# Patient Record
Sex: MALE | Race: WHITE | NOT HISPANIC OR LATINO | Employment: OTHER | URBAN - METROPOLITAN AREA
[De-identification: names, ages, dates, MRNs, and addresses within clinical notes are randomized per-mention and may not be internally consistent; named-entity substitution may affect disease eponyms.]

---

## 2018-04-12 LAB
ALBUMIN SERPL BCP-MCNC: 4.3 G/DL (ref 3–5.2)
ALP SERPL-CCNC: 88 U/L (ref 43–122)
ALT SERPL W P-5'-P-CCNC: 36 U/L (ref 9–52)
AMORPHOUS MATERIAL (HISTORICAL): ABNORMAL
ANION GAP SERPL CALCULATED.3IONS-SCNC: 12 MMOL/L (ref 5–14)
AST SERPL W P-5'-P-CCNC: 23 U/L (ref 17–59)
BACTERIA UR QL AUTO: ABNORMAL
BILIRUB SERPL-MCNC: 0.5 MG/DL
BILIRUB UR QL STRIP: NEGATIVE MG/DL
BUN SERPL-MCNC: 19 MG/DL (ref 5–25)
CALCIUM SERPL-MCNC: 9.7 MG/DL (ref 8.4–10.2)
CASTS/CASTS TYPE (HISTORICAL): ABNORMAL /LPF
CHLORIDE SERPL-SCNC: 101 MEQ/L (ref 97–108)
CLARITY UR: CLEAR
CO2 SERPL-SCNC: 28 MMOL/L (ref 22–30)
COLOR UR: YELLOW
CREATINE, SERUM (HISTORICAL): 0.8 MG/DL (ref 0.7–1.5)
CREATININE, RANDOM URINE (HISTORICAL): 132.9 MG/DL (ref 50–200)
CRYSTAL TYPE (HISTORICAL): ABNORMAL /HPF
EGFR (HISTORICAL): >60 ML/MIN/1.73 M2
EST. AVERAGE GLUCOSE BLD GHB EST-MCNC: 146 MG/DL
GLUCOSE FASTING (HISTORICAL): 148 MG/DL (ref 70–99)
GLUCOSE UR STRIP-MCNC: NEGATIVE MG/DL
HBA1C MFR BLD HPLC: 6.7 %
HGB UR QL STRIP.AUTO: NEGATIVE
KETONES UR STRIP-MCNC: NEGATIVE MG/DL
LEUKOCYTE ESTERASE UR QL STRIP: NEGATIVE
MAGNESIUM SERPL-MCNC: 1.4 MG/DL (ref 1.6–2.3)
MICROALBUM.,U,RANDOM (HISTORICAL): 6.4 MG/DL
MICROALBUMIN/CREATININE RATIO (HISTORICAL): 48.2
MUCOUS THREADS URNS QL MICRO: ABNORMAL
NITRITE UR QL STRIP: NEGATIVE
NON-SQ EPI CELLS URNS QL MICRO: ABNORMAL
OTHER STN SPEC: ABNORMAL
PH UR STRIP.AUTO: 6 [PH] (ref 4.5–8)
POTASSIUM SERPL-SCNC: 4.5 MEQ/L (ref 3.6–5)
PROT UR STRIP-MCNC: 15 MG/DL
RBC #/AREA URNS AUTO: ABNORMAL /HPF
SODIUM SERPL-SCNC: 141 MEQ/L (ref 137–147)
SP GR UR STRIP.AUTO: 1.02 (ref 1–1.04)
TOTAL PROTEIN (HISTORICAL): 6.8 G/DL (ref 5.9–8.4)
TSH SERPL DL<=0.05 MIU/L-ACNC: 3.61 UIU/ML (ref 0.47–4.68)
UROBILINOGEN UR QL STRIP.AUTO: NEGATIVE MG/DL (ref 0–1)
WBC #/AREA URNS AUTO: ABNORMAL /HPF

## 2018-07-16 DIAGNOSIS — R52 PAIN: Primary | ICD-10-CM

## 2018-07-18 RX ORDER — IBUPROFEN 600 MG/1
TABLET ORAL
Qty: 45 TABLET | Refills: 0 | Status: SHIPPED | OUTPATIENT
Start: 2018-07-18

## 2018-07-28 DIAGNOSIS — E11.8 TYPE 2 DIABETES MELLITUS WITH COMPLICATION, UNSPECIFIED WHETHER LONG TERM INSULIN USE: Primary | ICD-10-CM

## 2018-07-31 ENCOUNTER — TELEPHONE (OUTPATIENT)
Dept: FAMILY MEDICINE CLINIC | Facility: CLINIC | Age: 78
End: 2018-07-31

## 2018-09-08 DIAGNOSIS — N40.0 BENIGN PROSTATIC HYPERPLASIA, UNSPECIFIED WHETHER LOWER URINARY TRACT SYMPTOMS PRESENT: Primary | ICD-10-CM

## 2018-09-10 RX ORDER — TAMSULOSIN HYDROCHLORIDE 0.4 MG/1
CAPSULE ORAL
Qty: 90 CAPSULE | Refills: 0 | Status: SHIPPED | OUTPATIENT
Start: 2018-09-10 | End: 2018-11-11 | Stop reason: SDUPTHER

## 2018-11-11 DIAGNOSIS — E78.2 MIXED HYPERLIPIDEMIA: ICD-10-CM

## 2018-11-11 DIAGNOSIS — N40.0 BENIGN PROSTATIC HYPERPLASIA, UNSPECIFIED WHETHER LOWER URINARY TRACT SYMPTOMS PRESENT: ICD-10-CM

## 2018-11-11 DIAGNOSIS — E11.8 TYPE 2 DIABETES MELLITUS WITH COMPLICATION, UNSPECIFIED WHETHER LONG TERM INSULIN USE: ICD-10-CM

## 2018-11-11 DIAGNOSIS — I10 HTN (HYPERTENSION), BENIGN: Primary | ICD-10-CM

## 2018-11-12 DIAGNOSIS — E11.8 TYPE 2 DIABETES MELLITUS WITH COMPLICATION, UNSPECIFIED WHETHER LONG TERM INSULIN USE: ICD-10-CM

## 2018-11-12 DIAGNOSIS — E78.5 HYPERLIPIDEMIA, UNSPECIFIED HYPERLIPIDEMIA TYPE: Primary | ICD-10-CM

## 2018-11-12 DIAGNOSIS — I10 HYPERTENSION, UNSPECIFIED TYPE: ICD-10-CM

## 2018-11-12 DIAGNOSIS — T78.40XS ALLERGIC STATE, SEQUELA: ICD-10-CM

## 2018-11-12 RX ORDER — ATORVASTATIN CALCIUM 10 MG/1
TABLET, FILM COATED ORAL
Qty: 90 TABLET | Refills: 0 | Status: SHIPPED | OUTPATIENT
Start: 2018-11-12 | End: 2019-03-07

## 2018-11-12 RX ORDER — AMLODIPINE BESYLATE 10 MG/1
10 TABLET ORAL DAILY
Qty: 90 TABLET | Refills: 1 | Status: SHIPPED | OUTPATIENT
Start: 2018-11-12 | End: 2019-03-21 | Stop reason: SDUPTHER

## 2018-11-12 RX ORDER — TAMSULOSIN HYDROCHLORIDE 0.4 MG/1
CAPSULE ORAL
Qty: 90 CAPSULE | Refills: 0 | Status: SHIPPED | OUTPATIENT
Start: 2018-11-12 | End: 2019-02-06 | Stop reason: SDUPTHER

## 2018-11-12 RX ORDER — OLMESARTAN MEDOXOMIL 40 MG/1
TABLET ORAL
COMMUNITY
Start: 2018-04-24 | End: 2018-11-12 | Stop reason: SDUPTHER

## 2018-11-12 RX ORDER — ATORVASTATIN CALCIUM 10 MG/1
10 TABLET, FILM COATED ORAL DAILY
Qty: 90 TABLET | Refills: 1 | Status: SHIPPED | OUTPATIENT
Start: 2018-11-12 | End: 2019-03-21 | Stop reason: SDUPTHER

## 2018-11-12 RX ORDER — OLMESARTAN MEDOXOMIL 40 MG/1
40 TABLET ORAL DAILY
Qty: 90 TABLET | Refills: 1 | Status: SHIPPED | OUTPATIENT
Start: 2018-11-12 | End: 2019-03-07

## 2018-11-12 RX ORDER — AMLODIPINE BESYLATE 10 MG/1
TABLET ORAL
Qty: 90 TABLET | Refills: 0 | Status: SHIPPED | OUTPATIENT
Start: 2018-11-12 | End: 2019-03-07

## 2018-11-12 RX ORDER — FLUTICASONE PROPIONATE 50 MCG
1 SPRAY, SUSPENSION (ML) NASAL DAILY
Qty: 16 G | Refills: 3 | Status: SHIPPED | OUTPATIENT
Start: 2018-11-12

## 2018-11-12 RX ORDER — AMLODIPINE BESYLATE 10 MG/1
10 TABLET ORAL
COMMUNITY
End: 2018-11-12 | Stop reason: SDUPTHER

## 2018-11-12 RX ORDER — ATORVASTATIN CALCIUM 10 MG/1
TABLET, FILM COATED ORAL
COMMUNITY
Start: 2018-04-24 | End: 2018-11-12 | Stop reason: SDUPTHER

## 2018-11-12 RX ORDER — OLMESARTAN MEDOXOMIL 40 MG/1
TABLET ORAL
Qty: 90 TABLET | Refills: 0 | Status: SHIPPED | OUTPATIENT
Start: 2018-11-12 | End: 2019-03-21 | Stop reason: SDUPTHER

## 2019-02-06 DIAGNOSIS — N40.0 BENIGN PROSTATIC HYPERPLASIA, UNSPECIFIED WHETHER LOWER URINARY TRACT SYMPTOMS PRESENT: ICD-10-CM

## 2019-02-06 RX ORDER — TAMSULOSIN HYDROCHLORIDE 0.4 MG/1
CAPSULE ORAL
Qty: 20 CAPSULE | Refills: 0 | Status: SHIPPED | OUTPATIENT
Start: 2019-02-06 | End: 2019-03-21 | Stop reason: SDUPTHER

## 2019-03-07 ENCOUNTER — OFFICE VISIT (OUTPATIENT)
Dept: FAMILY MEDICINE CLINIC | Facility: CLINIC | Age: 79
End: 2019-03-07
Payer: COMMERCIAL

## 2019-03-07 VITALS
HEART RATE: 68 BPM | OXYGEN SATURATION: 96 % | RESPIRATION RATE: 16 BRPM | DIASTOLIC BLOOD PRESSURE: 80 MMHG | TEMPERATURE: 97.9 F | WEIGHT: 201.2 LBS | SYSTOLIC BLOOD PRESSURE: 168 MMHG | BODY MASS INDEX: 30.49 KG/M2 | HEIGHT: 68 IN

## 2019-03-07 DIAGNOSIS — D53.8 OTHER SPECIFIED NUTRITIONAL ANEMIAS: ICD-10-CM

## 2019-03-07 DIAGNOSIS — E11.9 TYPE 2 DIABETES MELLITUS WITHOUT COMPLICATION, UNSPECIFIED WHETHER LONG TERM INSULIN USE (HCC): Primary | ICD-10-CM

## 2019-03-07 DIAGNOSIS — E55.9 VITAMIN D INSUFFICIENCY: ICD-10-CM

## 2019-03-07 DIAGNOSIS — I10 ESSENTIAL HYPERTENSION: ICD-10-CM

## 2019-03-07 DIAGNOSIS — Z12.5 PROSTATE CANCER SCREENING: ICD-10-CM

## 2019-03-07 DIAGNOSIS — E78.49 OTHER HYPERLIPIDEMIA: ICD-10-CM

## 2019-03-07 LAB — SL AMB POCT GLUCOSE BLD: 150

## 2019-03-07 PROCEDURE — 1160F RVW MEDS BY RX/DR IN RCRD: CPT | Performed by: FAMILY MEDICINE

## 2019-03-07 PROCEDURE — 82948 REAGENT STRIP/BLOOD GLUCOSE: CPT | Performed by: FAMILY MEDICINE

## 2019-03-07 PROCEDURE — 99214 OFFICE O/P EST MOD 30 MIN: CPT | Performed by: FAMILY MEDICINE

## 2019-03-07 RX ORDER — TADALAFIL 5 MG/1
1 TABLET ORAL EVERY 24 HOURS
COMMUNITY
Start: 2015-01-23 | End: 2019-03-21 | Stop reason: SDUPTHER

## 2019-03-07 RX ORDER — CHLORAL HYDRATE 500 MG
CAPSULE ORAL
COMMUNITY
Start: 2014-07-25

## 2019-03-07 RX ORDER — CLOBETASOL PROPIONATE 0.5 MG/G
OINTMENT TOPICAL EVERY 24 HOURS
COMMUNITY
Start: 2018-04-24

## 2019-03-07 RX ORDER — MAGNESIUM CHLORIDE 64 MG
64 TABLET, DELAYED RELEASE (ENTERIC COATED) ORAL 4 TIMES DAILY
COMMUNITY
Start: 2015-01-07

## 2019-03-07 RX ORDER — ASPIRIN 81 MG/1
TABLET ORAL
COMMUNITY
Start: 2015-01-06

## 2019-03-07 RX ORDER — OXYCODONE HYDROCHLORIDE AND ACETAMINOPHEN 5; 325 MG/1; MG/1
TABLET ORAL
COMMUNITY
Start: 2018-06-14 | End: 2019-03-21

## 2019-03-07 NOTE — PROGRESS NOTES
Assessment/Plan:     Diagnoses and all orders for this visit:    Type 2 diabetes mellitus without complication, unspecified whether long term insulin use (HCC)  Comments:  Blood sugar measurements have been elevated  It was discussed about low carb diet  Continue same medications  I will check his A1c  Orders:  -     POCT blood glucose  -     CBC and differential; Future  -     Comprehensive metabolic panel; Future  -     Hemoglobin A1C; Future  -     Lipid Panel with Direct LDL reflex; Future  -     Microalbumin / creatinine urine ratio  -     TSH, 3rd generation with Free T4 reflex; Future    Essential hypertension  Comments:  Not well controlled  Continue take his medication  He is to monitor his blood pressure at home  Come back in 2 weeks  Orders:  -     TSH, 3rd generation with Free T4 reflex; Future    Other hyperlipidemia  Comments:  Continue same  It was discussed about low-fat diet  Will continue to monitor  Other specified nutritional anemias    Prostate cancer screening  -     PSA, Total Screen; Future    Vitamin D insufficiency  -     Vitamin D 25 hydroxy; Future    Other orders  -     tadalafil (CIALIS) 5 MG tablet; 1 tablet every 24 hours  -     oxyCODONE-acetaminophen (PERCOCET) 5-325 mg per tablet; take 1 tablet by oral route  at bed time  -     magnesium chloride (MAG64) 64 MG TBEC EC tablet; take 2 tablets TID  -     clobetasol (TEMOVATE) 0 05 % ointment; every 24 hours  -     Omega-3 Fatty Acids (FISH OIL) 1,000 mg; take 1 by Oral route once  Pt takes 4x week  -     aspirin (ECOTRIN LOW STRENGTH) 81 mg EC tablet; take 1 tablet 4x wkly          There are no Patient Instructions on file for this visit  Return in about 2 years (around 3/7/2021)  Subjective:      Patient ID: Ananda Julian is a 77 y o  male      Chief Complaint   Patient presents with    Diabetes     elevated bs of over 200 last night and this am 210 1 hour after metformin       He is here today for follow-up of his high blood sugar measurements  He stated that his sugar was elevated to 200 yesterday and this morning he checked it again after he ate breakfast and it was 200  Fasting it was 150  He is on metformin 1000 twice a day  His A1c was done at almost a year ago and it was 6 7  He denies any other complaint  His blood pressure was elevated today  He stated he usually his blood pressure is well controlled  He takes Lipitor for hyperlipidemia  The following portions of the patient's history were reviewed and updated as appropriate: allergies, current medications, past family history, past medical history, past social history, past surgical history and problem list     Review of Systems   Constitutional: Negative for chills and fever  HENT: Negative for trouble swallowing  Eyes: Negative for visual disturbance  Respiratory: Negative for cough and shortness of breath  Cardiovascular: Negative for chest pain and palpitations  Gastrointestinal: Negative for abdominal pain, blood in stool and vomiting  Endocrine: Negative for cold intolerance and heat intolerance  Genitourinary: Negative for difficulty urinating and dysuria  Musculoskeletal: Negative for gait problem  Skin: Negative for rash  Neurological: Negative for dizziness, syncope and headaches  Hematological: Negative for adenopathy  Psychiatric/Behavioral: Negative for behavioral problems           Current Outpatient Medications   Medication Sig Dispense Refill    amLODIPine (NORVASC) 10 mg tablet Take 1 tablet (10 mg total) by mouth daily 90 tablet 1    aspirin (ECOTRIN LOW STRENGTH) 81 mg EC tablet take 1 tablet 4x wkly      atorvastatin (LIPITOR) 10 mg tablet Take 1 tablet (10 mg total) by mouth daily 90 tablet 1    clobetasol (TEMOVATE) 0 05 % ointment every 24 hours      magnesium chloride (MAG64) 64 MG TBEC EC tablet take 2 tablets TID      metFORMIN (GLUCOPHAGE) 1000 MG tablet Take 1 tablet (1,000 mg total) by mouth 2 (two) times a day with meals Breakfast and dinner 180 tablet 1    olmesartan (BENICAR) 40 mg tablet TAKE ONE TABLET BY MOUTH EVERY DAY  90 tablet 0    Omega-3 Fatty Acids (FISH OIL) 1,000 mg take 1 by Oral route once  Pt takes 4x week      tadalafil (CIALIS) 5 MG tablet 1 tablet every 24 hours      tamsulosin (FLOMAX) 0 4 mg TAKE ONE CAPSULE BY MOUTH EVERY DAY A 1/2 HOUR FOLLOWING THE SAME MEAL EACH DAY  20 capsule 0    fluticasone (FLONASE) 50 mcg/act nasal spray 1 spray into each nostril daily (Patient not taking: Reported on 3/7/2019) 16 g 3    ibuprofen (MOTRIN) 600 mg tablet TAKE ONE TABLET BY MOUTH THREE TIMES DAILY WITH FOOD  (Patient not taking: Reported on 3/7/2019) 45 tablet 0    oxyCODONE-acetaminophen (PERCOCET) 5-325 mg per tablet take 1 tablet by oral route  at bed time       No current facility-administered medications for this visit  Objective:    /80 (BP Location: Left arm, Patient Position: Sitting, Cuff Size: Standard)   Pulse 68   Temp 97 9 °F (36 6 °C) (Tympanic)   Resp 16   Ht 5' 8" (1 727 m)   Wt 91 3 kg (201 lb 3 2 oz)   SpO2 96%   BMI 30 59 kg/m²        Physical Exam   Constitutional: He is oriented to person, place, and time  He appears well-developed and well-nourished  HENT:   Head: Normocephalic and atraumatic  Eyes: Pupils are equal, round, and reactive to light  EOM are normal    Neck: Normal range of motion  Neck supple  Cardiovascular: Normal rate, regular rhythm and normal heart sounds  Pulmonary/Chest: Effort normal and breath sounds normal    Abdominal: Soft  Bowel sounds are normal    Musculoskeletal: Normal range of motion  He exhibits no edema  Lymphadenopathy:     He has no cervical adenopathy  Neurological: He is alert and oriented to person, place, and time  No cranial nerve deficit  Skin: Skin is warm  No rash noted  Psychiatric: He has a normal mood and affect  Nursing note and vitals reviewed  Phi Lao MD

## 2019-03-08 ENCOUNTER — TRANSCRIBE ORDERS (OUTPATIENT)
Dept: ADMINISTRATIVE | Facility: HOSPITAL | Age: 79
End: 2019-03-08

## 2019-03-08 ENCOUNTER — LAB (OUTPATIENT)
Dept: LAB | Facility: IMAGING CENTER | Age: 79
End: 2019-03-08
Payer: COMMERCIAL

## 2019-03-08 DIAGNOSIS — E11.9 TYPE 2 DIABETES MELLITUS WITHOUT COMPLICATION, UNSPECIFIED WHETHER LONG TERM INSULIN USE (HCC): ICD-10-CM

## 2019-03-08 DIAGNOSIS — E55.9 VITAMIN D INSUFFICIENCY: ICD-10-CM

## 2019-03-08 DIAGNOSIS — I10 ESSENTIAL HYPERTENSION: ICD-10-CM

## 2019-03-08 DIAGNOSIS — Z12.5 PROSTATE CANCER SCREENING: ICD-10-CM

## 2019-03-08 LAB
25(OH)D3 SERPL-MCNC: 32 NG/ML (ref 30–100)
ALBUMIN SERPL BCP-MCNC: 4.1 G/DL (ref 3.5–5)
ALP SERPL-CCNC: 88 U/L (ref 46–116)
ALT SERPL W P-5'-P-CCNC: 26 U/L (ref 12–78)
ANION GAP SERPL CALCULATED.3IONS-SCNC: 7 MMOL/L (ref 4–13)
AST SERPL W P-5'-P-CCNC: 17 U/L (ref 5–45)
BASOPHILS # BLD AUTO: 0.07 THOUSANDS/ΜL (ref 0–0.1)
BASOPHILS NFR BLD AUTO: 1 % (ref 0–1)
BILIRUB SERPL-MCNC: 0.54 MG/DL (ref 0.2–1)
BUN SERPL-MCNC: 19 MG/DL (ref 5–25)
CALCIUM SERPL-MCNC: 9 MG/DL (ref 8.3–10.1)
CHLORIDE SERPL-SCNC: 105 MMOL/L (ref 100–108)
CHOLEST SERPL-MCNC: 101 MG/DL (ref 50–200)
CO2 SERPL-SCNC: 27 MMOL/L (ref 21–32)
CREAT SERPL-MCNC: 1 MG/DL (ref 0.6–1.3)
CREAT UR-MCNC: 175 MG/DL
EOSINOPHIL # BLD AUTO: 0.17 THOUSAND/ΜL (ref 0–0.61)
EOSINOPHIL NFR BLD AUTO: 2 % (ref 0–6)
ERYTHROCYTE [DISTWIDTH] IN BLOOD BY AUTOMATED COUNT: 12.7 % (ref 11.6–15.1)
EST. AVERAGE GLUCOSE BLD GHB EST-MCNC: 154 MG/DL
GFR SERPL CREATININE-BSD FRML MDRD: 71 ML/MIN/1.73SQ M
GLUCOSE P FAST SERPL-MCNC: 157 MG/DL (ref 65–99)
HBA1C MFR BLD: 7 % (ref 4.2–6.3)
HCT VFR BLD AUTO: 39.4 % (ref 36.5–49.3)
HDLC SERPL-MCNC: 39 MG/DL (ref 40–60)
HGB BLD-MCNC: 13.2 G/DL (ref 12–17)
IMM GRANULOCYTES # BLD AUTO: 0.02 THOUSAND/UL (ref 0–0.2)
IMM GRANULOCYTES NFR BLD AUTO: 0 % (ref 0–2)
LDLC SERPL CALC-MCNC: 39 MG/DL (ref 0–100)
LYMPHOCYTES # BLD AUTO: 2.4 THOUSANDS/ΜL (ref 0.6–4.47)
LYMPHOCYTES NFR BLD AUTO: 31 % (ref 14–44)
MCH RBC QN AUTO: 32.4 PG (ref 26.8–34.3)
MCHC RBC AUTO-ENTMCNC: 33.5 G/DL (ref 31.4–37.4)
MCV RBC AUTO: 97 FL (ref 82–98)
MICROALBUMIN UR-MCNC: 57.7 MG/L (ref 0–20)
MICROALBUMIN/CREAT 24H UR: 33 MG/G CREATININE (ref 0–30)
MONOCYTES # BLD AUTO: 0.71 THOUSAND/ΜL (ref 0.17–1.22)
MONOCYTES NFR BLD AUTO: 9 % (ref 4–12)
NEUTROPHILS # BLD AUTO: 4.32 THOUSANDS/ΜL (ref 1.85–7.62)
NEUTS SEG NFR BLD AUTO: 57 % (ref 43–75)
NRBC BLD AUTO-RTO: 0 /100 WBCS
PLATELET # BLD AUTO: 238 THOUSANDS/UL (ref 149–390)
PMV BLD AUTO: 11.6 FL (ref 8.9–12.7)
POTASSIUM SERPL-SCNC: 4.2 MMOL/L (ref 3.5–5.3)
PROT SERPL-MCNC: 6.8 G/DL (ref 6.4–8.2)
PSA SERPL-MCNC: 2.3 NG/ML (ref 0–4)
RBC # BLD AUTO: 4.08 MILLION/UL (ref 3.88–5.62)
SODIUM SERPL-SCNC: 139 MMOL/L (ref 136–145)
TRIGL SERPL-MCNC: 115 MG/DL
TSH SERPL DL<=0.05 MIU/L-ACNC: 3.17 UIU/ML (ref 0.36–3.74)
WBC # BLD AUTO: 7.69 THOUSAND/UL (ref 4.31–10.16)

## 2019-03-08 PROCEDURE — 84443 ASSAY THYROID STIM HORMONE: CPT

## 2019-03-08 PROCEDURE — 80053 COMPREHEN METABOLIC PANEL: CPT

## 2019-03-08 PROCEDURE — 82043 UR ALBUMIN QUANTITATIVE: CPT | Performed by: FAMILY MEDICINE

## 2019-03-08 PROCEDURE — 36415 COLL VENOUS BLD VENIPUNCTURE: CPT

## 2019-03-08 PROCEDURE — 82306 VITAMIN D 25 HYDROXY: CPT

## 2019-03-08 PROCEDURE — 80061 LIPID PANEL: CPT

## 2019-03-08 PROCEDURE — G0103 PSA SCREENING: HCPCS

## 2019-03-08 PROCEDURE — 83036 HEMOGLOBIN GLYCOSYLATED A1C: CPT

## 2019-03-08 PROCEDURE — 82570 ASSAY OF URINE CREATININE: CPT | Performed by: FAMILY MEDICINE

## 2019-03-08 PROCEDURE — 85025 COMPLETE CBC W/AUTO DIFF WBC: CPT

## 2019-03-21 ENCOUNTER — OFFICE VISIT (OUTPATIENT)
Dept: FAMILY MEDICINE CLINIC | Facility: CLINIC | Age: 79
End: 2019-03-21
Payer: COMMERCIAL

## 2019-03-21 VITALS
OXYGEN SATURATION: 97 % | HEIGHT: 68 IN | HEART RATE: 65 BPM | TEMPERATURE: 98.1 F | RESPIRATION RATE: 16 BRPM | BODY MASS INDEX: 30.52 KG/M2 | WEIGHT: 201.38 LBS | SYSTOLIC BLOOD PRESSURE: 150 MMHG | DIASTOLIC BLOOD PRESSURE: 80 MMHG

## 2019-03-21 DIAGNOSIS — N40.0 BENIGN PROSTATIC HYPERPLASIA, UNSPECIFIED WHETHER LOWER URINARY TRACT SYMPTOMS PRESENT: ICD-10-CM

## 2019-03-21 DIAGNOSIS — Z00.00 HEALTHCARE MAINTENANCE: ICD-10-CM

## 2019-03-21 DIAGNOSIS — E78.5 HYPERLIPIDEMIA, UNSPECIFIED HYPERLIPIDEMIA TYPE: ICD-10-CM

## 2019-03-21 DIAGNOSIS — I10 HTN (HYPERTENSION), BENIGN: ICD-10-CM

## 2019-03-21 DIAGNOSIS — N52.8 OTHER MALE ERECTILE DYSFUNCTION: ICD-10-CM

## 2019-03-21 DIAGNOSIS — I10 HYPERTENSION, UNSPECIFIED TYPE: ICD-10-CM

## 2019-03-21 DIAGNOSIS — E11.8 TYPE 2 DIABETES MELLITUS WITH COMPLICATION, UNSPECIFIED WHETHER LONG TERM INSULIN USE: Primary | ICD-10-CM

## 2019-03-21 PROCEDURE — 1125F AMNT PAIN NOTED PAIN PRSNT: CPT | Performed by: FAMILY MEDICINE

## 2019-03-21 PROCEDURE — G0439 PPPS, SUBSEQ VISIT: HCPCS | Performed by: FAMILY MEDICINE

## 2019-03-21 PROCEDURE — 1160F RVW MEDS BY RX/DR IN RCRD: CPT | Performed by: FAMILY MEDICINE

## 2019-03-21 PROCEDURE — 1170F FXNL STATUS ASSESSED: CPT | Performed by: FAMILY MEDICINE

## 2019-03-21 PROCEDURE — 99214 OFFICE O/P EST MOD 30 MIN: CPT | Performed by: FAMILY MEDICINE

## 2019-03-21 RX ORDER — OLMESARTAN MEDOXOMIL 40 MG/1
40 TABLET ORAL DAILY
Qty: 90 TABLET | Refills: 0 | Status: SHIPPED | OUTPATIENT
Start: 2019-03-21 | End: 2019-07-15 | Stop reason: SDUPTHER

## 2019-03-21 RX ORDER — TAMSULOSIN HYDROCHLORIDE 0.4 MG/1
0.4 CAPSULE ORAL
Qty: 90 CAPSULE | Refills: 1 | Status: SHIPPED | OUTPATIENT
Start: 2019-03-21 | End: 2019-10-15 | Stop reason: SDUPTHER

## 2019-03-21 RX ORDER — AMLODIPINE BESYLATE 10 MG/1
10 TABLET ORAL DAILY
Qty: 90 TABLET | Refills: 1 | Status: SHIPPED | OUTPATIENT
Start: 2019-03-21 | End: 2019-10-15 | Stop reason: SDUPTHER

## 2019-03-21 RX ORDER — ATORVASTATIN CALCIUM 10 MG/1
10 TABLET, FILM COATED ORAL DAILY
Qty: 90 TABLET | Refills: 1 | Status: SHIPPED | OUTPATIENT
Start: 2019-03-21 | End: 2020-02-28 | Stop reason: SDUPTHER

## 2019-03-21 RX ORDER — TADALAFIL 5 MG/1
5 TABLET ORAL EVERY 24 HOURS
Qty: 30 TABLET | Refills: 3 | Status: SHIPPED | OUTPATIENT
Start: 2019-03-21 | End: 2019-04-05

## 2019-03-21 NOTE — PROGRESS NOTES
Assessment and Plan:    Problem List Items Addressed This Visit        Endocrine    Type 2 diabetes mellitus ([facility] [address] ) - Primary    Relevant Medications    metFORMIN (GLUCOPHAGE) 1000 MG tablet    Other Relevant Orders    CBC and differential    Comprehensive metabolic panel    Lipid Panel with Direct LDL reflex    TSH, 3rd generation with Free T4 reflex    Hemoglobin A1C       Cardiovascular and Mediastinum    Hypertension    Relevant Medications    amLODIPine (NORVASC) 10 mg tablet    olmesartan (BENICAR) 40 mg tablet    Other Relevant Orders    CBC and differential    Comprehensive metabolic panel    TSH, 3rd generation with Free T4 reflex       Genitourinary    Benign prostatic hyperplasia    Relevant Medications    tamsulosin (FLOMAX) 0 4 mg       Other    Hyperlipidemia    Relevant Medications    atorvastatin (LIPITOR) 10 mg tablet    Other Relevant Orders    Lipid Panel with Direct LDL reflex      Other Visit Diagnoses     HTN (hypertension), benign        Relevant Medications    amLODIPine (NORVASC) 10 mg tablet    olmesartan (BENICAR) 40 mg tablet    Other male erectile dysfunction        Was given refills for Cialis  Relevant Medications    tadalafil (CIALIS) 5 MG tablet    Healthcare maintenance        It was discussed about immunization, diet, exercise and safety measures  Health Maintenance Due   Topic Date Due    Medicare Annual Wellness Visit (AWV)  1940    Diabetic Foot Exam  07/08/1949    DM Eye Exam  07/08/1949    BMI: Followup Plan  07/08/1957    HEPATITIS B VACCINES (1 of 3 - Risk 3-dose series) 07/08/1958    DTaP,Tdap,and Td Vaccines (1 - Tdap) 04/08/2016         HPI:  Frannie Martell is a 77 y o  male here for his Subsequent Wellness Visit      Patient Active Problem List   Diagnosis    Hypertension    Type 2 diabetes mellitus ([facility] [address] )    Allergic rhinitis    Anemia    Benign prostatic hyperplasia    Hyperlipidemia    Obesity     Past Medical History:   Diagnosis Date    Diabetes mellitus ([facility] [address] )     Hypertension      Past Surgical History:   Procedure Laterality Date    GALLBLADDER SURGERY  1968     Family History   Problem Relation Age of Onset    No Known Problems Mother     No Known Problems Father      Social History     Tobacco Use   Smoking Status Never Smoker   Smokeless Tobacco Never Used   Tobacco Comment    no passive smoke exposure      Social History     Substance and Sexual Activity   Alcohol Use Not Currently      Social History     Substance and Sexual Activity   Drug Use Never       Current Outpatient Medications   Medication Sig Dispense Refill    amLODIPine (NORVASC) 10 mg tablet Take 1 tablet (10 mg total) by mouth daily 90 tablet 1    atorvastatin (LIPITOR) 10 mg tablet Take 1 tablet (10 mg total) by mouth daily 90 tablet 1    clobetasol (TEMOVATE) 0 05 % ointment every 24 hours      magnesium chloride (MAG64) 64 MG TBEC EC tablet take 2 tablets TID      metFORMIN (GLUCOPHAGE) 1000 MG tablet Take 1 tablet (1,000 mg total) by mouth 2 (two) times a day with meals Breakfast and dinner 180 tablet 1    olmesartan (BENICAR) 40 mg tablet Take 1 tablet (40 mg total) by mouth daily 90 tablet 0    Omega-3 Fatty Acids (FISH OIL) 1,000 mg take 1 by Oral route once  Pt takes 4x week      tadalafil (CIALIS) 5 MG tablet Take 1 tablet (5 mg total) by mouth every 24 hours 30 tablet 3    tamsulosin (FLOMAX) 0 4 mg Take 1 capsule (0 4 mg total) by mouth daily with dinner 90 capsule 1    aspirin (ECOTRIN LOW STRENGTH) 81 mg EC tablet take 1 tablet 4x wkly      fluticasone (FLONASE) 50 mcg/act nasal spray 1 spray into each nostril daily (Patient not taking: Reported on 3/7/2019) 16 g 3    ibuprofen (MOTRIN) 600 mg tablet TAKE ONE TABLET BY MOUTH THREE TIMES DAILY WITH FOOD  (Patient not taking: Reported on 3/7/2019) 45 tablet 0     No current facility-administered medications for this visit        Allergies   Allergen Reactions    Penicillins Other (See Comments)     Immunization History   Administered Date(s) Administered    INFLUENZA 10/24/2015, 10/05/2016, 09/19/2017, 10/05/2018    Pneumococcal Conjugate 13-Valent 07/22/2015    Pneumococcal Polysaccharide PPV23 10/01/2011    Td (adult), Unspecified 04/07/2016       Patient Care Team:  Amanuel Schwab MD as PCP - General (Family Medicine)    Medicare Screening Tests and Risk Assessments:  Anya Noe is here for his Subsequent Wellness visit  Health Risk Assessment:  Patient rates overall health as very good  Patient feels that their physical health rating is Same  Eyesight was rated as Slightly worse  Hearing was rated as Slightly worse  Patient feels that their emotional and mental health rating is Same  Pain experienced by patient in the last 7 days has been Some  Patient's pain rating has been 3/10  Patient states that he has experienced no weight loss or gain in last 6 months  Emotional/Mental Health:  Patient has been feeling nervous/anxious  PHQ-9 Depression Screening:    Frequency of the following problems over the past two weeks:      1  Little interest or pleasure in doing things: 0 - not at all  PHQ-2 Score: 0          Broken Bones/Falls: Fall Risk Assessment:    In the past year, patient has experienced: No history of falling in past year          Bladder/Bowel:  Patient has leaked urine accidently in the last six months  Patient reports loss of bowel control  Immunizations:  Patient has had a flu vaccination within the last year  Patient has received a pneumonia shot  Patient has received a shingles shot  Patient has not received tetanus/diphtheria shot  Home Safety:  Patient does not have trouble with stairs inside or outside of their home  Patient currently reports that there are no safety hazards present in home, working smoke alarms, no working carbon monoxide detectors        Preventative Screenings:   prostate cancer screen performed, no colon cancer screen completed, cholesterol screen completed, glaucoma eye exam completed,     Nutrition:  Current diet: Regular, Diabetic and Frequent junk food with servings of the following:    Medications:  Patient is currently taking over-the-counter supplements  List of OTC medications includes: Magnesium, Fish oil  Patient is able to manage medications  Lifestyle Choices:  Patient reports no tobacco use  Patient has smoked or used tobacco in the past   Patient has stopped his tobacco use  Patient reports no alcohol use  Patient drives a vehicle  Patient wears seat belt  Current level of exercise of physical activity described by patient as: active at home  Activities of Daily Living:  Can get out of bed by his or her self, able to dress self, able to make own meals, able to do own shopping, able to bathe self, can do own laundry/housekeeping, can manage own money, pay bills and track expenses    Previous Hospitalizations:  No hospitalization or ED visit in past 12 months        Advanced Directives:  Patient has decided on a power of   Patient has spoken to designated power of   Patient has not completed advanced directive          Preventative Screening/Counseling:      Cardiovascular:      General: Risks and Benefits Discussed and Screening Current          Diabetes:      General: Risks and Benefits Discussed and Screening Current          Colorectal Cancer:      General: Risks and Benefits Discussed          Prostate Cancer:      General: Screening Current and Risks and Benefits Discussed          Osteoporosis:      General: Risks and Benefits Discussed          AAA:      General: Risks and Benefits Discussed          Glaucoma:      General: Risks and Benefits Discussed and Screening Current          HIV:      General: Risks and Benefits Discussed          Hepatitis C:      General: Risks and Benefits Discussed        Advanced Directives:   Patient has no living will for healthcare, does not have durable POA for healthcare, patient does not have an advanced directive

## 2019-03-21 NOTE — PROGRESS NOTES
Assessment/Plan:     Diagnoses and all orders for this visit:    Type 2 diabetes mellitus with complication, unspecified whether long term insulin use ([facility] [address] )  Comments:  Fair controlled  Continue same  It was discussed about low carb diet  We will continue to monitor  Orders:  -     metFORMIN (GLUCOPHAGE) 1000 MG tablet; Take 1 tablet (1,000 mg total) by mouth 2 (two) times a day with meals Breakfast and dinner  -     CBC and differential; Future  -     Comprehensive metabolic panel; Future  -     Lipid Panel with Direct LDL reflex; Future  -     TSH, 3rd generation with Free T4 reflex; Future  -     Hemoglobin A1C; Future    Hyperlipidemia, unspecified hyperlipidemia type  Comments:  Stable  Continue same  Will continue to monitor  Orders:  -     atorvastatin (LIPITOR) 10 mg tablet; Take 1 tablet (10 mg total) by mouth daily  -     Lipid Panel with Direct LDL reflex; Future    Hypertension, unspecified type  Comments:  Not well controlled  Continue same  Will continue to monitor  Orders:  -     amLODIPine (NORVASC) 10 mg tablet; Take 1 tablet (10 mg total) by mouth daily  -     CBC and differential; Future  -     Comprehensive metabolic panel; Future  -     TSH, 3rd generation with Free T4 reflex; Future    HTN (hypertension), benign  -     olmesartan (BENICAR) 40 mg tablet; Take 1 tablet (40 mg total) by mouth daily    Benign prostatic hyperplasia, unspecified whether lower urinary tract symptoms present  Comments:  Stable  Continue same  Orders:  -     tamsulosin (FLOMAX) 0 4 mg; Take 1 capsule (0 4 mg total) by mouth daily with dinner    Other male erectile dysfunction  Comments:  Was given refills for Cialis  Orders:  -     tadalafil (CIALIS) 5 MG tablet; Take 1 tablet (5 mg total) by mouth every 24 hours    Healthcare maintenance  Comments: It was discussed about immunization, diet, exercise and safety measures  There are no Patient Instructions on file for this visit      No follow-ups on file     Subjective:      Patient ID: Kiah Paz is a 77 y o  male  Chief Complaint   Patient presents with   [facility] Wellness Visit       He is here today for follow-up multiple medical problems  He has been taking his medications  His A1c went up to 7  He has been trying to watch his diet better since the last visit  He takes all his medications  He denies any side effects from his medications  He stated that the Cialis helping with his BPH symptoms and his erectile dysfunction  The following portions of the patient's history were reviewed and updated as appropriate: allergies, current medications, past family history, past medical history, past social history, past surgical history and problem list     Review of Systems   Constitutional: Negative for chills and fever  HENT: Negative for trouble swallowing  Eyes: Negative for visual disturbance  Respiratory: Negative for cough and shortness of breath  Cardiovascular: Negative for chest pain and palpitations  Gastrointestinal: Negative for abdominal pain, blood in stool and vomiting  Endocrine: Negative for cold intolerance and heat intolerance  Genitourinary: Negative for difficulty urinating and dysuria  Musculoskeletal: Negative for gait problem  Skin: Negative for rash  Neurological: Negative for dizziness, syncope and headaches  Hematological: Negative for adenopathy  Psychiatric/Behavioral: Negative for behavioral problems           Current Outpatient Medications   Medication Sig Dispense Refill    amLODIPine (NORVASC) 10 mg tablet Take 1 tablet (10 mg total) by mouth daily 90 tablet 1    atorvastatin (LIPITOR) 10 mg tablet Take 1 tablet (10 mg total) by mouth daily 90 tablet 1    clobetasol (TEMOVATE) 0 05 % ointment every 24 hours      magnesium chloride (MAG64) 64 MG TBEC EC tablet take 2 tablets TID      metFORMIN (GLUCOPHAGE) 1000 MG tablet Take 1 tablet (1,000 mg total) by mouth 2 (two) times a day with meals Breakfast and dinner 180 tablet 1    olmesartan (BENICAR) 40 mg tablet Take 1 tablet (40 mg total) by mouth daily 90 tablet 0    Omega-3 Fatty Acids (FISH OIL) 1,000 mg take 1 by Oral route once  Pt takes 4x week      tadalafil (CIALIS) 5 MG tablet Take 1 tablet (5 mg total) by mouth every 24 hours 30 tablet 3    tamsulosin (FLOMAX) 0 4 mg Take 1 capsule (0 4 mg total) by mouth daily with dinner 90 capsule 1    aspirin (ECOTRIN LOW STRENGTH) 81 mg EC tablet take 1 tablet 4x wkly      fluticasone (FLONASE) 50 mcg/act nasal spray 1 spray into each nostril daily (Patient not taking: Reported on 3/7/2019) 16 g 3    ibuprofen (MOTRIN) 600 mg tablet TAKE ONE TABLET BY MOUTH THREE TIMES DAILY WITH FOOD  (Patient not taking: Reported on 3/7/2019) 45 tablet 0     No current facility-administered medications for this visit  Objective:    /80 (BP Location: Left arm, Patient Position: Sitting, Cuff Size: Adult)   Pulse 65   Temp 98 1 °F (36 7 °C) (Tympanic)   Resp 16   Ht 5' 8" (1 727 m)   Wt 91 3 kg (201 lb 6 oz)   SpO2 97%   BMI 30 62 kg/m²        Physical Exam   Constitutional: He is oriented to person, place, and time  He appears well-developed and well-nourished  HENT:   Head: Normocephalic and atraumatic  Eyes: Pupils are equal, round, and reactive to light  EOM are normal    Neck: Normal range of motion  Neck supple  Cardiovascular: Normal rate, regular rhythm and normal heart sounds  Pulmonary/Chest: Effort normal and breath sounds normal    Abdominal: Soft  Bowel sounds are normal    Musculoskeletal: Normal range of motion  He exhibits no edema  Lymphadenopathy:     He has no cervical adenopathy  Neurological: He is alert and oriented to person, place, and time  No cranial nerve deficit  Skin: Skin is warm  No rash noted  Psychiatric: He has a normal mood and affect  Nursing note and vitals reviewed               Kostas Suh MD

## 2019-03-21 NOTE — PROGRESS NOTES
Patient's shoes and socks removed  Right Foot/Ankle   Right Foot Inspection  Skin Exam: skin normal, skin intact and dry skin no warmth, no callus, no erythema, no maceration, no abnormal color, no pre-ulcer, no ulcer and no callus                              Vascular  Capillary refills: < 3 seconds      Left Foot/Ankle  Left Foot Inspection  Skin Exam: skin normal and skin intactno warmth, no erythema, no maceration, normal color, no pre-ulcer, no ulcer and no callus                                           Vascular  Capillary refills: < 3 seconds    Assign Risk Category:  No deformity present; No loss of protective sensation;  No weak pulses       Risk: 0  left thickened toenail great toe

## 2019-04-05 ENCOUNTER — TELEPHONE (OUTPATIENT)
Dept: FAMILY MEDICINE CLINIC | Facility: CLINIC | Age: 79
End: 2019-04-05

## 2019-04-05 DIAGNOSIS — N52.8 OTHER MALE ERECTILE DYSFUNCTION: Primary | ICD-10-CM

## 2019-04-05 RX ORDER — SILDENAFIL 100 MG/1
100 TABLET, FILM COATED ORAL DAILY PRN
Qty: 10 TABLET | Refills: 3 | Status: SHIPPED | OUTPATIENT
Start: 2019-04-05

## 2019-06-03 ENCOUNTER — APPOINTMENT (OUTPATIENT)
Dept: LAB | Facility: IMAGING CENTER | Age: 79
End: 2019-06-03
Payer: COMMERCIAL

## 2019-06-03 ENCOUNTER — TRANSCRIBE ORDERS (OUTPATIENT)
Dept: ADMINISTRATIVE | Facility: HOSPITAL | Age: 79
End: 2019-06-03

## 2019-06-03 DIAGNOSIS — E78.5 HYPERLIPIDEMIA, UNSPECIFIED HYPERLIPIDEMIA TYPE: ICD-10-CM

## 2019-06-03 DIAGNOSIS — I10 HYPERTENSION, UNSPECIFIED TYPE: ICD-10-CM

## 2019-06-03 DIAGNOSIS — E11.8 TYPE 2 DIABETES MELLITUS WITH COMPLICATION, UNSPECIFIED WHETHER LONG TERM INSULIN USE: ICD-10-CM

## 2019-06-03 LAB
ALBUMIN SERPL BCP-MCNC: 4.2 G/DL (ref 3.5–5)
ALP SERPL-CCNC: 86 U/L (ref 46–116)
ALT SERPL W P-5'-P-CCNC: 22 U/L (ref 12–78)
ANION GAP SERPL CALCULATED.3IONS-SCNC: 4 MMOL/L (ref 4–13)
AST SERPL W P-5'-P-CCNC: 12 U/L (ref 5–45)
BASOPHILS # BLD AUTO: 0.06 THOUSANDS/ΜL (ref 0–0.1)
BASOPHILS NFR BLD AUTO: 1 % (ref 0–1)
BILIRUB SERPL-MCNC: 0.57 MG/DL (ref 0.2–1)
BUN SERPL-MCNC: 18 MG/DL (ref 5–25)
CALCIUM SERPL-MCNC: 8.5 MG/DL (ref 8.3–10.1)
CHLORIDE SERPL-SCNC: 105 MMOL/L (ref 100–108)
CHOLEST SERPL-MCNC: 86 MG/DL (ref 50–200)
CO2 SERPL-SCNC: 28 MMOL/L (ref 21–32)
CREAT SERPL-MCNC: 1.05 MG/DL (ref 0.6–1.3)
EOSINOPHIL # BLD AUTO: 0.12 THOUSAND/ΜL (ref 0–0.61)
EOSINOPHIL NFR BLD AUTO: 2 % (ref 0–6)
ERYTHROCYTE [DISTWIDTH] IN BLOOD BY AUTOMATED COUNT: 13 % (ref 11.6–15.1)
EST. AVERAGE GLUCOSE BLD GHB EST-MCNC: 140 MG/DL
GFR SERPL CREATININE-BSD FRML MDRD: 67 ML/MIN/1.73SQ M
GLUCOSE P FAST SERPL-MCNC: 153 MG/DL (ref 65–99)
HBA1C MFR BLD: 6.5 % (ref 4.2–6.3)
HCT VFR BLD AUTO: 39.6 % (ref 36.5–49.3)
HDLC SERPL-MCNC: 34 MG/DL (ref 40–60)
HGB BLD-MCNC: 13.3 G/DL (ref 12–17)
IMM GRANULOCYTES # BLD AUTO: 0.02 THOUSAND/UL (ref 0–0.2)
IMM GRANULOCYTES NFR BLD AUTO: 0 % (ref 0–2)
LDLC SERPL CALC-MCNC: 32 MG/DL (ref 0–100)
LYMPHOCYTES # BLD AUTO: 1.96 THOUSANDS/ΜL (ref 0.6–4.47)
LYMPHOCYTES NFR BLD AUTO: 24 % (ref 14–44)
MCH RBC QN AUTO: 32.7 PG (ref 26.8–34.3)
MCHC RBC AUTO-ENTMCNC: 33.6 G/DL (ref 31.4–37.4)
MCV RBC AUTO: 97 FL (ref 82–98)
MONOCYTES # BLD AUTO: 0.73 THOUSAND/ΜL (ref 0.17–1.22)
MONOCYTES NFR BLD AUTO: 9 % (ref 4–12)
NEUTROPHILS # BLD AUTO: 5.19 THOUSANDS/ΜL (ref 1.85–7.62)
NEUTS SEG NFR BLD AUTO: 64 % (ref 43–75)
NRBC BLD AUTO-RTO: 0 /100 WBCS
PLATELET # BLD AUTO: 248 THOUSANDS/UL (ref 149–390)
PMV BLD AUTO: 11.7 FL (ref 8.9–12.7)
POTASSIUM SERPL-SCNC: 5 MMOL/L (ref 3.5–5.3)
PROT SERPL-MCNC: 7 G/DL (ref 6.4–8.2)
RBC # BLD AUTO: 4.07 MILLION/UL (ref 3.88–5.62)
SODIUM SERPL-SCNC: 137 MMOL/L (ref 136–145)
TRIGL SERPL-MCNC: 98 MG/DL
TSH SERPL DL<=0.05 MIU/L-ACNC: 2.51 UIU/ML (ref 0.36–3.74)
WBC # BLD AUTO: 8.08 THOUSAND/UL (ref 4.31–10.16)

## 2019-06-03 PROCEDURE — 83036 HEMOGLOBIN GLYCOSYLATED A1C: CPT

## 2019-06-03 PROCEDURE — 84443 ASSAY THYROID STIM HORMONE: CPT

## 2019-06-03 PROCEDURE — 80053 COMPREHEN METABOLIC PANEL: CPT

## 2019-06-03 PROCEDURE — 85025 COMPLETE CBC W/AUTO DIFF WBC: CPT

## 2019-06-03 PROCEDURE — 36415 COLL VENOUS BLD VENIPUNCTURE: CPT

## 2019-06-03 PROCEDURE — 80061 LIPID PANEL: CPT

## 2019-07-15 DIAGNOSIS — I10 HTN (HYPERTENSION), BENIGN: ICD-10-CM

## 2019-07-16 RX ORDER — OLMESARTAN MEDOXOMIL 40 MG/1
TABLET ORAL
Qty: 90 TABLET | Refills: 0 | Status: SHIPPED | OUTPATIENT
Start: 2019-07-16 | End: 2019-10-15 | Stop reason: SDUPTHER

## 2019-09-16 RX ORDER — OXYCODONE HYDROCHLORIDE AND ACETAMINOPHEN 5; 325 MG/1; MG/1
TABLET ORAL
COMMUNITY
Start: 2018-06-14

## 2019-09-20 ENCOUNTER — OFFICE VISIT (OUTPATIENT)
Dept: FAMILY MEDICINE CLINIC | Facility: CLINIC | Age: 79
End: 2019-09-20
Payer: COMMERCIAL

## 2019-09-20 VITALS
HEART RATE: 62 BPM | HEIGHT: 68 IN | OXYGEN SATURATION: 100 % | RESPIRATION RATE: 16 BRPM | BODY MASS INDEX: 30.55 KG/M2 | WEIGHT: 201.6 LBS | DIASTOLIC BLOOD PRESSURE: 80 MMHG | TEMPERATURE: 98.2 F | SYSTOLIC BLOOD PRESSURE: 136 MMHG

## 2019-09-20 DIAGNOSIS — I10 ESSENTIAL HYPERTENSION: ICD-10-CM

## 2019-09-20 DIAGNOSIS — Z12.5 PROSTATE CANCER SCREENING: ICD-10-CM

## 2019-09-20 DIAGNOSIS — E11.8 TYPE 2 DIABETES MELLITUS WITH COMPLICATION, UNSPECIFIED WHETHER LONG TERM INSULIN USE: Primary | ICD-10-CM

## 2019-09-20 DIAGNOSIS — E78.2 MIXED HYPERLIPIDEMIA: ICD-10-CM

## 2019-09-20 DIAGNOSIS — M54.42 LEFT-SIDED LOW BACK PAIN WITH LEFT-SIDED SCIATICA, UNSPECIFIED CHRONICITY: ICD-10-CM

## 2019-09-20 DIAGNOSIS — D53.8 OTHER SPECIFIED NUTRITIONAL ANEMIAS: ICD-10-CM

## 2019-09-20 DIAGNOSIS — E66.9 OBESITY (BMI 30.0-34.9): ICD-10-CM

## 2019-09-20 PROBLEM — E66.811 OBESITY (BMI 30.0-34.9): Status: ACTIVE | Noted: 2019-09-20

## 2019-09-20 PROCEDURE — 99214 OFFICE O/P EST MOD 30 MIN: CPT | Performed by: FAMILY MEDICINE

## 2019-09-20 PROCEDURE — 3079F DIAST BP 80-89 MM HG: CPT | Performed by: FAMILY MEDICINE

## 2019-09-20 PROCEDURE — 3075F SYST BP GE 130 - 139MM HG: CPT | Performed by: FAMILY MEDICINE

## 2019-09-20 NOTE — ASSESSMENT & PLAN NOTE
Pain in L hip with radiation down entire L leg  Continue following with ortho and complete PT  Will continue to follow

## 2019-09-20 NOTE — PROGRESS NOTES
Assessment/Plan:  Hypertension  Fair control  Told patient to regularly check BP at home 4-5x per week  Continue same  Will continue to monitor  Type 2 diabetes mellitus (HCC)  Lab Results   Component Value Date    HGBA1C 6 5 (H) 06/03/2019   Controlled as of last labs in June  Orders for new blood work for next visit placed  Continue same  Will continue to follow  No results for input(s): POCGLU in the last 72 hours  Blood Sugar Average: Last 72 hrs:      Hyperlipidemia  Controlled  Continue same  Will continue to follow  Anemia  Stable as of last labs  New orders for updated blood work placed  Will continue to follow  Left-sided low back pain with left-sided sciatica  Pain in L hip with radiation down entire L leg  Continue following with ortho and complete PT  Will continue to follow  Diagnoses and all orders for this visit:    Type 2 diabetes mellitus with complication, unspecified whether long term insulin use  -     Ambulatory referral to Ophthalmology; Future  -     Comprehensive metabolic panel; Future  -     Hemoglobin A1C; Future  -     Microalbumin / creatinine urine ratio    Mixed hyperlipidemia  -     Lipid Panel with Direct LDL reflex; Future    Other specified nutritional anemias  -     CBC and differential; Future    Essential hypertension  -     TSH, 3rd generation with Free T4 reflex; Future    Left-sided low back pain with left-sided sciatica, unspecified chronicity    Prostate cancer screening  -     PSA, Total Screen; Future    Obesity (BMI 30 0-34 9)    Other orders  -     oxyCODONE-acetaminophen (PERCOCET) 5-325 mg per tablet; take 1 tablet by oral route  at bed time  -     Cancel: Vitamin D 25 hydroxy;  Future          Patient Instructions     Weight Management   AMBULATORY CARE:   Why it is important to manage your weight:  Being overweight increases your risk of health conditions such as heart disease, high blood pressure, type 2 diabetes, and certain types of cancer  It can also increase your risk for osteoarthritis, sleep apnea, and other respiratory problems  Aim for a slow, steady weight loss  Even a small amount of weight loss can lower your risk of health problems  How to lose weight safely:  A safe and healthy way to lose weight is to eat fewer calories and get regular exercise  You can lose up about 1 pound a week by decreasing the number of calories you eat by 500 calories each day  You can decrease calories by eating smaller portion sizes or by cutting out high-calorie foods  Read labels to find out how many calories are in the foods you eat  You can also burn calories with exercise such as walking, swimming, or biking  You will be more likely to keep weight off if you make these changes part of your lifestyle  Healthy meal plan for weight management:  A healthy meal plan includes a variety of foods, contains fewer calories, and helps you stay healthy  A healthy meal plan includes the following:  · Eat whole-grain foods more often  A healthy meal plan should contain fiber  Fiber is the part of grains, fruits, and vegetables that is not broken down by your body  Whole-grain foods are healthy and provide extra fiber in your diet  Some examples of whole-grain foods are whole-wheat breads and pastas, oatmeal, brown rice, and bulgur  · Eat a variety of vegetables every day  Include dark, leafy greens such as spinach, kale, tony greens, and mustard greens  Eat yellow and orange vegetables such as carrots, sweet potatoes, and winter squash  · Eat a variety of fruits every day  Choose fresh or canned fruit (canned in its own juice or light syrup) instead of juice  Fruit juice has very little or no fiber  · Eat low-fat dairy foods  Drink fat-free (skim) milk or 1% milk  Eat fat-free yogurt and low-fat cottage cheese  Try low-fat cheeses such as mozzarella and other reduced-fat cheeses      · Choose meat and other protein foods that are low in fat   Choose beans or other legumes such as split peas or lentils  Choose fish, skinless poultry (chicken or turkey), or lean cuts of red meat (beef or pork)  Before you cook meat or poultry, cut off any visible fat  · Use less fat and oil  Try baking foods instead of frying them  Add less fat, such as margarine, sour cream, regular salad dressing and mayonnaise to foods  Eat fewer high-fat foods  Some examples of high-fat foods include french fries, doughnuts, ice cream, and cakes  · Eat fewer sweets  Limit foods and drinks that are high in sugar  This includes candy, cookies, regular soda, and sweetened drinks  Ways to decrease calories:   · Eat smaller portions  ¨ Use a small plate with smaller servings  ¨ Do not eat second helpings  ¨ When you eat at a restaurant, ask for a box and place half of your meal in the box before you eat  ¨ Share an entrée with someone else  · Replace high-calorie snacks with healthy, low-calorie snacks  ¨ Choose fresh fruit, vegetables, fat-free rice cakes, or air-popped popcorn instead of potato chips, nuts, or chocolate  ¨ Choose water or calorie-free drinks instead of soda or sweetened drinks  · Eat regular meals  Skipping meals can lead to overeating later in the day  Eat a healthy snack in place of a meal if you do not have time to eat a regular meal      · Do not shop for groceries when you are hungry  You may be more likely to make unhealthy food choices  Take a grocery list of healthy foods and shop after you have eaten  Exercise:  Exercise at least 30 minutes per day on most days of the week  Some examples of exercise include walking, biking, dancing, and swimming  You can also fit in more physical activity by taking the stairs instead of the elevator or parking farther away from stores  Ask your healthcare provider about the best exercise plan for you     Other things to consider as you try to lose weight:   · Be aware of situations that may give you the urge to overeat, such as eating while watching television  Find ways to avoid these situations  For example, read a book, go for a walk, or do crafts  · Meet with a weight loss support group or friends who are also trying to lose weight  This may help you stay motivated to continue working on your weight loss goals  © 2017 [employer] Information is for End User's use only and may not be sold, redistributed or otherwise used for commercial purposes  All illustrations and images included in CareNotes® are the copyrighted property of [employer]  or [employer]  The above information is an  only  It is not intended as medical advice for individual conditions or treatments  Talk to your doctor, nurse or pharmacist before following any medical regimen to see if it is safe and effective for you  Heart Healthy Diet   AMBULATORY CARE:   A heart healthy diet  is an eating plan low in total fat, unhealthy fats, and sodium (salt)  A heart healthy diet helps decrease your risk for heart disease and stroke  Limit the amount of fat you eat to 25% to 35% of your total daily calories  Limit sodium to less than 2,300 mg each day  Healthy fats:  Healthy fats can help improve cholesterol levels  The risk for heart disease is decreased when cholesterol levels are normal  Choose healthy fats, such as the following:  · Unsaturated fat  is found in foods such as soybean, canola, olive, corn, and safflower oils  It is also found in soft tub margarine that is made with liquid vegetable oil  · Omega-3 fat  is found in certain fish, such as salmon, tuna, and trout, and in walnuts and flaxseed  Unhealthy fats:  Unhealthy fats can cause unhealthy cholesterol levels in your blood and increase your risk of heart disease   Limit unhealthy fats, such as the following:  · Cholesterol  is found in animal foods, such as eggs and lobster, and in dairy products made from whole milk  Limit cholesterol to less than 300 milligrams (mg) each day  You may need to limit cholesterol to 200 mg each day if you have heart disease  · Saturated fat  is found in meats, such as danielson and hamburger  It is also found in chicken or turkey skin, whole milk, and butter  Limit saturated fat to less than 7% of your total daily calories  Limit saturated fat to less than 6% if you have heart disease or are at increased risk for it  · Trans fat  is found in packaged foods, such as potato chips and cookies  It is also in hard margarine, some fried foods, and shortening  Avoid trans fats as much as possible    Heart healthy foods and drinks to include:  Ask your dietitian or healthcare provider how many servings to have from each of the following food groups:  · Grains:      ¨ Whole-wheat breads, cereals, and pastas, and brown rice    ¨ Low-fat, low-sodium crackers and chips    · Vegetables:      ¨ Broccoli, green beans, green peas, and spinach    ¨ Collards, kale, and lima beans    ¨ Carrots, sweet potatoes, tomatoes, and peppers    ¨ Canned vegetables with no salt added    · Fruits:      ¨ Bananas, peaches, pears, and pineapple    ¨ Grapes, raisins, and dates    ¨ Oranges, tangerines, grapefruit, orange juice, and grapefruit juice    ¨ Apricots, mangoes, melons, and papaya    ¨ Raspberries and strawberries    ¨ Canned fruit with no added sugar    · Low-fat dairy products:      ¨ Nonfat (skim) milk, 1% milk, and low-fat almond, cashew, or soy milks fortified with calcium    ¨ Low-fat cheese, regular or frozen yogurt, and cottage cheese    · Meats and proteins , such as lean cuts of beef and pork (loin, leg, round), skinless chicken and turkey, legumes, soy products, egg whites, and nuts  Foods and drinks to limit or avoid:  Ask your dietitian or healthcare provider about these and other foods that are high in unhealthy fat, sodium, and sugar:  · Snack or packaged foods , such as frozen dinners, cookies, macaroni and cheese, and cereals with more than 300 mg of sodium per serving    · Canned or dry mixes  for cakes, soups, sauces, or gravies    · Vegetables with added sodium , such as instant potatoes, vegetables with added sauces, or regular canned vegetables    · Other foods high in sodium , such as ketchup, barbecue sauce, salad dressing, pickles, olives, soy sauce, and miso    · High-fat dairy foods  such as whole or 2% milk, cream cheese, or sour cream, and cheeses     · High-fat protein foods  such as high-fat cuts of beef (T-bone steaks, ribs), chicken or turkey with skin, and organ meats, such as liver    · Cured or smoked meats , such as hot dogs, danielson, and sausage    · Unhealthy fats and oils , such as butter, stick margarine, shortening, and cooking oils such as coconut or palm oil    · Food and drinks high in sugar , such as soft drinks (soda), sports drinks, sweetened tea, candy, cake, cookies, pies, and doughnuts  Other diet guidelines to follow:   · Eat more foods containing omega-3 fats  Eat fish high in omega-3 fats at least 2 times a week  · Limit alcohol  Too much alcohol can damage your heart and raise your blood pressure  Women should limit alcohol to 1 drink a day  Men should limit alcohol to 2 drinks a day  A drink of alcohol is 12 ounces of beer, 5 ounces of wine, or 1½ ounces of liquor  · Choose low-sodium foods  High-sodium foods can lead to high blood pressure  Add little or no salt to food you prepare  Use herbs and spices in place of salt  · Eat more fiber  to help lower cholesterol levels  Eat at least 5 servings of fruits and vegetables each day  Eat 3 ounces of whole-grain foods each day  Legumes (beans) are also a good source of fiber  Lifestyle guidelines:   · Do not smoke  Nicotine and other chemicals in cigarettes and cigars can cause lung and heart damage  Ask your healthcare provider for information if you currently smoke and need help to quit   E-cigarettes or smokeless tobacco still contain nicotine  Talk to your healthcare provider before you use these products  · Exercise regularly  to help you maintain a healthy weight and improve your blood pressure and cholesterol levels  Ask your healthcare provider about the best exercise plan for you  Do not start an exercise program without asking your healthcare provider  Follow up with your healthcare provider as directed:  Write down your questions so you remember to ask them during your visits  © 2017 [employer] Information is for End User's use only and may not be sold, redistributed or otherwise used for commercial purposes  All illustrations and images included in CareNotes® are the copyrighted property of [employer]  or Daniel German  The above information is an  only  It is not intended as medical advice for individual conditions or treatments  Talk to your doctor, nurse or pharmacist before following any medical regimen to see if it is safe and effective for you  Return in about 6 months (around 3/23/2020)  Subjective:      Patient ID: Angeles Mcnair is a [de-identified] y o  male  Chief Complaint   Patient presents with    Hypertension    Diabetes    Hyperlipidemia       Glenna Diaz is an [de-identified] y o  M presenting to the office for 6 month follow up for multiple medical problems  His last lab work from June showed an improved A1c at 6 5  He complains today of some back pain with L leg sciatica for which he is currently in PT  His ortho put him in therapy for 10 sessions so he can get another MRI  Otherwise, he is taking all of his medications without side effects and has no other complaints  He has the form for a diabetic eye exam and he has some haziness in both eyes, more so in the R eye          The following portions of the patient's history were reviewed and updated as appropriate: allergies, current medications, past family history, past medical history, past social history, past surgical history and problem list     Review of Systems   Constitutional: Negative for chills and fever  HENT: Negative for trouble swallowing  Eyes: Negative for visual disturbance  Respiratory: Negative for cough and shortness of breath  Cardiovascular: Negative for chest pain and palpitations  Gastrointestinal: Negative for abdominal pain, blood in stool and vomiting  Endocrine: Negative for cold intolerance and heat intolerance  Genitourinary: Negative for difficulty urinating and dysuria  Musculoskeletal: Positive for arthralgias (L hip pain)  Negative for gait problem  Skin: Negative for rash  Neurological: Positive for numbness (Down the side of L thigh, behind the L calf, and on the top of L foot)  Negative for dizziness, syncope and headaches  Hematological: Negative for adenopathy  Psychiatric/Behavioral: Negative for behavioral problems  Current Outpatient Medications   Medication Sig Dispense Refill    amLODIPine (NORVASC) 10 mg tablet Take 1 tablet (10 mg total) by mouth daily 90 tablet 1    aspirin (ECOTRIN LOW STRENGTH) 81 mg EC tablet take 1 tablet 4x wkly      atorvastatin (LIPITOR) 10 mg tablet Take 1 tablet (10 mg total) by mouth daily 90 tablet 1    magnesium chloride (MAG64) 64 MG TBEC EC tablet Take 64 mg by mouth 4 (four) times a day Take 2 tablets by mouth twice daily      metFORMIN (GLUCOPHAGE) 1000 MG tablet Take 1 tablet (1,000 mg total) by mouth 2 (two) times a day with meals Breakfast and dinner 180 tablet 1    olmesartan (BENICAR) 40 mg tablet TAKE ONE TABLET BY MOUTH EVERY DAY  90 tablet 0    Omega-3 Fatty Acids (FISH OIL) 1,000 mg take 1 by Oral route once    Pt takes 4x week      sildenafil (VIAGRA) 100 mg tablet Take 1 tablet (100 mg total) by mouth daily as needed for erectile dysfunction 10 tablet 3    tamsulosin (FLOMAX) 0 4 mg Take 1 capsule (0 4 mg total) by mouth daily with dinner 90 capsule 1    clobetasol (TEMOVATE) 0 05 % ointment every 24 hours      fluticasone (FLONASE) 50 mcg/act nasal spray 1 spray into each nostril daily (Patient not taking: Reported on 3/7/2019) 16 g 3    ibuprofen (MOTRIN) 600 mg tablet TAKE ONE TABLET BY MOUTH THREE TIMES DAILY WITH FOOD  (Patient not taking: Reported on 3/7/2019) 45 tablet 0    oxyCODONE-acetaminophen (PERCOCET) 5-325 mg per tablet take 1 tablet by oral route  at bed time       No current facility-administered medications for this visit  Objective:    /80 (BP Location: Left arm, Patient Position: Sitting, Cuff Size: Standard)   Pulse 62   Temp 98 2 °F (36 8 °C) (Tympanic)   Resp 16   Ht 5' 8" (1 727 m)   Wt 91 4 kg (201 lb 9 6 oz)   SpO2 100%   BMI 30 65 kg/m²        Physical Exam   Constitutional: He is oriented to person, place, and time  He appears well-developed and well-nourished  HENT:   Head: Normocephalic and atraumatic  Eyes: Pupils are equal, round, and reactive to light  EOM are normal    Neck: Normal range of motion  Neck supple  Cardiovascular: Normal rate, regular rhythm and normal heart sounds  Exam reveals no gallop and no friction rub  No murmur heard  Pulmonary/Chest: Effort normal and breath sounds normal  No stridor  He has no wheezes  He has no rales  Abdominal: Soft  Bowel sounds are normal  He exhibits no distension  There is no tenderness  Musculoskeletal: Normal range of motion  He exhibits no edema  Lymphadenopathy:     He has no cervical adenopathy  Neurological: He is alert and oriented to person, place, and time  No cranial nerve deficit  Skin: Skin is warm  Capillary refill takes less than 2 seconds  No rash noted  Psychiatric: He has a normal mood and affect  His behavior is normal    Nursing note and vitals reviewed  Namrata Campos MD BMI Counseling: Body mass index is 30 65 kg/m²   The BMI is above normal  Nutrition recommendations include reducing portion sizes, decreasing overall calorie intake and 3-5 servings of fruits/vegetables daily  Exercise recommendations include moderate aerobic physical activity for 150 minutes/week

## 2019-09-20 NOTE — ASSESSMENT & PLAN NOTE
Lab Results   Component Value Date    HGBA1C 6 5 (H) 06/03/2019   Controlled as of last labs in June  Orders for new blood work for next visit placed  Continue same  Will continue to follow  No results for input(s): POCGLU in the last 72 hours      Blood Sugar Average: Last 72 hrs:

## 2019-09-20 NOTE — PATIENT INSTRUCTIONS
Weight Management   AMBULATORY CARE:   Why it is important to manage your weight:  Being overweight increases your risk of health conditions such as heart disease, high blood pressure, type 2 diabetes, and certain types of cancer  It can also increase your risk for osteoarthritis, sleep apnea, and other respiratory problems  Aim for a slow, steady weight loss  Even a small amount of weight loss can lower your risk of health problems  How to lose weight safely:  A safe and healthy way to lose weight is to eat fewer calories and get regular exercise  You can lose up about 1 pound a week by decreasing the number of calories you eat by 500 calories each day  You can decrease calories by eating smaller portion sizes or by cutting out high-calorie foods  Read labels to find out how many calories are in the foods you eat  You can also burn calories with exercise such as walking, swimming, or biking  You will be more likely to keep weight off if you make these changes part of your lifestyle  Healthy meal plan for weight management:  A healthy meal plan includes a variety of foods, contains fewer calories, and helps you stay healthy  A healthy meal plan includes the following:  · Eat whole-grain foods more often  A healthy meal plan should contain fiber  Fiber is the part of grains, fruits, and vegetables that is not broken down by your body  Whole-grain foods are healthy and provide extra fiber in your diet  Some examples of whole-grain foods are whole-wheat breads and pastas, oatmeal, brown rice, and bulgur  · Eat a variety of vegetables every day  Include dark, leafy greens such as spinach, kale, tony greens, and mustard greens  Eat yellow and orange vegetables such as carrots, sweet potatoes, and winter squash  · Eat a variety of fruits every day  Choose fresh or canned fruit (canned in its own juice or light syrup) instead of juice  Fruit juice has very little or no fiber  · Eat low-fat dairy foods  Drink fat-free (skim) milk or 1% milk  Eat fat-free yogurt and low-fat cottage cheese  Try low-fat cheeses such as mozzarella and other reduced-fat cheeses  · Choose meat and other protein foods that are low in fat  Choose beans or other legumes such as split peas or lentils  Choose fish, skinless poultry (chicken or turkey), or lean cuts of red meat (beef or pork)  Before you cook meat or poultry, cut off any visible fat  · Use less fat and oil  Try baking foods instead of frying them  Add less fat, such as margarine, sour cream, regular salad dressing and mayonnaise to foods  Eat fewer high-fat foods  Some examples of high-fat foods include french fries, doughnuts, ice cream, and cakes  · Eat fewer sweets  Limit foods and drinks that are high in sugar  This includes candy, cookies, regular soda, and sweetened drinks  Ways to decrease calories:   · Eat smaller portions  ¨ Use a small plate with smaller servings  ¨ Do not eat second helpings  ¨ When you eat at a restaurant, ask for a box and place half of your meal in the box before you eat  ¨ Share an entrée with someone else  · Replace high-calorie snacks with healthy, low-calorie snacks  ¨ Choose fresh fruit, vegetables, fat-free rice cakes, or air-popped popcorn instead of potato chips, nuts, or chocolate  ¨ Choose water or calorie-free drinks instead of soda or sweetened drinks  · Eat regular meals  Skipping meals can lead to overeating later in the day  Eat a healthy snack in place of a meal if you do not have time to eat a regular meal      · Do not shop for groceries when you are hungry  You may be more likely to make unhealthy food choices  Take a grocery list of healthy foods and shop after you have eaten  Exercise:  Exercise at least 30 minutes per day on most days of the week  Some examples of exercise include walking, biking, dancing, and swimming   You can also fit in more physical activity by taking the stairs instead of the elevator or parking farther away from stores  Ask your healthcare provider about the best exercise plan for you  Other things to consider as you try to lose weight:   · Be aware of situations that may give you the urge to overeat, such as eating while watching television  Find ways to avoid these situations  For example, read a book, go for a walk, or do crafts  · Meet with a weight loss support group or friends who are also trying to lose weight  This may help you stay motivated to continue working on your weight loss goals  © 2017 [facility] Information is for End User's use only and may not be sold, redistributed or otherwise used for commercial purposes  All illustrations and images included in CareNotes® are the copyrighted property of A D A M , Inc  or Daniel German  The above information is an  only  It is not intended as medical advice for individual conditions or treatments  Talk to your doctor, nurse or pharmacist before following any medical regimen to see if it is safe and effective for you  Heart Healthy Diet   AMBULATORY CARE:   A heart healthy diet  is an eating plan low in total fat, unhealthy fats, and sodium (salt)  A heart healthy diet helps decrease your risk for heart disease and stroke  Limit the amount of fat you eat to 25% to 35% of your total daily calories  Limit sodium to less than 2,300 mg each day  Healthy fats:  Healthy fats can help improve cholesterol levels  The risk for heart disease is decreased when cholesterol levels are normal  Choose healthy fats, such as the following:  · Unsaturated fat  is found in foods such as soybean, canola, olive, corn, and safflower oils  It is also found in soft tub margarine that is made with liquid vegetable oil  · Omega-3 fat  is found in certain fish, such as salmon, tuna, and trout, and in walnuts and flaxseed    Unhealthy fats:  Unhealthy fats can cause unhealthy cholesterol levels in your blood and increase your risk of heart disease  Limit unhealthy fats, such as the following:  · Cholesterol  is found in animal foods, such as eggs and lobster, and in dairy products made from whole milk  Limit cholesterol to less than 300 milligrams (mg) each day  You may need to limit cholesterol to 200 mg each day if you have heart disease  · Saturated fat  is found in meats, such as danielson and hamburger  It is also found in chicken or turkey skin, whole milk, and butter  Limit saturated fat to less than 7% of your total daily calories  Limit saturated fat to less than 6% if you have heart disease or are at increased risk for it  · Trans fat  is found in packaged foods, such as potato chips and cookies  It is also in hard margarine, some fried foods, and shortening  Avoid trans fats as much as possible    Heart healthy foods and drinks to include:  Ask your dietitian or healthcare provider how many servings to have from each of the following food groups:  · Grains:      ¨ Whole-wheat breads, cereals, and pastas, and brown rice    ¨ Low-fat, low-sodium crackers and chips    · Vegetables:      ¨ Broccoli, green beans, green peas, and spinach    ¨ Collards, kale, and lima beans    ¨ Carrots, sweet potatoes, tomatoes, and peppers    ¨ Canned vegetables with no salt added    · Fruits:      ¨ Bananas, peaches, pears, and pineapple    ¨ Grapes, raisins, and dates    ¨ Oranges, tangerines, grapefruit, orange juice, and grapefruit juice    ¨ Apricots, mangoes, melons, and papaya    ¨ Raspberries and strawberries    ¨ Canned fruit with no added sugar    · Low-fat dairy products:      ¨ Nonfat (skim) milk, 1% milk, and low-fat almond, cashew, or soy milks fortified with calcium    ¨ Low-fat cheese, regular or frozen yogurt, and cottage cheese    · Meats and proteins , such as lean cuts of beef and pork (loin, leg, round), skinless chicken and turkey, legumes, soy products, egg whites, and nuts  Foods and drinks to limit or avoid:  Ask your dietitian or healthcare provider about these and other foods that are high in unhealthy fat, sodium, and sugar:  · Snack or packaged foods , such as frozen dinners, cookies, macaroni and cheese, and cereals with more than 300 mg of sodium per serving    · Canned or dry mixes  for cakes, soups, sauces, or gravies    · Vegetables with added sodium , such as instant potatoes, vegetables with added sauces, or regular canned vegetables    · Other foods high in sodium , such as ketchup, barbecue sauce, salad dressing, pickles, olives, soy sauce, and miso    · High-fat dairy foods  such as whole or 2% milk, cream cheese, or sour cream, and cheeses     · High-fat protein foods  such as high-fat cuts of beef (T-bone steaks, ribs), chicken or turkey with skin, and organ meats, such as liver    · Cured or smoked meats , such as hot dogs, danielson, and sausage    · Unhealthy fats and oils , such as butter, stick margarine, shortening, and cooking oils such as coconut or palm oil    · Food and drinks high in sugar , such as soft drinks (soda), sports drinks, sweetened tea, candy, cake, cookies, pies, and doughnuts  Other diet guidelines to follow:   · Eat more foods containing omega-3 fats  Eat fish high in omega-3 fats at least 2 times a week  · Limit alcohol  Too much alcohol can damage your heart and raise your blood pressure  Women should limit alcohol to 1 drink a day  Men should limit alcohol to 2 drinks a day  A drink of alcohol is 12 ounces of beer, 5 ounces of wine, or 1½ ounces of liquor  · Choose low-sodium foods  High-sodium foods can lead to high blood pressure  Add little or no salt to food you prepare  Use herbs and spices in place of salt  · Eat more fiber  to help lower cholesterol levels  Eat at least 5 servings of fruits and vegetables each day  Eat 3 ounces of whole-grain foods each day  Legumes (beans) are also a good source of fiber    Lifestyle guidelines: · Do not smoke  Nicotine and other chemicals in cigarettes and cigars can cause lung and heart damage  Ask your healthcare provider for information if you currently smoke and need help to quit  E-cigarettes or smokeless tobacco still contain nicotine  Talk to your healthcare provider before you use these products  · Exercise regularly  to help you maintain a healthy weight and improve your blood pressure and cholesterol levels  Ask your healthcare provider about the best exercise plan for you  Do not start an exercise program without asking your healthcare provider  Follow up with your healthcare provider as directed:  Write down your questions so you remember to ask them during your visits  © 2017 [address] [employer] Information is for End User's use only and may not be sold, redistributed or otherwise used for commercial purposes  All illustrations and images included in CareNotes® are the copyrighted property of A D A [facility] [employer]  or Daniel German  The above information is an  only  It is not intended as medical advice for individual conditions or treatments  Talk to your doctor, nurse or pharmacist before following any medical regimen to see if it is safe and effective for you

## 2019-09-20 NOTE — ASSESSMENT & PLAN NOTE
Fair control  Told patient to regularly check BP at home 4-5x per week  Continue same  Will continue to monitor

## 2019-09-30 LAB
LEFT EYE DIABETIC RETINOPATHY: NORMAL
RIGHT EYE DIABETIC RETINOPATHY: NORMAL

## 2019-10-15 DIAGNOSIS — N40.0 BENIGN PROSTATIC HYPERPLASIA, UNSPECIFIED WHETHER LOWER URINARY TRACT SYMPTOMS PRESENT: ICD-10-CM

## 2019-10-15 DIAGNOSIS — I10 HYPERTENSION, UNSPECIFIED TYPE: ICD-10-CM

## 2019-10-15 DIAGNOSIS — I10 HTN (HYPERTENSION), BENIGN: ICD-10-CM

## 2019-10-15 RX ORDER — AMLODIPINE BESYLATE 10 MG/1
TABLET ORAL
Qty: 90 TABLET | Refills: 0 | Status: SHIPPED | OUTPATIENT
Start: 2019-10-15 | End: 2020-01-15

## 2019-10-15 RX ORDER — TAMSULOSIN HYDROCHLORIDE 0.4 MG/1
CAPSULE ORAL
Qty: 90 CAPSULE | Refills: 0 | Status: SHIPPED | OUTPATIENT
Start: 2019-10-15 | End: 2020-01-15

## 2019-10-15 RX ORDER — OLMESARTAN MEDOXOMIL 40 MG/1
TABLET ORAL
Qty: 90 TABLET | Refills: 0 | Status: SHIPPED | OUTPATIENT
Start: 2019-10-15 | End: 2020-01-15

## 2020-01-14 DIAGNOSIS — I10 HYPERTENSION, UNSPECIFIED TYPE: ICD-10-CM

## 2020-01-14 DIAGNOSIS — N40.0 BENIGN PROSTATIC HYPERPLASIA, UNSPECIFIED WHETHER LOWER URINARY TRACT SYMPTOMS PRESENT: ICD-10-CM

## 2020-01-14 DIAGNOSIS — I10 HTN (HYPERTENSION), BENIGN: ICD-10-CM

## 2020-01-15 RX ORDER — OLMESARTAN MEDOXOMIL 40 MG/1
TABLET ORAL
Qty: 90 TABLET | Refills: 0 | Status: SHIPPED | OUTPATIENT
Start: 2020-01-15 | End: 2020-04-14

## 2020-01-15 RX ORDER — TAMSULOSIN HYDROCHLORIDE 0.4 MG/1
CAPSULE ORAL
Qty: 90 CAPSULE | Refills: 0 | Status: SHIPPED | OUTPATIENT
Start: 2020-01-15 | End: 2020-04-14

## 2020-01-15 RX ORDER — AMLODIPINE BESYLATE 10 MG/1
TABLET ORAL
Qty: 90 TABLET | Refills: 0 | Status: SHIPPED | OUTPATIENT
Start: 2020-01-15 | End: 2020-04-14

## 2020-02-20 ENCOUNTER — TRANSCRIBE ORDERS (OUTPATIENT)
Dept: ADMINISTRATIVE | Facility: HOSPITAL | Age: 80
End: 2020-02-20

## 2020-02-20 ENCOUNTER — LAB (OUTPATIENT)
Dept: LAB | Facility: IMAGING CENTER | Age: 80
End: 2020-02-20
Payer: COMMERCIAL

## 2020-02-20 DIAGNOSIS — Z12.5 PROSTATE CANCER SCREENING: ICD-10-CM

## 2020-02-20 DIAGNOSIS — E11.8 TYPE 2 DIABETES MELLITUS WITH COMPLICATION (HCC): ICD-10-CM

## 2020-02-20 DIAGNOSIS — E78.2 MIXED HYPERLIPIDEMIA: ICD-10-CM

## 2020-02-20 DIAGNOSIS — I10 ESSENTIAL HYPERTENSION: ICD-10-CM

## 2020-02-20 DIAGNOSIS — D53.8 OTHER SPECIFIED NUTRITIONAL ANEMIAS: ICD-10-CM

## 2020-02-20 LAB
ALBUMIN SERPL BCP-MCNC: 4.1 G/DL (ref 3.5–5)
ALP SERPL-CCNC: 89 U/L (ref 46–116)
ALT SERPL W P-5'-P-CCNC: 21 U/L (ref 12–78)
ANION GAP SERPL CALCULATED.3IONS-SCNC: 9 MMOL/L (ref 4–13)
AST SERPL W P-5'-P-CCNC: 12 U/L (ref 5–45)
BASOPHILS # BLD AUTO: 0.07 THOUSANDS/ΜL (ref 0–0.1)
BASOPHILS NFR BLD AUTO: 1 % (ref 0–1)
BILIRUB SERPL-MCNC: 0.66 MG/DL (ref 0.2–1)
BUN SERPL-MCNC: 20 MG/DL (ref 5–25)
CALCIUM SERPL-MCNC: 9.1 MG/DL (ref 8.3–10.1)
CHLORIDE SERPL-SCNC: 107 MMOL/L (ref 100–108)
CHOLEST SERPL-MCNC: 96 MG/DL (ref 50–200)
CO2 SERPL-SCNC: 25 MMOL/L (ref 21–32)
CREAT SERPL-MCNC: 1 MG/DL (ref 0.6–1.3)
CREAT UR-MCNC: 174 MG/DL
EOSINOPHIL # BLD AUTO: 0.1 THOUSAND/ΜL (ref 0–0.61)
EOSINOPHIL NFR BLD AUTO: 1 % (ref 0–6)
ERYTHROCYTE [DISTWIDTH] IN BLOOD BY AUTOMATED COUNT: 12.8 % (ref 11.6–15.1)
EST. AVERAGE GLUCOSE BLD GHB EST-MCNC: 140 MG/DL
GFR SERPL CREATININE-BSD FRML MDRD: 71 ML/MIN/1.73SQ M
GLUCOSE P FAST SERPL-MCNC: 191 MG/DL (ref 65–99)
HBA1C MFR BLD: 6.5 %
HCT VFR BLD AUTO: 41.1 % (ref 36.5–49.3)
HDLC SERPL-MCNC: 38 MG/DL
HGB BLD-MCNC: 13.6 G/DL (ref 12–17)
IMM GRANULOCYTES # BLD AUTO: 0.02 THOUSAND/UL (ref 0–0.2)
IMM GRANULOCYTES NFR BLD AUTO: 0 % (ref 0–2)
LDLC SERPL CALC-MCNC: 30 MG/DL (ref 0–100)
LYMPHOCYTES # BLD AUTO: 2.19 THOUSANDS/ΜL (ref 0.6–4.47)
LYMPHOCYTES NFR BLD AUTO: 28 % (ref 14–44)
MCH RBC QN AUTO: 31.8 PG (ref 26.8–34.3)
MCHC RBC AUTO-ENTMCNC: 33.1 G/DL (ref 31.4–37.4)
MCV RBC AUTO: 96 FL (ref 82–98)
MICROALBUMIN UR-MCNC: 126 MG/L (ref 0–20)
MICROALBUMIN/CREAT 24H UR: 72 MG/G CREATININE (ref 0–30)
MONOCYTES # BLD AUTO: 0.65 THOUSAND/ΜL (ref 0.17–1.22)
MONOCYTES NFR BLD AUTO: 8 % (ref 4–12)
NEUTROPHILS # BLD AUTO: 4.86 THOUSANDS/ΜL (ref 1.85–7.62)
NEUTS SEG NFR BLD AUTO: 62 % (ref 43–75)
NRBC BLD AUTO-RTO: 0 /100 WBCS
PLATELET # BLD AUTO: 249 THOUSANDS/UL (ref 149–390)
PMV BLD AUTO: 11.6 FL (ref 8.9–12.7)
POTASSIUM SERPL-SCNC: 4 MMOL/L (ref 3.5–5.3)
PROT SERPL-MCNC: 7.1 G/DL (ref 6.4–8.2)
PSA SERPL-MCNC: 1.9 NG/ML (ref 0–4)
RBC # BLD AUTO: 4.28 MILLION/UL (ref 3.88–5.62)
SODIUM SERPL-SCNC: 141 MMOL/L (ref 136–145)
T4 FREE SERPL-MCNC: 1.2 NG/DL (ref 0.76–1.46)
TRIGL SERPL-MCNC: 138 MG/DL
TSH SERPL DL<=0.05 MIU/L-ACNC: 4.7 UIU/ML (ref 0.36–3.74)
WBC # BLD AUTO: 7.89 THOUSAND/UL (ref 4.31–10.16)

## 2020-02-20 PROCEDURE — 82570 ASSAY OF URINE CREATININE: CPT | Performed by: FAMILY MEDICINE

## 2020-02-20 PROCEDURE — 36415 COLL VENOUS BLD VENIPUNCTURE: CPT

## 2020-02-20 PROCEDURE — 85025 COMPLETE CBC W/AUTO DIFF WBC: CPT

## 2020-02-20 PROCEDURE — G0103 PSA SCREENING: HCPCS

## 2020-02-20 PROCEDURE — 80053 COMPREHEN METABOLIC PANEL: CPT

## 2020-02-20 PROCEDURE — 80061 LIPID PANEL: CPT

## 2020-02-20 PROCEDURE — 83036 HEMOGLOBIN GLYCOSYLATED A1C: CPT

## 2020-02-20 PROCEDURE — 84439 ASSAY OF FREE THYROXINE: CPT

## 2020-02-20 PROCEDURE — 82043 UR ALBUMIN QUANTITATIVE: CPT | Performed by: FAMILY MEDICINE

## 2020-02-20 PROCEDURE — 84443 ASSAY THYROID STIM HORMONE: CPT

## 2020-02-24 ENCOUNTER — TELEPHONE (OUTPATIENT)
Dept: FAMILY MEDICINE CLINIC | Facility: CLINIC | Age: 80
End: 2020-02-24

## 2020-02-24 NOTE — TELEPHONE ENCOUNTER
----- Message from Radha Hollins MD sent at 2/21/2020  6:51 AM EST -----  His blood work came back showing his A1c well controlled at 6 5  All the rest of the blood work came back stable

## 2020-02-28 DIAGNOSIS — E78.5 HYPERLIPIDEMIA, UNSPECIFIED HYPERLIPIDEMIA TYPE: ICD-10-CM

## 2020-02-28 RX ORDER — ATORVASTATIN CALCIUM 10 MG/1
10 TABLET, FILM COATED ORAL DAILY
Qty: 90 TABLET | Refills: 1 | Status: SHIPPED | OUTPATIENT
Start: 2020-02-28 | End: 2020-05-18 | Stop reason: SDUPTHER

## 2020-02-28 NOTE — TELEPHONE ENCOUNTER
I do not see that we received any refill requests in chart from srinivasa  Pt last seen 9/20/19   Sent to provider for approval

## 2020-03-19 ENCOUNTER — TELEPHONE (OUTPATIENT)
Dept: FAMILY MEDICINE CLINIC | Facility: CLINIC | Age: 80
End: 2020-03-19

## 2020-03-19 NOTE — TELEPHONE ENCOUNTER
I called pt to see when he last had BW done because the last we have is from 2/20/20  That is the BW he is looking for- we did call on 2/24/20 with results but I did give him the results again   (see lab results)

## 2020-03-19 NOTE — TELEPHONE ENCOUNTER
Phone call from pt, he canc his upcoming appt due to the Coronavirus,but,would like a phone call re: his bloodwork if there is any concern

## 2020-03-25 ENCOUNTER — TELEPHONE (OUTPATIENT)
Dept: FAMILY MEDICINE CLINIC | Facility: CLINIC | Age: 80
End: 2020-03-25

## 2020-04-13 DIAGNOSIS — E11.8 TYPE 2 DIABETES MELLITUS WITH COMPLICATION (HCC): ICD-10-CM

## 2020-04-13 DIAGNOSIS — I10 HYPERTENSION, UNSPECIFIED TYPE: ICD-10-CM

## 2020-04-13 DIAGNOSIS — I10 HTN (HYPERTENSION), BENIGN: ICD-10-CM

## 2020-04-13 DIAGNOSIS — N40.0 BENIGN PROSTATIC HYPERPLASIA, UNSPECIFIED WHETHER LOWER URINARY TRACT SYMPTOMS PRESENT: ICD-10-CM

## 2020-04-14 DIAGNOSIS — I10 HTN (HYPERTENSION), BENIGN: ICD-10-CM

## 2020-04-14 DIAGNOSIS — I10 HYPERTENSION, UNSPECIFIED TYPE: ICD-10-CM

## 2020-04-14 DIAGNOSIS — N40.0 BENIGN PROSTATIC HYPERPLASIA, UNSPECIFIED WHETHER LOWER URINARY TRACT SYMPTOMS PRESENT: ICD-10-CM

## 2020-04-14 RX ORDER — OLMESARTAN MEDOXOMIL 40 MG/1
TABLET ORAL
Qty: 90 TABLET | Refills: 0 | Status: SHIPPED | OUTPATIENT
Start: 2020-04-14 | End: 2021-01-04

## 2020-04-14 RX ORDER — TAMSULOSIN HYDROCHLORIDE 0.4 MG/1
CAPSULE ORAL
Qty: 90 CAPSULE | Refills: 0 | Status: SHIPPED | OUTPATIENT
Start: 2020-04-14 | End: 2021-01-04

## 2020-04-14 RX ORDER — AMLODIPINE BESYLATE 10 MG/1
TABLET ORAL
Qty: 90 TABLET | Refills: 0 | Status: SHIPPED | OUTPATIENT
Start: 2020-04-14 | End: 2021-01-04

## 2020-04-14 RX ORDER — AMLODIPINE BESYLATE 10 MG/1
10 TABLET ORAL DAILY
Qty: 90 TABLET | Refills: 0 | Status: SHIPPED | OUTPATIENT
Start: 2020-04-14 | End: 2020-07-09 | Stop reason: SDUPTHER

## 2020-04-14 RX ORDER — OLMESARTAN MEDOXOMIL 40 MG/1
40 TABLET ORAL DAILY
Qty: 90 TABLET | Refills: 0 | Status: SHIPPED | OUTPATIENT
Start: 2020-04-14 | End: 2020-07-09 | Stop reason: SDUPTHER

## 2020-04-14 RX ORDER — TAMSULOSIN HYDROCHLORIDE 0.4 MG/1
0.4 CAPSULE ORAL
Qty: 90 CAPSULE | Refills: 0 | Status: SHIPPED | OUTPATIENT
Start: 2020-04-14 | End: 2020-07-09 | Stop reason: SDUPTHER

## 2020-05-18 DIAGNOSIS — E78.5 HYPERLIPIDEMIA, UNSPECIFIED HYPERLIPIDEMIA TYPE: ICD-10-CM

## 2020-05-18 RX ORDER — ATORVASTATIN CALCIUM 10 MG/1
10 TABLET, FILM COATED ORAL DAILY
Qty: 90 TABLET | Refills: 0 | Status: SHIPPED | OUTPATIENT
Start: 2020-05-18 | End: 2020-08-10 | Stop reason: SDUPTHER

## 2020-07-09 DIAGNOSIS — E11.8 TYPE 2 DIABETES MELLITUS WITH COMPLICATION (HCC): ICD-10-CM

## 2020-07-09 DIAGNOSIS — I10 HYPERTENSION, UNSPECIFIED TYPE: ICD-10-CM

## 2020-07-09 DIAGNOSIS — N40.0 BENIGN PROSTATIC HYPERPLASIA, UNSPECIFIED WHETHER LOWER URINARY TRACT SYMPTOMS PRESENT: ICD-10-CM

## 2020-07-09 DIAGNOSIS — I10 HTN (HYPERTENSION), BENIGN: ICD-10-CM

## 2020-07-09 NOTE — TELEPHONE ENCOUNTER
Pt req refills on his amlodipine,metformin,benicar and tamsulosin   I called and scheduled him for 7/14/20 for AWV

## 2020-07-10 RX ORDER — TAMSULOSIN HYDROCHLORIDE 0.4 MG/1
0.4 CAPSULE ORAL
Qty: 90 CAPSULE | Refills: 0 | Status: SHIPPED | OUTPATIENT
Start: 2020-07-10 | End: 2020-07-14

## 2020-07-10 RX ORDER — OLMESARTAN MEDOXOMIL 40 MG/1
40 TABLET ORAL DAILY
Qty: 90 TABLET | Refills: 0 | Status: SHIPPED | OUTPATIENT
Start: 2020-07-10 | End: 2020-07-14

## 2020-07-10 RX ORDER — AMLODIPINE BESYLATE 10 MG/1
10 TABLET ORAL DAILY
Qty: 90 TABLET | Refills: 0 | Status: SHIPPED | OUTPATIENT
Start: 2020-07-10 | End: 2020-07-14

## 2020-07-14 ENCOUNTER — OFFICE VISIT (OUTPATIENT)
Dept: FAMILY MEDICINE CLINIC | Facility: CLINIC | Age: 80
End: 2020-07-14
Payer: COMMERCIAL

## 2020-07-14 VITALS
RESPIRATION RATE: 16 BRPM | HEIGHT: 68 IN | OXYGEN SATURATION: 97 % | BODY MASS INDEX: 29.7 KG/M2 | TEMPERATURE: 97.8 F | WEIGHT: 196 LBS | DIASTOLIC BLOOD PRESSURE: 80 MMHG | HEART RATE: 72 BPM | SYSTOLIC BLOOD PRESSURE: 122 MMHG

## 2020-07-14 DIAGNOSIS — E78.2 MIXED HYPERLIPIDEMIA: ICD-10-CM

## 2020-07-14 DIAGNOSIS — E11.8 TYPE 2 DIABETES MELLITUS WITH COMPLICATION (HCC): ICD-10-CM

## 2020-07-14 DIAGNOSIS — D53.8 OTHER SPECIFIED NUTRITIONAL ANEMIAS: ICD-10-CM

## 2020-07-14 DIAGNOSIS — Z00.00 HEALTHCARE MAINTENANCE: ICD-10-CM

## 2020-07-14 DIAGNOSIS — I10 ESSENTIAL HYPERTENSION: ICD-10-CM

## 2020-07-14 DIAGNOSIS — E11.69 TYPE 2 DIABETES MELLITUS WITH OTHER SPECIFIED COMPLICATION, WITHOUT LONG-TERM CURRENT USE OF INSULIN (HCC): Primary | ICD-10-CM

## 2020-07-14 PROCEDURE — 3079F DIAST BP 80-89 MM HG: CPT | Performed by: FAMILY MEDICINE

## 2020-07-14 PROCEDURE — 3074F SYST BP LT 130 MM HG: CPT | Performed by: FAMILY MEDICINE

## 2020-07-14 PROCEDURE — 1036F TOBACCO NON-USER: CPT | Performed by: FAMILY MEDICINE

## 2020-07-14 PROCEDURE — 99214 OFFICE O/P EST MOD 30 MIN: CPT | Performed by: FAMILY MEDICINE

## 2020-07-14 PROCEDURE — 3008F BODY MASS INDEX DOCD: CPT | Performed by: FAMILY MEDICINE

## 2020-07-14 PROCEDURE — 3044F HG A1C LEVEL LT 7.0%: CPT | Performed by: FAMILY MEDICINE

## 2020-07-14 PROCEDURE — 1125F AMNT PAIN NOTED PAIN PRSNT: CPT | Performed by: FAMILY MEDICINE

## 2020-07-14 PROCEDURE — 3060F POS MICROALBUMINURIA REV: CPT | Performed by: FAMILY MEDICINE

## 2020-07-14 PROCEDURE — G0439 PPPS, SUBSEQ VISIT: HCPCS | Performed by: FAMILY MEDICINE

## 2020-07-14 PROCEDURE — 1160F RVW MEDS BY RX/DR IN RCRD: CPT | Performed by: FAMILY MEDICINE

## 2020-07-14 PROCEDURE — 1170F FXNL STATUS ASSESSED: CPT | Performed by: FAMILY MEDICINE

## 2020-07-14 PROCEDURE — 2022F DILAT RTA XM EVC RTNOPTHY: CPT | Performed by: FAMILY MEDICINE

## 2020-07-14 PROCEDURE — 4040F PNEUMOC VAC/ADMIN/RCVD: CPT | Performed by: FAMILY MEDICINE

## 2020-07-14 NOTE — PROGRESS NOTES
Assessment/Plan:  Type 2 diabetes mellitus ([facility] )    Lab Results   Component Value Date    HGBA1C 6 5 (H) 02/20/2020    Well controlled  Continue same  Will continue to monitor  Hypertension  Well controlled  Continue same  Will continue to monitor  Hyperlipidemia  Stable  Continue same  Will continue to monitor  Anemia  Stable  Will continue to monitor  Healthcare maintenance  It was discussed about immunization, diet, exercise and safety measures  Diagnoses and all orders for this visit:    Type 2 diabetes mellitus with other specified complication, without long-term current use of insulin ([place] [address] )    Type 2 diabetes mellitus with complication ([place] [address] )  -     Comprehensive metabolic panel; Future  -     Hemoglobin A1C; Future  -     Microalbumin / creatinine urine ratio  -     Lipid Panel with Direct LDL reflex; Future  -     TSH, 3rd generation with Free T4 reflex; Future  -     CBC and differential; Future    Essential hypertension    Mixed hyperlipidemia    Other specified nutritional anemias    Healthcare maintenance    BMI 29 0-29 9,adult          There are no Patient Instructions on file for this visit  Return in about 6 months (around 1/14/2021)  Subjective:      Patient ID: Ana Allred is a 79 y o  male  Chief Complaint   Patient presents with   [facility] Visit       Is here today for follow-up multiple medical problems  He has been taking his medications  He denies any side effects from his medications  He had blood work back in February  His A1c was well controlled  His cholesterol was stable  The following portions of the patient's history were reviewed and updated as appropriate: allergies, current medications, past family history, past medical history, past social history, past surgical history and problem list     Review of Systems   Constitutional: Negative for chills and fever  HENT: Negative for trouble swallowing      Eyes: Negative for visual disturbance  Respiratory: Negative for cough and shortness of breath  Cardiovascular: Negative for chest pain and palpitations  Gastrointestinal: Negative for abdominal pain, blood in stool and vomiting  Endocrine: Negative for cold intolerance and heat intolerance  Genitourinary: Negative for difficulty urinating and dysuria  Musculoskeletal: Negative for gait problem  Skin: Negative for rash  Neurological: Negative for dizziness, syncope and headaches  Hematological: Negative for adenopathy  Psychiatric/Behavioral: Negative for behavioral problems  Current Outpatient Medications   Medication Sig Dispense Refill    amLODIPine (NORVASC) 10 mg tablet TAKE ONE TABLET BY MOUTH EVERY DAY  90 tablet 0    aspirin (ECOTRIN LOW STRENGTH) 81 mg EC tablet take 1 tablet 4x wkly      atorvastatin (Lipitor) 10 mg tablet Take 1 tablet (10 mg total) by mouth daily 90 tablet 0    magnesium chloride (MAG64) 64 MG TBEC EC tablet Take 64 mg by mouth 4 (four) times a day Take 2 tablets by mouth twice daily      metFORMIN (GLUCOPHAGE) 1000 MG tablet Take 1 tablet (1,000 mg total) by mouth 2 (two) times a day with meals Breakfast and dinner 180 tablet 1    olmesartan (BENICAR) 40 mg tablet TAKE ONE TABLET BY MOUTH EVERY DAY  90 tablet 0    Omega-3 Fatty Acids (FISH OIL) 1,000 mg take 1 by Oral route once    Pt takes 4x week      sildenafil (VIAGRA) 100 mg tablet Take 1 tablet (100 mg total) by mouth daily as needed for erectile dysfunction 10 tablet 3    tamsulosin (FLOMAX) 0 4 mg TAKE 1 CAPSULE BY MOUTH daily with dinner 90 capsule 0    clobetasol (TEMOVATE) 0 05 % ointment every 24 hours      fluticasone (FLONASE) 50 mcg/act nasal spray 1 spray into each nostril daily (Patient not taking: Reported on 3/7/2019) 16 g 3    ibuprofen (MOTRIN) 600 mg tablet TAKE ONE TABLET BY MOUTH THREE TIMES DAILY WITH FOOD  (Patient not taking: Reported on 3/7/2019) 45 tablet 0    oxyCODONE-acetaminophen (PERCOCET) 5-325 mg per tablet take 1 tablet by oral route  at bed time       No current facility-administered medications for this visit  Objective:    /80 (BP Location: Left arm, Patient Position: Sitting, Cuff Size: Adult)   Pulse 72   Temp 97 8 °F (36 6 °C) (Tympanic)   Resp 16   Ht 5' 8" (1 727 m)   Wt 88 9 kg (196 lb)   SpO2 97%   BMI 29 80 kg/m²        Physical Exam   Constitutional: He is oriented to person, place, and time  He appears well-developed and well-nourished  HENT:   Head: Normocephalic and atraumatic  Eyes: Pupils are equal, round, and reactive to light  EOM are normal    Neck: Normal range of motion  Neck supple  Cardiovascular: Normal rate, regular rhythm and normal heart sounds  Pulmonary/Chest: Effort normal and breath sounds normal    Abdominal: Soft  Bowel sounds are normal    Musculoskeletal: Normal range of motion  He exhibits no edema  Lymphadenopathy:     He has no cervical adenopathy  Neurological: He is alert and oriented to person, place, and time  No cranial nerve deficit  Skin: Skin is warm  No rash noted  Psychiatric: He has a normal mood and affect  Nursing note and vitals reviewed  Raymond Kerr MD BMI Counseling: Body mass index is 29 8 kg/m²  The BMI is above normal  Nutrition recommendations include reducing portion sizes, decreasing overall calorie intake and 3-5 servings of fruits/vegetables daily  Exercise recommendations include moderate aerobic physical activity for 150 minutes/week

## 2020-07-14 NOTE — ASSESSMENT & PLAN NOTE
Lab Results   Component Value Date    HGBA1C 6 5 (H) 02/20/2020    Well controlled  Continue same  Will continue to monitor

## 2020-08-10 DIAGNOSIS — E78.5 HYPERLIPIDEMIA, UNSPECIFIED HYPERLIPIDEMIA TYPE: ICD-10-CM

## 2020-08-11 ENCOUNTER — TELEPHONE (OUTPATIENT)
Dept: FAMILY MEDICINE CLINIC | Facility: CLINIC | Age: 80
End: 2020-08-11

## 2020-08-11 RX ORDER — ATORVASTATIN CALCIUM 10 MG/1
10 TABLET, FILM COATED ORAL DAILY
Qty: 90 TABLET | Refills: 1 | Status: SHIPPED | OUTPATIENT
Start: 2020-08-11 | End: 2021-04-20

## 2020-08-11 NOTE — TELEPHONE ENCOUNTER
Pt was here last week to see  but did not mention that his sugar been running a lil high daily then this morning After a bagel breakfast at 9am , took sugar at 1:00 and it was 264  Also feeling fatigue and using bathroom alot  Not sure if he needs to adjust medication    Please call [phone]

## 2020-08-11 NOTE — TELEPHONE ENCOUNTER
Per Dr Garret Bettencourt check fasting and afternoon glucose, sched  appt Friday  Pt aware and appt sched, will bring glucose readings to appt  Марина Farrar

## 2020-08-14 ENCOUNTER — OFFICE VISIT (OUTPATIENT)
Dept: FAMILY MEDICINE CLINIC | Facility: CLINIC | Age: 80
End: 2020-08-14
Payer: COMMERCIAL

## 2020-08-14 VITALS
DIASTOLIC BLOOD PRESSURE: 70 MMHG | HEIGHT: 68 IN | HEART RATE: 77 BPM | TEMPERATURE: 97.2 F | WEIGHT: 195.8 LBS | SYSTOLIC BLOOD PRESSURE: 132 MMHG | BODY MASS INDEX: 29.67 KG/M2 | RESPIRATION RATE: 16 BRPM | OXYGEN SATURATION: 97 %

## 2020-08-14 DIAGNOSIS — E11.69 TYPE 2 DIABETES MELLITUS WITH OTHER SPECIFIED COMPLICATION, WITHOUT LONG-TERM CURRENT USE OF INSULIN (HCC): Primary | ICD-10-CM

## 2020-08-14 PROCEDURE — 2022F DILAT RTA XM EVC RTNOPTHY: CPT | Performed by: FAMILY MEDICINE

## 2020-08-14 PROCEDURE — 3078F DIAST BP <80 MM HG: CPT | Performed by: FAMILY MEDICINE

## 2020-08-14 PROCEDURE — 3060F POS MICROALBUMINURIA REV: CPT | Performed by: FAMILY MEDICINE

## 2020-08-14 PROCEDURE — 4040F PNEUMOC VAC/ADMIN/RCVD: CPT | Performed by: FAMILY MEDICINE

## 2020-08-14 PROCEDURE — 99213 OFFICE O/P EST LOW 20 MIN: CPT | Performed by: FAMILY MEDICINE

## 2020-08-14 PROCEDURE — 1160F RVW MEDS BY RX/DR IN RCRD: CPT | Performed by: FAMILY MEDICINE

## 2020-08-14 PROCEDURE — 3044F HG A1C LEVEL LT 7.0%: CPT | Performed by: FAMILY MEDICINE

## 2020-08-14 PROCEDURE — 1036F TOBACCO NON-USER: CPT | Performed by: FAMILY MEDICINE

## 2020-08-14 PROCEDURE — 3075F SYST BP GE 130 - 139MM HG: CPT | Performed by: FAMILY MEDICINE

## 2020-08-14 PROCEDURE — 3008F BODY MASS INDEX DOCD: CPT | Performed by: FAMILY MEDICINE

## 2020-08-14 NOTE — PROGRESS NOTES
Assessment/Plan:  Type 2 diabetes mellitus ([facility] )    Lab Results   Component Value Date    HGBA1C 6 5 (H) 02/20/2020    Fair controlled  Was discussed with patient about low carb diet  He is to continue same medications for now  Will continue to monitor  Diagnoses and all orders for this visit:    Type 2 diabetes mellitus with other specified complication, without long-term current use of insulin ([facility] )          There are no Patient Instructions on file for this visit  Return if symptoms worsen or fail to improve  Subjective:      Patient ID: Parth Hoffman is a 79 y o  male  Chief Complaint   Patient presents with    Diabetes       He is here today due to elevated blood sugar measurements at home  He stated he had to blood sugar measurement elevated in the 200s  Since then he has been checking his sugar and it has been in the 150s to 160s  He denies any episode of hypoglycemia  He continues to take his metformin twice a day  The following portions of the patient's history were reviewed and updated as appropriate: allergies, current medications, past family history, past medical history, past social history, past surgical history and problem list     Review of Systems   Constitutional: Negative for chills and fever  HENT: Negative for trouble swallowing  Eyes: Negative for visual disturbance  Respiratory: Negative for cough and shortness of breath  Cardiovascular: Negative for chest pain and palpitations  Gastrointestinal: Negative for abdominal pain, blood in stool and vomiting  Endocrine: Negative for cold intolerance and heat intolerance  Genitourinary: Negative for difficulty urinating and dysuria  Musculoskeletal: Negative for gait problem  Skin: Negative for rash  Neurological: Negative for dizziness, syncope and headaches  Hematological: Negative for adenopathy  Psychiatric/Behavioral: Negative for behavioral problems           Current Outpatient Medications   Medication Sig Dispense Refill    amLODIPine (NORVASC) 10 mg tablet TAKE ONE TABLET BY MOUTH EVERY DAY  90 tablet 0    aspirin (ECOTRIN LOW STRENGTH) 81 mg EC tablet take 1 tablet 4x wkly      atorvastatin (Lipitor) 10 mg tablet Take 1 tablet (10 mg total) by mouth daily 90 tablet 1    clobetasol (TEMOVATE) 0 05 % ointment every 24 hours      magnesium chloride (MAG64) 64 MG TBEC EC tablet Take 64 mg by mouth 4 (four) times a day Take 2 tablets by mouth twice daily      metFORMIN (GLUCOPHAGE) 1000 MG tablet Take 1 tablet (1,000 mg total) by mouth 2 (two) times a day with meals Breakfast and dinner 180 tablet 1    olmesartan (BENICAR) 40 mg tablet TAKE ONE TABLET BY MOUTH EVERY DAY  90 tablet 0    Omega-3 Fatty Acids (FISH OIL) 1,000 mg take 1 by Oral route once  Pt takes 4x week      oxyCODONE-acetaminophen (PERCOCET) 5-325 mg per tablet take 1 tablet by oral route  at bed time      sildenafil (VIAGRA) 100 mg tablet Take 1 tablet (100 mg total) by mouth daily as needed for erectile dysfunction 10 tablet 3    tamsulosin (FLOMAX) 0 4 mg TAKE 1 CAPSULE BY MOUTH daily with dinner 90 capsule 0    fluticasone (FLONASE) 50 mcg/act nasal spray 1 spray into each nostril daily (Patient not taking: Reported on 3/7/2019) 16 g 3    ibuprofen (MOTRIN) 600 mg tablet TAKE ONE TABLET BY MOUTH THREE TIMES DAILY WITH FOOD  (Patient not taking: Reported on 3/7/2019) 45 tablet 0     No current facility-administered medications for this visit  Objective:    /70 (BP Location: Left arm, Patient Position: Sitting, Cuff Size: Adult)   Pulse 77   Temp (!) 97 2 °F (36 2 °C) (Tympanic)   Resp 16   Ht 5' 8" (1 727 m)   Wt 88 8 kg (195 lb 12 8 oz)   SpO2 97%   BMI 29 77 kg/m²        Physical Exam  Vitals signs and nursing note reviewed  Constitutional:       Appearance: He is well-developed  HENT:      Head: Normocephalic and atraumatic     Eyes:      Pupils: Pupils are equal, round, and reactive to light  Neck:      Musculoskeletal: Normal range of motion and neck supple  Cardiovascular:      Rate and Rhythm: Normal rate and regular rhythm  Heart sounds: Normal heart sounds  Pulmonary:      Effort: Pulmonary effort is normal       Breath sounds: Normal breath sounds  Abdominal:      General: Bowel sounds are normal       Palpations: Abdomen is soft  Musculoskeletal: Normal range of motion  Lymphadenopathy:      Cervical: No cervical adenopathy  Skin:     General: Skin is warm  Findings: No rash  Neurological:      Mental Status: He is alert and oriented to person, place, and time  Cranial Nerves: No cranial nerve deficit                  Nyla Gaston MD

## 2020-08-14 NOTE — ASSESSMENT & PLAN NOTE
Lab Results   Component Value Date    HGBA1C 6 5 (H) 02/20/2020    Fair controlled  Was discussed with patient about low carb diet  He is to continue same medications for now  Will continue to monitor

## 2021-01-04 ENCOUNTER — DOCTOR'S OFFICE (OUTPATIENT)
Dept: URBAN - METROPOLITAN AREA CLINIC 151 | Facility: CLINIC | Age: 81
Setting detail: OPHTHALMOLOGY
End: 2021-01-04
Payer: MEDICARE

## 2021-01-04 DIAGNOSIS — I10 HYPERTENSION, UNSPECIFIED TYPE: ICD-10-CM

## 2021-01-04 DIAGNOSIS — H25.13: ICD-10-CM

## 2021-01-04 DIAGNOSIS — N40.0 BENIGN PROSTATIC HYPERPLASIA, UNSPECIFIED WHETHER LOWER URINARY TRACT SYMPTOMS PRESENT: ICD-10-CM

## 2021-01-04 DIAGNOSIS — I10 HTN (HYPERTENSION), BENIGN: ICD-10-CM

## 2021-01-04 DIAGNOSIS — H43.813: ICD-10-CM

## 2021-01-04 DIAGNOSIS — H26.223: ICD-10-CM

## 2021-01-04 DIAGNOSIS — H35.363: ICD-10-CM

## 2021-01-04 PROBLEM — E11.9 DIABETES TYPE 2 NO RETINOPATHY: Status: ACTIVE | Noted: 2021-01-04

## 2021-01-04 PROBLEM — H25.041 CATARACT POST SUBCAPSULAR; RIGHT EYE, LEFT EYE: Status: ACTIVE | Noted: 2021-01-04

## 2021-01-04 PROBLEM — H16.222 KERATOCONJUNCTIVITIS SICCA; RIGHT EYE, LEFT EYE: Status: ACTIVE | Noted: 2021-01-04

## 2021-01-04 PROBLEM — H25.12 CATARACT NUCLEAR SCLEROSIS; RIGHT EYE, LEFT EYE: Status: ACTIVE | Noted: 2021-01-04

## 2021-01-04 PROBLEM — H35.362 DRUSEN; RIGHT EYE, LEFT EYE: Status: ACTIVE | Noted: 2021-01-04

## 2021-01-04 PROBLEM — H25.042 CATARACT POST SUBCAPSULAR; RIGHT EYE, LEFT EYE: Status: ACTIVE | Noted: 2021-01-04

## 2021-01-04 PROBLEM — H16.221 KERATOCONJUNCTIVITIS SICCA; RIGHT EYE, LEFT EYE: Status: ACTIVE | Noted: 2021-01-04

## 2021-01-04 PROBLEM — H25.11 CATARACT NUCLEAR SCLEROSIS; RIGHT EYE, LEFT EYE: Status: ACTIVE | Noted: 2021-01-04

## 2021-01-04 PROBLEM — H35.361 DRUSEN; RIGHT EYE, LEFT EYE: Status: ACTIVE | Noted: 2021-01-04

## 2021-01-04 PROCEDURE — 92134 CPTRZ OPH DX IMG PST SGM RTA: CPT | Performed by: OPHTHALMOLOGY

## 2021-01-04 PROCEDURE — 99204 OFFICE O/P NEW MOD 45 MIN: CPT | Performed by: OPHTHALMOLOGY

## 2021-01-04 PROCEDURE — 92025 CPTRIZED CORNEAL TOPOGRAPHY: CPT | Performed by: OPHTHALMOLOGY

## 2021-01-04 RX ORDER — OLMESARTAN MEDOXOMIL 40 MG/1
TABLET ORAL
Qty: 90 TABLET | Refills: 0 | Status: SHIPPED | OUTPATIENT
Start: 2021-01-04 | End: 2021-03-16

## 2021-01-04 RX ORDER — AMLODIPINE BESYLATE 10 MG/1
TABLET ORAL
Qty: 90 TABLET | Refills: 0 | Status: SHIPPED | OUTPATIENT
Start: 2021-01-04 | End: 2021-03-16

## 2021-01-04 RX ORDER — TAMSULOSIN HYDROCHLORIDE 0.4 MG/1
CAPSULE ORAL
Qty: 90 CAPSULE | Refills: 0 | Status: SHIPPED | OUTPATIENT
Start: 2021-01-04 | End: 2021-03-16

## 2021-01-04 ASSESSMENT — VISUAL ACUITY
OD_BCVA: 20/70-1
OS_BCVA: 20/60+2

## 2021-01-04 ASSESSMENT — REFRACTION_CURRENTRX
OS_OVR_VA: 20/
OD_SPHERE: +2.50
OD_VPRISM_DIRECTION: PROGS
OS_AXIS: 089
OD_OVR_VA: 20/
OS_CYLINDER: -1.25
OS_SPHERE: +2.25
OS_ADD: +2.75
OD_CYLINDER: -2.25
OD_ADD: +2.75
OD_AXIS: 096
OS_VPRISM_DIRECTION: PROGS

## 2021-01-04 ASSESSMENT — KERATOMETRY
OS_K1POWER_DIOPTERS: 44.00
OS_K2POWER_DIOPTERS: 44.75
OD_AXISANGLE_DEGREES: 115
OD_K2POWER_DIOPTERS: 44.75
OS_AXISANGLE_DEGREES: 079
OD_K1POWER_DIOPTERS: 43.00

## 2021-01-04 ASSESSMENT — CORNEAL SURGICAL SCARRING: OD_SCARRING: STROMAL

## 2021-01-04 ASSESSMENT — CONFRONTATIONAL VISUAL FIELD TEST (CVF)
OS_FINDINGS: FULL
OD_FINDINGS: FULL

## 2021-01-04 ASSESSMENT — DRY EYES - PHYSICIAN NOTES
OD_GENERALCOMMENTS: TRACE PEE
OS_GENERALCOMMENTS: TRACE PEE

## 2021-02-11 ENCOUNTER — OFFICE VISIT (OUTPATIENT)
Dept: FAMILY MEDICINE CLINIC | Facility: CLINIC | Age: 81
End: 2021-02-11
Payer: COMMERCIAL

## 2021-02-11 VITALS
DIASTOLIC BLOOD PRESSURE: 78 MMHG | HEART RATE: 65 BPM | SYSTOLIC BLOOD PRESSURE: 118 MMHG | BODY MASS INDEX: 30.52 KG/M2 | OXYGEN SATURATION: 98 % | HEIGHT: 68 IN | RESPIRATION RATE: 16 BRPM | WEIGHT: 201.38 LBS | TEMPERATURE: 97.8 F

## 2021-02-11 DIAGNOSIS — Z01.818 PRE-OP EXAMINATION: ICD-10-CM

## 2021-02-11 DIAGNOSIS — H25.89 OTHER AGE-RELATED CATARACT OF BOTH EYES: Primary | ICD-10-CM

## 2021-02-11 PROBLEM — H26.9 CATARACTA: Status: ACTIVE | Noted: 2021-02-11

## 2021-02-11 PROCEDURE — 99214 OFFICE O/P EST MOD 30 MIN: CPT | Performed by: FAMILY MEDICINE

## 2021-02-11 RX ORDER — OFLOXACIN 3 MG/ML
SOLUTION/ DROPS OPHTHALMIC
COMMUNITY
Start: 2021-01-29

## 2021-02-11 RX ORDER — PREDNISOLONE ACETATE 10 MG/ML
SUSPENSION/ DROPS OPHTHALMIC
COMMUNITY
Start: 2021-01-29

## 2021-02-11 RX ORDER — KETOROLAC TROMETHAMINE 5 MG/ML
SOLUTION OPHTHALMIC
COMMUNITY
Start: 2021-01-29

## 2021-02-11 NOTE — PROGRESS NOTES
Assessment/Plan:  Pre-op examination    He is an acceptable risk for the proposed procedure  Cataracta    He is getting cataract procedures for both eyes within the next month  Diagnoses and all orders for this visit:    Other age-related cataract of both eyes    Pre-op examination    BMI 30 0-30 9,adult    Other orders  -     prednisoLONE acetate (PRED FORTE) 1 % ophthalmic suspension  -     ofloxacin (OCUFLOX) 0 3 % ophthalmic solution  -     ketorolac (ACULAR) 0 5 % ophthalmic solution          There are no Patient Instructions on file for this visit  Return in about 8 weeks (around 4/5/2021)  Subjective:      Patient ID: Javi Carney is a 79 y o  male  Chief Complaint   Patient presents with    Pre-op Exam        He is here today for preop clearance for cataract surgery  He denies any other complaint  He is due for blood work  The following portions of the patient's history were reviewed and updated as appropriate: allergies, current medications, past family history, past medical history, past social history, past surgical history and problem list     Review of Systems   Constitutional: Negative for chills and fever  HENT: Negative for trouble swallowing  Eyes: Positive for visual disturbance  Negative for photophobia, pain, redness and itching  Respiratory: Negative for cough and shortness of breath  Cardiovascular: Negative for chest pain and palpitations  Gastrointestinal: Negative for abdominal pain, blood in stool and vomiting  Endocrine: Negative for cold intolerance and heat intolerance  Genitourinary: Negative for difficulty urinating and dysuria  Musculoskeletal: Negative for gait problem  Skin: Negative for rash  Neurological: Negative for dizziness, syncope and headaches  Hematological: Negative for adenopathy  Psychiatric/Behavioral: Negative for behavioral problems           Current Outpatient Medications   Medication Sig Dispense Refill  amLODIPine (NORVASC) 10 mg tablet take 1 tablet by mouth daily 90 tablet 0    aspirin (ECOTRIN LOW STRENGTH) 81 mg EC tablet take 1 tablet 4x wkly      atorvastatin (Lipitor) 10 mg tablet Take 1 tablet (10 mg total) by mouth daily 90 tablet 1    clobetasol (TEMOVATE) 0 05 % ointment every 24 hours      ketorolac (ACULAR) 0 5 % ophthalmic solution       magnesium chloride (MAG64) 64 MG TBEC EC tablet Take 64 mg by mouth 4 (four) times a day Take 2 tablets by mouth twice daily      metFORMIN (GLUCOPHAGE) 1000 MG tablet Take 1 tablet (1,000 mg total) by mouth 2 (two) times a day with meals Breakfast and dinner 180 tablet 1    ofloxacin (OCUFLOX) 0 3 % ophthalmic solution       olmesartan (BENICAR) 40 mg tablet take 1 tablet by mouth daily 90 tablet 0    Omega-3 Fatty Acids (FISH OIL) 1,000 mg take 1 by Oral route once  Pt takes 4x week      prednisoLONE acetate (PRED FORTE) 1 % ophthalmic suspension       sildenafil (VIAGRA) 100 mg tablet Take 1 tablet (100 mg total) by mouth daily as needed for erectile dysfunction 10 tablet 3    tamsulosin (FLOMAX) 0 4 mg take 1 capsule by mouth daily with dinner 90 capsule 0    fluticasone (FLONASE) 50 mcg/act nasal spray 1 spray into each nostril daily (Patient not taking: Reported on 3/7/2019) 16 g 3    ibuprofen (MOTRIN) 600 mg tablet TAKE ONE TABLET BY MOUTH THREE TIMES DAILY WITH FOOD  (Patient not taking: Reported on 3/7/2019) 45 tablet 0    oxyCODONE-acetaminophen (PERCOCET) 5-325 mg per tablet take 1 tablet by oral route  at bed time       No current facility-administered medications for this visit  Objective:    /78 (BP Location: Left arm, Patient Position: Sitting, Cuff Size: Large)   Pulse 65   Temp 97 8 °F (36 6 °C) (Tympanic)   Resp 16   Ht 5' 8" (1 727 m)   Wt 91 3 kg (201 lb 6 oz)   SpO2 98%   BMI 30 62 kg/m²        Physical Exam  Vitals signs and nursing note reviewed  Constitutional:       Appearance: He is well-developed  HENT:      Head: Normocephalic and atraumatic  Eyes:      Pupils: Pupils are equal, round, and reactive to light  Neck:      Musculoskeletal: Normal range of motion and neck supple  Cardiovascular:      Rate and Rhythm: Normal rate and regular rhythm  Heart sounds: Normal heart sounds  Pulmonary:      Effort: Pulmonary effort is normal       Breath sounds: Normal breath sounds  Abdominal:      General: Bowel sounds are normal       Palpations: Abdomen is soft  Musculoskeletal: Normal range of motion  Lymphadenopathy:      Cervical: No cervical adenopathy  Skin:     General: Skin is warm  Findings: No rash  Neurological:      Mental Status: He is alert and oriented to person, place, and time  Cranial Nerves: No cranial nerve deficit  Gary Beasley MD BMI Counseling: Body mass index is 30 62 kg/m²  The BMI is above normal  Nutrition recommendations include reducing portion sizes, decreasing overall calorie intake and 3-5 servings of fruits/vegetables daily  Exercise recommendations include moderate aerobic physical activity for 150 minutes/week

## 2021-02-18 ENCOUNTER — AMBUL SURGICAL CARE (OUTPATIENT)
Dept: URBAN - METROPOLITAN AREA SURGERY 32 | Facility: SURGERY | Age: 81
Setting detail: OPHTHALMOLOGY
End: 2021-02-18
Payer: MEDICARE

## 2021-02-18 DIAGNOSIS — H25.12: ICD-10-CM

## 2021-02-18 PROCEDURE — S9986 NOT MEDICALLY NECESSARY SVC: HCPCS | Performed by: OPHTHALMOLOGY

## 2021-02-18 PROCEDURE — 66984 XCAPSL CTRC RMVL W/O ECP: CPT | Performed by: OPHTHALMOLOGY

## 2021-03-03 ENCOUNTER — AMBUL SURGICAL CARE (OUTPATIENT)
Dept: URBAN - METROPOLITAN AREA SURGERY 32 | Facility: SURGERY | Age: 81
Setting detail: OPHTHALMOLOGY
End: 2021-03-03
Payer: MEDICARE

## 2021-03-03 DIAGNOSIS — H25.11: ICD-10-CM

## 2021-03-03 PROCEDURE — S9986 NOT MEDICALLY NECESSARY SVC: HCPCS | Performed by: OPHTHALMOLOGY

## 2021-03-03 PROCEDURE — 66984 XCAPSL CTRC RMVL W/O ECP: CPT | Performed by: OPHTHALMOLOGY

## 2021-03-15 DIAGNOSIS — I10 HTN (HYPERTENSION), BENIGN: ICD-10-CM

## 2021-03-15 DIAGNOSIS — I10 HYPERTENSION, UNSPECIFIED TYPE: ICD-10-CM

## 2021-03-15 DIAGNOSIS — N40.0 BENIGN PROSTATIC HYPERPLASIA, UNSPECIFIED WHETHER LOWER URINARY TRACT SYMPTOMS PRESENT: ICD-10-CM

## 2021-03-15 DIAGNOSIS — E11.8 TYPE 2 DIABETES MELLITUS WITH COMPLICATION (HCC): ICD-10-CM

## 2021-03-16 RX ORDER — AMLODIPINE BESYLATE 10 MG/1
TABLET ORAL
Qty: 90 TABLET | Refills: 0 | Status: SHIPPED | OUTPATIENT
Start: 2021-03-16 | End: 2021-05-28

## 2021-03-16 RX ORDER — OLMESARTAN MEDOXOMIL 40 MG/1
TABLET ORAL
Qty: 90 TABLET | Refills: 0 | Status: SHIPPED | OUTPATIENT
Start: 2021-03-16 | End: 2021-05-28

## 2021-03-16 RX ORDER — TAMSULOSIN HYDROCHLORIDE 0.4 MG/1
CAPSULE ORAL
Qty: 90 CAPSULE | Refills: 0 | Status: SHIPPED | OUTPATIENT
Start: 2021-03-16 | End: 2021-05-28

## 2021-04-20 DIAGNOSIS — E78.5 HYPERLIPIDEMIA, UNSPECIFIED HYPERLIPIDEMIA TYPE: ICD-10-CM

## 2021-04-20 RX ORDER — ATORVASTATIN CALCIUM 10 MG/1
TABLET, FILM COATED ORAL
Qty: 90 TABLET | Refills: 0 | Status: SHIPPED | OUTPATIENT
Start: 2021-04-20 | End: 2021-07-13

## 2021-05-28 DIAGNOSIS — I10 HTN (HYPERTENSION), BENIGN: ICD-10-CM

## 2021-05-28 DIAGNOSIS — N40.0 BENIGN PROSTATIC HYPERPLASIA, UNSPECIFIED WHETHER LOWER URINARY TRACT SYMPTOMS PRESENT: ICD-10-CM

## 2021-05-28 DIAGNOSIS — E11.8 TYPE 2 DIABETES MELLITUS WITH COMPLICATION (HCC): ICD-10-CM

## 2021-05-28 DIAGNOSIS — I10 HYPERTENSION, UNSPECIFIED TYPE: ICD-10-CM

## 2021-05-28 RX ORDER — OLMESARTAN MEDOXOMIL 40 MG/1
TABLET ORAL
Qty: 90 TABLET | Refills: 0 | Status: SHIPPED | OUTPATIENT
Start: 2021-05-28 | End: 2021-09-08

## 2021-05-28 RX ORDER — AMLODIPINE BESYLATE 10 MG/1
TABLET ORAL
Qty: 90 TABLET | Refills: 0 | Status: SHIPPED | OUTPATIENT
Start: 2021-05-28 | End: 2021-09-08

## 2021-05-28 RX ORDER — TAMSULOSIN HYDROCHLORIDE 0.4 MG/1
CAPSULE ORAL
Qty: 90 CAPSULE | Refills: 0 | Status: SHIPPED | OUTPATIENT
Start: 2021-05-28 | End: 2021-09-08

## 2021-06-22 ENCOUNTER — APPOINTMENT (OUTPATIENT)
Dept: LAB | Facility: IMAGING CENTER | Age: 81
End: 2021-06-22
Payer: COMMERCIAL

## 2021-06-22 DIAGNOSIS — E11.8 TYPE 2 DIABETES MELLITUS WITH COMPLICATION (HCC): ICD-10-CM

## 2021-06-22 LAB
ALBUMIN SERPL BCP-MCNC: 4.2 G/DL (ref 3.5–5)
ALP SERPL-CCNC: 91 U/L (ref 46–116)
ALT SERPL W P-5'-P-CCNC: 23 U/L (ref 12–78)
ANION GAP SERPL CALCULATED.3IONS-SCNC: 7 MMOL/L (ref 4–13)
AST SERPL W P-5'-P-CCNC: 15 U/L (ref 5–45)
BASOPHILS # BLD AUTO: 0.06 THOUSANDS/ΜL (ref 0–0.1)
BASOPHILS NFR BLD AUTO: 1 % (ref 0–1)
BILIRUB SERPL-MCNC: 0.63 MG/DL (ref 0.2–1)
BUN SERPL-MCNC: 16 MG/DL (ref 5–25)
CALCIUM SERPL-MCNC: 9.3 MG/DL (ref 8.3–10.1)
CHLORIDE SERPL-SCNC: 106 MMOL/L (ref 100–108)
CHOLEST SERPL-MCNC: 112 MG/DL (ref 50–200)
CO2 SERPL-SCNC: 28 MMOL/L (ref 21–32)
CREAT SERPL-MCNC: 0.99 MG/DL (ref 0.6–1.3)
CREAT UR-MCNC: 86.6 MG/DL
EOSINOPHIL # BLD AUTO: 0.1 THOUSAND/ΜL (ref 0–0.61)
EOSINOPHIL NFR BLD AUTO: 1 % (ref 0–6)
ERYTHROCYTE [DISTWIDTH] IN BLOOD BY AUTOMATED COUNT: 13.2 % (ref 11.6–15.1)
EST. AVERAGE GLUCOSE BLD GHB EST-MCNC: 143 MG/DL
GFR SERPL CREATININE-BSD FRML MDRD: 71 ML/MIN/1.73SQ M
GLUCOSE P FAST SERPL-MCNC: 187 MG/DL (ref 65–99)
HBA1C MFR BLD: 6.6 %
HCT VFR BLD AUTO: 43.3 % (ref 36.5–49.3)
HDLC SERPL-MCNC: 40 MG/DL
HGB BLD-MCNC: 14.3 G/DL (ref 12–17)
IMM GRANULOCYTES # BLD AUTO: 0.03 THOUSAND/UL (ref 0–0.2)
IMM GRANULOCYTES NFR BLD AUTO: 0 % (ref 0–2)
LDLC SERPL CALC-MCNC: 45 MG/DL (ref 0–100)
LYMPHOCYTES # BLD AUTO: 2.24 THOUSANDS/ΜL (ref 0.6–4.47)
LYMPHOCYTES NFR BLD AUTO: 27 % (ref 14–44)
MCH RBC QN AUTO: 32.1 PG (ref 26.8–34.3)
MCHC RBC AUTO-ENTMCNC: 33 G/DL (ref 31.4–37.4)
MCV RBC AUTO: 97 FL (ref 82–98)
MICROALBUMIN UR-MCNC: 82.5 MG/L (ref 0–20)
MICROALBUMIN/CREAT 24H UR: 95 MG/G CREATININE (ref 0–30)
MONOCYTES # BLD AUTO: 0.74 THOUSAND/ΜL (ref 0.17–1.22)
MONOCYTES NFR BLD AUTO: 9 % (ref 4–12)
NEUTROPHILS # BLD AUTO: 5.25 THOUSANDS/ΜL (ref 1.85–7.62)
NEUTS SEG NFR BLD AUTO: 62 % (ref 43–75)
NRBC BLD AUTO-RTO: 0 /100 WBCS
PLATELET # BLD AUTO: 284 THOUSANDS/UL (ref 149–390)
PMV BLD AUTO: 11.5 FL (ref 8.9–12.7)
POTASSIUM SERPL-SCNC: 4.5 MMOL/L (ref 3.5–5.3)
PROT SERPL-MCNC: 7.4 G/DL (ref 6.4–8.2)
RBC # BLD AUTO: 4.46 MILLION/UL (ref 3.88–5.62)
SODIUM SERPL-SCNC: 141 MMOL/L (ref 136–145)
TRIGL SERPL-MCNC: 135 MG/DL
TSH SERPL DL<=0.05 MIU/L-ACNC: 3.69 UIU/ML (ref 0.36–3.74)
WBC # BLD AUTO: 8.42 THOUSAND/UL (ref 4.31–10.16)

## 2021-06-22 PROCEDURE — 84443 ASSAY THYROID STIM HORMONE: CPT

## 2021-06-22 PROCEDURE — 83036 HEMOGLOBIN GLYCOSYLATED A1C: CPT

## 2021-06-22 PROCEDURE — 82570 ASSAY OF URINE CREATININE: CPT | Performed by: FAMILY MEDICINE

## 2021-06-22 PROCEDURE — 36415 COLL VENOUS BLD VENIPUNCTURE: CPT

## 2021-06-22 PROCEDURE — 80061 LIPID PANEL: CPT

## 2021-06-22 PROCEDURE — 85025 COMPLETE CBC W/AUTO DIFF WBC: CPT

## 2021-06-22 PROCEDURE — 80053 COMPREHEN METABOLIC PANEL: CPT

## 2021-06-22 PROCEDURE — 82043 UR ALBUMIN QUANTITATIVE: CPT | Performed by: FAMILY MEDICINE

## 2021-06-30 ENCOUNTER — TELEPHONE (OUTPATIENT)
Dept: FAMILY MEDICINE CLINIC | Facility: CLINIC | Age: 81
End: 2021-06-30

## 2021-06-30 NOTE — TELEPHONE ENCOUNTER
----- Message from [address] sent at 6/30/2021  9:03 AM EDT -----  -  Labs show elevated fasting glucose but A1c is well controlled  -  The rest of his labs are stable

## 2021-06-30 NOTE — TELEPHONE ENCOUNTER
Advised patient of lab results  He asked about his urine test and I had to look in his chart since it was not addressed      It was the microalbumin creatinine ratio and I said I had to have someone else call with the explanation of this and he said he will wait until his appointment in July and discuss with the doctor

## 2021-07-12 DIAGNOSIS — E78.5 HYPERLIPIDEMIA, UNSPECIFIED HYPERLIPIDEMIA TYPE: ICD-10-CM

## 2021-07-13 RX ORDER — ATORVASTATIN CALCIUM 10 MG/1
TABLET, FILM COATED ORAL
Qty: 90 TABLET | Refills: 0 | Status: SHIPPED | OUTPATIENT
Start: 2021-07-13 | End: 2021-10-18

## 2021-07-15 ENCOUNTER — RA CDI HCC (OUTPATIENT)
Dept: OTHER | Facility: HOSPITAL | Age: 81
End: 2021-07-15

## 2021-07-15 NOTE — PROGRESS NOTES
[place] [employer]  coding opportunities             Chart reviewed, (number of) suggestions sent to provider: 1            Number of suggestions actually used: 0         Patients insurance company: [employer]     Visit status: Patient arrived for their scheduled appointment     Provider never responded to [employer]  coding request     [facility] [employer]  coding opportunities        7/23     Chart reviewed, (number of) suggestions sent to provider: 1    E11 22  Type 2 diabetes mellitus with chronic kidney disease - patient's eGFR values are in the CKD stage 2 range                   Patients insurance company: [employer]

## 2021-07-23 ENCOUNTER — OFFICE VISIT (OUTPATIENT)
Dept: FAMILY MEDICINE CLINIC | Facility: CLINIC | Age: 81
End: 2021-07-23
Payer: COMMERCIAL

## 2021-07-23 VITALS
OXYGEN SATURATION: 95 % | BODY MASS INDEX: 30.01 KG/M2 | TEMPERATURE: 97.5 F | HEART RATE: 70 BPM | HEIGHT: 68 IN | SYSTOLIC BLOOD PRESSURE: 132 MMHG | WEIGHT: 198 LBS | RESPIRATION RATE: 16 BRPM | DIASTOLIC BLOOD PRESSURE: 82 MMHG

## 2021-07-23 DIAGNOSIS — M54.42 LEFT-SIDED LOW BACK PAIN WITH LEFT-SIDED SCIATICA, UNSPECIFIED CHRONICITY: ICD-10-CM

## 2021-07-23 DIAGNOSIS — Z00.00 HEALTHCARE MAINTENANCE: ICD-10-CM

## 2021-07-23 DIAGNOSIS — D53.8 OTHER SPECIFIED NUTRITIONAL ANEMIAS: ICD-10-CM

## 2021-07-23 DIAGNOSIS — I10 ESSENTIAL HYPERTENSION: ICD-10-CM

## 2021-07-23 DIAGNOSIS — E11.69 TYPE 2 DIABETES MELLITUS WITH OTHER SPECIFIED COMPLICATION, WITHOUT LONG-TERM CURRENT USE OF INSULIN (HCC): Primary | ICD-10-CM

## 2021-07-23 DIAGNOSIS — E78.2 MIXED HYPERLIPIDEMIA: ICD-10-CM

## 2021-07-23 PROCEDURE — G0439 PPPS, SUBSEQ VISIT: HCPCS | Performed by: FAMILY MEDICINE

## 2021-07-23 PROCEDURE — 99214 OFFICE O/P EST MOD 30 MIN: CPT | Performed by: FAMILY MEDICINE

## 2021-07-23 NOTE — PROGRESS NOTES
Assessment/Plan:  Type 2 diabetes mellitus ([facility] [address] )    Lab Results   Component Value Date    HGBA1C 6 6 (H) 06/22/2021     Well controlled  Continue same  Continue to monitor  Hypertension    Well controlled  Continue same  Will continue to monitor  Hyperlipidemia    Well controlled  Continue same  Will continue to monitor  Healthcare maintenance    It was discussed about immunizations, diet, exercise and safety measures  Left-sided low back pain with left-sided sciatica    Fair controlled  Continue same  Continue to monitor  Anemia    Stable  Continue to monitor  Diagnoses and all orders for this visit:    Type 2 diabetes mellitus with other specified complication, without long-term current use of insulin ([facility] [address] )    Essential hypertension    Mixed hyperlipidemia    Healthcare maintenance    Other specified nutritional anemias    Left-sided low back pain with left-sided sciatica, unspecified chronicity          There are no Patient Instructions on file for this visit  No follow-ups on file  Subjective:      Patient ID: Tatiana Anderson is a 79 y o  male  Chief Complaint   Patient presents with   [facility] Wellness Visit       He is here today for multiple medical problems  He has been taking his medications  Denies any side effects from his medications  He had blood work done last month showed A1c well controlled  Cholesterol normal   All the rest of blood work came back stable  He denies any complaint today  The following portions of the patient's history were reviewed and updated as appropriate: allergies, current medications, past family history, past medical history, past social history, past surgical history and problem list     Review of Systems   Constitutional: Negative for chills and fever  HENT: Negative for trouble swallowing  Eyes: Negative for visual disturbance  Respiratory: Negative for cough and shortness of breath      Cardiovascular: Negative for chest pain and palpitations  Gastrointestinal: Negative for abdominal pain, blood in stool and vomiting  Endocrine: Negative for cold intolerance and heat intolerance  Genitourinary: Negative for difficulty urinating and dysuria  Musculoskeletal: Negative for gait problem  Skin: Negative for rash  Neurological: Negative for dizziness, syncope and headaches  Hematological: Negative for adenopathy  Psychiatric/Behavioral: Negative for behavioral problems  Current Outpatient Medications   Medication Sig Dispense Refill    amLODIPine (NORVASC) 10 mg tablet TAKE ONE TABLET BY MOUTH DAILY  90 tablet 0    aspirin (ECOTRIN LOW STRENGTH) 81 mg EC tablet take 1 tablet 4x wkly      atorvastatin (LIPITOR) 10 mg tablet TAKE ONE TABLET BY MOUTH EVERY DAY  90 tablet 0    magnesium chloride (MAG64) 64 MG TBEC EC tablet Take 64 mg by mouth 4 (four) times a day Take 2 tablets by mouth twice daily      metFORMIN (GLUCOPHAGE) 1000 MG tablet TAKE 1 TABLET BY MOUTH TWICE DAILY with meals in breakfast and dinner 180 tablet 0    olmesartan (BENICAR) 40 mg tablet TAKE ONE TABLET BY MOUTH DAILY  90 tablet 0    Omega-3 Fatty Acids (FISH OIL) 1,000 mg take 1 by Oral route once    Pt takes 4x week      sildenafil (VIAGRA) 100 mg tablet Take 1 tablet (100 mg total) by mouth daily as needed for erectile dysfunction 10 tablet 3    tamsulosin (FLOMAX) 0 4 mg TAKE 1 CAPSULE BY MOUTH DAILY with dinner 90 capsule 0    clobetasol (TEMOVATE) 0 05 % ointment every 24 hours (Patient not taking: Reported on 7/23/2021)      fluticasone (FLONASE) 50 mcg/act nasal spray 1 spray into each nostril daily (Patient not taking: Reported on 3/7/2019) 16 g 3    ibuprofen (MOTRIN) 600 mg tablet TAKE ONE TABLET BY MOUTH THREE TIMES DAILY WITH FOOD  (Patient not taking: Reported on 3/7/2019) 45 tablet 0    ketorolac (ACULAR) 0 5 % ophthalmic solution  (Patient not taking: Reported on 7/23/2021)      ofloxacin (OCUFLOX) 0 3 % ophthalmic solution  (Patient not taking: Reported on 7/23/2021)      oxyCODONE-acetaminophen (PERCOCET) 5-325 mg per tablet take 1 tablet by oral route  at bed time (Patient not taking: Reported on 7/23/2021)      prednisoLONE acetate (PRED FORTE) 1 % ophthalmic suspension  (Patient not taking: Reported on 7/23/2021)       No current facility-administered medications for this visit  Objective:    /82 (BP Location: Left arm, Patient Position: Sitting, Cuff Size: Adult)   Pulse 70   Temp 97 5 °F (36 4 °C) (Tympanic)   Resp 16   Ht 5' 8" (1 727 m)   Wt 89 8 kg (198 lb)   SpO2 95%   BMI 30 11 kg/m²        Physical Exam  Vitals and nursing note reviewed  Constitutional:       Appearance: He is well-developed  HENT:      Head: Normocephalic and atraumatic  Eyes:      Pupils: Pupils are equal, round, and reactive to light  Cardiovascular:      Rate and Rhythm: Normal rate and regular rhythm  Heart sounds: Normal heart sounds  Pulmonary:      Effort: Pulmonary effort is normal       Breath sounds: Normal breath sounds  Abdominal:      General: Bowel sounds are normal       Palpations: Abdomen is soft  Musculoskeletal:         General: Normal range of motion  Cervical back: Normal range of motion and neck supple  Lymphadenopathy:      Cervical: No cervical adenopathy  Skin:     General: Skin is warm  Findings: No rash  Neurological:      Mental Status: He is alert and oriented to person, place, and time  Cranial Nerves: No cranial nerve deficit                  Shakira Hollis MD

## 2021-07-23 NOTE — ASSESSMENT & PLAN NOTE
Lab Results   Component Value Date    HGBA1C 6 6 (H) 06/22/2021     Well controlled  Continue same  Continue to monitor

## 2021-07-23 NOTE — PROGRESS NOTES
Assessment and Plan:     Problem List Items Addressed This Visit        Endocrine    Type 2 diabetes mellitus ([facility] [address] ) - Primary       Lab Results   Component Value Date    HGBA1C 6 6 (H) 06/22/2021     Well controlled  Continue same  Continue to monitor  Relevant Orders    CBC and differential    Comprehensive metabolic panel    Lipid Panel with Direct LDL reflex    TSH, 3rd generation with Free T4 reflex    Hemoglobin A1C    Microalbumin / creatinine urine ratio       Cardiovascular and Mediastinum    Hypertension       Well controlled  Continue same  Will continue to monitor  Nervous and Auditory    Left-sided low back pain with left-sided sciatica       Fair controlled  Continue same  Continue to monitor  Other    Anemia       Stable  Continue to monitor  Hyperlipidemia       Well controlled  Continue same  Will continue to monitor  Healthcare maintenance       It was discussed about immunizations, diet, exercise and safety measures  BMI Counseling: Body mass index is 30 11 kg/m²  The BMI is above normal  Nutrition recommendations include decreasing portion sizes and encouraging healthy choices of fruits and vegetables  Falls Plan of Care: balance, strength, and gait training instructions were provided  Preventive health issues were discussed with patient, and age appropriate screening tests were ordered as noted in patient's After Visit Summary  Personalized health advice and appropriate referrals for health education or preventive services given if needed, as noted in patient's After Visit Summary       History of Present Illness:     Patient presents for Medicare Annual Wellness visit    Patient Care Team:  Attila De La Cruz MD as PCP - General (Family Medicine)  Attila De La Cruz MD as PCP - [address] [place], [place] [id number] [facility])     Problem List:     Patient Active Problem List   Diagnosis    Hypertension    Type 2 diabetes mellitus ([facility] [address] )  Allergic rhinitis    Anemia    Benign prostatic hyperplasia    Hyperlipidemia    Obesity    Left-sided low back pain with left-sided sciatica    Prostate cancer screening    Obesity (BMI 30 0-34  9)   [address] maintenance    Cataracta    Pre-op examination      Past Medical and Surgical History:     Past Medical History:   Diagnosis Date    Diabetes mellitus ([address] )     Hypertension      Past Surgical History:   Procedure Laterality Date    GALLBLADDER SURGERY  1968      Family History:     Family History   Problem Relation Age of Onset    No Known Problems Mother     No Known Problems Father       Social History:     Social History     Socioeconomic History    Marital status: /Civil Union     Spouse name: None    Number of children: None    Years of education: None    Highest education level: None   Occupational History    None   Tobacco Use    Smoking status: Never Smoker    Smokeless tobacco: Never Used    Tobacco comment: no passive smoke exposure    Vaping Use    Vaping Use: Never used   Substance and Sexual Activity    Alcohol use: Not Currently    Drug use: Never    Sexual activity: None   Other Topics Concern    None   Social History Narrative    None     Social Determinants of Health     Financial Resource Strain:     Difficulty of Paying Living Expenses:    Food Insecurity:     Worried About Running Out of Food in the Last Year:     Ran Out of Food in the Last Year:    Transportation Needs:     Lack of Transportation (Medical):      Lack of Transportation (Non-Medical):    Physical Activity:     Days of Exercise per Week:     Minutes of Exercise per Session:    Stress:     Feeling of Stress :    Social Connections:     Frequency of Communication with Friends and Family:     Frequency of Social Gatherings with Friends and Family:     Attends Congregational Services:     Active Member of Clubs or Organizations:     Attends Club or Organization Meetings:    Kira Kinsey Marital Status:    Intimate Partner Violence:     Fear of Current or Ex-Partner:     Emotionally Abused:     Physically Abused:     Sexually Abused:       Medications and Allergies:     Current Outpatient Medications   Medication Sig Dispense Refill    amLODIPine (NORVASC) 10 mg tablet TAKE ONE TABLET BY MOUTH DAILY  90 tablet 0    aspirin (ECOTRIN LOW STRENGTH) 81 mg EC tablet take 1 tablet 4x wkly      atorvastatin (LIPITOR) 10 mg tablet TAKE ONE TABLET BY MOUTH EVERY DAY  90 tablet 0    magnesium chloride (MAG64) 64 MG TBEC EC tablet Take 64 mg by mouth 4 (four) times a day Take 2 tablets by mouth twice daily      metFORMIN (GLUCOPHAGE) 1000 MG tablet TAKE 1 TABLET BY MOUTH TWICE DAILY with meals in breakfast and dinner 180 tablet 0    olmesartan (BENICAR) 40 mg tablet TAKE ONE TABLET BY MOUTH DAILY  90 tablet 0    Omega-3 Fatty Acids (FISH OIL) 1,000 mg take 1 by Oral route once    Pt takes 4x week      sildenafil (VIAGRA) 100 mg tablet Take 1 tablet (100 mg total) by mouth daily as needed for erectile dysfunction 10 tablet 3    tamsulosin (FLOMAX) 0 4 mg TAKE 1 CAPSULE BY MOUTH DAILY with dinner 90 capsule 0    clobetasol (TEMOVATE) 0 05 % ointment every 24 hours (Patient not taking: Reported on 7/23/2021)      fluticasone (FLONASE) 50 mcg/act nasal spray 1 spray into each nostril daily (Patient not taking: Reported on 3/7/2019) 16 g 3    ibuprofen (MOTRIN) 600 mg tablet TAKE ONE TABLET BY MOUTH THREE TIMES DAILY WITH FOOD  (Patient not taking: Reported on 3/7/2019) 45 tablet 0    ketorolac (ACULAR) 0 5 % ophthalmic solution  (Patient not taking: Reported on 7/23/2021)      ofloxacin (OCUFLOX) 0 3 % ophthalmic solution  (Patient not taking: Reported on 7/23/2021)      oxyCODONE-acetaminophen (PERCOCET) 5-325 mg per tablet take 1 tablet by oral route  at bed time (Patient not taking: Reported on 7/23/2021)      prednisoLONE acetate (PRED FORTE) 1 % ophthalmic suspension  (Patient not taking: Reported on 7/23/2021)       No current facility-administered medications for this visit  Allergies   Allergen Reactions    Penicillins Other (See Comments)      Immunizations:     Immunization History   Administered Date(s) Administered    INFLUENZA 10/16/2014, 10/24/2015, 10/05/2016, 09/19/2017, 10/05/2018, 09/01/2020    Pneumococcal Conjugate 13-Valent 07/22/2015    Pneumococcal Polysaccharide PPV23 10/01/2011    SARS-CoV-2 / COVID-19 mRNA IM (Moderna) 01/18/2021, 02/15/2021    Td (adult), Unspecified 04/07/2016    Zoster Vaccine Recombinant 10/05/2019, 12/12/2019    influenza, trivalent, adjuvanted 09/18/2019      Health Maintenance: There are no preventive care reminders to display for this patient  Topic Date Due    DTaP,Tdap,and Td Vaccines (1 - Tdap) 07/08/1961    Influenza Vaccine (1) 09/01/2021      Medicare Health Risk Assessment:     /82 (BP Location: Left arm, Patient Position: Sitting, Cuff Size: Adult)   Pulse 70   Temp 97 5 °F (36 4 °C) (Tympanic)   Resp 16   Ht 5' 8" (1 727 m)   Wt 89 8 kg (198 lb)   SpO2 95%   BMI 30 11 kg/m²      Malik Reynoso is here for his Subsequent Wellness visit  Health Risk Assessment:   Patient rates overall health as good  Patient feels that their physical health rating is same  Patient is satisfied with their life  Eyesight was rated as same  Hearing was rated as same  Patient feels that their emotional and mental health rating is same  Patients states they are never, rarely angry  Patient states they are sometimes unusually tired/fatigued  Pain experienced in the last 7 days has been some  Patient's pain rating has been 5/10  Patient states that he has experienced no weight loss or gain in last 6 months  Depression Screening:   PHQ-2 Score: 1      Fall Risk Screening:    In the past year, patient has experienced: no history of falling in past year      Home Safety:  Patient does not have trouble with stairs inside or outside of their home  Patient has working smoke alarms and has no working carbon monoxide detector  Home safety hazards include: none  Nutrition:   Current diet is Regular  Medications:   Patient is currently taking over-the-counter supplements  OTC medications include: see medication list  Patient is able to manage medications  Activities of Daily Living (ADLs)/Instrumental Activities of Daily Living (IADLs):   Walk and transfer into and out of bed and chair?: Yes  Dress and groom yourself?: Yes    Bathe or shower yourself?: Yes    Feed yourself? Yes  Do your laundry/housekeeping?: Yes  Manage your money, pay your bills and track your expenses?: Yes  Make your own meals?: Yes    Do your own shopping?: Yes    Previous Hospitalizations:   Any hospitalizations or ED visits within the last 12 months?: No      Advance Care Planning:   Living will: No    Durable POA for healthcare: No    Advanced directive: No      Cognitive Screening:   Provider or family/friend/caregiver concerned regarding cognition?: No    PREVENTIVE SCREENINGS      Cardiovascular Screening:    General: Screening Not Indicated and History Lipid Disorder      Diabetes Screening:     General: Screening Not Indicated and History Diabetes      Colorectal Cancer Screening:     General: Screening Not Indicated      Prostate Cancer Screening:    General: Screening Not Indicated      Osteoporosis Screening:    General: Risks and Benefits Discussed      Abdominal Aortic Aneurysm (AAA) Screening:        General: Risks and Benefits Discussed      Lung Cancer Screening:     General: Screening Not Indicated      Hepatitis C Screening:    General: Risks and Benefits Discussed    Screening, Brief Intervention, and Referral to Treatment (SBIRT)    Screening  Typical number of drinks in a day: 0  Typical number of drinks in a week: 0  Interpretation: Low risk drinking behavior      Single Item Drug Screening:  How often have you used an illegal drug (including marijuana) or a prescription medication for non-medical reasons in the past year? never    Single Item Drug Screen Score: 0  Interpretation: Negative screen for possible drug use disorder    Brief Intervention  Alcohol & drug use screenings were reviewed  No concerns regarding substance use disorder identified         Yesenia Snow MD

## 2021-09-07 DIAGNOSIS — E11.8 TYPE 2 DIABETES MELLITUS WITH COMPLICATION (HCC): ICD-10-CM

## 2021-09-07 DIAGNOSIS — N40.0 BENIGN PROSTATIC HYPERPLASIA, UNSPECIFIED WHETHER LOWER URINARY TRACT SYMPTOMS PRESENT: ICD-10-CM

## 2021-09-07 DIAGNOSIS — I10 HYPERTENSION, UNSPECIFIED TYPE: ICD-10-CM

## 2021-09-07 DIAGNOSIS — I10 HTN (HYPERTENSION), BENIGN: ICD-10-CM

## 2021-09-08 RX ORDER — TAMSULOSIN HYDROCHLORIDE 0.4 MG/1
CAPSULE ORAL
Qty: 90 CAPSULE | Refills: 0 | Status: SHIPPED | OUTPATIENT
Start: 2021-09-08 | End: 2021-12-06

## 2021-09-08 RX ORDER — AMLODIPINE BESYLATE 10 MG/1
TABLET ORAL
Qty: 90 TABLET | Refills: 0 | Status: SHIPPED | OUTPATIENT
Start: 2021-09-08 | End: 2021-12-06

## 2021-09-08 RX ORDER — OLMESARTAN MEDOXOMIL 40 MG/1
TABLET ORAL
Qty: 90 TABLET | Refills: 0 | Status: SHIPPED | OUTPATIENT
Start: 2021-09-08 | End: 2021-12-06

## 2021-10-18 DIAGNOSIS — E78.5 HYPERLIPIDEMIA, UNSPECIFIED HYPERLIPIDEMIA TYPE: ICD-10-CM

## 2021-10-18 RX ORDER — ATORVASTATIN CALCIUM 10 MG/1
TABLET, FILM COATED ORAL
Qty: 90 TABLET | Refills: 0 | Status: SHIPPED | OUTPATIENT
Start: 2021-10-18 | End: 2022-01-24

## 2021-11-19 ENCOUNTER — VBI (OUTPATIENT)
Dept: ADMINISTRATIVE | Facility: OTHER | Age: 81
End: 2021-11-19

## 2021-12-06 DIAGNOSIS — I10 HTN (HYPERTENSION), BENIGN: ICD-10-CM

## 2021-12-06 DIAGNOSIS — E11.8 TYPE 2 DIABETES MELLITUS WITH COMPLICATION (HCC): ICD-10-CM

## 2021-12-06 DIAGNOSIS — N40.0 BENIGN PROSTATIC HYPERPLASIA, UNSPECIFIED WHETHER LOWER URINARY TRACT SYMPTOMS PRESENT: ICD-10-CM

## 2021-12-06 DIAGNOSIS — I10 HYPERTENSION, UNSPECIFIED TYPE: ICD-10-CM

## 2021-12-06 RX ORDER — TAMSULOSIN HYDROCHLORIDE 0.4 MG/1
CAPSULE ORAL
Qty: 90 CAPSULE | Refills: 0 | Status: SHIPPED | OUTPATIENT
Start: 2021-12-06 | End: 2021-12-06

## 2021-12-06 RX ORDER — TAMSULOSIN HYDROCHLORIDE 0.4 MG/1
CAPSULE ORAL
Qty: 90 CAPSULE | Refills: 0 | Status: SHIPPED | OUTPATIENT
Start: 2021-12-06 | End: 2022-03-10

## 2021-12-06 RX ORDER — AMLODIPINE BESYLATE 10 MG/1
TABLET ORAL
Qty: 90 TABLET | Refills: 0 | Status: SHIPPED | OUTPATIENT
Start: 2021-12-06 | End: 2022-03-10

## 2021-12-06 RX ORDER — OLMESARTAN MEDOXOMIL 40 MG/1
TABLET ORAL
Qty: 90 TABLET | Refills: 0 | Status: SHIPPED | OUTPATIENT
Start: 2021-12-06 | End: 2022-03-10

## 2021-12-06 RX ORDER — AMLODIPINE BESYLATE 10 MG/1
TABLET ORAL
Qty: 90 TABLET | Refills: 0 | Status: SHIPPED | OUTPATIENT
Start: 2021-12-06 | End: 2021-12-06

## 2021-12-06 RX ORDER — OLMESARTAN MEDOXOMIL 40 MG/1
TABLET ORAL
Qty: 90 TABLET | Refills: 0 | Status: SHIPPED | OUTPATIENT
Start: 2021-12-06 | End: 2021-12-06

## 2022-01-22 DIAGNOSIS — E78.5 HYPERLIPIDEMIA, UNSPECIFIED HYPERLIPIDEMIA TYPE: ICD-10-CM

## 2022-01-24 RX ORDER — ATORVASTATIN CALCIUM 10 MG/1
TABLET, FILM COATED ORAL
Qty: 90 TABLET | Refills: 0 | Status: SHIPPED | OUTPATIENT
Start: 2022-01-24 | End: 2022-04-19

## 2022-02-24 ENCOUNTER — APPOINTMENT (OUTPATIENT)
Dept: LAB | Facility: IMAGING CENTER | Age: 82
End: 2022-02-24
Payer: COMMERCIAL

## 2022-02-24 DIAGNOSIS — E11.69 TYPE 2 DIABETES MELLITUS WITH OTHER SPECIFIED COMPLICATION, WITHOUT LONG-TERM CURRENT USE OF INSULIN (HCC): ICD-10-CM

## 2022-02-24 LAB
ALBUMIN SERPL BCP-MCNC: 4.1 G/DL (ref 3.5–5)
ALP SERPL-CCNC: 71 U/L (ref 46–116)
ALT SERPL W P-5'-P-CCNC: 34 U/L (ref 12–78)
ANION GAP SERPL CALCULATED.3IONS-SCNC: 9 MMOL/L (ref 4–13)
AST SERPL W P-5'-P-CCNC: 22 U/L (ref 5–45)
BASOPHILS # BLD AUTO: 0.06 THOUSANDS/ΜL (ref 0–0.1)
BASOPHILS NFR BLD AUTO: 1 % (ref 0–1)
BILIRUB SERPL-MCNC: 0.69 MG/DL (ref 0.2–1)
BUN SERPL-MCNC: 22 MG/DL (ref 5–25)
CALCIUM SERPL-MCNC: 9.7 MG/DL (ref 8.3–10.1)
CHLORIDE SERPL-SCNC: 104 MMOL/L (ref 100–108)
CHOLEST SERPL-MCNC: 114 MG/DL
CO2 SERPL-SCNC: 25 MMOL/L (ref 21–32)
CREAT SERPL-MCNC: 1.06 MG/DL (ref 0.6–1.3)
CREAT UR-MCNC: 151 MG/DL
EOSINOPHIL # BLD AUTO: 0.1 THOUSAND/ΜL (ref 0–0.61)
EOSINOPHIL NFR BLD AUTO: 1 % (ref 0–6)
ERYTHROCYTE [DISTWIDTH] IN BLOOD BY AUTOMATED COUNT: 12.9 % (ref 11.6–15.1)
EST. AVERAGE GLUCOSE BLD GHB EST-MCNC: 148 MG/DL
GFR SERPL CREATININE-BSD FRML MDRD: 65 ML/MIN/1.73SQ M
GLUCOSE P FAST SERPL-MCNC: 181 MG/DL (ref 65–99)
HBA1C MFR BLD: 6.8 %
HCT VFR BLD AUTO: 39.1 % (ref 36.5–49.3)
HDLC SERPL-MCNC: 38 MG/DL
HGB BLD-MCNC: 13.8 G/DL (ref 12–17)
IMM GRANULOCYTES # BLD AUTO: 0.01 THOUSAND/UL (ref 0–0.2)
IMM GRANULOCYTES NFR BLD AUTO: 0 % (ref 0–2)
LDLC SERPL CALC-MCNC: 45 MG/DL (ref 0–100)
LYMPHOCYTES # BLD AUTO: 2.28 THOUSANDS/ΜL (ref 0.6–4.47)
LYMPHOCYTES NFR BLD AUTO: 29 % (ref 14–44)
MCH RBC QN AUTO: 32.2 PG (ref 26.8–34.3)
MCHC RBC AUTO-ENTMCNC: 35.3 G/DL (ref 31.4–37.4)
MCV RBC AUTO: 91 FL (ref 82–98)
MICROALBUMIN UR-MCNC: 163 MG/L (ref 0–20)
MICROALBUMIN/CREAT 24H UR: 108 MG/G CREATININE (ref 0–30)
MONOCYTES # BLD AUTO: 0.69 THOUSAND/ΜL (ref 0.17–1.22)
MONOCYTES NFR BLD AUTO: 9 % (ref 4–12)
NEUTROPHILS # BLD AUTO: 4.84 THOUSANDS/ΜL (ref 1.85–7.62)
NEUTS SEG NFR BLD AUTO: 60 % (ref 43–75)
NRBC BLD AUTO-RTO: 0 /100 WBCS
PLATELET # BLD AUTO: 251 THOUSANDS/UL (ref 149–390)
PMV BLD AUTO: 11.6 FL (ref 8.9–12.7)
POTASSIUM SERPL-SCNC: 3.7 MMOL/L (ref 3.5–5.3)
PROT SERPL-MCNC: 7.1 G/DL (ref 6.4–8.2)
RBC # BLD AUTO: 4.28 MILLION/UL (ref 3.88–5.62)
SODIUM SERPL-SCNC: 138 MMOL/L (ref 136–145)
TRIGL SERPL-MCNC: 156 MG/DL
TSH SERPL DL<=0.05 MIU/L-ACNC: 3.63 UIU/ML (ref 0.36–3.74)
WBC # BLD AUTO: 7.98 THOUSAND/UL (ref 4.31–10.16)

## 2022-02-24 PROCEDURE — 36415 COLL VENOUS BLD VENIPUNCTURE: CPT

## 2022-02-24 PROCEDURE — 85025 COMPLETE CBC W/AUTO DIFF WBC: CPT

## 2022-02-24 PROCEDURE — 80053 COMPREHEN METABOLIC PANEL: CPT

## 2022-02-24 PROCEDURE — 82043 UR ALBUMIN QUANTITATIVE: CPT | Performed by: FAMILY MEDICINE

## 2022-02-24 PROCEDURE — 84443 ASSAY THYROID STIM HORMONE: CPT

## 2022-02-24 PROCEDURE — 83036 HEMOGLOBIN GLYCOSYLATED A1C: CPT

## 2022-02-24 PROCEDURE — 82570 ASSAY OF URINE CREATININE: CPT | Performed by: FAMILY MEDICINE

## 2022-02-24 PROCEDURE — 80061 LIPID PANEL: CPT

## 2022-03-09 DIAGNOSIS — I10 HYPERTENSION, UNSPECIFIED TYPE: ICD-10-CM

## 2022-03-09 DIAGNOSIS — N40.0 BENIGN PROSTATIC HYPERPLASIA, UNSPECIFIED WHETHER LOWER URINARY TRACT SYMPTOMS PRESENT: ICD-10-CM

## 2022-03-09 DIAGNOSIS — I10 HTN (HYPERTENSION), BENIGN: ICD-10-CM

## 2022-03-09 DIAGNOSIS — E11.8 TYPE 2 DIABETES MELLITUS WITH COMPLICATION (HCC): ICD-10-CM

## 2022-03-10 RX ORDER — TAMSULOSIN HYDROCHLORIDE 0.4 MG/1
CAPSULE ORAL
Qty: 30 CAPSULE | Refills: 0 | Status: SHIPPED | OUTPATIENT
Start: 2022-03-10 | End: 2022-06-14 | Stop reason: SDUPTHER

## 2022-03-10 RX ORDER — AMLODIPINE BESYLATE 10 MG/1
TABLET ORAL
Qty: 30 TABLET | Refills: 0 | Status: SHIPPED | OUTPATIENT
Start: 2022-03-10 | End: 2022-06-14 | Stop reason: SDUPTHER

## 2022-03-10 RX ORDER — OLMESARTAN MEDOXOMIL 40 MG/1
TABLET ORAL
Qty: 30 TABLET | Refills: 0 | Status: SHIPPED | OUTPATIENT
Start: 2022-03-10 | End: 2022-06-14 | Stop reason: SDUPTHER

## 2022-03-10 NOTE — TELEPHONE ENCOUNTER
Pharmacy requesting refills  Last seen 7/23/21  LM for pt to call office to schedule follow up appt  1 month supply sent to pharmacy

## 2022-03-21 ENCOUNTER — TELEPHONE (OUTPATIENT)
Dept: FAMILY MEDICINE CLINIC | Facility: CLINIC | Age: 82
End: 2022-03-21

## 2022-03-21 NOTE — TELEPHONE ENCOUNTER
----- Message from Cheryl Marcus MD sent at 3/21/2022  6:36 AM EDT -----  Blood work came back showing A1c well controlled at 6 8  Slightly elevated triglyceride  All the rest of the blood work came back normal   He is due for an appointment

## 2022-03-23 ENCOUNTER — OFFICE VISIT (OUTPATIENT)
Dept: FAMILY MEDICINE CLINIC | Facility: CLINIC | Age: 82
End: 2022-03-23
Payer: COMMERCIAL

## 2022-03-23 VITALS
TEMPERATURE: 97.7 F | BODY MASS INDEX: 30.62 KG/M2 | WEIGHT: 202 LBS | HEIGHT: 68 IN | SYSTOLIC BLOOD PRESSURE: 120 MMHG | RESPIRATION RATE: 16 BRPM | DIASTOLIC BLOOD PRESSURE: 84 MMHG | OXYGEN SATURATION: 97 % | HEART RATE: 79 BPM

## 2022-03-23 DIAGNOSIS — E78.2 MIXED HYPERLIPIDEMIA: ICD-10-CM

## 2022-03-23 DIAGNOSIS — E11.8 TYPE 2 DIABETES MELLITUS WITH COMPLICATION (HCC): Primary | ICD-10-CM

## 2022-03-23 DIAGNOSIS — E11.69 TYPE 2 DIABETES MELLITUS WITH OTHER SPECIFIED COMPLICATION, WITHOUT LONG-TERM CURRENT USE OF INSULIN (HCC): ICD-10-CM

## 2022-03-23 DIAGNOSIS — I10 PRIMARY HYPERTENSION: ICD-10-CM

## 2022-03-23 DIAGNOSIS — K21.9 GASTROESOPHAGEAL REFLUX DISEASE WITHOUT ESOPHAGITIS: ICD-10-CM

## 2022-03-23 PROCEDURE — 99214 OFFICE O/P EST MOD 30 MIN: CPT | Performed by: FAMILY MEDICINE

## 2022-03-23 PROCEDURE — 3079F DIAST BP 80-89 MM HG: CPT | Performed by: FAMILY MEDICINE

## 2022-03-23 PROCEDURE — 3074F SYST BP LT 130 MM HG: CPT | Performed by: FAMILY MEDICINE

## 2022-03-23 PROCEDURE — 3725F SCREEN DEPRESSION PERFORMED: CPT | Performed by: FAMILY MEDICINE

## 2022-03-23 RX ORDER — PANTOPRAZOLE SODIUM 40 MG/1
40 TABLET, DELAYED RELEASE ORAL
Qty: 90 TABLET | Refills: 1 | Status: SHIPPED | OUTPATIENT
Start: 2022-03-23

## 2022-03-23 NOTE — ASSESSMENT & PLAN NOTE
Controlled  Same continue  Will continue to monitor    Lab Results   Component Value Date    HGBA1C 6 8 (H) 02/24/2022

## 2022-03-23 NOTE — ASSESSMENT & PLAN NOTE
He was given prescription for pantoprazole 40 mg 1 tablet in a m  before breakfast   It was discussed about possible side effects  Discussed about diet changes  Continue to monitor

## 2022-03-23 NOTE — PROGRESS NOTES
Assessment/Plan:  Type 2 diabetes mellitus ([facility] )  Controlled  Same continue  Will continue to monitor  Lab Results   Component Value Date    HGBA1C 6 8 (H) 02/24/2022       Hypertension  Well controlled  Continue same  Will continue to monitor  Hyperlipidemia  Stable  Continue same  Will continue to monitor  Gastroesophageal reflux disease without esophagitis  He was given prescription for pantoprazole 40 mg 1 tablet in a m  before breakfast   It was discussed about possible side effects  Discussed about diet changes  Continue to monitor  Diagnoses and all orders for this visit:    Type 2 diabetes mellitus with complication ([facility] )  -     CBC and differential; Future  -     Comprehensive metabolic panel; Future  -     Hemoglobin A1C; Future  -     Lipid Panel with Direct LDL reflex; Future  -     TSH, 3rd generation with Free T4 reflex; Future    Gastroesophageal reflux disease without esophagitis  -     pantoprazole (PROTONIX) 40 mg tablet; Take 1 tablet (40 mg total) by mouth daily before breakfast    Type 2 diabetes mellitus with other specified complication, without long-term current use of insulin ([facility])    Primary hypertension    Mixed hyperlipidemia    BMI 30 0-30 9,adult          There are no Patient Instructions on file for this visit  Return in about 6 months (around 9/19/2022)  Subjective:      Patient ID: Kiah Paz is a 79 y o  male  Chief Complaint   Patient presents with    Results     BW    GERD     pt has been taking tums        He is here today for follow-up multiple medical problems  He has been taking his medications  He denies any side effects from his medications  He takes Tylenol for his hip pain  He had blood work done recently showed A1c well controlled at 6 8  All the rest of the blood work came back stable  Complained of having GERD symptoms  He usually takes Tums  Denies any nausea vomiting  Denies any blood in stool        The following portions of the patient's history were reviewed and updated as appropriate: allergies, current medications, past family history, past medical history, past social history, past surgical history and problem list     Review of Systems   Constitutional: Negative for chills and fever  HENT: Negative for trouble swallowing  Eyes: Negative for visual disturbance  Respiratory: Negative for cough and shortness of breath  Cardiovascular: Negative for chest pain and palpitations  Gastrointestinal: Negative for abdominal pain, blood in stool and vomiting  Heartburn   Endocrine: Negative for cold intolerance and heat intolerance  Genitourinary: Negative for difficulty urinating and dysuria  Musculoskeletal: Positive for arthralgias  Skin: Negative for rash  Neurological: Negative for dizziness, syncope and headaches  Hematological: Negative for adenopathy  Psychiatric/Behavioral: Negative for behavioral problems  Current Outpatient Medications   Medication Sig Dispense Refill    amLODIPine (NORVASC) 10 mg tablet TAKE ONE TABLET BY MOUTH EVERY DAY 30 tablet 0    aspirin (ECOTRIN LOW STRENGTH) 81 mg EC tablet take 1 tablet 4x wkly      atorvastatin (LIPITOR) 10 mg tablet TAKE ONE TABLET BY MOUTH EVERY DAY 90 tablet 0    magnesium chloride (MAG64) 64 MG TBEC EC tablet Take 64 mg by mouth 4 (four) times a day Take 2 tablets by mouth twice daily      metFORMIN (GLUCOPHAGE) 1000 MG tablet TAKE ONE TABLET BY MOUTH TWICE DAILY with meals (breakfast and dinner) 60 tablet 0    olmesartan (BENICAR) 40 mg tablet TAKE ONE TABLET BY MOUTH EVERY DAY 30 tablet 0    Omega-3 Fatty Acids (FISH OIL) 1,000 mg take 1 by Oral route once    Pt takes 4x week      sildenafil (VIAGRA) 100 mg tablet Take 1 tablet (100 mg total) by mouth daily as needed for erectile dysfunction 10 tablet 3    tamsulosin (FLOMAX) 0 4 mg TAKE ONE CAPSULE BY MOUTH EVERY DAY with dinner 30 capsule 0    clobetasol (TEMOVATE) 0 05 % ointment every 24 hours (Patient not taking: Reported on 7/23/2021)      fluticasone (FLONASE) 50 mcg/act nasal spray 1 spray into each nostril daily (Patient not taking: Reported on 3/7/2019) 16 g 3    ibuprofen (MOTRIN) 600 mg tablet TAKE ONE TABLET BY MOUTH THREE TIMES DAILY WITH FOOD  (Patient not taking: Reported on 3/7/2019) 45 tablet 0    ketorolac (ACULAR) 0 5 % ophthalmic solution  (Patient not taking: Reported on 7/23/2021)      ofloxacin (OCUFLOX) 0 3 % ophthalmic solution  (Patient not taking: Reported on 7/23/2021)      oxyCODONE-acetaminophen (PERCOCET) 5-325 mg per tablet take 1 tablet by oral route  at bed time (Patient not taking: Reported on 7/23/2021)      pantoprazole (PROTONIX) 40 mg tablet Take 1 tablet (40 mg total) by mouth daily before breakfast 90 tablet 1    prednisoLONE acetate (PRED FORTE) 1 % ophthalmic suspension  (Patient not taking: Reported on 7/23/2021)       No current facility-administered medications for this visit  Objective:    /84 (BP Location: Left arm, Patient Position: Sitting, Cuff Size: Adult)   Pulse 79   Temp 97 7 °F (36 5 °C) (Tympanic)   Resp 16   Ht 5' 8" (1 727 m)   Wt 91 6 kg (202 lb)   SpO2 97%   BMI 30 71 kg/m²        Physical Exam  Vitals and nursing note reviewed  Constitutional:       Appearance: Normal appearance  He is well-developed  HENT:      Head: Normocephalic and atraumatic  Eyes:      Pupils: Pupils are equal, round, and reactive to light  Cardiovascular:      Rate and Rhythm: Normal rate and regular rhythm  Heart sounds: Normal heart sounds  Pulmonary:      Effort: Pulmonary effort is normal       Breath sounds: Normal breath sounds  Abdominal:      General: Bowel sounds are normal       Palpations: Abdomen is soft  Musculoskeletal:      Cervical back: Normal range of motion and neck supple  Right lower leg: No edema  Left lower leg: No edema     Lymphadenopathy:      Cervical: No cervical adenopathy  Skin:     General: Skin is warm  Findings: No rash  Neurological:      Mental Status: He is alert and oriented to person, place, and time  Nyla Gaston MD BMI Counseling: Body mass index is 30 71 kg/m²  The BMI is above normal  Nutrition recommendations include reducing portion sizes, decreasing overall calorie intake and 3-5 servings of fruits/vegetables daily  Exercise recommendations include moderate aerobic physical activity for 150 minutes/week

## 2022-04-19 DIAGNOSIS — E78.5 HYPERLIPIDEMIA, UNSPECIFIED HYPERLIPIDEMIA TYPE: ICD-10-CM

## 2022-04-19 RX ORDER — ATORVASTATIN CALCIUM 10 MG/1
TABLET, FILM COATED ORAL
Qty: 90 TABLET | Refills: 1 | Status: SHIPPED | OUTPATIENT
Start: 2022-04-19

## 2022-06-14 DIAGNOSIS — N40.0 BENIGN PROSTATIC HYPERPLASIA, UNSPECIFIED WHETHER LOWER URINARY TRACT SYMPTOMS PRESENT: ICD-10-CM

## 2022-06-14 DIAGNOSIS — I10 HTN (HYPERTENSION), BENIGN: ICD-10-CM

## 2022-06-14 DIAGNOSIS — E11.8 TYPE 2 DIABETES MELLITUS WITH COMPLICATION (HCC): ICD-10-CM

## 2022-06-14 DIAGNOSIS — I10 HYPERTENSION, UNSPECIFIED TYPE: ICD-10-CM

## 2022-06-14 RX ORDER — AMLODIPINE BESYLATE 10 MG/1
10 TABLET ORAL DAILY
Qty: 90 TABLET | Refills: 1 | Status: SHIPPED | OUTPATIENT
Start: 2022-06-14

## 2022-06-14 RX ORDER — OLMESARTAN MEDOXOMIL 40 MG/1
40 TABLET ORAL DAILY
Qty: 90 TABLET | Refills: 1 | Status: SHIPPED | OUTPATIENT
Start: 2022-06-14

## 2022-06-14 RX ORDER — TAMSULOSIN HYDROCHLORIDE 0.4 MG/1
0.4 CAPSULE ORAL
Qty: 90 CAPSULE | Refills: 1 | Status: SHIPPED | OUTPATIENT
Start: 2022-06-14

## 2022-06-14 NOTE — TELEPHONE ENCOUNTER
Phone call from Estefany Patel, refill Amlodipine 10 mg 1 daily #90 with refills,Metformin 1000mg 1 tab twice daily 90 day with refills, Olmesartan 40 mg 1 daily #90 with refills &  Tamsulosin 0 4mg 1 daily #90 with refills   Call to Edith Dia

## 2022-06-15 NOTE — TELEPHONE ENCOUNTER
Pt called in reference to the 4 medication refills he called in reference to yesterday  he stated he picked up his wifes but srinivasa told him there were none there for him  I told him the day and time they were sent but I would ask for us to resubmit them for the pt

## 2022-06-28 ENCOUNTER — CONSULT (OUTPATIENT)
Dept: PAIN MEDICINE | Facility: CLINIC | Age: 82
End: 2022-06-28
Payer: COMMERCIAL

## 2022-06-28 ENCOUNTER — TELEPHONE (OUTPATIENT)
Dept: PAIN MEDICINE | Facility: CLINIC | Age: 82
End: 2022-06-28

## 2022-06-28 VITALS
BODY MASS INDEX: 30.41 KG/M2 | WEIGHT: 200 LBS | SYSTOLIC BLOOD PRESSURE: 155 MMHG | DIASTOLIC BLOOD PRESSURE: 89 MMHG | HEART RATE: 72 BPM

## 2022-06-28 DIAGNOSIS — Z91.81 FALLS INFREQUENTLY: ICD-10-CM

## 2022-06-28 DIAGNOSIS — M51.16 LUMBAR DISC DISEASE WITH RADICULOPATHY: ICD-10-CM

## 2022-06-28 DIAGNOSIS — M51.26 LUMBAR DISC HERNIATION: ICD-10-CM

## 2022-06-28 DIAGNOSIS — M21.371 FOOT DROP, RIGHT: ICD-10-CM

## 2022-06-28 DIAGNOSIS — G89.4 CHRONIC PAIN SYNDROME: Primary | ICD-10-CM

## 2022-06-28 PROCEDURE — 3077F SYST BP >= 140 MM HG: CPT | Performed by: PHYSICAL MEDICINE & REHABILITATION

## 2022-06-28 PROCEDURE — 3079F DIAST BP 80-89 MM HG: CPT | Performed by: PHYSICAL MEDICINE & REHABILITATION

## 2022-06-28 PROCEDURE — 99204 OFFICE O/P NEW MOD 45 MIN: CPT | Performed by: PHYSICAL MEDICINE & REHABILITATION

## 2022-06-28 RX ORDER — GABAPENTIN 100 MG/1
100 CAPSULE ORAL 2 TIMES DAILY
Qty: 60 CAPSULE | Refills: 1 | Status: SHIPPED | OUTPATIENT
Start: 2022-06-28 | End: 2022-08-08 | Stop reason: SDUPTHER

## 2022-06-28 NOTE — PROGRESS NOTES
Assessment  1  Chronic pain syndrome    2  Foot drop, right    3  Falls infrequently    4  Lumbar disc disease with radiculopathy    5  Lumbar disc herniation        Plan  Mr Italo Cortes is a pleasant 79-year-old male who presents for initial evaluation regarding 5 years duration of low back pain with intermittent radiating symptoms into bilateral lower extremities  He was previously being seen by Dr Tamy Alvarado and Dr Macho Lyman for transforaminal and interlaminar epidural steroid injections in 2018, 2019 and followed by 2021 with varying degrees of relief in his ongoing radicular pain  During today's evaluation he is demonstrating worsening lumbar radiculopathy with noticeable footdrop of the right leg and reported multiple falls  Last MRI was done greater than 5 years ago  At this time we will   1  Order repeat lumbar x-ray and MRI to identify worsening bony pathology and degenerative changes contributing to radicular pain and weakness    2  Will order AFO right foot  3  Will start low-dose gabapentin 100 mg twice a day for neuropathic pain control  4  Will refer to neuro surgery to be seen after MRI is done     My impressions and treatment recommendations were discussed in detail with the patient who verbalized understanding and had no further questions  Discharge instructions were provided  I personally saw and examined the patient and I agree with the above discussed plan of care  Orders Placed This Encounter   Procedures    AFO Ankle Foot Orthotic Spring Wire Dorsiflexion Assist Calf Band    MRI lumbar spine without contrast     OPEN MRI     Standing Status:   Future     Standing Expiration Date:   6/28/2026     Scheduling Instructions: There is no preparation for this test  Please leave your jewelry and valuables at home, wedding rings are the exception   Magnetic nail polish must be removed prior to arrival for your test  Please bring your insurance cards, a form of photo ID and a list of your medications with you  Arrive 15 minutes prior to your appointment time in order to register  Please bring any prior CT or MRI studies of this area that were not performed at a [facility]  To schedule this appointment, please contact Central Scheduling at [phone]  Prior to your appointment, please make sure you complete the MRI Screening Form when you e-Check in for your appointment  This will be available starting 7 days before your appointment in [address]  You may receive an e-mail with an activation code if you do not have a [employer] account  If you do not have access to a device, we will complete your screening at your appointment  Order Specific Question:   What is the patient's sedation requirement? Answer:   No Sedation     Order Specific Question:   Release to patient through [employer]     Answer:   Immediate     Order Specific Question:   Is order priority selected as STAT? Answer:   No     Order Specific Question:   Reason for Exam (FREE TEXT)     Answer:   foot drop    Ambulatory referral to Neurosurgery     Standing Status:   Future     Standing Expiration Date:   6/28/2023     Referral Priority:   Routine     Referral Type:   Consult - AMB     Referral Reason:   Specialty Services Required     Referred to Provider:   Liz Rocha MD     Requested Specialty:   Neurosurgery     Number of Visits Requested:   1     Expiration Date:   6/28/2023     New Medications Ordered This Visit   Medications    gabapentin (NEURONTIN) 100 mg capsule     Sig: Take 1 capsule (100 mg total) by mouth 2 (two) times a day     Dispense:  60 capsule     Refill:  1       History of Present Illness    Earl Rogers is a 79 y o  male presents to [place] [address] [facility] for initial evaluation regarding 5 years duration of low back pain with radiating symptoms into bilateral lower extremities    Patient reports pain started after getting out of a car and has been dealing with a known herniated disc ever since  Today reports moderate to severe pain rated 7/10 and interfering with daily activities  Pain is intermittent 30-60 percent of the time that is described as a cramping, shooting, numbness, pressure-like, dull aching sensation that is worse in the morning  Also reports lower extremity weakness in the right foot  Requires a walker or cane for ambulation at times  Symptoms are worse with standing, walking  Has had no significant relief with chiropractic manipulation and reports excellent relief with previous injection and moderate relief with 10s unit  Currently taking over-the-counter Tylenol with minimal relief  Presents today for initial evaluation  I have personally reviewed and/or updated the patient's past medical history, past surgical history, family history, social history, current medications, allergies, and vital signs today  Review of Systems   Constitutional: Negative for fever and unexpected weight change  HENT: Negative for trouble swallowing  Eyes: Negative for visual disturbance  Respiratory: Negative for shortness of breath and wheezing  Cardiovascular: Positive for leg swelling  Negative for chest pain and palpitations  Gastrointestinal: Negative for constipation, diarrhea, nausea and vomiting  Endocrine: Negative for cold intolerance, heat intolerance and polydipsia  Genitourinary: Positive for frequency  Negative for difficulty urinating  Musculoskeletal: Positive for back pain, gait problem and joint swelling  Negative for arthralgias and myalgias  Skin: Negative for rash  Neurological: Negative for dizziness, seizures, syncope, weakness and headaches  Hematological: Does not bruise/bleed easily  Psychiatric/Behavioral: Negative for dysphoric mood  All other systems reviewed and are negative        Patient Active Problem List   Diagnosis    Hypertension    Type [facility] diabetes mellitus ([facility] )    Allergic rhinitis    Anemia    Benign prostatic hyperplasia    Hyperlipidemia    Obesity    Left-sided low back pain with left-sided sciatica    Prostate cancer screening    Obesity (BMI 30 0-34  9)    Healthcare maintenance    Cataracta    Pre-op examination    Gastroesophageal reflux disease without esophagitis       Past Medical History:   Diagnosis Date    Diabetes mellitus ([address] )     Hypertension        Past Surgical History:   Procedure Laterality Date    GALLBLADDER SURGERY  1968       Family History   Problem Relation Age of Onset    No Known Problems Mother     No Known Problems Father        Social History     Occupational History    Not on file   Tobacco Use    Smoking status: Never Smoker    Smokeless tobacco: Never Used    Tobacco comment: no passive smoke exposure    Vaping Use    Vaping Use: Never used   Substance and Sexual Activity    Alcohol use: Not Currently    Drug use: Never    Sexual activity: Not on file       Current Outpatient Medications on File Prior to Visit   Medication Sig    amLODIPine (NORVASC) 10 mg tablet Take 1 tablet (10 mg total) by mouth daily    aspirin (ECOTRIN LOW STRENGTH) 81 mg EC tablet take 1 tablet 4x wkly    atorvastatin (LIPITOR) 10 mg tablet TAKE ONE TABLET BY MOUTH EVERY DAY    pantoprazole (PROTONIX) 40 mg tablet Take 1 tablet (40 mg total) by mouth daily before breakfast    tamsulosin (FLOMAX) 0 4 mg Take 1 capsule (0 4 mg total) by mouth daily with dinner    clobetasol (TEMOVATE) 0 05 % ointment every 24 hours    fluticasone (FLONASE) 50 mcg/act nasal spray 1 spray into each nostril daily    ibuprofen (MOTRIN) 600 mg tablet TAKE ONE TABLET BY MOUTH THREE TIMES DAILY WITH FOOD     ketorolac (ACULAR) 0 5 % ophthalmic solution     magnesium chloride (MAG64) 64 MG TBEC EC tablet Take 64 mg by mouth 4 (four) times a day Take 2 tablets by mouth twice daily    metFORMIN (GLUCOPHAGE) 1000 MG tablet Take 1 tablet (1,000 mg total) by mouth 2 (two) times a day with meals    ofloxacin (OCUFLOX) 0 3 % ophthalmic solution     olmesartan (BENICAR) 40 mg tablet Take 1 tablet (40 mg total) by mouth daily    Omega-3 Fatty Acids (FISH OIL) 1,000 mg take 1 by Oral route once  Pt takes 4x week    oxyCODONE-acetaminophen (PERCOCET) 5-325 mg per tablet take 1 tablet by oral route  at bed time    prednisoLONE acetate (PRED FORTE) 1 % ophthalmic suspension     sildenafil (VIAGRA) 100 mg tablet Take 1 tablet (100 mg total) by mouth daily as needed for erectile dysfunction     No current facility-administered medications on file prior to visit  Allergies   Allergen Reactions    Penicillins Other (See Comments)       Physical Exam    /89   Pulse 72   Wt 90 7 kg (200 lb)   BMI 30 41 kg/m²     Constitutional: normal, well developed, well nourished, alert, in no distress and non-toxic and no overt pain behavior    Eyes: anicteric  HEENT: grossly intact  Neck: supple, symmetric, trachea midline and no masses   Pulmonary:even and unlabored  Cardiovascular:No edema or pitting edema present  Skin:Normal without rashes or lesions and well hydrated  Psychiatric:Mood and affect appropriate  Neurologic:Cranial Nerves II-XII grossly intact  Musculoskeletal:antalgic, ambulates with rolling walker, tenderness to palpation right-sided lumbar paraspinals, decreased active and passive range of motion with lumbar flexion and extension limited by pain, MMT 5/5 bilateral lower extremities except for right ankle dorsiflexion and EHL 2+ out of 5, decreased sensation to light touch patchy distribution posterolateral right shin, positive straight leg raise in the seated position with radicular pain into the right leg    Imaging

## 2022-07-12 ENCOUNTER — TELEPHONE (OUTPATIENT)
Dept: RADIOLOGY | Facility: CLINIC | Age: 82
End: 2022-07-12

## 2022-07-12 DIAGNOSIS — M54.42 ACUTE LEFT-SIDED LOW BACK PAIN WITH LEFT-SIDED SCIATICA: ICD-10-CM

## 2022-07-12 DIAGNOSIS — M51.16 LUMBAR DISC DISEASE WITH RADICULOPATHY: Primary | ICD-10-CM

## 2022-07-12 NOTE — TELEPHONE ENCOUNTER
Spoke to pt, advised the same   Pt stated that neuro surgery did reach out to him but was not schedule due to waiting on MRI results  Pt stated he is unsure if he wants to have surgery at this point  Pt stated he is agreeable to having an LESI along with PT to help with pain and strengthen his muscles   Pt stated he doesn't want to use his walker he wants to get back to his cane

## 2022-07-12 NOTE — TELEPHONE ENCOUNTER
----- Message from Marisabel Early DO sent at 7/12/2022 10:57 AM EDT -----  Please notify patient of MRI lumbar spine results demonstrating multilevel degenerative disc disease and varying degrees of foraminal and central narrowing with a resolving disc herniation at L4-L5   Patient is already refer to neuro surgery regarding the footdrop   Would consider L4-L5 lumbar epidural steroid injection if surgery is not advised   Thank you  ----- Message -----  From: Selvin Rice, Carlos Edco  Sent: 7/7/2022   6:25 PM EDT  To: Marisabel Early DO

## 2022-07-15 ENCOUNTER — TELEPHONE (OUTPATIENT)
Dept: PAIN MEDICINE | Facility: CLINIC | Age: 82
End: 2022-07-15

## 2022-07-15 NOTE — TELEPHONE ENCOUNTER
Patient scheduled for procedure with Dr Bj Grady on 8/8/22 @ 1:20pm    Patient does not have mychart, so the following instructions were given to patient verbally, Do not take Aspirin after 8/1/22 or we will not be able to do the procedure  No vaccines 14 days prior or after procedure, nothing to eat or drink 1 hr prior to procedure, wear something loose and comfortable, no jewelry, if ill, infection or antibiotics, must call office to reschedule procedure  Take prescription medications as normal and you will need a  18 yrs or older

## 2022-07-15 NOTE — TELEPHONE ENCOUNTER
This patient is being considered for a neuraxial injection for the management of their pain  However, the patient is currently on an anticoagulant per your orders  The American Society [employer] Guideline on neuraxial procedures and anti-coagulation indicates that medication should be held as follows: Aspirin 6 days prior to injection  Please advise if you ok the hold of this medication      Thank you,    Sarah Fitzgerald

## 2022-07-15 NOTE — TELEPHONE ENCOUNTER
This patient is being considered for a neuraxial injection for the management of their pain  However, the patient is currently on an anticoagulant per your orders  The American Society [employer] Regional [employer]est[employer] Guideline on neuraxial procedures and anti-coagulation indicates that medication should be held as follows: Aspirin 6 days prior to injection  Please advise if you ok the hold of this medication      Thank you,    Max Evans

## 2022-08-02 ENCOUNTER — TELEPHONE (OUTPATIENT)
Dept: PAIN MEDICINE | Facility: CLINIC | Age: 82
End: 2022-08-02

## 2022-08-02 NOTE — TELEPHONE ENCOUNTER
Spoke with patient's wife, patient is having LESI with Dr Annabel Roman on 8/8/22, initially instructed to stop ASA, but after review, it is ok to take 81 mg ASA  Wife will instruct patient to continue as directed

## 2022-08-08 ENCOUNTER — HOSPITAL ENCOUNTER (OUTPATIENT)
Dept: RADIOLOGY | Facility: CLINIC | Age: 82
Discharge: HOME | End: 2022-08-08
Payer: COMMERCIAL

## 2022-08-08 VITALS
OXYGEN SATURATION: 95 % | RESPIRATION RATE: 18 BRPM | SYSTOLIC BLOOD PRESSURE: 144 MMHG | HEART RATE: 72 BPM | DIASTOLIC BLOOD PRESSURE: 76 MMHG | TEMPERATURE: 98 F

## 2022-08-08 DIAGNOSIS — G89.4 CHRONIC PAIN SYNDROME: ICD-10-CM

## 2022-08-08 DIAGNOSIS — M51.16 LUMBAR DISC DISEASE WITH RADICULOPATHY: ICD-10-CM

## 2022-08-08 DIAGNOSIS — Z91.81 FALLS INFREQUENTLY: ICD-10-CM

## 2022-08-08 DIAGNOSIS — M51.26 LUMBAR DISC HERNIATION: ICD-10-CM

## 2022-08-08 DIAGNOSIS — M21.371 FOOT DROP, RIGHT: ICD-10-CM

## 2022-08-08 PROCEDURE — 62323 NJX INTERLAMINAR LMBR/SAC: CPT | Performed by: PHYSICAL MEDICINE & REHABILITATION

## 2022-08-08 RX ORDER — METHYLPREDNISOLONE ACETATE 80 MG/ML
80 INJECTION, SUSPENSION INTRA-ARTICULAR; INTRALESIONAL; INTRAMUSCULAR; PARENTERAL; SOFT TISSUE ONCE
Status: COMPLETED | OUTPATIENT
Start: 2022-08-08 | End: 2022-08-08

## 2022-08-08 RX ORDER — GABAPENTIN 100 MG/1
100 CAPSULE ORAL 2 TIMES DAILY
Qty: 180 CAPSULE | Refills: 1 | Status: SHIPPED | OUTPATIENT
Start: 2022-08-08 | End: 2023-01-30

## 2022-08-08 RX ORDER — 0.9 % SODIUM CHLORIDE 0.9 %
10 VIAL (ML) INJECTION ONCE
Status: COMPLETED | OUTPATIENT
Start: 2022-08-08 | End: 2022-08-08

## 2022-08-08 RX ADMIN — IOHEXOL 1 ML: 300 INJECTION, SOLUTION INTRAVENOUS at 13:32

## 2022-08-08 RX ADMIN — SODIUM CHLORIDE 2 ML: 9 INJECTION, SOLUTION INTRAMUSCULAR; INTRAVENOUS; SUBCUTANEOUS at 13:30

## 2022-08-08 RX ADMIN — METHYLPREDNISOLONE ACETATE 80 MG: 80 INJECTION, SUSPENSION INTRA-ARTICULAR; INTRALESIONAL; INTRAMUSCULAR; PARENTERAL; SOFT TISSUE at 13:32

## 2022-08-08 NOTE — H&P
History of Present Illness: The patient is a 79 y o  male who presents with complaints of low back pain    Patient Active Problem List   Diagnosis    Hypertension    Type 2 diabetes mellitus ([facility] )    Allergic rhinitis    Anemia    Benign prostatic hyperplasia    Hyperlipidemia    Obesity    Left-sided low back pain with left-sided sciatica    Prostate cancer screening    Obesity (BMI 30 0-34  9)    Healthcare maintenance    Cataracta    Pre-op examination    Gastroesophageal reflux disease without esophagitis       Past Medical History:   Diagnosis Date    Diabetes mellitus ([facility] )     Hypertension        Past Surgical History:   Procedure Laterality Date    GALLBLADDER SURGERY  1968         Current Outpatient Medications:     amLODIPine (NORVASC) 10 mg tablet, Take 1 tablet (10 mg total) by mouth daily, Disp: 90 tablet, Rfl: 1    aspirin (ECOTRIN LOW STRENGTH) 81 mg EC tablet, take 1 tablet 4x wkly, Disp: , Rfl:     atorvastatin (LIPITOR) 10 mg tablet, TAKE ONE TABLET BY MOUTH EVERY DAY, Disp: 90 tablet, Rfl: 1    clobetasol (TEMOVATE) 0 05 % ointment, every 24 hours, Disp: , Rfl:     fluticasone (FLONASE) 50 mcg/act nasal spray, 1 spray into each nostril daily, Disp: 16 g, Rfl: 3    gabapentin (NEURONTIN) 100 mg capsule, Take 1 capsule (100 mg total) by mouth 2 (two) times a day, Disp: 60 capsule, Rfl: 1    ibuprofen (MOTRIN) 600 mg tablet, TAKE ONE TABLET BY MOUTH THREE TIMES DAILY WITH FOOD , Disp: 45 tablet, Rfl: 0    ketorolac (ACULAR) 0 5 % ophthalmic solution, , Disp: , Rfl:     magnesium chloride (MAG64) 64 MG TBEC EC tablet, Take 64 mg by mouth 4 (four) times a day Take 2 tablets by mouth twice daily, Disp: , Rfl:     metFORMIN (GLUCOPHAGE) 1000 MG tablet, Take 1 tablet (1,000 mg total) by mouth 2 (two) times a day with meals, Disp: 180 tablet, Rfl: 1    ofloxacin (OCUFLOX) 0 3 % ophthalmic solution, , Disp: , Rfl:     olmesartan (BENICAR) 40 mg tablet, Take 1 tablet (40 mg total) by mouth daily, Disp: 90 tablet, Rfl: 1    Omega-3 Fatty Acids (FISH OIL) 1,000 mg, take 1 by Oral route once  Pt takes 4x week, Disp: , Rfl:     oxyCODONE-acetaminophen (PERCOCET) 5-325 mg per tablet, take 1 tablet by oral route  at bed time, Disp: , Rfl:     pantoprazole (PROTONIX) 40 mg tablet, Take 1 tablet (40 mg total) by mouth daily before breakfast, Disp: 90 tablet, Rfl: 1    prednisoLONE acetate (PRED FORTE) 1 % ophthalmic suspension, , Disp: , Rfl:     sildenafil (VIAGRA) 100 mg tablet, Take 1 tablet (100 mg total) by mouth daily as needed for erectile dysfunction, Disp: 10 tablet, Rfl: 3    tamsulosin (FLOMAX) 0 4 mg, Take 1 capsule (0 4 mg total) by mouth daily with dinner, Disp: 90 capsule, Rfl: 1    Current Facility-Administered Medications:     iohexol (OMNIPAQUE) 300 mg/mL injection 50 mL, 50 mL, Epidural, Once, Violet Heal, DO    methylPREDNISolone acetate (DEPO-MEDROL) injection 80 mg, 80 mg, Epidural, Once, Violet Heal, DO    sodium chloride (PF) 0 9 % injection 10 mL, 10 mL, Epidural, Once, Violet Heal, DO    Allergies   Allergen Reactions    Penicillins Other (See Comments)       Physical Exam: There were no vitals filed for this visit  General: Awake, Alert, Oriented x 3, Mood and affect appropriate  Respiratory: Respirations even and unlabored  Cardiovascular: Peripheral pulses intact; no edema  Musculoskeletal Exam:  Tenderness to palpation bilateral lumbar paraspinals    ASA Score: 2       Assessment:   1   Lumbar disc disease with radiculopathy        Plan: LESI (L4-L5)

## 2022-08-08 NOTE — DISCHARGE INSTR - LAB
Epidural Steroid Injection   WHAT YOU NEED TO KNOW:   An epidural steroid injection (EDEN) is a procedure to inject steroid medicine into the epidural space  The epidural space is between your spinal cord and vertebrae  Steroids reduce inflammation and fluid buildup in your spine that may be causing pain  You may be given pain medicine along with the steroids  ACTIVITY  Do not drive or operate machinery today  No strenuous activity today - bending, lifting, etc   You may resume normal activites starting tomorrow - start slowly and as tolerated  You may shower today, but no tub baths or hot tubs  You may have numbness for several hours from the local anesthetic  Please use caution and common sense, especially with weight-bearing activities  CARE OF THE INJECTION SITE  If you have soreness or pain, apply ice to the area today (20 minutes on/20 minutes off)  Starting tomorrow, you may use warm, moist heat or ice if needed  You may have an increase or change in your discomfort for 36-48 hours after your treatment  Apply ice and continue with any pain medication you have been prescribed  Notify the [facility] if you have any of the following: redness, drainage, swelling, headache, stiff neck or fever above 100°F     SPECIAL INSTRUCTIONS  Our office will contact you in approximately 7 days for a progress report  MEDICATIONS  Continue to take all routine medications  Our office may have instructed you to hold some medications  As no general anesthesia was used in today's procedure, you should not experience any side effects related to anesthesia  If you are diabetic, the steroids used in today's injection may temporarily increase your blood sugar levels after the first few days after your injection  Please keep a close eye on your sugars and alert the doctor who manages your diabetes if your sugars are significantly high from your baseline or you are symptomatic       If you have a problem specifically related to your procedure, please call our office at [phone]  Problems not related to your procedure should be directed to your primary care physician

## 2022-08-15 ENCOUNTER — TELEPHONE (OUTPATIENT)
Dept: PAIN MEDICINE | Facility: CLINIC | Age: 82
End: 2022-08-15

## 2022-08-15 NOTE — TELEPHONE ENCOUNTER
Patient report: 50% improvement post injection  Pain Level:  3-4 /10  Patient aware will be calling next week

## 2022-08-18 ENCOUNTER — APPOINTMENT (OUTPATIENT)
Dept: LAB | Facility: IMAGING CENTER | Age: 82
End: 2022-08-18
Payer: COMMERCIAL

## 2022-08-18 DIAGNOSIS — E11.8 TYPE 2 DIABETES MELLITUS WITH COMPLICATION (HCC): ICD-10-CM

## 2022-08-18 LAB
ALBUMIN SERPL BCP-MCNC: 3.8 G/DL (ref 3.5–5)
ALP SERPL-CCNC: 81 U/L (ref 46–116)
ALT SERPL W P-5'-P-CCNC: 33 U/L (ref 12–78)
ANION GAP SERPL CALCULATED.3IONS-SCNC: 6 MMOL/L (ref 4–13)
AST SERPL W P-5'-P-CCNC: 17 U/L (ref 5–45)
BASOPHILS # BLD AUTO: 0.05 THOUSANDS/ΜL (ref 0–0.1)
BASOPHILS NFR BLD AUTO: 1 % (ref 0–1)
BILIRUB SERPL-MCNC: 0.5 MG/DL (ref 0.2–1)
BUN SERPL-MCNC: 20 MG/DL (ref 5–25)
CALCIUM SERPL-MCNC: 9.6 MG/DL (ref 8.3–10.1)
CHLORIDE SERPL-SCNC: 106 MMOL/L (ref 96–108)
CHOLEST SERPL-MCNC: 100 MG/DL
CO2 SERPL-SCNC: 28 MMOL/L (ref 21–32)
CREAT SERPL-MCNC: 1.02 MG/DL (ref 0.6–1.3)
EOSINOPHIL # BLD AUTO: 0.08 THOUSAND/ΜL (ref 0–0.61)
EOSINOPHIL NFR BLD AUTO: 1 % (ref 0–6)
ERYTHROCYTE [DISTWIDTH] IN BLOOD BY AUTOMATED COUNT: 13.2 % (ref 11.6–15.1)
EST. AVERAGE GLUCOSE BLD GHB EST-MCNC: 143 MG/DL
GFR SERPL CREATININE-BSD FRML MDRD: 67 ML/MIN/1.73SQ M
GLUCOSE P FAST SERPL-MCNC: 172 MG/DL (ref 65–99)
HBA1C MFR BLD: 6.6 %
HCT VFR BLD AUTO: 39.3 % (ref 36.5–49.3)
HDLC SERPL-MCNC: 39 MG/DL
HGB BLD-MCNC: 13 G/DL (ref 12–17)
IMM GRANULOCYTES # BLD AUTO: 0.03 THOUSAND/UL (ref 0–0.2)
IMM GRANULOCYTES NFR BLD AUTO: 0 % (ref 0–2)
LDLC SERPL CALC-MCNC: 38 MG/DL (ref 0–100)
LYMPHOCYTES # BLD AUTO: 2.8 THOUSANDS/ΜL (ref 0.6–4.47)
LYMPHOCYTES NFR BLD AUTO: 36 % (ref 14–44)
MCH RBC QN AUTO: 32.3 PG (ref 26.8–34.3)
MCHC RBC AUTO-ENTMCNC: 33.1 G/DL (ref 31.4–37.4)
MCV RBC AUTO: 98 FL (ref 82–98)
MONOCYTES # BLD AUTO: 0.77 THOUSAND/ΜL (ref 0.17–1.22)
MONOCYTES NFR BLD AUTO: 10 % (ref 4–12)
NEUTROPHILS # BLD AUTO: 4.01 THOUSANDS/ΜL (ref 1.85–7.62)
NEUTS SEG NFR BLD AUTO: 52 % (ref 43–75)
NRBC BLD AUTO-RTO: 0 /100 WBCS
PLATELET # BLD AUTO: 248 THOUSANDS/UL (ref 149–390)
PMV BLD AUTO: 11.6 FL (ref 8.9–12.7)
POTASSIUM SERPL-SCNC: 3.8 MMOL/L (ref 3.5–5.3)
PROT SERPL-MCNC: 7.1 G/DL (ref 6.4–8.4)
RBC # BLD AUTO: 4.03 MILLION/UL (ref 3.88–5.62)
SODIUM SERPL-SCNC: 140 MMOL/L (ref 135–147)
TRIGL SERPL-MCNC: 116 MG/DL
TSH SERPL DL<=0.05 MIU/L-ACNC: 3.41 UIU/ML (ref 0.45–4.5)
WBC # BLD AUTO: 7.74 THOUSAND/UL (ref 4.31–10.16)

## 2022-08-18 PROCEDURE — 84443 ASSAY THYROID STIM HORMONE: CPT

## 2022-08-18 PROCEDURE — 36415 COLL VENOUS BLD VENIPUNCTURE: CPT

## 2022-08-18 PROCEDURE — 80061 LIPID PANEL: CPT

## 2022-08-18 PROCEDURE — 85025 COMPLETE CBC W/AUTO DIFF WBC: CPT

## 2022-08-18 PROCEDURE — 80053 COMPREHEN METABOLIC PANEL: CPT

## 2022-08-18 PROCEDURE — 83036 HEMOGLOBIN GLYCOSYLATED A1C: CPT

## 2022-08-19 ENCOUNTER — TELEPHONE (OUTPATIENT)
Dept: FAMILY MEDICINE CLINIC | Facility: CLINIC | Age: 82
End: 2022-08-19

## 2022-08-19 NOTE — TELEPHONE ENCOUNTER
----- Message from Catia Wilson MD sent at 8/19/2022  6:26 AM EDT -----  A1c improved to 6 6  Triglyceride improved  All the rest of blood work came back stable

## 2022-08-26 ENCOUNTER — OFFICE VISIT (OUTPATIENT)
Dept: FAMILY MEDICINE CLINIC | Facility: CLINIC | Age: 82
End: 2022-08-26
Payer: COMMERCIAL

## 2022-08-26 ENCOUNTER — TELEPHONE (OUTPATIENT)
Dept: OBGYN CLINIC | Facility: CLINIC | Age: 82
End: 2022-08-26

## 2022-08-26 VITALS
SYSTOLIC BLOOD PRESSURE: 136 MMHG | HEART RATE: 81 BPM | WEIGHT: 205 LBS | HEIGHT: 68 IN | TEMPERATURE: 98.6 F | BODY MASS INDEX: 31.07 KG/M2 | OXYGEN SATURATION: 96 % | DIASTOLIC BLOOD PRESSURE: 70 MMHG | RESPIRATION RATE: 16 BRPM

## 2022-08-26 DIAGNOSIS — E11.8 TYPE 2 DIABETES MELLITUS WITH COMPLICATION (HCC): Primary | ICD-10-CM

## 2022-08-26 DIAGNOSIS — I10 PRIMARY HYPERTENSION: ICD-10-CM

## 2022-08-26 DIAGNOSIS — T78.40XS ALLERGY, SEQUELA: ICD-10-CM

## 2022-08-26 DIAGNOSIS — K21.9 GASTROESOPHAGEAL REFLUX DISEASE WITHOUT ESOPHAGITIS: ICD-10-CM

## 2022-08-26 DIAGNOSIS — E78.2 MIXED HYPERLIPIDEMIA: ICD-10-CM

## 2022-08-26 DIAGNOSIS — R09.82 POSTNASAL DRIP: ICD-10-CM

## 2022-08-26 DIAGNOSIS — Z00.00 HEALTHCARE MAINTENANCE: ICD-10-CM

## 2022-08-26 DIAGNOSIS — I10 HTN (HYPERTENSION), BENIGN: ICD-10-CM

## 2022-08-26 DIAGNOSIS — E11.69 TYPE 2 DIABETES MELLITUS WITH OTHER SPECIFIED COMPLICATION, WITHOUT LONG-TERM CURRENT USE OF INSULIN (HCC): ICD-10-CM

## 2022-08-26 PROBLEM — T78.40XA ALLERGIES: Status: ACTIVE | Noted: 2022-08-26

## 2022-08-26 PROCEDURE — 1160F RVW MEDS BY RX/DR IN RCRD: CPT | Performed by: FAMILY MEDICINE

## 2022-08-26 PROCEDURE — 1125F AMNT PAIN NOTED PAIN PRSNT: CPT | Performed by: FAMILY MEDICINE

## 2022-08-26 PROCEDURE — 1170F FXNL STATUS ASSESSED: CPT | Performed by: FAMILY MEDICINE

## 2022-08-26 PROCEDURE — G0439 PPPS, SUBSEQ VISIT: HCPCS | Performed by: FAMILY MEDICINE

## 2022-08-26 PROCEDURE — 3725F SCREEN DEPRESSION PERFORMED: CPT | Performed by: FAMILY MEDICINE

## 2022-08-26 PROCEDURE — 99214 OFFICE O/P EST MOD 30 MIN: CPT | Performed by: FAMILY MEDICINE

## 2022-08-26 PROCEDURE — 3288F FALL RISK ASSESSMENT DOCD: CPT | Performed by: FAMILY MEDICINE

## 2022-08-26 PROCEDURE — 3078F DIAST BP <80 MM HG: CPT | Performed by: FAMILY MEDICINE

## 2022-08-26 PROCEDURE — 3075F SYST BP GE 130 - 139MM HG: CPT | Performed by: FAMILY MEDICINE

## 2022-08-26 RX ORDER — GUAIFENESIN 600 MG/1
600 TABLET, EXTENDED RELEASE ORAL EVERY 12 HOURS SCHEDULED
Qty: 60 TABLET | Refills: 1 | Status: SHIPPED | OUTPATIENT
Start: 2022-08-26

## 2022-08-26 RX ORDER — FLUTICASONE PROPIONATE 50 MCG
1 SPRAY, SUSPENSION (ML) NASAL DAILY
Qty: 16 G | Refills: 3 | Status: SHIPPED | OUTPATIENT
Start: 2022-08-26

## 2022-08-26 NOTE — TELEPHONE ENCOUNTER
Please call at [phone] patient for sooner appointment opening having a lot of weakness in his foot

## 2022-08-26 NOTE — ASSESSMENT & PLAN NOTE
Lab Results   Component Value Date    HGBA1C 6 6 (H) 08/18/2022   Well controlled  Continue same  Will continue to monitor

## 2022-08-26 NOTE — PROGRESS NOTES
Assessment and Plan:     Problem List Items Addressed This Visit        Digestive    Gastroesophageal reflux disease without esophagitis       Endocrine    Type 2 diabetes mellitus with complication ([facility] [address] ) - Primary       Lab Results   Component Value Date    HGBA1C 6 6 (H) 08/18/2022   Well controlled  Continue same  Will continue to monitor  Relevant Orders    CBC and differential    Comprehensive metabolic panel    Lipid Panel with Direct LDL reflex    TSH, 3rd generation with Free T4 reflex    Hemoglobin A1C       Cardiovascular and Mediastinum    Hypertension     Well controlled  Continue same  Will continue to monitor  Other    Hyperlipidemia     Stable  Continue same  Continue follow low-fat diet  Will continue to monitor  Relevant Orders    CBC and differential    Comprehensive metabolic panel    Lipid Panel with Direct LDL reflex    TSH, 3rd generation with Free T4 reflex    Healthcare maintenance     It was discussed about immunizations, diet, exercise and safety measures  Allergies    Relevant Medications    fluticasone (FLONASE) 50 mcg/act nasal spray    guaiFENesin (MUCINEX) 600 mg 12 hr tablet    Postnasal drip     He was given scripts for Mucinex and Flonase nasal spray  Other Visit Diagnoses     HTN (hypertension), benign        Relevant Orders    CBC and differential    Comprehensive metabolic panel    Lipid Panel with Direct LDL reflex    TSH, 3rd generation with Free T4 reflex           Preventive health issues were discussed with patient, and age appropriate screening tests were ordered as noted in patient's After Visit Summary  Personalized health advice and appropriate referrals for health education or preventive services given if needed, as noted in patient's After Visit Summary  History of Present Illness:     Patient presents for a Medicare Wellness Visit    He is here today for follow-up multiple medical problems    He has been taking his medications  Denies any side effects to medications  He complained of having problems with postnasal drip  He had blood work done recently showed A1c well controlled  Triglyceride improved  Patient Care Team:  Main Smith MD as PCP - General (Family Medicine)  Main Smith MD as PCP - [address] [place] [address] (RTE)     Review of Systems:     Review of Systems   Constitutional: Negative for chills and fever  HENT: Positive for postnasal drip  Negative for trouble swallowing  Eyes: Negative for visual disturbance  Respiratory: Negative for cough and shortness of breath  Cardiovascular: Negative for chest pain and palpitations  Gastrointestinal: Negative for abdominal pain, blood in stool and vomiting  Endocrine: Negative for cold intolerance and heat intolerance  Genitourinary: Negative for difficulty urinating and dysuria  Musculoskeletal: Positive for arthralgias and gait problem  Skin: Negative for rash  Neurological: Negative for dizziness, syncope and headaches  Hematological: Negative for adenopathy  Psychiatric/Behavioral: Negative for behavioral problems  Problem List:     Patient Active Problem List   Diagnosis    Hypertension    Type 2 diabetes mellitus with complication (HCC)    Allergic rhinitis    Anemia    Benign prostatic hyperplasia    Hyperlipidemia    Obesity    Left-sided low back pain with left-sided sciatica    Prostate cancer screening    Obesity (BMI 30 0-34  9)    Healthcare maintenance    Cataracta    Pre-op examination    Gastroesophageal reflux disease without esophagitis    Lumbar disc disease with radiculopathy    Allergies    Postnasal drip      Past Medical and Surgical History:     Past Medical History:   Diagnosis Date    Diabetes mellitus ([address] )     Hypertension      Past Surgical History:   Procedure Laterality Date    GALLBLADDER SURGERY  1968      Family History:     Family History   Problem Relation Age of Onset  No Known Problems Mother     No Known Problems Father       Social History:     Social History     Socioeconomic History    Marital status: /Civil Union     Spouse name: None    Number of children: None    Years of education: None    Highest education level: None   Occupational History    None   Tobacco Use    Smoking status: Never Smoker    Smokeless tobacco: Never Used    Tobacco comment: no passive smoke exposure    Vaping Use    Vaping Use: Never used   Substance and Sexual Activity    Alcohol use: Not Currently    Drug use: Never    Sexual activity: None   Other Topics Concern    None   Social History Narrative    None     Social Determinants of Health     Financial Resource Strain: Not on file   Food Insecurity: Not on file   Transportation Needs: Not on file   Physical Activity: Not on file   Stress: Not on file   Social Connections: Not on file   Intimate Partner Violence: Not on file   Housing Stability: Not on file      Medications and Allergies:     Current Outpatient Medications   Medication Sig Dispense Refill    amLODIPine (NORVASC) 10 mg tablet Take 1 tablet (10 mg total) by mouth daily 90 tablet 1    aspirin (ECOTRIN LOW STRENGTH) 81 mg EC tablet take 1 tablet 4x wkly      atorvastatin (LIPITOR) 10 mg tablet TAKE ONE TABLET BY MOUTH EVERY DAY 90 tablet 1    clobetasol (TEMOVATE) 0 05 % ointment every 24 hours      fluticasone (FLONASE) 50 mcg/act nasal spray 1 spray into each nostril daily 16 g 3    gabapentin (NEURONTIN) 100 mg capsule Take 1 capsule (100 mg total) by mouth 2 (two) times a day 180 capsule 1    guaiFENesin (MUCINEX) 600 mg 12 hr tablet Take 1 tablet (600 mg total) by mouth every 12 (twelve) hours 60 tablet 1    ibuprofen (MOTRIN) 600 mg tablet TAKE ONE TABLET BY MOUTH THREE TIMES DAILY WITH FOOD  45 tablet 0    ketorolac (ACULAR) 0 5 % ophthalmic solution       magnesium chloride (MAG64) 64 MG TBEC EC tablet Take 64 mg by mouth 4 (four) times a day Take 2 tablets by mouth twice daily      metFORMIN (GLUCOPHAGE) 1000 MG tablet Take 1 tablet (1,000 mg total) by mouth 2 (two) times a day with meals 180 tablet 1    olmesartan (BENICAR) 40 mg tablet Take 1 tablet (40 mg total) by mouth daily 90 tablet 1    Omega-3 Fatty Acids (FISH OIL) 1,000 mg take 1 by Oral route once  Pt takes 4x week      oxyCODONE-acetaminophen (PERCOCET) 5-325 mg per tablet take 1 tablet by oral route  at bed time      pantoprazole (PROTONIX) 40 mg tablet Take 1 tablet (40 mg total) by mouth daily before breakfast 90 tablet 1    sildenafil (VIAGRA) 100 mg tablet Take 1 tablet (100 mg total) by mouth daily as needed for erectile dysfunction 10 tablet 3    tamsulosin (FLOMAX) 0 4 mg Take 1 capsule (0 4 mg total) by mouth daily with dinner 90 capsule 1     No current facility-administered medications for this visit  Allergies   Allergen Reactions    Penicillins Other (See Comments)      Immunizations:     Immunization History   Administered Date(s) Administered    COVID-19 MODERNA VACC 0 5 ML IM 01/18/2021, 02/15/2021, 12/15/2021    INFLUENZA 10/16/2014, 10/24/2015, 10/05/2016, 09/19/2017, 10/05/2018, 09/01/2020, 09/03/2021    Pneumococcal Conjugate 13-Valent 07/22/2015    Pneumococcal Polysaccharide PPV23 10/01/2011    Td (adult), Unspecified 04/07/2016    Zoster Vaccine Recombinant 10/05/2019, 12/12/2019    influenza, trivalent, adjuvanted 09/18/2019      Health Maintenance: There are no preventive care reminders to display for this patient  Topic Date Due    COVID-19 Vaccine (4 - Booster for Moderna series) 04/15/2022    Influenza Vaccine (1) 09/01/2022      Medicare Screening Tests and Risk Assessments:     Juan Jose Cisneros is here for his Subsequent Wellness visit  Health Risk Assessment:   Patient rates overall health as good  Patient feels that their physical health rating is same  Patient is satisfied with their life  Eyesight was rated as same   Hearing was rated as same  Patient feels that their emotional and mental health rating is same  Patients states they are never, rarely angry  Patient states they are never, rarely unusually tired/fatigued  Pain experienced in the last 7 days has been some  Patient's pain rating has been 1/10  Patient states that he has experienced no weight loss or gain in last 6 months  Depression Screening:   PHQ-2 Score: 0      Fall Risk Screening: In the past year, patient has experienced: no history of falling in past year      Home Safety:  Patient has trouble with stairs inside or outside of their home  Patient has working smoke alarms and has working carbon monoxide detector  Home safety hazards include: none  Nutrition:   Current diet is Regular  Medications:   Patient is currently taking over-the-counter supplements  OTC medications include: see medication list  Patient is able to manage medications  Activities of Daily Living (ADLs)/Instrumental Activities of Daily Living (IADLs):   Walk and transfer into and out of bed and chair?: Yes  Dress and groom yourself?: Yes    Bathe or shower yourself?: Yes    Feed yourself?  Yes  Do your laundry/housekeeping?: Yes  Manage your money, pay your bills and track your expenses?: Yes  Make your own meals?: Yes    Do your own shopping?: Yes    Previous Hospitalizations:   Any hospitalizations or ED visits within the last 12 months?: No      Advance Care Planning:   Living will: No    Durable POA for healthcare: No    Advanced directive: No      Cognitive Screening:   Provider or family/friend/caregiver concerned regarding cognition?: No    PREVENTIVE SCREENINGS      Cardiovascular Screening:    General: Screening Not Indicated and History Lipid Disorder      Diabetes Screening:     General: Screening Not Indicated and History Diabetes      Colorectal Cancer Screening:     General: Screening Not Indicated      Prostate Cancer Screening:    General: Screening Not Indicated Osteoporosis Screening:    General: Risks and Benefits Discussed      Abdominal Aortic Aneurysm (AAA) Screening:        General: Screening Not Indicated      Lung Cancer Screening:     General: Screening Not Indicated      Hepatitis C Screening:    General: Risks and Benefits Discussed    Screening, Brief Intervention, and Referral to Treatment (SBIRT)    Screening  Typical number of drinks in a day: 0  Typical number of drinks in a week: 0  Interpretation: Low risk drinking behavior  Single Item Drug Screening:  How often have you used an illegal drug (including marijuana) or a prescription medication for non-medical reasons in the past year? never    Single Item Drug Screen Score: 0  Interpretation: Negative screen for possible drug use disorder    Brief Intervention  Alcohol & drug use screenings were reviewed  No concerns regarding substance use disorder identified  Review of Current Opioid Use    Opioid Risk Tool (ORT) Interpretation: Complete Opioid Risk Tool (ORT)    No exam data present     Physical Exam:     /70 (BP Location: Right arm, Patient Position: Sitting, Cuff Size: Adult)   Pulse 81   Temp 98 6 °F (37 °C) (Tympanic)   Resp 16   Ht 5' 8" (1 727 m)   Wt 93 kg (205 lb)   SpO2 96%   BMI 31 17 kg/m²     Physical Exam  Vitals and nursing note reviewed  Constitutional:       Appearance: Normal appearance  He is well-developed  HENT:      Head: Normocephalic and atraumatic  Eyes:      Conjunctiva/sclera: Conjunctivae normal    Cardiovascular:      Rate and Rhythm: Normal rate and regular rhythm  Heart sounds: No murmur heard  Pulmonary:      Effort: Pulmonary effort is normal  No respiratory distress  Breath sounds: Normal breath sounds  Abdominal:      Palpations: Abdomen is soft  Tenderness: There is no abdominal tenderness  Musculoskeletal:      Cervical back: Neck supple  Skin:     General: Skin is warm and dry     Neurological:      Mental Status: He is alert            Viv Coy MD

## 2022-09-09 DIAGNOSIS — K21.9 GASTROESOPHAGEAL REFLUX DISEASE WITHOUT ESOPHAGITIS: ICD-10-CM

## 2022-09-09 RX ORDER — PANTOPRAZOLE SODIUM 40 MG/1
TABLET, DELAYED RELEASE ORAL
Qty: 90 TABLET | Refills: 0 | Status: SHIPPED | OUTPATIENT
Start: 2022-09-09

## 2022-10-20 DIAGNOSIS — E78.5 HYPERLIPIDEMIA, UNSPECIFIED HYPERLIPIDEMIA TYPE: ICD-10-CM

## 2022-10-20 RX ORDER — ATORVASTATIN CALCIUM 10 MG/1
TABLET, FILM COATED ORAL
Qty: 90 TABLET | Refills: 1 | Status: SHIPPED | OUTPATIENT
Start: 2022-10-20

## 2022-10-20 NOTE — TELEPHONE ENCOUNTER
Pharmacy requesting refill on atorvastatin   Last seen 8/26/22  Has appt 3/3/23  Medication sent to pharmacy

## 2022-11-10 ENCOUNTER — CONSULT (OUTPATIENT)
Dept: NEUROSURGERY | Facility: CLINIC | Age: 82
End: 2022-11-10

## 2022-11-10 VITALS
HEIGHT: 68 IN | HEART RATE: 75 BPM | SYSTOLIC BLOOD PRESSURE: 160 MMHG | TEMPERATURE: 98.6 F | BODY MASS INDEX: 31.07 KG/M2 | DIASTOLIC BLOOD PRESSURE: 86 MMHG | RESPIRATION RATE: 16 BRPM | WEIGHT: 205 LBS

## 2022-11-10 DIAGNOSIS — M62.50 ATROPHY, DISUSE, MUSCLE: Primary | ICD-10-CM

## 2022-11-10 DIAGNOSIS — M21.371 FOOT DROP, RIGHT: ICD-10-CM

## 2022-11-10 DIAGNOSIS — M51.26 LUMBAR DISC HERNIATION: ICD-10-CM

## 2022-11-10 DIAGNOSIS — G89.4 CHRONIC PAIN SYNDROME: ICD-10-CM

## 2022-11-10 DIAGNOSIS — M51.16 LUMBAR DISC DISEASE WITH RADICULOPATHY: ICD-10-CM

## 2022-11-10 DIAGNOSIS — Z91.81 FALLS INFREQUENTLY: ICD-10-CM

## 2022-11-10 NOTE — PROGRESS NOTES
DISCUSSION SUMMARY  This is a 79 y o  male who complains of weakness of the bilateral lower extremities and difficulty walking  Does have some pain but this appears to be mainly due to weakness of the muscles of his low back and legs  His minimal pain until he stands to walk  Imaging studies do demonstrate spondylolisthesis at the L5-S1 region with bilateral foraminal stenosis  His weakness has been present for greater than a year and he attributes this to in activity during COVID-19 lock-downs  He is atrophy of the bilateral lower extremities and footdrop greater on the right than on the left  He has frequent falls  He does have diabetes mellitus type 2 but his hemoglobin A1c he reports to be just under 7  His son brings him in today because of this weakness of the lower extremities with no change over a significant period of time  Return for follow-up after test         Diagnosis ICD-10-CM Associated Orders   1  Atrophy, disuse, muscle  M62 [id number] Ambulatory referral to Physical Therapy     EMG 2 limb lower extremity   2  Foot drop, right  M21 [id number] Ambulatory referral to Neurosurgery     Ambulatory referral to Physical Therapy     EMG 2 limb lower extremity   3  Falls infrequently  Z91 [id number] Ambulatory referral to Neurosurgery     Ambulatory referral to Physical Therapy     EMG 2 limb lower extremity   4  Lumbar disc disease with radiculopathy  M51 16 Ambulatory referral to Neurosurgery     Ambulatory referral to Physical Therapy     EMG 2 limb lower extremity   5  Lumbar disc herniation  M51 26 Ambulatory referral to Neurosurgery     Ambulatory referral to Physical Therapy     EMG 2 limb lower extremity   6  Chronic pain syndrome  G89 4 Ambulatory referral to Neurosurgery     Ambulatory referral to Physical Therapy     EMG 2 limb lower extremity          Chief Complaint   Patient presents with   • Consult     Consult ref   by Dr Brandi Ham for LBP/Drop foot with MRI Lsp 7/6/2022 [facility]; PT: 3-4 yrs ago; EDEN: 8/8/22 HPI this is an 79-year-old gentleman who complains of weakness  His greatest problem is that he has difficulty in walking  He is unsteady and has fallen on multiple occasions  He complains of pain in his low back radiating to bilateral buttocks region with muscle aches and cramps  These occur in his back but also in his leg  He is had footdrop for greater than a year  He reports that he became profoundly weak during COVID in that feels that this has been progressive  He does not do significant amounts of activity  His son who is a  but him some workout equipment but the patient has not used it  He also complains of numbness bilaterally in his legs from his knees down to his feet  He is difficulty driving in that he has profound weakness of the right lower extremity and has difficulty controlling the foot pedals  He is not had recent physical therapy nor has he had chiropractic manipulation  He has had epidural steroid injections in the past but none recently  He did undergo an MRI recently  This is available today  Does have diabetes mellitus type 2 and is on an oral hypoglycemic for this  He tells me that his hemoglobin A1cs are low although these are not available for me to review myself  Review of Systems   HENT: Positive for tinnitus (years )  Eyes: Negative for visual disturbance  Respiratory: Negative for shortness of breath and wheezing  Cardiovascular: Negative for chest pain  Gastrointestinal: Negative  Genitourinary: Negative  Musculoskeletal: Positive for back pain (lower back pain radiates into hir buttock and legs ), gait problem (unsteady on his feet, uses a walker ) and myalgias (occa cramp in his legs )            Injections in his back for pain with relief and some with out relief     Back pain for years  No recent PT   No chiropractor     Foot drop - right leg wearing a brace      Neurological: Positive for weakness (right leg weaker than the left leg ) and numbness (bilateral legs from the knees down into his feet )  Negative for dizziness, tremors, seizures and headaches  I reviewed the ROS        Vitals:    /86 (BP Location: Left arm, Patient Position: Sitting, Cuff Size: Standard)   Pulse 75   Temp 98 6 °F (37 °C) (Temporal)   Resp 16   Ht 5' 8" (1 727 m)   Wt 93 kg (205 lb)   BMI 31 17 kg/m²       MEDICAL HISTORY  Past Medical History:   Diagnosis Date   • Diabetes mellitus ([facility] [address] )    • Hypertension      Past Surgical History:   Procedure Laterality Date   • GALLBLADDER SURGERY  1968     Social History     Tobacco Use   • Smoking status: Never Smoker   • Smokeless tobacco: Never Used   • Tobacco comment: no passive smoke exposure    Vaping Use   • Vaping Use: Never used   Substance Use Topics   • Alcohol use: Not Currently   • Drug use: Never        Current Outpatient Medications:   •  amLODIPine (NORVASC) 10 mg tablet, Take 1 tablet (10 mg total) by mouth daily, Disp: 90 tablet, Rfl: 1  •  aspirin (ECOTRIN LOW STRENGTH) 81 mg EC tablet, every other day, Disp: , Rfl:   •  atorvastatin (LIPITOR) 10 mg tablet, TAKE ONE TABLET BY MOUTH EVERY DAY, Disp: 90 tablet, Rfl: 1  •  clobetasol (TEMOVATE) 0 05 % ointment, every 24 hours, Disp: , Rfl:   •  fluticasone (FLONASE) 50 mcg/act nasal spray, 1 spray into each nostril daily, Disp: 16 g, Rfl: 3  •  gabapentin (NEURONTIN) 100 mg capsule, Take 1 capsule (100 mg total) by mouth 2 (two) times a day, Disp: 180 capsule, Rfl: 1  •  guaiFENesin (MUCINEX) 600 mg 12 hr tablet, Take 1 tablet (600 mg total) by mouth every 12 (twelve) hours (Patient taking differently: Take 600 mg by mouth in the morning), Disp: 60 tablet, Rfl: 1  •  ibuprofen (MOTRIN) 600 mg tablet, TAKE ONE TABLET BY MOUTH THREE TIMES DAILY WITH FOOD , Disp: 45 tablet, Rfl: 0  •  magnesium chloride (MAG64) 64 MG TBEC EC tablet, Take 128 mg by mouth daily, Disp: , Rfl:   •  metFORMIN (GLUCOPHAGE) 1000 MG tablet, Take 1 tablet (1,000 mg total) by mouth 2 (two) times a day with meals, Disp: 180 tablet, Rfl: 1  •  olmesartan (BENICAR) 40 mg tablet, Take 1 tablet (40 mg total) by mouth daily, Disp: 90 tablet, Rfl: 1  •  Omega-3 Fatty Acids (FISH OIL) 1,000 mg, take 1 by Oral route once  Pt takes 4x week, Disp: , Rfl:   •  pantoprazole (PROTONIX) 40 mg tablet, TAKE ONE TABLET BY MOUTH EVERY DAY BEFORE BREAKFAST, Disp: 90 tablet, Rfl: 0  •  tamsulosin (FLOMAX) 0 4 mg, Take 1 capsule (0 4 mg total) by mouth daily with dinner, Disp: 90 capsule, Rfl: 1  •  ketorolac (ACULAR) 0 5 % ophthalmic solution, , Disp: , Rfl:   •  oxyCODONE-acetaminophen (PERCOCET) 5-325 mg per tablet, take 1 tablet by oral route  at bed time (Patient not taking: Reported on 11/10/2022), Disp: , Rfl:   •  sildenafil (VIAGRA) 100 mg tablet, Take 1 tablet (100 mg total) by mouth daily as needed for erectile dysfunction, Disp: 10 tablet, Rfl: 3   Allergies   Allergen Reactions   • Penicillins Other (See Comments)        The following portions of the patient's history were updated by MA and reviewed by MD: allergies, current medications, past family history, past medical history, past social history, past surgical history and problem list       Physical Exam  Vitals and nursing note reviewed  Constitutional:       General: He is not in acute distress  Appearance: Normal appearance  He is obese  He is not ill-appearing, toxic-appearing or diaphoretic  HENT:      Head: Normocephalic and atraumatic  Nose: Nose normal    Eyes:      Extraocular Movements: Extraocular movements intact  Pupils: Pupils are equal, round, and reactive to light  Musculoskeletal:         General: No swelling, tenderness, deformity or signs of injury  Cervical back: Normal range of motion and neck supple  Right lower leg: No edema  Left lower leg: No edema  Comments: Range of motion in the LS spine is severely limited    He is also associated with pain at extremes of range of motion  Skin:     General: Skin is warm and dry  Neurological:      Mental Status: He is alert and oriented to person, place, and time  Cranial Nerves: No cranial nerve deficit  Sensory: Sensory deficit present  Motor: Weakness (Significant weakness of the lower extremities  ) present  Coordination: Coordination normal       Gait: Gait abnormal ( brandyn changes in the lower extremities severely abnormal gait with shuffling gait and short unsteady steps  )  Deep Tendon Reflexes: Reflexes normal       Comments: Hip flexion at 4/5 bilaterally hip extension at 4/5 bilaterally knee flexion and extension at 4/5 bilaterally ankle dorsiflexion at 0/5 on the right and 2/5 on the left plantar flexion is at 1/5 on the right and 3/5 on the left   Psychiatric:         Mood and Affect: Mood normal          Behavior: Behavior normal          Thought Content: Thought content normal          Judgment: Judgment normal            RESULTS/DATA  I have personally reviewed pertinent films in PACS   MRI of the LS spine is carefully reviewed  This demonstrates foraminal stenosis at the L5-S1 region which is present bilaterally  This is caused by a spondylolisthesis of L5 on S1  There is various degrees of spondylosis elsewhere

## 2022-11-21 ENCOUNTER — EVALUATION (OUTPATIENT)
Dept: PHYSICAL THERAPY | Facility: REHABILITATION | Age: 82
End: 2022-11-21

## 2022-11-21 DIAGNOSIS — M51.16 LUMBAR DISC DISEASE WITH RADICULOPATHY: ICD-10-CM

## 2022-11-21 DIAGNOSIS — M54.16 LUMBAR RADICULOPATHY: Primary | ICD-10-CM

## 2022-11-21 DIAGNOSIS — G89.4 CHRONIC PAIN SYNDROME: ICD-10-CM

## 2022-11-21 DIAGNOSIS — M62.50 ATROPHY, DISUSE, MUSCLE: ICD-10-CM

## 2022-11-21 DIAGNOSIS — Z91.81 FALLS INFREQUENTLY: ICD-10-CM

## 2022-11-21 DIAGNOSIS — M51.26 LUMBAR DISC HERNIATION: ICD-10-CM

## 2022-11-21 DIAGNOSIS — M21.371 FOOT DROP, RIGHT: ICD-10-CM

## 2022-11-21 NOTE — PROGRESS NOTES
PT Evaluation     Today's date: 2022  Patient name: Amanda Damon  : 1940  MRN: [id number]  Referring provider: Michael Gerard,*  Dx:   Encounter Diagnosis     ICD-10-CM    1  Lumbar radiculopathy  M54 16       2  Foot drop, right  M21 371 Ambulatory referral to Physical Therapy      3  Falls infrequently  Z91 81 Ambulatory referral to Physical Therapy      4  Lumbar disc disease with radiculopathy  M51 16 Ambulatory referral to Physical Therapy      5  Lumbar disc herniation  M51 26 Ambulatory referral to Physical Therapy      6  Chronic pain syndrome  G89 4 Ambulatory referral to Physical Therapy      7  Atrophy, disuse, muscle  M62 50 Ambulatory referral to Physical Therapy                     Assessment  Assessment details: Pt is an 80 yo male with LE weakness and right foot drop secondary to lumbar degeneration and general deconditioning  Most of his pain has been relieved with EDEN however he continues to have some discomfort over the buttocks and proximal thighs with activity  He balance is significantly impaired and he is a fall risk  He currently is using a walker because he notes that his right leg will give out at times and he has fallen  Impairments are preventing him from performing yard work and being able to carry anything up steps or around the home  He would benefit from physical therapy for balance and strengthening and core stabilization exercises  Impairments: abnormal gait, abnormal or restricted ROM, activity intolerance, lacks appropriate home exercise program, pain with function, safety issue and poor body mechanics    Symptom irritability: moderateUnderstanding of Dx/Px/POC: excellent  Goals  STG: in 4 week  Pt performing HEP consistently  Increase strength 1/2 grade  Pt able to get up from sitting with no use of hands  LTG: by discharge  Pt able to go up stairs with railing only    Pt able to walk with a SPC safely  Pt independent with HEP    Plan  Patient would benefit from: skilled physical therapy  Referral necessary: No  Planned modality interventions: thermotherapy: hydrocollator packs  Planned therapy interventions: abdominal trunk stabilization, manual therapy, neuromuscular re-education, therapeutic exercise, strengthening, stretching and home exercise program  Frequency: 2x week  Duration in weeks: 10  Treatment plan discussed with: patient        Subjective Evaluation    History of Present Illness  Mechanism of injury: Back and leg pain began about 5 years ago  He had epidural injections which resolved pain for a year  The following year he had pain on the left side  This reduced pain again for 2 years  Last year he began to have b/l leg and buttock pain  He has had 2 epidurals with only partial relief  Pt c/o LE weakness, frequent falls and low back pain  Imaging studies do demonstrate spondylolisthesis at the L5-S1 region with bilateral foraminal stenosis  His weakness has been present for greater than a year and he attributes this to in activity during COVID-19 lock-downs  He c/o drop foot on the right  He recently had an epidural which relieved most of his pain  Pain  Current pain ratin  At best pain ratin  At worst pain ratin  Location: buttock and proximal thighs  Relieving factors: rest  Aggravating factors: walking and standing    Social Support  Steps to enter house: yes  Stairs in house: yes       Diagnostic Tests  MRI studies: abnormal  Patient Goals  Patient goals for therapy: decreased pain and increased strength  Patient goal: carry something up and down the stairs-coffee,  cut the grass        Objective     Concurrent Complaints  Positive for bladder dysfunction   Negative for night pain, disturbed sleep, bowel dysfunction and saddle (S4) numbness    Neurological Testing     Sensation     Lumbar     Right   Diminished: light touch    Comments   Right light touch: L5, medial and lateral malleolus    Active Range of Motion     Lumbar   Flexion:  Restriction level: minimal  Extension:  Restriction level: moderate  Left lateral flexion:  with pain Restriction level: moderate  Right lateral flexion:  with pain Restriction level: moderate  Left rotation:  Restriction level: moderate  Right rotation:  Restriction level: moderate    Strength/Myotome Testing     Left Hip   Planes of Motion   Flexion: 4+  Abduction: 4-  Adduction: 4    Right Hip   Planes of Motion   Flexion: 3+  Abduction: 4-  Adduction: 4    Left Knee   Flexion: 3  Extension: 4-    Right Knee   Flexion: 4+  Extension: 5    Left Ankle/Foot   Dorsiflexion: 1  Plantar flexion: 2  Inversion: 3  Eversion: 2  Great toe extension: 2+    Right Ankle/Foot   Dorsiflexion: 3+  Plantar flexion: 3  Inversion: 4-  Eversion: 3+  Great toe extension: 3+    Tests     Lumbar     Left   Negative crossed SLR, passive SLR and slump test      Right   Negative crossed SLR, passive SLR and slump test      Additional Tests Details  Tight hamstrings and hip flexors B/L             Precautions: GERD, T2D, HTN     11/21            FOTO             IE/RE               Manuals                                                                 Neuro Re-Ed             TA 10"x5            TA w/ ball squeeze 10"x5            TA w/ march x5                                                   AA DF right             Ther Ex             Mini squats x10            Romberg 60" EC/or on foam           Tandem stand                          HR seated or stand             LAQ x10 3#                                     Ther Activity                                       Gait Training                                       Modalities

## 2022-11-29 ENCOUNTER — OFFICE VISIT (OUTPATIENT)
Dept: PHYSICAL THERAPY | Facility: REHABILITATION | Age: 82
End: 2022-11-29

## 2022-11-29 DIAGNOSIS — M51.16 LUMBAR DISC DISEASE WITH RADICULOPATHY: ICD-10-CM

## 2022-11-29 DIAGNOSIS — G89.4 CHRONIC PAIN SYNDROME: ICD-10-CM

## 2022-11-29 DIAGNOSIS — M54.16 LUMBAR RADICULOPATHY: ICD-10-CM

## 2022-11-29 DIAGNOSIS — M21.371 FOOT DROP, RIGHT: Primary | ICD-10-CM

## 2022-11-29 DIAGNOSIS — Z91.81 FALLS INFREQUENTLY: ICD-10-CM

## 2022-11-29 DIAGNOSIS — M62.50 ATROPHY, DISUSE, MUSCLE: ICD-10-CM

## 2022-11-29 DIAGNOSIS — M51.26 LUMBAR DISC HERNIATION: ICD-10-CM

## 2022-11-29 NOTE — PROGRESS NOTES
Daily Note     Today's date: 2022  Patient name: Rosa Brooks  : 1940  MRN: [id number]  Referring provider: Yu Velásquez  Dx:   Encounter Diagnosis     ICD-10-CM    1  Foot drop, right  M21 371       2  Falls infrequently  Z91 81       3  Lumbar disc disease with radiculopathy  M51 16       4  Lumbar disc herniation  M51 26       5  Atrophy, disuse, muscle  M62 50       6  Lumbar radiculopathy  M54 16       7  Chronic pain syndrome  G89 4                      Subjective: Patient reports no change since last session  Objective: See treatment diary below      Assessment: Patient tolerated treatment well  Patient able to perform static rhomberg on foam today without difficulty  Cueing needed to breathe through TrA exercises  No increased pain post treatment  Pt will benefit from continued skilled PT intervention in order to address his remaining limitations and to restore maximal function  Plan: Continue per plan of care        Precautions: GERD, T2D, HTN                FOTO             IE/RE               Manuals                                                                 Neuro Re-Ed             TA 10"x5 10"x10           TA w/ ball squeeze 10"x5 5" x10           TA w/ march x5 x10                                                  AA DF right             Ther Ex             Mini squats x10 3x5           Romberg 60"   30"x3 foam           Tandem stand  Semi tandem 30"x2                        HR seated or stand  seated 2x10 ea           LAQ x10 3# 2x10                                     Ther Activity                                       Gait Training                                       Modalities

## 2022-12-01 ENCOUNTER — OFFICE VISIT (OUTPATIENT)
Dept: PHYSICAL THERAPY | Facility: REHABILITATION | Age: 82
End: 2022-12-01

## 2022-12-01 DIAGNOSIS — M62.50 ATROPHY, DISUSE, MUSCLE: ICD-10-CM

## 2022-12-01 DIAGNOSIS — G89.4 CHRONIC PAIN SYNDROME: ICD-10-CM

## 2022-12-01 DIAGNOSIS — M21.371 FOOT DROP, RIGHT: Primary | ICD-10-CM

## 2022-12-01 DIAGNOSIS — M51.26 LUMBAR DISC HERNIATION: ICD-10-CM

## 2022-12-01 DIAGNOSIS — Z91.81 FALLS INFREQUENTLY: ICD-10-CM

## 2022-12-01 DIAGNOSIS — M54.16 LUMBAR RADICULOPATHY: ICD-10-CM

## 2022-12-01 DIAGNOSIS — M51.16 LUMBAR DISC DISEASE WITH RADICULOPATHY: ICD-10-CM

## 2022-12-01 NOTE — PROGRESS NOTES
Daily Note     Today's date: 2022  Patient name: Leatha Degroot  : 1940  MRN: [id number]  Referring provider: Chicho Desir  Dx:   Encounter Diagnosis     ICD-10-CM    1  Foot drop, right  M21 371       2  Falls infrequently  Z91 81       3  Lumbar disc disease with radiculopathy  M51 16       4  Lumbar disc herniation  M51 26       5  Atrophy, disuse, muscle  M62 50       6  Lumbar radiculopathy  M54 16       7  Chronic pain syndrome  G89 4           Start Time: 1345  Stop Time: 1430  Total time in clinic (min): 45 minutes    Subjective: Patient reports he had some muscle soreness after last session but       Objective: See treatment diary below      Assessment: Patient showed improved tolerance to session today  Patient continues to be challenged by static tandem balance, but showed improvements today  Patient able to balance on foam with only occasional LOB on last set of 30"  Improved ROM with toe raises noted today, patient states he performs at home as well  Pt will benefit from continued skilled PT intervention in order to address his remaining limitations and to restore maximal function  Plan: Continue per plan of care        Precautions: GERD, T2D, HTN               FOTO             IE/RE               Manuals                                                                 Neuro Re-Ed             TA 10"x5 10"x10 10"x10          TA w/ ball squeeze 10"x5 5" x10 5"x15          TA w/ march x5 x10 x15             Hip abd OTB 5"x15                                    AA DF right             Ther Ex             Mini squats x10 3x5 x20          Romberg 60"   30"x3 foam Foam 30"x3          Tandem stand  Semi tandem 30"x2 Semi tandem 30"x2 ea                       HR seated or stand  seated 2x10 ea Seated 2x10          LAQ x10 3# 2x10 3# 2x10                          standing marches x10          Ther Activity                Side stepping 3 laps Gait Training                                       Modalities

## 2022-12-02 ENCOUNTER — TELEPHONE (OUTPATIENT)
Dept: FAMILY MEDICINE CLINIC | Facility: CLINIC | Age: 82
End: 2022-12-02

## 2022-12-02 DIAGNOSIS — R26.2 AMBULATORY DYSFUNCTION: Primary | ICD-10-CM

## 2022-12-05 ENCOUNTER — APPOINTMENT (OUTPATIENT)
Dept: PHYSICAL THERAPY | Facility: REHABILITATION | Age: 82
End: 2022-12-05

## 2022-12-06 ENCOUNTER — OFFICE VISIT (OUTPATIENT)
Dept: PHYSICAL THERAPY | Facility: REHABILITATION | Age: 82
End: 2022-12-06

## 2022-12-06 DIAGNOSIS — G89.4 CHRONIC PAIN SYNDROME: ICD-10-CM

## 2022-12-06 DIAGNOSIS — M51.26 LUMBAR DISC HERNIATION: ICD-10-CM

## 2022-12-06 DIAGNOSIS — M21.371 FOOT DROP, RIGHT: Primary | ICD-10-CM

## 2022-12-06 DIAGNOSIS — Z91.81 FALLS INFREQUENTLY: ICD-10-CM

## 2022-12-06 DIAGNOSIS — M51.16 LUMBAR DISC DISEASE WITH RADICULOPATHY: ICD-10-CM

## 2022-12-06 DIAGNOSIS — M62.50 ATROPHY, DISUSE, MUSCLE: ICD-10-CM

## 2022-12-06 DIAGNOSIS — M54.16 LUMBAR RADICULOPATHY: ICD-10-CM

## 2022-12-06 NOTE — PROGRESS NOTES
Daily Note     Today's date: 2022  Patient name: Shaniqua Brothers  : 1940  MRN: [id number]  Referring provider: Sherry Baer  Dx:   Encounter Diagnosis     ICD-10-CM    1  Foot drop, right  M21 371       2  Falls infrequently  Z91 81       3  Lumbar disc disease with radiculopathy  M51 16       4  Lumbar disc herniation  M51 26       5  Atrophy, disuse, muscle  M62 50       6  Lumbar radiculopathy  M54 16       7  Chronic pain syndrome  G89 4                      Subjective: pt reports no lower back pain, but has numbness in legs, R> L pre-tx  He noted undergoing NCV test next week  Objective: See treatment diary below      Assessment: Tolerated treatment well and without complaints  Most challenged with balance exercises; Intermittent LOB, but regained by pt  Overall, good response with exercises with no adverse reactions  Patient demonstrated fatigue post treatment, exhibited good technique with therapeutic exercises and would benefit from continued PT  Plan: Continue per plan of care  Progress treatment as tolerated         Precautions: GERD, T2D, HTN              FOTO             IE/RE               Manuals                                                                 Neuro Re-Ed             TA 10"x5 10"x10 10"x10 10"x10         TA w/ ball squeeze 10"x5 5" x10 5"x15 10"x10         TA w/ march x5 x10 x15 nv            Hip abd OTB 5"x15 Hip abd  OTB 10"x10                                   AA DF right             Ther Ex             Mini squats x10 3x5 x20 x20         Romberg 60"   30"x3 foam Foam 30"x3 Foam 30"x3         Tandem stand  Semi tandem 30"x2 Semi tandem 30"x2 ea Semi tandem 30"x2 ea                      HR seated or stand  seated 2x10 ea Seated 2x10 nv         LAQ x10 3# 2x10 3# 2x10 3# 2x10                         standing marches x10 standing marches x10         Ther Activity                Side stepping 3 laps Side stepping 3 laps Gait Training                                       Modalities

## 2022-12-07 ENCOUNTER — OFFICE VISIT (OUTPATIENT)
Dept: PHYSICAL THERAPY | Facility: REHABILITATION | Age: 82
End: 2022-12-07

## 2022-12-07 DIAGNOSIS — I10 HYPERTENSION, UNSPECIFIED TYPE: ICD-10-CM

## 2022-12-07 DIAGNOSIS — M62.50 ATROPHY, DISUSE, MUSCLE: ICD-10-CM

## 2022-12-07 DIAGNOSIS — I10 HTN (HYPERTENSION), BENIGN: ICD-10-CM

## 2022-12-07 DIAGNOSIS — M51.16 LUMBAR DISC DISEASE WITH RADICULOPATHY: ICD-10-CM

## 2022-12-07 DIAGNOSIS — E11.8 TYPE 2 DIABETES MELLITUS WITH COMPLICATION (HCC): ICD-10-CM

## 2022-12-07 DIAGNOSIS — Z91.81 FALLS INFREQUENTLY: ICD-10-CM

## 2022-12-07 DIAGNOSIS — M21.371 FOOT DROP, RIGHT: Primary | ICD-10-CM

## 2022-12-07 DIAGNOSIS — M54.16 LUMBAR RADICULOPATHY: ICD-10-CM

## 2022-12-07 DIAGNOSIS — G89.4 CHRONIC PAIN SYNDROME: ICD-10-CM

## 2022-12-07 DIAGNOSIS — N40.0 BENIGN PROSTATIC HYPERPLASIA, UNSPECIFIED WHETHER LOWER URINARY TRACT SYMPTOMS PRESENT: ICD-10-CM

## 2022-12-07 DIAGNOSIS — K21.9 GASTROESOPHAGEAL REFLUX DISEASE WITHOUT ESOPHAGITIS: ICD-10-CM

## 2022-12-07 RX ORDER — OLMESARTAN MEDOXOMIL 40 MG/1
TABLET ORAL
Qty: 90 TABLET | Refills: 0 | Status: SHIPPED | OUTPATIENT
Start: 2022-12-07

## 2022-12-07 RX ORDER — PANTOPRAZOLE SODIUM 40 MG/1
TABLET, DELAYED RELEASE ORAL
Qty: 90 TABLET | Refills: 0 | Status: SHIPPED | OUTPATIENT
Start: 2022-12-07

## 2022-12-07 RX ORDER — AMLODIPINE BESYLATE 10 MG/1
TABLET ORAL
Qty: 90 TABLET | Refills: 0 | Status: SHIPPED | OUTPATIENT
Start: 2022-12-07

## 2022-12-07 RX ORDER — TAMSULOSIN HYDROCHLORIDE 0.4 MG/1
CAPSULE ORAL
Qty: 90 CAPSULE | Refills: 0 | Status: SHIPPED | OUTPATIENT
Start: 2022-12-07

## 2022-12-07 NOTE — PROGRESS NOTES
Daily Note     Today's date: 2022  Patient name: Francisca Celis  : 1940  MRN: [id number]  Referring provider: Na Sultana  Dx:   Encounter Diagnosis     ICD-10-CM    1  Weakness  R53 1                      Subjective: Reports that his muscles are a little sore in the morning but this decreases after he is up a while  Objective: See treatment diary below      Assessment: Tolerated treatment well and without complaints  Balance is still challenging  Unable to do a heel raise yet  Rocker board worked well to exercise plantarflexion and dorsiflexion  Patient demonstrated fatigue post treatment, exhibited good technique with therapeutic exercises and would benefit from continued PT  Plan: Continue per plan of care  Progress treatment as tolerated  Precautions: GERD, T2D, HTN             FOTO             IE/RE               Manuals                                                                 Neuro Re-Ed             TA 10"x5 10"x10 10"x10 10"x10 10"x10 w/ball press        TA w/ ball squeeze 10"x5 5" x10 5"x15 10"x10 10"x10        TA w/ march x5 x10 x15 nv 3"x15           Hip abd OTB 5"x15 Hip abd  OTB 10"x10 OTB 10"x15        Rocker board     AP x20        Step up?              AA DF right             Ther Ex             Mini squats x10 3x5 x20 x20 x20        Romberg 60"   30"x3 foam Foam 30"x3 Foam 30"x3 foam30"x3        Tandem stand  Semi tandem 30"x2 Semi tandem 30"x2 ea Semi tandem 30"x2 ea 30"x2                     HR seated or stand  seated 2x10 ea Seated 2x10  5# x20        LAQ x10 3# 2x10 3# 2x10 3# 2x10 4# 2x10                        standing marches x10 standing marches x10 x15                        Side stepping 3 laps Side stepping 3 laps On foam x 3 laps                     Gait Training                                       Modalities

## 2022-12-12 ENCOUNTER — OFFICE VISIT (OUTPATIENT)
Dept: PHYSICAL THERAPY | Facility: REHABILITATION | Age: 82
End: 2022-12-12

## 2022-12-12 DIAGNOSIS — Z91.81 FALLS INFREQUENTLY: ICD-10-CM

## 2022-12-12 DIAGNOSIS — M62.50 ATROPHY, DISUSE, MUSCLE: ICD-10-CM

## 2022-12-12 DIAGNOSIS — M54.16 LUMBAR RADICULOPATHY: ICD-10-CM

## 2022-12-12 DIAGNOSIS — M21.371 FOOT DROP, RIGHT: Primary | ICD-10-CM

## 2022-12-12 DIAGNOSIS — G89.4 CHRONIC PAIN SYNDROME: ICD-10-CM

## 2022-12-12 DIAGNOSIS — M51.26 LUMBAR DISC HERNIATION: ICD-10-CM

## 2022-12-12 NOTE — PROGRESS NOTES
Daily Note     Today's date: 2022  Patient name: Tom Stout  : 1940  MRN: [id number]  Referring provider: Lacie Matson  Dx:   Encounter Diagnosis     ICD-10-CM    1  Foot drop, right  M21 371       2  Atrophy, disuse, muscle  M62 50       3  Falls infrequently  Z91 81       4  Lumbar disc herniation  M51 26       5  Lumbar radiculopathy  M54 16       6  Chronic pain syndrome  G89 4                      Subjective: Pt notes that his wife has been in and out of the hospital   She had him up at 2:00am          Objective: See treatment diary below      Assessment: Tolerated treatment well and without complaints  Pt appears a bit weaker today but it could be from lack of sleep and stress  Struggled with heel raise with 5# wt  Pt has an ankle orthosis but was told not to wear it when he drives so he does not wear it out most times  Patient demonstrated fatigue post treatment, exhibited good technique with therapeutic exercises and would benefit from continued PT  Plan: Continue per plan of care  Progress treatment as tolerated  Pt will bring his ankle orthosis next visit for gait training        Precautions: GERD, T2D, HTN            FOTO             IE/RE               Manuals             Hamstring stretch      NT       Heel cord stretch      NT       Sciatic nerve glide      NT                    Neuro Re-Ed             TA 10"x5 10"x10 10"x10 10"x10 10"x10 w/ball press 10"x10 w/ball press       TA w/ ball squeeze 10"x5 5" x10 5"x15 10"x10 10"x10 10"x10       TA w/ march x5 x10 x15 nv 3"x15 3"x15          Hip abd OTB 5"x15 Hip abd  OTB 10"x10 OTB 10"x15 RTB 10"x15       Rocker board     AP x20 APx20 and 1'bal       bridge      5"x10       AA DF right             Ther Ex             Mini squats x10 3x5 x20 x20 x20 x20       Romberg 60"   30"x3 foam Foam 30"x3 Foam 30"x3 foam30"x3 foam 30"x3       Tandem stand  Semi tandem 30"x2 Semi tandem 30"x2 ea Semi tandem 30"x2 ea 30"x2 30"x2                    HR seated or stand  seated 2x10 ea Seated 2x10  5# x20 5# x20       LAQ x10 3# 2x10 3# 2x10 3# 2x10 4# 2x10 4# 2x10                       standing marches x10 standing marches x10 x15 x15                       Side stepping 3 laps Side stepping 3 laps On foam x 3 laps On foam 3 laps       Bike      3 min       Gait Training                                       Modalities

## 2022-12-14 ENCOUNTER — OFFICE VISIT (OUTPATIENT)
Dept: PHYSICAL THERAPY | Facility: REHABILITATION | Age: 82
End: 2022-12-14

## 2022-12-14 DIAGNOSIS — M62.50 ATROPHY, DISUSE, MUSCLE: ICD-10-CM

## 2022-12-14 DIAGNOSIS — M51.16 LUMBAR DISC DISEASE WITH RADICULOPATHY: ICD-10-CM

## 2022-12-14 DIAGNOSIS — M21.371 FOOT DROP, RIGHT: Primary | ICD-10-CM

## 2022-12-14 DIAGNOSIS — M54.16 LUMBAR RADICULOPATHY: ICD-10-CM

## 2022-12-14 DIAGNOSIS — G89.4 CHRONIC PAIN SYNDROME: ICD-10-CM

## 2022-12-14 DIAGNOSIS — Z91.81 FALLS INFREQUENTLY: ICD-10-CM

## 2022-12-14 NOTE — PROGRESS NOTES
Daily Note     Today's date: 2022  Patient name: Francisca Celis  : 1940  MRN: [id number]  Referring provider: Na Sultana  Dx:   Encounter Diagnosis     ICD-10-CM    1  Foot drop, right  M21 371       2  Atrophy, disuse, muscle  M62 50       3  Falls infrequently  Z91 81       4  Lumbar disc disease with radiculopathy  M51 16       5  Chronic pain syndrome  G89 4       6  Lumbar radiculopathy  M54 16                      Subjective: Pt reports that the stair glides were installed at home and they are useful  He wore his brace today but feels that it throws his balance off a bit  Objective: See treatment diary below      Assessment: Tolerated treatment well and without complaints  Pt presents to day in his DF brace  Foot drop is eliminated with brace but he continues to be unsteady  Patient demonstrated fatigue post treatment, exhibited good technique with therapeutic exercises and would benefit from continued PT  Plan: Continue per plan of care  Progress treatment as tolerated  Pt will bring his ankle orthosis next visit for gait training        Precautions: GERD, T2D, HTN           FOTO             IE/RE               Manuals             Hamstring stretch      NT NT      Heel cord stretch      NT NT      Sciatic nerve glide      NT NT                   Neuro Re-Ed             TA 10"x5 10"x10 10"x10 10"x10 10"x10 w/ball press 10"x10 w/ball press 10"x10      TA w/ ball squeeze 10"x5 5" x10 5"x15 10"x10 10"x10 10"x10 10"x10      TA w/ march x5 x10 x15 nv 3"x15 3"x15 3"x20         Hip abd OTB 5"x15 Hip abd  OTB 10"x10 OTB 10"x15 RTB 10"x15 GTB 10"x15      Rocker board     AP x20 APx20 and 1'bal NP due brace      bridge      5"x10 missed      AA DF right             Ther Ex             Mini squats x10 3x5 x20 x20 x20 x20 x20      Romberg 60"   30"x3 foam Foam 30"x3 Foam 30"x3 foam30"x3 foam 30"x3 Foam 30"x3      Tandem stand  Semi tandem 30"x2 Semi tandem 30"x2 ea Semi tandem 30"x2 ea 30"x2 30"x2 30"x2                   HR seated or stand  seated 2x10 ea Seated 2x10  5# x20 5# x20 NP due to brace      LAQ x10 3# 2x10 3# 2x10 3# 2x10 4# 2x10 4# 2x10 5# 2x10                      standing marches x10 standing marches x10 x15 x15 x20                      Side stepping 3 laps Side stepping 3 laps On foam x 3 laps On foam 3 laps On foam 4 laps      Bike      3 min 4 min      Gait Training             Stepping over 6" hurdles w/cane and bar       7ft  x2                   Modalities

## 2022-12-15 ENCOUNTER — HOSPITAL ENCOUNTER (OUTPATIENT)
Dept: NEUROLOGY | Facility: CLINIC | Age: 82
End: 2022-12-15

## 2022-12-15 DIAGNOSIS — M21.371 FOOT DROP, RIGHT: ICD-10-CM

## 2022-12-15 DIAGNOSIS — M51.16 LUMBAR DISC DISEASE WITH RADICULOPATHY: ICD-10-CM

## 2022-12-15 DIAGNOSIS — M51.26 LUMBAR DISC HERNIATION: ICD-10-CM

## 2022-12-15 DIAGNOSIS — M62.50 ATROPHY, DISUSE, MUSCLE: ICD-10-CM

## 2022-12-15 DIAGNOSIS — G89.4 CHRONIC PAIN SYNDROME: ICD-10-CM

## 2022-12-15 DIAGNOSIS — Z91.81 FALLS INFREQUENTLY: ICD-10-CM

## 2022-12-16 ENCOUNTER — APPOINTMENT (OUTPATIENT)
Dept: LAB | Facility: IMAGING CENTER | Age: 82
End: 2022-12-16

## 2022-12-16 DIAGNOSIS — I10 HTN (HYPERTENSION), BENIGN: ICD-10-CM

## 2022-12-16 DIAGNOSIS — E78.2 MIXED HYPERLIPIDEMIA: ICD-10-CM

## 2022-12-16 DIAGNOSIS — E11.8 TYPE 2 DIABETES MELLITUS WITH COMPLICATION (HCC): ICD-10-CM

## 2022-12-16 LAB
ALBUMIN SERPL BCP-MCNC: 3.9 G/DL (ref 3.5–5)
ALP SERPL-CCNC: 84 U/L (ref 46–116)
ALT SERPL W P-5'-P-CCNC: 26 U/L (ref 12–78)
ANION GAP SERPL CALCULATED.3IONS-SCNC: 6 MMOL/L (ref 4–13)
AST SERPL W P-5'-P-CCNC: 17 U/L (ref 5–45)
BASOPHILS # BLD AUTO: 0.06 THOUSANDS/ÂΜL (ref 0–0.1)
BASOPHILS NFR BLD AUTO: 1 % (ref 0–1)
BILIRUB SERPL-MCNC: 0.53 MG/DL (ref 0.2–1)
BUN SERPL-MCNC: 22 MG/DL (ref 5–25)
CALCIUM SERPL-MCNC: 9 MG/DL (ref 8.3–10.1)
CHLORIDE SERPL-SCNC: 110 MMOL/L (ref 96–108)
CHOLEST SERPL-MCNC: 94 MG/DL
CO2 SERPL-SCNC: 26 MMOL/L (ref 21–32)
CREAT SERPL-MCNC: 0.99 MG/DL (ref 0.6–1.3)
EOSINOPHIL # BLD AUTO: 0.09 THOUSAND/ÂΜL (ref 0–0.61)
EOSINOPHIL NFR BLD AUTO: 1 % (ref 0–6)
ERYTHROCYTE [DISTWIDTH] IN BLOOD BY AUTOMATED COUNT: 13 % (ref 11.6–15.1)
EST. AVERAGE GLUCOSE BLD GHB EST-MCNC: 143 MG/DL
GFR SERPL CREATININE-BSD FRML MDRD: 70 ML/MIN/1.73SQ M
GLUCOSE P FAST SERPL-MCNC: 182 MG/DL (ref 65–99)
HBA1C MFR BLD: 6.6 %
HCT VFR BLD AUTO: 39.4 % (ref 36.5–49.3)
HDLC SERPL-MCNC: 34 MG/DL
HGB BLD-MCNC: 13 G/DL (ref 12–17)
IMM GRANULOCYTES # BLD AUTO: 0.02 THOUSAND/UL (ref 0–0.2)
IMM GRANULOCYTES NFR BLD AUTO: 0 % (ref 0–2)
LDLC SERPL CALC-MCNC: 39 MG/DL (ref 0–100)
LYMPHOCYTES # BLD AUTO: 1.92 THOUSANDS/ÂΜL (ref 0.6–4.47)
LYMPHOCYTES NFR BLD AUTO: 27 % (ref 14–44)
MCH RBC QN AUTO: 32.6 PG (ref 26.8–34.3)
MCHC RBC AUTO-ENTMCNC: 33 G/DL (ref 31.4–37.4)
MCV RBC AUTO: 99 FL (ref 82–98)
MONOCYTES # BLD AUTO: 0.64 THOUSAND/ÂΜL (ref 0.17–1.22)
MONOCYTES NFR BLD AUTO: 9 % (ref 4–12)
NEUTROPHILS # BLD AUTO: 4.5 THOUSANDS/ÂΜL (ref 1.85–7.62)
NEUTS SEG NFR BLD AUTO: 62 % (ref 43–75)
NRBC BLD AUTO-RTO: 0 /100 WBCS
PLATELET # BLD AUTO: 264 THOUSANDS/UL (ref 149–390)
PMV BLD AUTO: 11.5 FL (ref 8.9–12.7)
POTASSIUM SERPL-SCNC: 4.1 MMOL/L (ref 3.5–5.3)
PROT SERPL-MCNC: 7.2 G/DL (ref 6.4–8.4)
RBC # BLD AUTO: 3.99 MILLION/UL (ref 3.88–5.62)
SODIUM SERPL-SCNC: 142 MMOL/L (ref 135–147)
TRIGL SERPL-MCNC: 104 MG/DL
TSH SERPL DL<=0.05 MIU/L-ACNC: 2.62 UIU/ML (ref 0.45–4.5)
WBC # BLD AUTO: 7.23 THOUSAND/UL (ref 4.31–10.16)

## 2022-12-19 ENCOUNTER — APPOINTMENT (OUTPATIENT)
Dept: PHYSICAL THERAPY | Facility: REHABILITATION | Age: 82
End: 2022-12-19

## 2022-12-20 ENCOUNTER — OFFICE VISIT (OUTPATIENT)
Dept: PHYSICAL THERAPY | Facility: REHABILITATION | Age: 82
End: 2022-12-20

## 2022-12-20 DIAGNOSIS — M62.50 ATROPHY, DISUSE, MUSCLE: ICD-10-CM

## 2022-12-20 DIAGNOSIS — Z91.81 FALLS INFREQUENTLY: ICD-10-CM

## 2022-12-20 DIAGNOSIS — M21.371 FOOT DROP, RIGHT: Primary | ICD-10-CM

## 2022-12-20 NOTE — PROGRESS NOTES
Daily Note     Today's date: 2022  Patient name: Navin Nowak  : 1940  MRN: [id number]  Referring provider: Phyllis Travis  Dx:   Encounter Diagnosis     ICD-10-CM    1  Foot drop, right  M21 371       2  Atrophy, disuse, muscle  M62 50       3  Falls infrequently  Z91 81                      Subjective:  Sierra Katz reports that he is feeling a little better  Objective: See treatment diary below      Assessment: Patient tolerated treatment well  Patient performed ex and received manual therapy as noted  Patient performed ex with short rest breaks as needed  Patient was able to perform hurdles with a reciprocal pattern and B UE support without loss of balance  Patient would benefit from continued PT intervention to address deficits and attain set goals  Plan: Continue per plan of care        Precautions: GERD, T2D, HTN          FOTO             IE/RE               Manuals             Hamstring stretch      NT NT CC     Heel cord stretch      NT NT CC     Sciatic nerve glide      NT NT CC                  Neuro Re-Ed             TA 10"x5 10"x10 10"x10 10"x10 10"x10 w/ball press 10"x10 w/ball press 10"x10 10"x10     TA w/ ball squeeze 10"x5 5" x10 5"x15 10"x10 10"x10 10"x10 10"x10 10"x10     TA w/ march x5 x10 x15 nv 3"x15 3"x15 3"x20 np        Hip abd OTB 5"x15 Hip abd  OTB 10"x10 OTB 10"x15 RTB 10"x15 GTB 10"x15 GTB  10"x10     Rocker board     AP x20 APx20 and 1'bal NP due brace      bridge      5"x10 missed      AA DF right             Ther Ex             Mini squats x10 3x5 x20 x20 x20 x20 x20 x20     Romberg 60"   30"x3 foam Foam 30"x3 Foam 30"x3 foam30"x3 foam 30"x3 Foam 30"x3 Foam  30"x3     Tandem stand  Semi tandem 30"x2 Semi tandem 30"x2 ea Semi tandem 30"x2 ea 30"x2 30"x2 30"x2 20"x1 ea                  HR seated or stand  seated 2x10 ea Seated 2x10  5# x20 5# x20 NP due to brace      LAQ x10 3# 2x10 3# 2x10 3# 2x10 4# 2x10 4# 2x10 5# 2x10 5#  2x10                     standing marches x10 standing marches x10 x15 x15 x20 standing march on foam  x20                     Side stepping 3 laps Side stepping 3 laps On foam x 3 laps On foam 3 laps On foam 4 laps On foam  x4 laps     Bike      3 min 4 min 2 min     Gait Training             Stepping over 6" hurdles w/cane and bar       7ft  x2 Forward reciprocally x3 laps                  Modalities

## 2022-12-21 ENCOUNTER — OFFICE VISIT (OUTPATIENT)
Dept: PHYSICAL THERAPY | Facility: REHABILITATION | Age: 82
End: 2022-12-21

## 2022-12-21 DIAGNOSIS — M21.371 FOOT DROP, RIGHT: Primary | ICD-10-CM

## 2022-12-21 DIAGNOSIS — M62.50 ATROPHY, DISUSE, MUSCLE: ICD-10-CM

## 2022-12-21 DIAGNOSIS — M51.26 LUMBAR DISC HERNIATION: ICD-10-CM

## 2022-12-21 DIAGNOSIS — G89.4 CHRONIC PAIN SYNDROME: ICD-10-CM

## 2022-12-21 DIAGNOSIS — Z91.81 FALLS INFREQUENTLY: ICD-10-CM

## 2022-12-21 DIAGNOSIS — M54.16 LUMBAR RADICULOPATHY: ICD-10-CM

## 2022-12-21 NOTE — PROGRESS NOTES
Daily Note     Today's date: 2022  Patient name: Meggan Ignacio  : 1940  MRN: [id number]  Referring provider: Emiliano Correa  Dx:   Encounter Diagnosis     ICD-10-CM    1  Foot drop, right  M21 371       2  Atrophy, disuse, muscle  M62 50       3  Falls infrequently  Z91 81       4  Lumbar disc herniation  M51 26       5  Lumbar radiculopathy  M54 16       6  Chronic pain syndrome  G89 4                      Subjective:  Pt c/o thigh pain in the morning  Otherwise feeling good  Balance continues to be an issue  Objective: See treatment diary below      Assessment: Patient tolerated treatment well  Pt required assist to regain his balance on 2 occasions during gait training  Foot drop causes him to catch his toe and he occasionally lists to the left  He would benefit from continued gait training and exercises to address deficits and attain set goals  Plan: Continue per plan of care        Precautions: GERD, T2D, HTN         FOTO             IE/RE               Manuals             Hamstring stretch      NT NT CC NT    Heel cord stretch      NT NT CC NT    Sciatic nerve glide      NT NT CC NT                 Neuro Re-Ed             TA 10"x5 10"x10 10"x10 10"x10 10"x10 w/ball press 10"x10 w/ball press 10"x10 10"x10     TA w/ ball squeeze 10"x5 5" x10 5"x15 10"x10 10"x10 10"x10 10"x10 10"x10 10"x10    TA w/ march x5 x10 x15 nv 3"x15 3"x15 3"x20 np        Hip abd OTB 5"x15 Hip abd  OTB 10"x10 OTB 10"x15 RTB 10"x15 GTB 10"x15 GTB  10"x10 GTB 10"x10    Rocker board     AP x20 APx20 and 1'bal NP due brace  AP 30 1' bal    bridge      5"x10 missed      Step up         6" x10 ea    Ther Ex             Mini squats x10 3x5 x20 x20 x20 x20 x20 x20 Sit to stand chair with airex x10    Romberg 60"   30"x3 foam Foam 30"x3 Foam 30"x3 foam30"x3 foam 30"x3 Foam 30"x3 Foam  30"x3     Tandem stand  Semi tandem 30"x2 Semi tandem 30"x2 ea Semi tandem 30"x2 ea 30"x2 30"x2 30"x2 20"x1 ea                  HR seated or stand  seated 2x10 ea Seated 2x10  5# x20 5# x20 NP due to brace      LAQ x10 3# 2x10 3# 2x10 3# 2x10 4# 2x10 4# 2x10 5# 2x10 5#  2x10                     standing marches x10 standing marches x10 x15 x15 x20 standing march on foam  x20                     Side stepping 3 laps Side stepping 3 laps On foam x 3 laps On foam 3 laps On foam 4 laps On foam  x4 laps     Bike      3 min 4 min 2 min 4 min    Gait Training             Stepping over 6" hurdles w/cane and bar       7ft  x2 Forward reciprocally x3 laps Fwd/sw 3 laps ea    W/spc and gait belt         100ft    Modalities

## 2022-12-28 ENCOUNTER — OFFICE VISIT (OUTPATIENT)
Dept: PHYSICAL THERAPY | Facility: REHABILITATION | Age: 82
End: 2022-12-28

## 2022-12-28 DIAGNOSIS — M62.50 ATROPHY, DISUSE, MUSCLE: Primary | ICD-10-CM

## 2022-12-28 DIAGNOSIS — M21.371 FOOT DROP, RIGHT: ICD-10-CM

## 2022-12-28 DIAGNOSIS — Z91.81 FALLS INFREQUENTLY: ICD-10-CM

## 2022-12-28 NOTE — PROGRESS NOTES
Daily Note     Today's date: 2022  Patient name: Belle Ken  : 1940  MRN: [id number]  Referring provider: Debra Moses  Dx:   Encounter Diagnosis     ICD-10-CM    1  Atrophy, disuse, muscle  M62 50       2  Falls infrequently  Z91 81       3  Foot drop, right  M21 371                      Subjective:  Allyson Rodriguez reports that he is well  He notes that he is getting some sharp pains in his back at times when walking  Objective: See treatment diary below      Assessment: Patient tolerated treatment well  Patient performed ex, balance challenge activity and received manual therapy as noted  Performed gait training with SPC with gait belt and CG  Patient did have loss of balance with SPC ambulation requiring assistance when he noted onset of back pain  Patient would benefit from continued PT intervention to address deficits and attain set goals  Plan: Continue per plan of care        Precautions: GERD, T2D, HTN         FOTO             IE/RE               Manuals             Hamstring stretch      NT NT CC NT CC   Heel cord stretch      NT NT CC NT CC   Sciatic nerve glide      NT NT CC NT CC                Neuro Re-Ed             TA  10"x10 10"x10 10"x10 10"x10 w/ball press 10"x10 w/ball press 10"x10 10"x10     TA w/ ball squeeze  5" x10 5"x15 10"x10 10"x10 10"x10 10"x10 10"x10 10"x10 10"x10   TA w/ march  x10 x15 nv 3"x15 3"x15 3"x20 np        Hip abd OTB 5"x15 Hip abd  OTB 10"x10 OTB 10"x15 RTB 10"x15 GTB 10"x15 GTB  10"x10 GTB 10"x10 gtb  10"x10   Rocker board     AP x20 APx20 and 1'bal NP due brace  AP 30 1' bal M/L,  A/P  x20 ea   bridge      5"x10 missed      Step up         6" x10 ea 6"  x10 ea   Ther Ex             Mini squats  3x5 x20 x20 x20 x20 x20 x20 Sit to stand chair with airex x10 Sit to stand chair with airex  x10   Romberg    30"x3 foam Foam 30"x3 Foam 30"x3 foam30"x3 foam 30"x3 Foam 30"x3 Foam  30"x3 Tandem stand  Semi tandem 30"x2 Semi tandem 30"x2 ea Semi tandem 30"x2 ea 30"x2 30"x2 30"x2 20"x1 ea                  HR seated or stand  seated 2x10 ea Seated 2x10  5# x20 5# x20 NP due to brace      LAQ  3# 2x10 3# 2x10 3# 2x10 4# 2x10 4# 2x10 5# 2x10 5#  2x10                     standing marches x10 standing marches x10 x15 x15 x20 standing march on foam  x20  Standing march on foam  x30 alt                   Side stepping 3 laps Side stepping 3 laps On foam x 3 laps On foam 3 laps On foam 4 laps On foam  x4 laps  sidestepping foam beam  x4 laps   Bike      3 min 4 min 2 min 4 min 5 min   Gait Training             Stepping over 6" hurdles w/cane and bar       7ft  x2 Forward reciprocally x3 laps Fwd/sw 3 laps ea Fwd/side  x3 laps ea   W/spc and gait belt         100ft 75 ft with CG-gait belt   Modalities

## 2023-01-03 ENCOUNTER — OFFICE VISIT (OUTPATIENT)
Dept: PHYSICAL THERAPY | Facility: REHABILITATION | Age: 83
End: 2023-01-03

## 2023-01-03 DIAGNOSIS — M21.371 FOOT DROP, RIGHT: ICD-10-CM

## 2023-01-03 DIAGNOSIS — Z91.81 FALLS INFREQUENTLY: ICD-10-CM

## 2023-01-03 DIAGNOSIS — M51.26 LUMBAR DISC HERNIATION: ICD-10-CM

## 2023-01-03 DIAGNOSIS — M62.50 ATROPHY, DISUSE, MUSCLE: Primary | ICD-10-CM

## 2023-01-03 NOTE — PROGRESS NOTES
Daily Note     Today's date: 1/3/2023  Patient name: Shaniqua Brothers  : 1940  MRN: [id number]  Referring provider: Sherry Baer  Dx:   Encounter Diagnosis     ICD-10-CM    1  Atrophy, disuse, muscle  M62 50       2  Falls infrequently  Z91 81       3  Foot drop, right  M21 371       4  Lumbar disc herniation  M51 26                      Subjective:  Cas Pan reports that he is doing well  Patient reports that he did well after his last visit but he was tired for awhile  Objective: See treatment diary below      Assessment: Patient tolerated treatment well  Patient performed ex and received manual therapy as noted  Patient was given rest breaks during the session  Ed performed sit to stands with improved mechanics today  R drop foot increases with fatigue  Patient would benefit from continued PT intervention to address deficits and attain set goals  Plan: Continue per plan of care        Precautions: GERD, T2D, HTN     1/3  12/1 12/6 12/7 12/12 12/14 12/20 12/21 12/28   FOTO             IE/RE               Manuals             Hamstring stretch CC     NT NT CC NT CC   Heel cord stretch CC     NT NT CC NT CC   Sciatic nerve glide CC     NT NT CC NT CC                Neuro Re-Ed             TA   10"x10 10"x10 10"x10 w/ball press 10"x10 w/ball press 10"x10 10"x10     TA w/ ball squeeze 10"x10  5"x15 10"x10 10"x10 10"x10 10"x10 10"x10 10"x10 10"x10   TA w/ march   x15 nv 3"x15 3"x15 3"x20 np      gtb  10"x10  Hip abd OTB 5"x15 Hip abd  OTB 10"x10 OTB 10"x15 RTB 10"x15 GTB 10"x15 GTB  10"x10 GTB 10"x10 gtb  10"x10   Rocker board M/L  x20  No A/P fatigued    AP x20 APx20 and 1'bal NP due brace  AP 30 1' bal M/L,  A/P  x20 ea   bridge      5"x10 missed      Step up 6"  x12 ea        6" x10 ea 6"  x10 ea   Ther Ex             Mini squats Sit to stand chair with airex  x12  x20 x20 x20 x20 x20 x20 Sit to stand chair with airex x10 Sit to stand chair with airex  x10   Romberg   Foam 30"x3 Foam 30"x3 foam30"x3 foam 30"x3 Foam 30"x3 Foam  30"x3     Tandem stand   Semi tandem 30"x2 ea Semi tandem 30"x2 ea 30"x2 30"x2 30"x2 20"x1 ea                  HR seated or stand   Seated 2x10  5# x20 5# x20 NP due to brace      LAQ   3# 2x10 3# 2x10 4# 2x10 4# 2x10 5# 2x10 5#  2x10                   standing march alt on foamx20  standing marches x10 standing marches x10 x15 x15 x20 standing march on foam  x20  Standing march on foam  x30 alt                 sidesteppig on foam beam x4 laps  Side stepping 3 laps Side stepping 3 laps On foam x 3 laps On foam 3 laps On foam 4 laps On foam  x4 laps  sidestepping foam beam  x4 laps   Bike x6'     3 min 4 min 2 min 4 min 5 min   Gait Training             Stepping over 6" hurdles w/cane and bar Fwd/side  X 4laps ea      7ft  x2 Forward reciprocally x3 laps Fwd/sw 3 laps ea Fwd/side  x3 laps ea   W/spc and gait belt         100ft 75 ft with CG-gait belt   Modalities

## 2023-01-03 NOTE — TELEPHONE ENCOUNTER
PT stated [id number] stated the script was never received back on 12/5/22, can we please refax script

## 2023-01-05 ENCOUNTER — OFFICE VISIT (OUTPATIENT)
Dept: PHYSICAL THERAPY | Facility: REHABILITATION | Age: 83
End: 2023-01-05

## 2023-01-05 DIAGNOSIS — M54.16 LUMBAR RADICULOPATHY: ICD-10-CM

## 2023-01-05 DIAGNOSIS — M62.50 ATROPHY, DISUSE, MUSCLE: Primary | ICD-10-CM

## 2023-01-05 DIAGNOSIS — M51.16 LUMBAR DISC DISEASE WITH RADICULOPATHY: ICD-10-CM

## 2023-01-05 DIAGNOSIS — M51.26 LUMBAR DISC HERNIATION: ICD-10-CM

## 2023-01-05 DIAGNOSIS — M21.371 FOOT DROP, RIGHT: ICD-10-CM

## 2023-01-05 DIAGNOSIS — Z91.81 FALLS INFREQUENTLY: ICD-10-CM

## 2023-01-05 DIAGNOSIS — G89.4 CHRONIC PAIN SYNDROME: ICD-10-CM

## 2023-01-05 NOTE — PROGRESS NOTES
Daily Note     Today's date: 2023  Patient name: Dario Corona  : 1940  MRN: [id number]  Referring provider: Tiesha Hargrove  Dx:   Encounter Diagnosis     ICD-10-CM    1  Atrophy, disuse, muscle  M62 50       2  Falls infrequently  Z91 81       3  Foot drop, right  M21 371       4  Lumbar disc herniation  M51 26       5  Lumbar radiculopathy  M54 16       6  Chronic pain syndrome  G89 4       7  Lumbar disc disease with radiculopathy  M51 16                      Subjective: No new complaints reported today  Objective: See treatment diary below      Assessment: Patient tolerated treatment well  Frequent rest breaks needed between standing exercises due to fatigue  Patient challenged by step ups on right LE due to weakness  Pt will benefit from continued skilled PT intervention in order to address his remaining limitations and to restore maximal function  Plan: Continue per plan of care        Precautions: GERD, T2D, HTN     1/3 1/5  12/6 12/7 12/12 12/14 12/20 12/21 12/28   FOTO             IE/RE               Manuals             Hamstring stretch CC JG    NT NT CC NT CC   Heel cord stretch CC JG    NT NT CC NT CC   Sciatic nerve glide CC     NT NT CC NT CC                Neuro Re-Ed             TA    10"x10 10"x10 w/ball press 10"x10 w/ball press 10"x10 10"x10     TA w/ ball squeeze 10"x10 10"x10  10"x10 10"x10 10"x10 10"x10 10"x10 10"x10 10"x10   TA w/ march    nv 3"x15 3"x15 3"x20 np      gtb  10"x10 gtb  10"x10  Hip abd  OTB 10"x10 OTB 10"x15 RTB 10"x15 GTB 10"x15 GTB  10"x10 GTB 10"x10 gtb  10"x10   Rocker board M/L  x20  No A/P fatigued AP x20   AP x20 APx20 and 1'bal NP due brace  AP 30 1' bal M/L,  A/P  x20 ea   bridge      5"x10 missed      Step up 6"  x12 ea 6" x12 ea       6" x10 ea 6"  x10 ea   Ther Ex             Mini squats Sit to stand chair with airex  x12 Sit to stand chair with airex  2x5  x20 x20 x20 x20 x20 Sit to stand chair with airex x10 Sit to stand chair with airex  x10   Romberg    Foam 30"x3 foam30"x3 foam 30"x3 Foam 30"x3 Foam  30"x3     Tandem stand    Semi tandem 30"x2 ea 30"x2 30"x2 30"x2 20"x1 ea                  HR seated or stand     5# x20 5# x20 NP due to brace      LAQ    3# 2x10 4# 2x10 4# 2x10 5# 2x10 5#  2x10                   standing march alt on foamx20 standing march alt on foamx20  standing marches x10 x15 x15 x20 standing march on foam  x20  Standing march on foam  x30 alt                 sidesteppig on foam beam x4 laps sidesteppig on foam beam x4 laps  Side stepping 3 laps On foam x 3 laps On foam 3 laps On foam 4 laps On foam  x4 laps  sidestepping foam beam  x4 laps   Bike x6' x6'    3 min 4 min 2 min 4 min 5 min   Gait Training             Stepping over 6" hurdles w/cane and bar Fwd/side  X 4laps ea      7ft  x2 Forward reciprocally x3 laps Fwd/sw 3 laps ea Fwd/side  x3 laps ea   W/spc and gait belt         100ft 75 ft with CG-gait belt   Modalities

## 2023-01-09 ENCOUNTER — EVALUATION (OUTPATIENT)
Dept: PHYSICAL THERAPY | Facility: REHABILITATION | Age: 83
End: 2023-01-09

## 2023-01-09 DIAGNOSIS — G89.4 CHRONIC PAIN SYNDROME: ICD-10-CM

## 2023-01-09 DIAGNOSIS — M62.50 ATROPHY, DISUSE, MUSCLE: Primary | ICD-10-CM

## 2023-01-09 DIAGNOSIS — M21.371 FOOT DROP, RIGHT: ICD-10-CM

## 2023-01-09 DIAGNOSIS — Z91.81 FALLS INFREQUENTLY: ICD-10-CM

## 2023-01-09 DIAGNOSIS — M54.16 LUMBAR RADICULOPATHY: ICD-10-CM

## 2023-01-09 DIAGNOSIS — M51.16 LUMBAR DISC DISEASE WITH RADICULOPATHY: ICD-10-CM

## 2023-01-09 NOTE — PROGRESS NOTES
Daily Note     Today's date: 2023  Patient name: Chandler Pretty  : 1940  MRN: [id number]  Referring provider: Michael Willard  Dx:   Encounter Diagnosis     ICD-10-CM    1  Atrophy, disuse, muscle  M62 50       2  Lumbar radiculopathy  M54 16       3  Falls infrequently  Z91 81       4  Chronic pain syndrome  G89 4       5  Foot drop, right  M21 371       6  Lumbar disc disease with radiculopathy  M51 16                      Subjective: No new complaints reported today  Objective: See treatment diary below  Active Range of Motion     Lumbar   Flexion:  Restriction level: minimal  Extension:  Restriction level: moderate  Left lateral flexion:  with pain Restriction level: moderate  Right lateral flexion:  with pain Restriction level: moderate  Left rotation:  Restriction level: moderate  Right rotation:  Restriction level: moderate    Strength/Myotome Testing     Left Hip   Planes of Motion   Flexion: 4  Abduction: 4-  Adduction: 4    Right Hip   Planes of Motion   Flexion: 4  Abduction: 4-  Adduction: 4    Right Knee   Flexion: 4  Extension: 4    Left Knee   Flexion: 4+  Extension: 5    Right Ankle/Foot   Dorsiflexion: 1  Plantar flexion: 2  Inversion: 3  Eversion: 2  Great toe extension: 2+    Left Ankle/Foot   Dorsiflexion: 3+  Plantar flexion: 3  Inversion: 4  Eversion: 3+  Great toe extension: 3+    Tests     Lumbar     Left   Negative crossed SLR, passive SLR and slump test      Right   Negative crossed SLR, passive SLR and slump test      Additional Tests Details  Tight hamstrings and hip flexors B/L      Assessment: Pt is demonstrating small improvements in strength and balance however this has not translated into significant improvement in function  His FOTO score has remained unchanged  I have referred him back to Dr Junior Green and Dr Jarrell Case  Therapy may be more helpful after another injection as he feels that pain continues to inhibit his activity      Goals  STG: in 4 week  Pt performing HEP consistently  Increase strength 1/2 grade  Pt able to get up from sitting with no use of hands  LTG: by discharge  Pt able to go up stairs with railing only    Pt able to walk with a SPC safely  Pt independent with HEP    Plan: Physical therapy is on hold     Precautions: GERD, T2D, HTN     1/3 1/5 1/9 12/6 12/7 12/12 12/14 12/20 12/21 12/28   FOTO             IE/RE               Manuals             Hamstring stretch CC JG NT   NT NT CC NT CC   Heel cord stretch CC JG NT   NT NT CC NT CC   Sciatic nerve glide CC  NT   NT NT CC NT CC                Neuro Re-Ed             TA    10"x10 10"x10 w/ball press 10"x10 w/ball press 10"x10 10"x10     TA w/ ball squeeze 10"x10 10"x10 4#ball 10"x10 10"x10 10"x10 10"x10 10"x10 10"x10 10"x10 10"x10   TA w/ abd    nv 3"x15 3"x15 3"x20 np      gtb  10"x10 gtb  10"x10 gtb 10"x15 Hip abd  OTB 10"x10 OTB 10"x15 RTB 10"x15 GTB 10"x15 GTB  10"x10 GTB 10"x10 gtb  10"x10   Rocker board M/L  x20  No A/P fatigued AP x20   AP x20 APx20 and 1'bal NP due brace  AP 30 1' bal M/L,  A/P  x20 ea   bridge      5"x10 missed      Step up 6"  x12 ea 6" x12 ea 6"x12      6" x10 ea 6"  x10 ea   Ther Ex             Mini squats Sit to stand chair with airex  x12 Sit to stand chair with airex  2x5 2x5 x20 x20 x20 x20 x20 Sit to stand chair with airex x10 Sit to stand chair with airex  x10   Romberg    Foam 30"x3 foam30"x3 foam 30"x3 Foam 30"x3 Foam  30"x3     Tandem stand    Semi tandem 30"x2 ea 30"x2 30"x2 30"x2 20"x1 ea                  HR seated or stand     5# x20 5# x20 NP due to brace      LAQ    3# 2x10 4# 2x10 4# 2x10 5# 2x10 5#  2x10                   standing march alt on foamx20 standing march alt on foamx20 standing march standing marches x10 x15 x15 x20 standing march on foam  x20  Standing march on foam  x30 alt                 sidesteppig on foam beam x4 laps sidesteppig on foam beam x4 laps Side step on foam x4 laps Side stepping 3 laps On foam x 3 laps On foam 3 laps On foam 4 laps On foam  x4 laps  sidestepping foam beam  x4 laps   Bike x6' x6'    3 min 4 min 2 min 4 min 5 min   Gait Training             Stepping over 6" hurdles w/cane and bar Fwd/side  X 4laps ea      7ft  x2 Forward reciprocally x3 laps Fwd/sw 3 laps ea Fwd/side  x3 laps ea   W/spc and gait belt         100ft 75 ft with CG-gait belt   Modalities

## 2023-01-11 ENCOUNTER — APPOINTMENT (OUTPATIENT)
Dept: PHYSICAL THERAPY | Facility: REHABILITATION | Age: 83
End: 2023-01-11

## 2023-01-16 ENCOUNTER — APPOINTMENT (OUTPATIENT)
Dept: PHYSICAL THERAPY | Facility: REHABILITATION | Age: 83
End: 2023-01-16

## 2023-01-18 ENCOUNTER — APPOINTMENT (OUTPATIENT)
Dept: PHYSICAL THERAPY | Facility: REHABILITATION | Age: 83
End: 2023-01-18

## 2023-01-23 ENCOUNTER — APPOINTMENT (OUTPATIENT)
Dept: PHYSICAL THERAPY | Facility: REHABILITATION | Age: 83
End: 2023-01-23

## 2023-01-25 ENCOUNTER — APPOINTMENT (OUTPATIENT)
Dept: PHYSICAL THERAPY | Facility: REHABILITATION | Age: 83
End: 2023-01-25

## 2023-01-26 ENCOUNTER — OFFICE VISIT (OUTPATIENT)
Dept: NEUROSURGERY | Facility: CLINIC | Age: 83
End: 2023-01-26

## 2023-01-26 VITALS
TEMPERATURE: 98.3 F | DIASTOLIC BLOOD PRESSURE: 72 MMHG | SYSTOLIC BLOOD PRESSURE: 151 MMHG | RESPIRATION RATE: 16 BRPM | WEIGHT: 195 LBS | HEIGHT: 68 IN | BODY MASS INDEX: 29.55 KG/M2 | HEART RATE: 83 BPM

## 2023-01-26 DIAGNOSIS — G62.9 POLYNEUROPATHY: Primary | ICD-10-CM

## 2023-01-26 DIAGNOSIS — M54.16 LUMBAR RADICULOPATHY: ICD-10-CM

## 2023-01-26 NOTE — PATIENT INSTRUCTIONS
Take 150% of the RDA of B2, B6, and B12    For 6 months then return to 100% of these supplements thereafter

## 2023-01-26 NOTE — PROGRESS NOTES
DISCUSSION SUMMARY  This is a 79 y o  male complains of weakness of his legs bilaterally with the right being affected to a greater extent than the left  I recommended medial branch blocks which can help to reduce alleviate the pain in the paraspinal regions thereby allowing him to increase his participation in physical therapy with less pain  I also recommended the B complex vitamins  I recommended against surgical intervention as I believe the probability of improvement to be very small  Tight control of his blood glucose will be the best overall treatment for a diabetic related polyneuropathy  Return if symptoms worsen or fail to improve  Diagnosis ICD-10-CM Associated Orders   1  Polyneuropathy  G62 9 Ambulatory referral to Pain Management      2  Lumbar radiculopathy  M54 16 Ambulatory referral to Pain Management           Chief Complaint   Patient presents with   • Follow-up     F/u after EMG 2 limb LE 12/15/22; Last MRI Lspine 7/6/22; Last OV 11/10/22 DKO         HPI - This patient was initially referred to our office by Dr Natalie Rodriguez [facility] for LBP/Foot drop  He complains of weakness of the bilateral lower extremities and difficulty walking   Does have some pain but this appears to be mainly due to weakness of the muscles of his low back and legs   His minimal pain until he stands to walk   Imaging studies do demonstrate spondylolisthesis at the L5-S1 region with bilateral foraminal stenosis   His weakness has been present for greater than a year and he attributes this to in activity during COVID-19 lock-downs  He has atrophy of bilateral lower extremities and footdrop greater on the right than on the left   He has frequent falls   He does have diabetes mellitus type 2 but his hemoglobin A1c he reports to be just under 7  His son brought him in at the last visit due to weakness in lower extremities  I recommended a follow-up after EMG/NCS which was performed on 12/15/22  Physical therapy was recommended which he is currently undergoing through [address]  He has weakness of his legs bilaterally but the right leg being affected to a greater extent than the left  The symptoms have been present for 5 years at least      Review of Systems   Constitutional: Negative  HENT: Negative  Eyes: Negative  Respiratory: Negative  Cardiovascular: Negative  Gastrointestinal: Negative  Endocrine: Negative  Genitourinary: Negative  Musculoskeletal: Positive for gait problem (using a walker) and myalgias (b/l buttocks pain and b/l thigh pain at times)  Negative for back pain  Per patient, PT d/c due to no improvement   Skin: Negative  Allergic/Immunologic: Negative  Neurological: Positive for weakness (b/l legs feel like dead weight) and numbness (and tingling R>L legs and down in the toes)  Hematological: Negative  Psychiatric/Behavioral: Negative  All other systems reviewed and are negative    I reviewed the ROS    Vitals:    /72 (BP Location: Left arm, Patient Position: Sitting, Cuff Size: Standard)   Pulse 83   Temp 98 3 °F (36 8 °C) (Temporal)   Resp 16   Ht 5' 8" (1 727 m)   Wt 88 5 kg (195 lb)   BMI 29 65 kg/m²     MEDICAL HISTORY  Past Medical History:   Diagnosis Date   • Diabetes mellitus ([facility] [address] )    • Hypertension      Past Surgical History:   Procedure Laterality Date   • GALLBLADDER SURGERY  1968     Social History     Tobacco Use   • Smoking status: Never   • Smokeless tobacco: Never   • Tobacco comments:     no passive smoke exposure    Vaping Use   • Vaping Use: Never used   Substance Use Topics   • Alcohol use: Not Currently   • Drug use: Never      Family History   Problem Relation Age of Onset   • No Known Problems Mother    • No Known Problems Father         Current Outpatient Medications:   •  amLODIPine (NORVASC) 10 mg tablet, TAKE ONE TABLET BY MOUTH ONCE DAILY, Disp: 90 tablet, Rfl: 0  •  aspirin (ECOTRIN LOW STRENGTH) 81 mg EC tablet, every other day, Disp: , Rfl:   •  atorvastatin (LIPITOR) 10 mg tablet, TAKE ONE TABLET BY MOUTH EVERY DAY, Disp: 90 tablet, Rfl: 1  •  clobetasol (TEMOVATE) 0 05 % ointment, every 24 hours, Disp: , Rfl:   •  fluticasone (FLONASE) 50 mcg/act nasal spray, 1 spray into each nostril daily, Disp: 16 g, Rfl: 3  •  gabapentin (NEURONTIN) 100 mg capsule, Take 1 capsule (100 mg total) by mouth 2 (two) times a day, Disp: 180 capsule, Rfl: 1  •  guaiFENesin (MUCINEX) 600 mg 12 hr tablet, Take 1 tablet (600 mg total) by mouth every 12 (twelve) hours (Patient taking differently: Take 600 mg by mouth in the morning), Disp: 60 tablet, Rfl: 1  •  ibuprofen (MOTRIN) 600 mg tablet, TAKE ONE TABLET BY MOUTH THREE TIMES DAILY WITH FOOD , Disp: 45 tablet, Rfl: 0  •  magnesium chloride (MAG64) 64 MG TBEC EC tablet, Take 128 mg by mouth daily, Disp: , Rfl:   •  metFORMIN (GLUCOPHAGE) 1000 MG tablet, TAKE ONE TABLET BY MOUTH TWICE A DAY WITH MEALS, Disp: 180 tablet, Rfl: 0  •  olmesartan (BENICAR) 40 mg tablet, TAKE ONE TABLET BY MOUTH ONCE DAILY, Disp: 90 tablet, Rfl: 0  •  Omega-3 Fatty Acids (FISH OIL) 1,000 mg, take 1 by Oral route once    Pt takes 4x week, Disp: , Rfl:   •  oxyCODONE-acetaminophen (PERCOCET) 5-325 mg per tablet, , Disp: , Rfl:   •  pantoprazole (PROTONIX) 40 mg tablet, TAKE ONE TABLET BY MOUTH EVERY DAY BEFORE BREAKFAST, Disp: 90 tablet, Rfl: 0  •  tamsulosin (FLOMAX) 0 4 mg, TAKE 1 CAPSULE BY MOUTH DAILY WITH DINNER, Disp: 90 capsule, Rfl: 0  •  ketorolac (ACULAR) 0 5 % ophthalmic solution, , Disp: , Rfl:   •  sildenafil (VIAGRA) 100 mg tablet, Take 1 tablet (100 mg total) by mouth daily as needed for erectile dysfunction, Disp: 10 tablet, Rfl: 3     Allergies   Allergen Reactions   • Penicillins Other (See Comments)        The following portions of the patient's history were updated by MA and reviewed by MD: allergies, current medications, past family history, past medical history, past social history, past surgical history and problem list     Physical Exam  Vitals and nursing note reviewed  Exam conducted with a chaperone present  Constitutional:       General: He is not in acute distress  Appearance: Normal appearance  He is normal weight  He is not ill-appearing, toxic-appearing or diaphoretic  HENT:      Head: Normocephalic and atraumatic  Nose: Nose normal    Eyes:      Extraocular Movements: Extraocular movements intact  Pupils: Pupils are equal, round, and reactive to light  Musculoskeletal:         General: No swelling, tenderness, deformity or signs of injury  Normal range of motion  Cervical back: Normal range of motion and neck supple  Right lower leg: No edema  Left lower leg: No edema  Skin:     General: Skin is warm and dry  Neurological:      Mental Status: He is alert and oriented to person, place, and time  Mental status is at baseline  Cranial Nerves: No cranial nerve deficit  Sensory: Sensory deficit ( Patchy nonradicular sensory changes in a stocking glove distribution) present  Motor: Weakness ( Weakness involving the right lower extremity especially distally but also proximally  For instance he has difficulty in stepping up with this leg  This involves multiple nerve roots and is not related to a single nerve root problem ) present  Coordination: Coordination normal       Gait: Gait ( ) normal    Psychiatric:         Mood and Affect: Mood normal          Behavior: Behavior normal          Thought Content: Thought content normal          Judgment: Judgment normal          RESULTS/DATA  I have personally reviewed pertinent films in PACS     EMG nerve conduction study demonstrates polyneuropathy of both axonal and demyelinating form affecting both motor and sensory fibers of the lower extremities    Patient does have a history of diabetes and is likely that this is contributing to this on top of this is a subacute or chronic L5-S1 radiculopathy but only on the right side

## 2023-01-27 DIAGNOSIS — Z91.81 FALLS INFREQUENTLY: ICD-10-CM

## 2023-01-27 DIAGNOSIS — M51.26 LUMBAR DISC HERNIATION: ICD-10-CM

## 2023-01-27 DIAGNOSIS — G89.4 CHRONIC PAIN SYNDROME: ICD-10-CM

## 2023-01-27 DIAGNOSIS — M21.371 FOOT DROP, RIGHT: ICD-10-CM

## 2023-01-27 DIAGNOSIS — M51.16 LUMBAR DISC DISEASE WITH RADICULOPATHY: ICD-10-CM

## 2023-01-30 ENCOUNTER — APPOINTMENT (OUTPATIENT)
Dept: PHYSICAL THERAPY | Facility: REHABILITATION | Age: 83
End: 2023-01-30

## 2023-01-30 RX ORDER — GABAPENTIN 100 MG/1
CAPSULE ORAL
Qty: 180 CAPSULE | Refills: 0 | Status: SHIPPED | OUTPATIENT
Start: 2023-01-30

## 2023-02-10 NOTE — H&P (VIEW-ONLY)
Adult and Child ENT New Patient Visit      Sergei Vigil is a 79 y o  who presents with a chief complaint of hoarseness x several months  HPI: The patient complains of gradual onset of hoarseness for several months  He admits to occasional throat clearing, as well as significant history of reflux, for which he takes Protonix 40 mg daily  He denies throat pain, difficulty swallowing, or pain on swallowing  He admits to a remote history of smoking as a young man  PHYSICAL EXAM: (abnormal findings appear in bold and supercede any conflicting normal findings listed below)    Vitals:    02/10/23 1008   Weight: 88 5 kg (195 lb)   Height: 5' 8" (1 727 m)       General:  Well developed, well nourished and groomed, in no acute distress  Integument:  Exam limited to head and neck: normal appearing without apparent masses or lesions  Eyes:  Extra-ocular movements intact OU  Pupils equally round and reactive to light and accommodation  Eye lids and conjunctivae are normal in appearance  Head:  Atraumatic, normocephalic  Bony palpation unremarkable without step-offs  Parotid and submandibular salivary glands non-tender to palpation and without appreciable masses bilaterally  Ears:  Auricles normal in appearance bilaterally  Mastoid prominences non-tender  External auditory meatus unremarkable  External auditory canals clear bilaterally  Tympanic membranes intact bilaterally  No apparent middle ear effusions or masses  Nose/Sinuses:  External appearance unremarkable  No maxillary or frontal sinus tenderness to palpation bilaterally  Anterior rhinoscopy reveals normal mucosa bilaterally  Oral Cavity:  Moist mucus membranes  Gums and dentition unremarkable  No oral mucosal masses or lesions, floor of mouth soft, tongue mobile without masses or lesions  Oropharynx:  Base of tongue soft and without appreciable masses  [place] tonsils bilaterally unremarkable    Soft palate mucosa unremarkable  Neck:  No visible or palpable cervical lesions or lymphadenopathy  Thyroid gland is normal in size, grossly symmetric, and without masses  Normal laryngeal elevation with swallowing  Cardiovascular:   Essentially regular rate and rhythm, with a few skipped beats upon prolonged auscultation  No discernable murmur or gallop  No palpable thrills  No jugulovenous distension  Respiratory:  No stridor or wheezing  Normal respiratory rate and effort, without retractions or use of accessory muscles  Neurologic:  Cranial nerves II-XII grossly intact bilaterally  PROCEDURE:  Flexible Fiberoptic Laryngoscopy:  Verbal consent was obtained from the patient  The patient was placed in the upright seated position, and each nasal cavity was sprayed with 50/50 blend of oxymetazoline and lidocaine 4% plain  A flexible 0-degree endoscope was then passed through the nasal cavity, into the larynx  Findings: Normal lingual tonsils  Epiglottis is grossly normal   No pooling of secretions  Voice is hoarse  TVCs are fully mobile and symmetric  There is an irregular mass along the anterior commissure, which appears to be transglottic  There is mild post cricoid edema  The patient tolerated this well  No complications  ASSESSMENT:   1  Laryngeal mass        2  Hoarseness        3  Laryngopharyngeal reflux (LPR)                PLAN:    The patient was also seen and examined by Dr Shlomo Brittle, then Dr Leonarda Carney  Explained laryngeal mass  Recommend MCL with biopsy, to be performed by Dr Leonarda Carney, see surgical consent  Will see post-op  In the interim, continue Protonix 40 mg daily, and reduce or eliminate caffeine and spicy foods  Thank you for allowing me to participate in the care of your patient

## 2023-02-13 ENCOUNTER — CLINICAL SUPPORT (OUTPATIENT)
Dept: URGENT CARE | Age: 83
End: 2023-02-13

## 2023-02-13 DIAGNOSIS — Z01.818 PRE-OP TESTING: ICD-10-CM

## 2023-02-13 LAB
ATRIAL RATE: 89 BPM
P AXIS: 35 DEGREES
PR INTERVAL: 234 MS
QRS AXIS: -1 DEGREES
QRSD INTERVAL: 90 MS
QT INTERVAL: 352 MS
QTC INTERVAL: 428 MS
T WAVE AXIS: 22 DEGREES
VENTRICULAR RATE: 89 BPM

## 2023-02-14 ENCOUNTER — ANESTHESIA EVENT (OUTPATIENT)
Dept: PERIOP | Facility: HOSPITAL | Age: 83
End: 2023-02-14

## 2023-02-14 NOTE — PRE-PROCEDURE INSTRUCTIONS
Pre-Surgery Instructions:   Medication Instructions   • amLODIPine (NORVASC) 10 mg tablet Take day of surgery  • aspirin (ECOTRIN LOW STRENGTH) 81 mg EC tablet Stop taking 3 days prior to surgery  • atorvastatin (LIPITOR) 10 mg tablet Take night before surgery   • clobetasol (TEMOVATE) 0 05 % ointment Hold day of surgery  • fluticasone (FLONASE) 50 mcg/act nasal spray Take day of surgery  • gabapentin (NEURONTIN) 100 mg capsule Take day of surgery  • ibuprofen (MOTRIN) 600 mg tablet Stop taking 3 days prior to surgery  • magnesium chloride (MAG64) 64 MG TBEC EC tablet Stop taking 3 days prior to surgery  • metFORMIN (GLUCOPHAGE) 1000 MG tablet Hold day of surgery  • olmesartan (BENICAR) 40 mg tablet Hold day of surgery  • Omega-3 Fatty Acids (FISH OIL) 1,000 mg Take day of surgery  • pantoprazole (PROTONIX) 40 mg tablet Take day of surgery  • tamsulosin (FLOMAX) 0 4 mg Take night before surgery    Preop Instructions per My Surgical Experience booklet,medications per anesthesia guidelines and showering instructions per Davin protocol using their own soap/shampoo reviewed  Pt  Verbalized understanding of current visitor restrictions  Instructed to call surgeon with any illness,change in health or covid exposure now till DOS  All medications instructed to take DOS are with sips water only  Instructed to avoid all ASA/NSAIDs and OTC Vit/Supp from now until after surgery per anesthesia guidelines  Tylenol ok prn  Pt  Verbalized an understanding of all instructions reviewed and offers no concerns at this time

## 2023-02-14 NOTE — ANESTHESIA PREPROCEDURE EVALUATION
Procedure:  MICROLARYGOSCOPY AND BIOPSY; FROZEN SECTION (Throat)    Relevant Problems   CARDIO   (+) Hyperlipidemia   (+) Hypertension      ENDO   (+) Type 2 diabetes mellitus with complication (HCC)      GI/HEPATIC   (+) Gastroesophageal reflux disease without esophagitis      /RENAL   (+) Benign prostatic hyperplasia      HEMATOLOGY   (+) Anemia      MUSCULOSKELETAL   (+) Left-sided low back pain with left-sided sciatica      Recent labs personally reviewed:  Lab Results   Component Value Date    WBC 7 23 12/16/2022    HGB 13 0 12/16/2022     12/16/2022     Lab Results   Component Value Date     04/12/2018    K 4 1 12/16/2022    BUN 22 12/16/2022    CREATININE 0 99 12/16/2022     No results found for: PTT   No results found for: INR    Lab Results   Component Value Date    HGBA1C 6 6 (H) 12/16/2022         Physical Exam    Airway    Mallampati score: III  TM Distance: >3 FB  Neck ROM: full     Dental       Cardiovascular  Cardiovascular exam normal    Pulmonary  Pulmonary exam normal     Other Findings        Anesthesia Plan  ASA Score- 2     Anesthesia Type- general with ASA Monitors  Additional Monitors:   Airway Plan: ETT  Plan Factors-Exercise tolerance (METS): <4 METS  Chart reviewed  EKG reviewed  Existing labs reviewed  Patient summary reviewed  Induction- intravenous  Postoperative Plan-     Informed Consent- Anesthetic plan and risks discussed with patient  I personally reviewed this patient with the CRNA  Discussed and agreed on the Anesthesia Plan with the CRNA  Maria D North

## 2023-02-15 ENCOUNTER — HOSPITAL ENCOUNTER (OUTPATIENT)
Facility: HOSPITAL | Age: 83
Setting detail: OUTPATIENT SURGERY
Discharge: HOME | End: 2023-02-15
Attending: OTOLARYNGOLOGY | Admitting: OTOLARYNGOLOGY

## 2023-02-15 ENCOUNTER — ANESTHESIA (OUTPATIENT)
Dept: PERIOP | Facility: HOSPITAL | Age: 83
End: 2023-02-15

## 2023-02-15 ENCOUNTER — DOCUMENTATION (OUTPATIENT)
Dept: HEMATOLOGY ONCOLOGY | Facility: CLINIC | Age: 83
End: 2023-02-15

## 2023-02-15 VITALS
BODY MASS INDEX: 29.55 KG/M2 | DIASTOLIC BLOOD PRESSURE: 66 MMHG | RESPIRATION RATE: 18 BRPM | TEMPERATURE: 97.1 F | OXYGEN SATURATION: 94 % | WEIGHT: 195 LBS | SYSTOLIC BLOOD PRESSURE: 153 MMHG | HEIGHT: 68 IN | HEART RATE: 64 BPM

## 2023-02-15 DIAGNOSIS — J38.7 LARYNGEAL MASS: ICD-10-CM

## 2023-02-15 DIAGNOSIS — C32.9 LARYNGEAL CANCER (HCC): Primary | ICD-10-CM

## 2023-02-15 LAB
GLUCOSE SERPL-MCNC: 181 MG/DL (ref 65–140)
GLUCOSE SERPL-MCNC: 201 MG/DL (ref 65–140)

## 2023-02-15 RX ORDER — SODIUM CHLORIDE, SODIUM LACTATE, POTASSIUM CHLORIDE, CALCIUM CHLORIDE 600; 310; 30; 20 MG/100ML; MG/100ML; MG/100ML; MG/100ML
INJECTION, SOLUTION INTRAVENOUS CONTINUOUS PRN
Status: DISCONTINUED | OUTPATIENT
Start: 2023-02-15 | End: 2023-02-15

## 2023-02-15 RX ORDER — FENTANYL CITRATE/PF 50 MCG/ML
50 SYRINGE (ML) INJECTION
Status: DISCONTINUED | OUTPATIENT
Start: 2023-02-15 | End: 2023-02-15 | Stop reason: HOSPADM

## 2023-02-15 RX ORDER — ONDANSETRON 2 MG/ML
4 INJECTION INTRAMUSCULAR; INTRAVENOUS ONCE AS NEEDED
Status: DISCONTINUED | OUTPATIENT
Start: 2023-02-15 | End: 2023-02-15 | Stop reason: HOSPADM

## 2023-02-15 RX ORDER — LIDOCAINE HYDROCHLORIDE 10 MG/ML
INJECTION, SOLUTION EPIDURAL; INFILTRATION; INTRACAUDAL; PERINEURAL AS NEEDED
Status: DISCONTINUED | OUTPATIENT
Start: 2023-02-15 | End: 2023-02-15

## 2023-02-15 RX ORDER — ACETAMINOPHEN 325 MG/1
325 TABLET ORAL EVERY 4 HOURS PRN
Status: DISCONTINUED | OUTPATIENT
Start: 2023-02-15 | End: 2023-02-15 | Stop reason: HOSPADM

## 2023-02-15 RX ORDER — FENTANYL CITRATE 50 UG/ML
INJECTION, SOLUTION INTRAMUSCULAR; INTRAVENOUS AS NEEDED
Status: DISCONTINUED | OUTPATIENT
Start: 2023-02-15 | End: 2023-02-15

## 2023-02-15 RX ORDER — ROCURONIUM BROMIDE 10 MG/ML
INJECTION, SOLUTION INTRAVENOUS AS NEEDED
Status: DISCONTINUED | OUTPATIENT
Start: 2023-02-15 | End: 2023-02-15

## 2023-02-15 RX ORDER — EPINEPHRINE NASAL SOLUTION 1 MG/ML
SOLUTION NASAL AS NEEDED
Status: DISCONTINUED | OUTPATIENT
Start: 2023-02-15 | End: 2023-02-15 | Stop reason: HOSPADM

## 2023-02-15 RX ORDER — PROPOFOL 10 MG/ML
INJECTION, EMULSION INTRAVENOUS AS NEEDED
Status: DISCONTINUED | OUTPATIENT
Start: 2023-02-15 | End: 2023-02-15

## 2023-02-15 RX ORDER — NEOSTIGMINE METHYLSULFATE 1 MG/ML
INJECTION INTRAVENOUS AS NEEDED
Status: DISCONTINUED | OUTPATIENT
Start: 2023-02-15 | End: 2023-02-15

## 2023-02-15 RX ORDER — GLYCOPYRROLATE 0.2 MG/ML
INJECTION INTRAMUSCULAR; INTRAVENOUS AS NEEDED
Status: DISCONTINUED | OUTPATIENT
Start: 2023-02-15 | End: 2023-02-15

## 2023-02-15 RX ORDER — MAGNESIUM HYDROXIDE 1200 MG/15ML
LIQUID ORAL AS NEEDED
Status: DISCONTINUED | OUTPATIENT
Start: 2023-02-15 | End: 2023-02-15 | Stop reason: HOSPADM

## 2023-02-15 RX ORDER — EPINEPHRINE 1 MG/ML
INJECTION, SOLUTION, CONCENTRATE INTRAVENOUS AS NEEDED
Status: DISCONTINUED | OUTPATIENT
Start: 2023-02-15 | End: 2023-02-15 | Stop reason: HOSPADM

## 2023-02-15 RX ORDER — GINSENG 100 MG
CAPSULE ORAL AS NEEDED
Status: DISCONTINUED | OUTPATIENT
Start: 2023-02-15 | End: 2023-02-15 | Stop reason: HOSPADM

## 2023-02-15 RX ORDER — HYDROMORPHONE HCL IN WATER/PF 6 MG/30 ML
0.2 PATIENT CONTROLLED ANALGESIA SYRINGE INTRAVENOUS
Status: DISCONTINUED | OUTPATIENT
Start: 2023-02-15 | End: 2023-02-15 | Stop reason: HOSPADM

## 2023-02-15 RX ORDER — DEXAMETHASONE SODIUM PHOSPHATE 10 MG/ML
INJECTION, SOLUTION INTRAMUSCULAR; INTRAVENOUS AS NEEDED
Status: DISCONTINUED | OUTPATIENT
Start: 2023-02-15 | End: 2023-02-15

## 2023-02-15 RX ADMIN — FENTANYL CITRATE 50 MCG: 50 INJECTION, SOLUTION INTRAMUSCULAR; INTRAVENOUS at 13:42

## 2023-02-15 RX ADMIN — PROPOFOL 150 MG: 10 INJECTION, EMULSION INTRAVENOUS at 13:40

## 2023-02-15 RX ADMIN — DEXAMETHASONE SODIUM PHOSPHATE 5 MG: 10 INJECTION, SOLUTION INTRAMUSCULAR; INTRAVENOUS at 13:40

## 2023-02-15 RX ADMIN — PROPOFOL 20 MG: 10 INJECTION, EMULSION INTRAVENOUS at 14:27

## 2023-02-15 RX ADMIN — SODIUM CHLORIDE, SODIUM LACTATE, POTASSIUM CHLORIDE, AND CALCIUM CHLORIDE: .6; .31; .03; .02 INJECTION, SOLUTION INTRAVENOUS at 13:06

## 2023-02-15 RX ADMIN — NEOSTIGMINE METHYLSULFATE 3 MG: 1 INJECTION INTRAVENOUS at 14:41

## 2023-02-15 RX ADMIN — FENTANYL CITRATE 50 MCG: 50 INJECTION, SOLUTION INTRAMUSCULAR; INTRAVENOUS at 13:40

## 2023-02-15 RX ADMIN — LIDOCAINE HYDROCHLORIDE 50 MG: 10 INJECTION, SOLUTION EPIDURAL; INFILTRATION; INTRACAUDAL at 13:40

## 2023-02-15 RX ADMIN — ROCURONIUM BROMIDE 30 MG: 10 SOLUTION INTRAVENOUS at 13:42

## 2023-02-15 RX ADMIN — PROPOFOL 30 MG: 10 INJECTION, EMULSION INTRAVENOUS at 14:15

## 2023-02-15 RX ADMIN — DEXAMETHASONE SODIUM PHOSPHATE 5 MG: 10 INJECTION, SOLUTION INTRAMUSCULAR; INTRAVENOUS at 13:56

## 2023-02-15 RX ADMIN — GLYCOPYRROLATE 0.4 MG: 0.2 INJECTION, SOLUTION INTRAMUSCULAR; INTRAVENOUS at 14:41

## 2023-02-15 NOTE — PROGRESS NOTES
Intake received for radiation oncology/ Chart reviewed for services completed outside of [facility] [place]    [facility]    Imaging completed:none at this time- PET ordered by Dr Mady Rhodes- scheduled proactively for 03/01/23    All records needed are in patients chart  No records retrieval needed at this time  Oncology Care Coordinator will outreach tomorrow once patient is discharged post laryngoscopy with Bx and provide appnt

## 2023-02-15 NOTE — OP NOTE
OPERATIVE REPORT  PATIENT NAME: Lorraine Campuzano    :  1940  MRN: [id number]  Pt Location: [facility] OR ROOM 03    SURGERY DATE: 2/15/2023    Surgeon(s) and Role:     * Nestor Solomon MD - Primary     * Lanre Cunningham MD - Assisting    Preop Diagnosis:  Laryngeal mass [J38 7]    Post-Op Diagnosis Codes:     * Laryngeal mass [J38 7]    Procedure(s): MICROLARYGOSCOPY AND BIOPSY; FROZEN SECTION    Specimen(s):  ID Type Source Tests Collected by Time Destination   1 : laryngeal mass Tissue Larynx TISSUE EXAM Nestor Solomon MD 2/15/2023 1403    2 : laryngeal mass Tissue Soft Tissue, Other TISSUE EXAM Nestor Solomon MD 2/15/2023 1409        Estimated Blood Loss:   Minimal    Drains:  * No LDAs found *    Anesthesia Type:   General    Operative Indications:  Laryngeal mass [J38 7]  dysphonia    Operative Findings:  Large, friable mass, arising from the anterior 1/2 of the left vocal fold, to to the anterior commissure, with some spread to the superior surface of the anterior right vocal fold  Complications:   None    Procedure and Technique:  After induction of general endotracheal anesthesia with a # 5 MLT endotracheal tube, the patient was draped in the sterile fashion  A tooth guard was placed on the upper teeth  The Sataloff medium female direct laryngoscope was inserted, and placed in the suspension apparatus  Tape was used for anterior laryngeal traction  The operating microscope was positioned to provide a magnified view of the larynx  There was a large, friable mass, arising from the anterior 1/2 of the left vocal fold and going to the anterior commissure, with some superficial spread to the superior surface of the right anterior vocal fold  The area was injected with epinephrine 1:1,000  Cupped forceps were used to take biopsies of the area, which were sent for frozen section and permanent section  Bleeding was controlled with adrenaline pledgets    When the frozen section results came back positive for squamous cell carcinoma, the procedure was ended  The patient was released from suspension, and the laryngoscope and tooth guard were removed  The teeth and pharynx were examined for any injuries, and no injuries were seen  When the patient awoke from general anesthesia, the patient was extubated and transported to the recovery room in satisfactory condition     I was present for the entire procedure   I was present for the entire procedure    Patient Disposition:  PACU         SIGNATURE: Shaneka Orona MD  DATE: February 15, 2023  TIME: 2:44 PM

## 2023-02-15 NOTE — ANESTHESIA POSTPROCEDURE EVALUATION
Post-Op Assessment Note    CV Status:  Stable  Pain Score: 0    Pain management: adequate     Mental Status:  Sleepy and awake   Hydration Status:  Euvolemic   PONV Controlled:  Controlled   Airway Patency:  Patent      Post Op Vitals Reviewed: Yes      Staff: CRNA         No notable events documented      BP  92/50   Temp   97 2   Pulse 69   Resp   12   SpO2   93

## 2023-02-15 NOTE — INTERVAL H&P NOTE
H&P reviewed  After examining the patient I find no changes in the patients condition since the H&P had been written      Vitals:    02/15/23 1113   BP: (!) 173/87   Pulse: (!) 50   Resp: 16   Temp: 97 8 °F (36 6 °C)   SpO2: 96%

## 2023-02-16 ENCOUNTER — PATIENT OUTREACH (OUTPATIENT)
Dept: HEMATOLOGY ONCOLOGY | Facility: CLINIC | Age: 83
End: 2023-02-16

## 2023-02-16 DIAGNOSIS — C32.9 LARYNGEAL CANCER (HCC): Primary | ICD-10-CM

## 2023-02-16 DIAGNOSIS — C32.9 SQUAMOUS CELL CARCINOMA OF LARYNX (HCC): Primary | ICD-10-CM

## 2023-02-16 NOTE — PROGRESS NOTES
I reached out to Pete Hatch to introduce myself and my role as Oncology Care Coordinator  Pete Hatch lives with his wife and has great support from his son who drives him to his appnts  We reviewed his upcoming appnt for PET/CT including instructions and prep  Pete Hatch took notes on appnt info and phone numbers  He has a good understanding of Dr Vidal Hernandez so far  He has a post op appnt with him tomorrow 2/17/23 to discuss further  He is aware that he has a consult with radiation oncology on 3/07/23  He reports that he has good dental health and just saw his dentist 2 weeks ago for a regular cleaning  If dental clearance is required he has an established dentist  He denies any pain or difficulty swallowing at this time  No barriers have been identified during this call  I provided him with my direct phone number for any questions  I will follow up with him after he has completed his consult with radiation oncology  He was appreciative for the call

## 2023-02-16 NOTE — PROGRESS NOTES
MSW received a referral for this pt from [facility]  Pt will be seen by Oncology on 3/7  MSW will plan to make outreach to the pt after that date to introduce self, explain the role of the OSW and complete assessments

## 2023-03-01 ENCOUNTER — HOSPITAL ENCOUNTER (OUTPATIENT)
Dept: NUCLEAR MEDICINE | Facility: HOSPITAL | Age: 83
Discharge: HOME | End: 2023-03-01
Attending: OTOLARYNGOLOGY

## 2023-03-01 DIAGNOSIS — C32.9 LARYNGEAL CANCER (HCC): ICD-10-CM

## 2023-03-01 LAB — GLUCOSE SERPL-MCNC: 174 MG/DL (ref 65–140)

## 2023-03-03 ENCOUNTER — OFFICE VISIT (OUTPATIENT)
Dept: FAMILY MEDICINE CLINIC | Facility: CLINIC | Age: 83
End: 2023-03-03

## 2023-03-03 VITALS
BODY MASS INDEX: 29.29 KG/M2 | DIASTOLIC BLOOD PRESSURE: 68 MMHG | OXYGEN SATURATION: 98 % | HEART RATE: 64 BPM | WEIGHT: 193.25 LBS | TEMPERATURE: 97.8 F | HEIGHT: 68 IN | SYSTOLIC BLOOD PRESSURE: 132 MMHG | RESPIRATION RATE: 16 BRPM

## 2023-03-03 DIAGNOSIS — I10 PRIMARY HYPERTENSION: ICD-10-CM

## 2023-03-03 DIAGNOSIS — E78.2 MIXED HYPERLIPIDEMIA: ICD-10-CM

## 2023-03-03 DIAGNOSIS — C32.9 LARYNGEAL SQUAMOUS CELL CARCINOMA (HCC): ICD-10-CM

## 2023-03-03 DIAGNOSIS — E11.8 TYPE 2 DIABETES MELLITUS WITH COMPLICATION (HCC): Primary | ICD-10-CM

## 2023-03-03 DIAGNOSIS — K21.9 GASTROESOPHAGEAL REFLUX DISEASE WITHOUT ESOPHAGITIS: ICD-10-CM

## 2023-03-03 NOTE — PROGRESS NOTES
Name: Manuela Hoff      : 1940      MRN: [id number]  Encounter Provider: Kelly Morton MD  Encounter Date: 3/3/2023   Encounter department: [address] [place] [address]     1  Type 2 diabetes mellitus with complication [facility])  Assessment & Plan:    Lab Results   Component Value Date    HGBA1C 6 6 (H) 2022   Well controlled  Continue on Metformin  We will continue to monitor A1C  Orders:  -     Hemoglobin A1C; Future; Expected date: 2023  -     Comprehensive metabolic panel; Future; Expected date: 2023  -     CBC and differential; Future; Expected date: 2023  -     Lipid Panel with Direct LDL reflex; Future; Expected date: 2023  -     TSH, 3rd generation with Free T4 reflex; Future; Expected date: 2023    2  Gastroesophageal reflux disease without esophagitis  Assessment & Plan:  Stable on pantoprazole  Continue same  We will continue to monitor  3  Primary hypertension  Assessment & Plan:  BP is well controlled  132/68  Continur on Norvasc and Benicar  We will continue to monitor  4  Laryngeal squamous cell carcinoma ([facility] [address] )  Assessment & Plan:  Continue to f/u with oncology  5  Mixed hyperlipidemia  Assessment & Plan:  Component      Latest Ref Rng & Units 2022   Cholesterol      See Comment mg/dL 114 100 94   Triglycerides      See Comment mg/dL 156 (H) 116 104   HDL      >=40 mg/dL 38 (L) 39 (L) 34 (L)   LDL Calculated      0 - 100 mg/dL 45 38 39   Stable  Continue same on Atorvastatin  Continue to follow low fat diet and regular exercise  We will continue to monitor fasting lipid profile  6  BMI 29 0-29 9,adult           Subjective     Continue medical problems  He has been taking his usual   Denies any side effects  His last blood work was from December and his A1c and cholesterol is well controlled    He was diagnosed recently with laryngeal cancer and he has been following with oncologist     Review of Systems   Constitutional: Negative for chills and fever  HENT: Negative for trouble swallowing  Eyes: Negative for visual disturbance  Respiratory: Negative for cough and shortness of breath  Cardiovascular: Negative for chest pain and palpitations  Gastrointestinal: Negative for abdominal pain, blood in stool and vomiting  Endocrine: Negative for cold intolerance and heat intolerance  Genitourinary: Negative for difficulty urinating and dysuria  Musculoskeletal: Negative for gait problem  Skin: Negative for rash  Neurological: Negative for dizziness, syncope and headaches  Hematological: Negative for adenopathy  Psychiatric/Behavioral: Negative for behavioral problems         Past Medical History:   Diagnosis Date   • Diabetes mellitus ([facility] [address] )    • GERD (gastroesophageal reflux disease)    • Hyperlipidemia    • Hypertension      Past Surgical History:   Procedure Laterality Date   • GALLBLADDER SURGERY  1968   • LA LARYNGOSCOPY W/BIOPSY MICROSCOPE/TELESCOPE N/A 2/15/2023    Procedure: MICROLARYGOSCOPY AND BIOPSY; FROZEN SECTION;  Surgeon: Abdoulaye Mejias MD;  Location: [facility];  Service: ENT     Family History   Problem Relation Age of Onset   • No Known Problems Mother    • No Known Problems Father      Social History     Socioeconomic History   • Marital status: /Civil Union     Spouse name: None   • Number of children: None   • Years of education: None   • Highest education level: None   Occupational History   • None   Tobacco Use   • Smoking status: Never   • Smokeless tobacco: Never   • Tobacco comments:     no passive smoke exposure    Vaping Use   • Vaping Use: Never used   Substance and Sexual Activity   • Alcohol use: Not Currently   • Drug use: Never   • Sexual activity: None   Other Topics Concern   • None   Social History Narrative   • None     Social Determinants of Health     Financial Resource Strain: Not on file   Food Insecurity: Not on file   Transportation Needs: Not on file   Physical Activity: Not on file   Stress: Not on file   Social Connections: Not on file   Intimate Partner Violence: Not on file   Housing Stability: Not on file     Current Outpatient Medications on File Prior to Visit   Medication Sig   • amLODIPine (NORVASC) 10 mg tablet TAKE ONE TABLET BY MOUTH ONCE DAILY (Patient taking differently: Take 10 mg by mouth every morning)   • aspirin (ECOTRIN LOW STRENGTH) 81 mg EC tablet 81 mg every other day   • atorvastatin (LIPITOR) 10 mg tablet TAKE ONE TABLET BY MOUTH EVERY DAY (Patient taking differently: Take 10 mg by mouth daily at bedtime)   • clobetasol (TEMOVATE) 0 05 % ointment 1 application every 24 hours   • fluticasone (FLONASE) 50 mcg/act nasal spray 1 spray into each nostril daily (Patient taking differently: 1 spray into each nostril every morning)   • gabapentin (NEURONTIN) 100 mg capsule TAKE ONE CAPSULE BY MOUTH TWICE DAILY (Patient taking differently: Take 100 mg by mouth 2 (two) times a day)   • ibuprofen (MOTRIN) 600 mg tablet TAKE ONE TABLET BY MOUTH THREE TIMES DAILY WITH FOOD  (Patient taking differently: Take 600 mg by mouth)   • magnesium chloride (MAG64) 64 MG TBEC EC tablet Take 128 mg by mouth daily   • metFORMIN (GLUCOPHAGE) 1000 MG tablet TAKE ONE TABLET BY MOUTH TWICE A DAY WITH MEALS (Patient taking differently: Take 1,000 mg by mouth 2 (two) times a day with meals)   • olmesartan (BENICAR) 40 mg tablet TAKE ONE TABLET BY MOUTH ONCE DAILY (Patient taking differently: Take 40 mg by mouth every morning)   • Omega-3 Fatty Acids (FISH OIL) 1,000 mg 1,000 mg see administration instructions Takes 4 times per week   • pantoprazole (PROTONIX) 40 mg tablet TAKE ONE TABLET BY MOUTH EVERY DAY BEFORE BREAKFAST (Patient taking differently: Take 40 mg by mouth every morning)   • tamsulosin (FLOMAX) 0 4 mg TAKE 1 CAPSULE BY MOUTH DAILY WITH DINNER (Patient taking differently: Take 0 4 mg by mouth daily with dinner) Allergies   Allergen Reactions   • Penicillins Other (See Comments)     "Unknown Reaction"     Immunization History   Administered Date(s) Administered   • COVID-19 MODERNA VACC 0 5 ML IM 01/18/2021, 02/15/2021, 12/15/2021   • INFLUENZA 10/16/2014, 10/24/2015, 10/05/2016, 09/19/2017, 10/05/2018, 09/01/2020, 09/03/2021, 10/28/2022   • Pneumococcal Conjugate 13-Valent 07/22/2015   • Pneumococcal Polysaccharide PPV23 10/01/2011   • Td (adult), Unspecified 04/07/2016   • Zoster Vaccine Recombinant 10/05/2019, 12/12/2019   • influenza, trivalent, adjuvanted 09/18/2019       Objective     /68 (BP Location: Left arm, Patient Position: Sitting, Cuff Size: Large)   Pulse 64   Temp 97 8 °F (36 6 °C) (Tympanic)   Resp 16   Ht 5' 8" (1 727 m)   Wt 87 7 kg (193 lb 4 oz)   SpO2 98%   BMI 29 38 kg/m²     Physical Exam  Vitals and nursing note reviewed  Constitutional:       Appearance: He is well-developed  HENT:      Head: Normocephalic and atraumatic  Eyes:      Pupils: Pupils are equal, round, and reactive to light  Cardiovascular:      Rate and Rhythm: Normal rate and regular rhythm  Heart sounds: Normal heart sounds  Pulmonary:      Effort: Pulmonary effort is normal       Breath sounds: Normal breath sounds  Abdominal:      General: Bowel sounds are normal       Palpations: Abdomen is soft  Musculoskeletal:         General: Normal range of motion  Cervical back: Normal range of motion and neck supple  Lymphadenopathy:      Cervical: No cervical adenopathy  Skin:     General: Skin is warm  Findings: No rash  Neurological:      Mental Status: He is alert and oriented to person, place, and time  Cranial Nerves: No cranial nerve deficit  Emily Johnson MD BMI Counseling: Body mass index is 29 38 kg/m²  The BMI is above normal  Nutrition recommendations include reducing portion sizes, decreasing overall calorie intake and 3-5 servings of fruits/vegetables daily  Exercise recommendations include moderate aerobic physical activity for 150 minutes/week

## 2023-03-06 NOTE — ASSESSMENT & PLAN NOTE
Lab Results   Component Value Date    HGBA1C 6 6 (H) 12/16/2022   Well controlled  Continue on Metformin  We will continue to monitor A1C

## 2023-03-06 NOTE — ASSESSMENT & PLAN NOTE
Component      Latest Ref Rng & Units 2/24/2022 8/18/2022 12/16/2022   Cholesterol      See Comment mg/dL 114 100 94   Triglycerides      See Comment mg/dL 156 (H) 116 104   HDL      >=40 mg/dL 38 (L) 39 (L) 34 (L)   LDL Calculated      0 - 100 mg/dL 45 38 39   Stable  Continue same on Atorvastatin  Continue to follow low fat diet and regular exercise  We will continue to monitor fasting lipid profile

## 2023-03-07 ENCOUNTER — CLINICAL SUPPORT (OUTPATIENT)
Dept: RADIATION ONCOLOGY | Facility: HOSPITAL | Age: 83
End: 2023-03-07
Attending: RADIOLOGY

## 2023-03-07 ENCOUNTER — RADIATION ONCOLOGY CONSULT (OUTPATIENT)
Dept: RADIATION ONCOLOGY | Facility: HOSPITAL | Age: 83
End: 2023-03-07
Attending: OTOLARYNGOLOGY

## 2023-03-07 VITALS
TEMPERATURE: 98.4 F | WEIGHT: 192.4 LBS | SYSTOLIC BLOOD PRESSURE: 160 MMHG | BODY MASS INDEX: 29.25 KG/M2 | OXYGEN SATURATION: 98 % | DIASTOLIC BLOOD PRESSURE: 94 MMHG | HEART RATE: 65 BPM | RESPIRATION RATE: 18 BRPM

## 2023-03-07 DIAGNOSIS — C32.9 LARYNGEAL SQUAMOUS CELL CARCINOMA (HCC): ICD-10-CM

## 2023-03-07 DIAGNOSIS — C32.9 LARYNGEAL CANCER (HCC): Primary | ICD-10-CM

## 2023-03-07 DIAGNOSIS — F40.240 CLAUSTROPHOBIA: Primary | ICD-10-CM

## 2023-03-07 DIAGNOSIS — C32.9 LARYNGEAL SQUAMOUS CELL CARCINOMA (HCC): Primary | ICD-10-CM

## 2023-03-07 DIAGNOSIS — C32.9 LARYNGEAL CANCER (HCC): ICD-10-CM

## 2023-03-07 RX ORDER — LORAZEPAM 0.5 MG/1
0.5 TABLET ORAL ONCE AS NEEDED
Qty: 1 TABLET | Refills: 0 | Status: SHIPPED | OUTPATIENT
Start: 2023-03-07

## 2023-03-07 NOTE — PROGRESS NOTES
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         History of Present Illness   Aristeo Khan is a 79y o  year old male who was referred to ENT for evaluation of hoarseness for several months  He had been taking a PPI without improvement  Flexible fiberoptic laryngoscopy on 2/10/23 demonstrated fully mobile and symmetric TVCs  There was an irregular mass along the anterior commissure, which appeared transglottic  There was mild postcricoid edema  On 2/15/23, he underwent microlaryngoscopy and biopsy  Per the operative report there was a large, friable mass, arising from the anterior 1/2 of the left vocal fold to the anterior commissure  There was some spread to the superior surface of the anterior right vocal fold  Pathology demonstrated multiple fragments of at least superficially invasive squamous cell carcinoma, keratinizing, moderately differentiated, extending to the tissue edges  PET/CT on 3/1/23 demonstrated a small focus of FDG activity in the anterior commissure of the larynx  There were no additional findings of hypermetabolic disease  There was a 8 x 5 mm ovoid nodular density in the left lower lobe posteriorly, without FDG uptake  Chest CT follow up was recommended in 3 months  Upon interview, he endorses the above history  He has continued hoarseness  He denies pain or difficulty swallowing  He denies otalgia and has a remote smoking history  He is without additional acute concerns      Historical Information   Oncology History   Laryngeal squamous cell carcinoma ([facility] [address] )    Initial Diagnosis    Laryngeal squamous cell carcinoma ([facility] [address] )     2/15/2023 Biopsy    MDL with biopsy:  Larynx, laryngeal mass, intraoperative and permanent biopsies:  - Multiple fragments of at least superficially invasive squamous cell carcinoma, conventional (keratinizing), moderately differentiated, extending to tissue edges               Past Medical History:   Diagnosis Date   • Diabetes mellitus ([address] )    • GERD (gastroesophageal reflux disease)    • Hyperlipidemia    • Hypertension      Past Surgical History:   Procedure Laterality Date   • GALLBLADDER SURGERY  1968   • PA LARYNGOSCOPY W/BIOPSY MICROSCOPE/TELESCOPE N/A 2/15/2023    Procedure: MICROLARYGOSCOPY AND BIOPSY; FROZEN SECTION;  Surgeon: Tomasa Beltrán MD;  [facility]: [facility];  Service: ENT       Family History   Problem Relation Age of Onset   • No Known Problems Mother    • No Known Problems Father        Social History   Social History     Substance and Sexual Activity   Alcohol Use Not Currently     Social History     Substance and Sexual Activity   Drug Use Never     Social History     Tobacco Use   Smoking Status Never   Smokeless Tobacco Never   Tobacco Comments    no passive smoke exposure          Meds/Allergies     Current Outpatient Medications:   •  amLODIPine (NORVASC) 10 mg tablet, TAKE ONE TABLET BY MOUTH ONCE DAILY (Patient taking differently: Take 10 mg by mouth every morning), Disp: 90 tablet, Rfl: 0  •  aspirin (ECOTRIN LOW STRENGTH) 81 mg EC tablet, 81 mg every other day, Disp: , Rfl:   •  atorvastatin (LIPITOR) 10 mg tablet, TAKE ONE TABLET BY MOUTH EVERY DAY (Patient taking differently: Take 10 mg by mouth daily at bedtime), Disp: 90 tablet, Rfl: 1  •  clobetasol (TEMOVATE) 0 05 % ointment, 1 application every 24 hours, Disp: , Rfl:   •  fluticasone (FLONASE) 50 mcg/act nasal spray, 1 spray into each nostril daily (Patient taking differently: 1 spray into each nostril every morning), Disp: 16 g, Rfl: 3  •  gabapentin (NEURONTIN) 100 mg capsule, TAKE ONE CAPSULE BY MOUTH TWICE DAILY (Patient taking differently: Take 100 mg by mouth 2 (two) times a day), Disp: 180 capsule, Rfl: 0  •  ibuprofen (MOTRIN) 600 mg tablet, TAKE ONE TABLET BY MOUTH THREE TIMES DAILY WITH FOOD  (Patient taking differently: Take 600 mg by mouth), Disp: 45 tablet, Rfl: 0  •  magnesium chloride (MAG64) 64 MG TBEC EC tablet, Take 128 mg by mouth daily, Disp: , Rfl:   •  metFORMIN (GLUCOPHAGE) 1000 MG tablet, TAKE ONE TABLET BY MOUTH TWICE A DAY WITH MEALS (Patient taking differently: Take 1,000 mg by mouth 2 (two) times a day with meals), Disp: 180 tablet, Rfl: 0  •  olmesartan (BENICAR) 40 mg tablet, TAKE ONE TABLET BY MOUTH ONCE DAILY (Patient taking differently: Take 40 mg by mouth every morning), Disp: 90 tablet, Rfl: 0  •  Omega-3 Fatty Acids (FISH OIL) 1,000 mg, 1,000 mg see administration instructions Takes 4 times per week, Disp: , Rfl:   •  pantoprazole (PROTONIX) 40 mg tablet, TAKE ONE TABLET BY MOUTH EVERY DAY BEFORE BREAKFAST (Patient taking differently: Take 40 mg by mouth every morning), Disp: 90 tablet, Rfl: 0  •  tamsulosin (FLOMAX) 0 4 mg, TAKE 1 CAPSULE BY MOUTH DAILY WITH DINNER (Patient taking differently: Take 0 4 mg by mouth daily with dinner), Disp: 90 capsule, Rfl: 0  Allergies   Allergen Reactions   • Penicillins Other (See Comments)     "Unknown Reaction"         Review of Systems   Constitutional: Negative  Negative for appetite change and unexpected weight change  HENT: Positive for voice change (hoarse voice)  Negative for sore throat and trouble swallowing  Eyes: Negative  Respiratory: Negative  Cardiovascular: Negative  Gastrointestinal: Negative  Endocrine: Negative  Genitourinary: Negative  Musculoskeletal: Positive for arthralgias, back pain and gait problem (ambulates with walker)  Skin: Negative  Allergic/Immunologic: Negative  Neurological: Positive for weakness  Hematological: Negative  Psychiatric/Behavioral: Negative          OBJECTIVE:     Vitals:    03/07/23 1325   BP: 160/94   Pulse: 65   Resp: 18   Temp: 98 4 °F (36 9 °C)   SpO2: 98%       Pain Assessment:  0  Performance Status: Karnofsky: 90 - Able to carry on normal activity; minor signs or symptoms of disease     Physical Exam  HENT:      Head: Normocephalic and atraumatic  Mouth/Throat:      Mouth: Mucous membranes are moist       Pharynx: Oropharynx is clear  Eyes:      General: No scleral icterus  Extraocular Movements: Extraocular movements intact  Cardiovascular:      Rate and Rhythm: Normal rate  Pulmonary:      Effort: Pulmonary effort is normal    Abdominal:      General: Abdomen is flat  There is no distension  Musculoskeletal:         General: Normal range of motion  Cervical back: Normal range of motion  Lymphadenopathy:      Cervical: No cervical adenopathy  Skin:     General: Skin is warm and dry  Neurological:      General: No focal deficit present  Mental Status: He is alert  Psychiatric:         Mood and Affect: Mood normal               RESULTS  Lab Results    Chemistry        Component Value Date/Time     04/12/2018 0948    K 4 1 12/16/2022 1036    K 4 5 04/12/2018 0948     (H) 12/16/2022 1036     04/12/2018 0948    CO2 26 12/16/2022 1036    CO2 28 04/12/2018 0948    BUN 22 12/16/2022 1036    BUN 19 04/12/2018 0948    CREATININE 0 99 12/16/2022 1036        Component Value Date/Time    CALCIUM 9 0 12/16/2022 1036    CALCIUM 9 7 04/12/2018 0948    ALKPHOS 84 12/16/2022 1036    ALKPHOS 88 04/12/2018 0948    AST 17 12/16/2022 1036    AST 23 04/12/2018 0948    ALT 26 12/16/2022 1036    ALT 36 04/12/2018 0948    BILITOT 0 5 04/12/2018 0948            Lab Results   Component Value Date    WBC 7 23 12/16/2022    HGB 13 0 12/16/2022    HCT 39 4 12/16/2022    MCV 99 (H) 12/16/2022     12/16/2022         Imaging Studies  NM PET CT skull base to mid thigh    Result Date: 3/1/2023  Narrative: WHOLE-BODY PET/CT SCAN INDICATION: Squamous cell carcinoma of the larynx  Initial staging  C32 9: Malignant neoplasm of larynx, unspecified MODIFIER: PI COMPARISON: None  CELL TYPE:  Squamous cell carcinoma TECHNIQUE: Following the intravenous administration of 10 2 mCi F-18-FDG, dedicated head and neck images were obtained from the skull vertex to the thoracic inlet with the patient's arms at their side 60 minutes post injection  Subsequently, whole body images were obtained from the thoracic inlet through the proximal femurs with the patients arms above their head  Multiplanar attenuation corrected and non attenuation corrected PET images are available for interpretation  Contiguous, low dose, axial CT sections were also obtained from the head through the thoracic inlet and from the thoracic inlet through the proximal femurs  Intravenous contrast material was not utilized  Additional coronal and sagittal images are available as well as fused PET/CT images  This examination, like all CT scans performed in the [facility], was performed utilizing techniques to minimize radiation dose exposure, including the use of iterative reconstruction and automated exposure control  Fasting serum glucose: 174 mg/dl FINDINGS: VISUALIZED BRAIN: No acute abnormalities are seen  HEAD/NECK:   Small focus of FDG uptake at the level of the anterior commissure of the larynx, SUV max of 4 6  No obvious findings on limited CT  Based on PET images measures on the order of 1 2 cm in size  No FDG avid lymph nodes  CT images:  No additional significant findings  CHEST:    No FDG avid soft tissue lesions are seen  8 x 5 mm ovoid nodular density noted in the left lower lobe posteriorly, image 68 series 4  No focal FDG uptake here but this may be at the lower limits of resolution by PET  CT images: Ectatic ascending thoracic aorta at 4 4 cm in diameter  Moderate coronary artery calcifications  ABDOMEN:   No FDG avid soft tissue lesions are seen   Fairly extensive heterogeneous colonic activity likely physiologic  CT images: Colonic diverticulosis  Mildly ectatic abdominal aorta  PELVIS:   No FDG avid soft tissue lesions are seen  CT images: Moderately enlarged prostate  OSSEOUS STRUCTURES:  No FDG avid lesions are  seen  CT images:  Bilateral spondylolysis defects at L5  Minimal anterolisthesis of L5 on S1  Impression: 1  Small focus of FDG uptake at the level of the anterior commissure of the larynx  This would be compatible with the known malignancy  2   No findings for hypermetabolic metastasis  3   Of note, there is an 8 x 5 mm ovoid nodular density noted in the left lower lobe posteriorly  No focal FDG uptake here but this may be at the lower limits of resolution by PET  In the setting of known malignancy, recommend follow up chest CT in 3 months  4   Ectatic ascending thoracic aorta at 4 4 cm in diameter  This can be reassessed on follow up  The study was marked in EPIC for significant notification  Workstation performed: [id number]         Pathology: See above  ASSESSMENT  1  Laryngeal cancer [facility])  Ambulatory Referral to Radiation Oncology        Cancer Staging   No matching staging information was found for the patient  PLAN/DISCUSSION  No orders of the defined types were placed in this encounter  Antonio Best is a 79 y o  male with recently diagnosed 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    I reviewed the diagnosis of early stage laryngeal cancer and treatment options  Those with T1-T2N0 squamous cell carcinoma (SCC) of the glottic larynx may be treated with transoral laser excision, open partial laryngectomy, or radiotherapy  The goals of treatment are cure and laryngeal voice preservation  The 5-year local control rates after radiotherapy vary from approximately 85% to 94% for T1 tumors    The rates of local control with laryngeal preservation are marginally better than the local control rates without laryngeal preservation, indicating that a small number of patients with local recurrence receive successful salvage treatment via a conservative procedure  Ultimate local control rates range from 90% to 95%      He is not interested in surgical options or laser excision  He wishes to proceed with definitive radiotherapy  In the absence of supraglottic or subglottic extension and no imaging indicating adenopathy, radiation will target the larynx alone  Given early-stage disease, chemotherapy is not indicated  I have recommended altered fractionation given the diagnosis of glottic squamous cell carcinoma and anticipate delivery of 63 Gy in 2 25 Gy daily fractions      Side effects of radiotherapy include but are not limited to laryngeal edema, tracheostomy, fistula formation, and the potential need for salvage surgery  In terms of voice quality, data suggest a trend toward less hoarseness and breathiness after radiotherapy compared with transoral laser excision, but these risks remain  Additional risks of radiotherapy to the head and neck include but are not limited to skin irritation and pigment changes, peeling or blistering of the skin, fatigue, difficulty or painful swallowing, temporary or permanent hair loss in the treated area, increased production of thick sputum and dry cough, swelling or irritation of the nasal passages, mouth and/or throat, changes in sense of taste and/or smell, weight loss, nausea and vomiting  In the long term, radiotherapy may lead to skin thickening and swelling in the neck and chin, increased risk for dental problems, organ and normal tissue injuries with scar tissue formation in the irradiated areas, possible decreased hearing, permanent difficulty swallowing, osteoradionecrosis of the jaw, hypothyroidism, nerve/spine injury, chronic hoarseness and or shortness of breath, chronic taste changes, dry mouth and vascular/cartilage injury   There is also the risk of injury to the carotid artery with future atherosclerosis or plaque formation  Radiation may also result in formation of a new cancer, but that risk is quite low      I also described the logistics of radiotherapy including the need for CT simulation and mask creation  He was instructed not to swallow during CT simulation or treatment        After considering the risks, benefits and alternatives to radiation, he elected to proceed with treatment and informed consent was obtained today  He will proceed with dental clearance and MRI of of the neck has been ordered to finalize staging  CT simulation will be arranged in the near future  All questions were answered to his satisfaction  Edel Razo MD  3/7/2023,1:37 PM      Portions of the record may have been created with voice recognition software  Occasional wrong word or "sound a like" substitutions may have occurred due to the inherent limitations of voice recognition software  Read the chart carefully and recognize, using context, where substitutions have occurred

## 2023-03-07 NOTE — PROGRESS NOTES
Savanah Gonzalez 1940 is a 79 y o  male presents for Radiation Oncology consult for laryngeal cancer, referred by Dr Daisha Echeverria  79year old male was evaluated by ENT in Feb 2023 for hoarseness over the last several months  He has history of GERD, taking protonix daily and history of smoking in the past      2/10/23 ENT  Flexible Fiberoptic Laryngoscopy:  Findings: Normal lingual tonsils  Epiglottis is grossly normal   No pooling of secretions  Voice is hoarse  TVCs are fully mobile and symmetric  There is an irregular mass along the anterior commissure, which appears to be transglottic  There is mild post cricoid edema  Recommendations:   MCL with biopsy  Continue Protonix 40 mg daily, and reduce or eliminate caffeine and spicy foods  2/15/23 Microlaryngoscopy with biopsy, Dr Daisha Echeverria  Operative Findings:  Large, friable mass, arising from the anterior 1/2 of the left vocal fold, to to the anterior commissure, with some spread to the superior surface of the anterior right vocal fold  Larynx, laryngeal mass, intraoperative and permanent biopsies:  - Multiple fragments of at least superficially invasive squamous cell carcinoma, conventional (keratinizing), moderately differentiated, extending to tissue edges  2/22/23 ENT, Dr Daisha Echeverria  Pt doing well s/p MCL and Bx  PET/CT is scheduled for next week  Refer to Radiation Oncology  F/u in 1 month  3/1/23 PET/CT  1  Small focus of FDG uptake at the level of the anterior commissure of the larynx  This would be compatible with the known malignancy  2   No findings for hypermetabolic metastasis  3   Of note, there is an 8 x 5 mm ovoid nodular density noted in the left lower lobe posteriorly  No focal FDG uptake here but this may be at the lower limits of resolution by PET  In the setting of known malignancy, recommend follow up chest CT in 3 months  4   Ectatic ascending thoracic aorta at 4 4 cm in diameter    This can be reassessed on follow up       Upcoming:  3/22/23 Dr Lorraine Kimble follow-up            Oncology History   Laryngeal squamous cell carcinoma ([facility] [address] )   2023 Initial Diagnosis    Laryngeal squamous cell carcinoma ([facility] [address] )     2/15/2023 Biopsy    MDL with biopsy:  Larynx, laryngeal mass, intraoperative and permanent biopsies:  - Multiple fragments of at least superficially invasive squamous cell carcinoma, conventional (keratinizing), moderately differentiated, extending to tissue edges  3/7/2023 -  Cancer Staged    Staging form: Larynx - Glottis, AJCC 8th Edition  - Clinical stage from 3/7/2023: Stage I (cT1b, cN0, cM0) - Signed by Miguel Boothe MD on 3/7/2023  Stage prefix: Initial diagnosis           Review of Systems:  Review of Systems   Constitutional: Negative  Negative for appetite change and unexpected weight change  HENT: Positive for voice change (hoarse voice)  Negative for sore throat and trouble swallowing  Eyes: Negative  Respiratory: Negative  Cardiovascular: Negative  Gastrointestinal: Negative  Endocrine: Negative  Genitourinary: Negative  Musculoskeletal: Positive for arthralgias, back pain and gait problem (ambulates with walker)  Skin: Negative  Allergic/Immunologic: Negative  Neurological: Positive for weakness  Hematological: Negative  Psychiatric/Behavioral: Negative          Clinical Trial: no    Pain assessment: 0    PFT: n/a    Prior Radiation: no    Teaching: NCI radiation packet    MST completed     Implantable Devices (Port, pacemaker, pain stimulator): no    Hip Replacement: no    Health Maintenance   Topic Date Due   • Diabetic Foot Exam  07/14/2021   • DM Eye Exam  09/30/2021   • COVID-19 Vaccine (4 - Booster for Moderna series) 02/09/2022   • PT PLAN OF CARE  02/08/2023   • HEMOGLOBIN A1C  06/16/2023   • Fall Risk  08/26/2023   • Medicare Annual Wellness Visit (AWV)  08/26/2023   • BMI: Adult  03/03/2024   • BMI: Followup Plan  03/05/2024   • Depression Screening 03/07/2024   • Pneumococcal Vaccine: 65+ Years  Completed   • Influenza Vaccine  Completed   • HIB Vaccine  Aged Out   • IPV Vaccine  Aged Out   • Hepatitis A Vaccine  Aged Out   • Meningococcal ACWY Vaccine  Aged Out   • HPV Vaccine  Aged Out       Past Medical History:   Diagnosis Date   • Diabetes mellitus ([facility] [address] )    • GERD (gastroesophageal reflux disease)    • Hyperlipidemia    • Hypertension    • [facility]        Past Surgical History:   Procedure Laterality Date   • GALLBLADDER SURGERY  1968   • [place] LARYNGOSCOPY W/BIOPSY MICROSCOPE/TELESCOPE N/A 2/15/2023    Procedure: MICROLARYGOSCOPY AND BIOPSY; FROZEN SECTION;  Surgeon: Matt Doran MD;  [facility];  Service: ENT       Family History   Problem Relation Age of Onset   • Breast cancer Mother    • No Known Problems Father        Social History     Tobacco Use   • Smoking status: Former     Packs/day: 0 50     Types: Cigarettes   • Smokeless tobacco: Never   • Tobacco comments:     no passive smoke exposure    Vaping Use   • Vaping Use: Never used   Substance Use Topics   • Alcohol use: Not Currently   • Drug use: Never          Current Outpatient Medications:   •  amLODIPine (NORVASC) 10 mg tablet, TAKE ONE TABLET BY MOUTH ONCE DAILY (Patient taking differently: Take 10 mg by mouth every morning), Disp: 90 tablet, Rfl: 0  •  aspirin (ECOTRIN LOW STRENGTH) 81 mg EC tablet, 81 mg every other day, Disp: , Rfl:   •  atorvastatin (LIPITOR) 10 mg tablet, TAKE ONE TABLET BY MOUTH EVERY DAY (Patient taking differently: Take 10 mg by mouth daily at bedtime), Disp: 90 tablet, Rfl: 1  •  clobetasol (TEMOVATE) 0 05 % ointment, 1 application every 24 hours, Disp: , Rfl:   •  fluticasone (FLONASE) 50 mcg/act nasal spray, 1 spray into each nostril daily (Patient taking differently: 1 spray into each nostril every morning), Disp: 16 g, Rfl: 3  •  gabapentin (NEURONTIN) 100 mg capsule, TAKE ONE CAPSULE BY MOUTH TWICE DAILY (Patient taking differently: Take 100 mg by mouth 2 (two) times a day), Disp: 180 capsule, Rfl: 0  •  ibuprofen (MOTRIN) 600 mg tablet, TAKE ONE TABLET BY MOUTH THREE TIMES DAILY WITH FOOD  (Patient taking differently: Take 600 mg by mouth), Disp: 45 tablet, Rfl: 0  •  magnesium chloride (MAG64) 64 MG TBEC EC tablet, Take 128 mg by mouth daily, Disp: , Rfl:   •  metFORMIN (GLUCOPHAGE) 1000 MG tablet, TAKE ONE TABLET BY MOUTH TWICE A DAY WITH MEALS (Patient taking differently: Take 1,000 mg by mouth 2 (two) times a day with meals), Disp: 180 tablet, Rfl: 0  •  olmesartan (BENICAR) 40 mg tablet, TAKE ONE TABLET BY MOUTH ONCE DAILY (Patient taking differently: Take 40 mg by mouth every morning), Disp: 90 tablet, Rfl: 0  •  Omega-3 Fatty Acids (FISH OIL) 1,000 mg, 1,000 mg see administration instructions Takes 4 times per week, Disp: , Rfl:   •  pantoprazole (PROTONIX) 40 mg tablet, TAKE ONE TABLET BY MOUTH EVERY DAY BEFORE BREAKFAST (Patient taking differently: Take 40 mg by mouth every morning), Disp: 90 tablet, Rfl: 0  •  tamsulosin (FLOMAX) 0 4 mg, TAKE 1 CAPSULE BY MOUTH DAILY WITH DINNER (Patient taking differently: Take 0 4 mg by mouth daily with dinner), Disp: 90 capsule, Rfl: 0    Allergies   Allergen Reactions   • Penicillins Other (See Comments)     "Unknown Reaction"        Vitals:    03/07/23 1325   BP: 160/94   BP Location: Left arm   Pulse: 65   Resp: 18   Temp: 98 4 °F (36 9 °C)   TempSrc: Temporal   SpO2: 98%   Weight: 87 3 kg (192 lb 6 4 oz)

## 2023-03-08 ENCOUNTER — TELEPHONE (OUTPATIENT)
Dept: NUTRITION | Facility: CLINIC | Age: 83
End: 2023-03-08

## 2023-03-08 ENCOUNTER — APPOINTMENT (OUTPATIENT)
Dept: LAB | Facility: IMAGING CENTER | Age: 83
End: 2023-03-08

## 2023-03-08 DIAGNOSIS — E11.8 TYPE 2 DIABETES MELLITUS WITH COMPLICATION (HCC): ICD-10-CM

## 2023-03-08 DIAGNOSIS — C32.9 LARYNGEAL CANCER (HCC): ICD-10-CM

## 2023-03-08 LAB
ALBUMIN SERPL BCP-MCNC: 3.9 G/DL (ref 3.5–5)
ALP SERPL-CCNC: 82 U/L (ref 46–116)
ALT SERPL W P-5'-P-CCNC: 24 U/L (ref 12–78)
ANION GAP SERPL CALCULATED.3IONS-SCNC: 5 MMOL/L (ref 4–13)
AST SERPL W P-5'-P-CCNC: 17 U/L (ref 5–45)
BASOPHILS # BLD AUTO: 0.05 THOUSANDS/ÂΜL (ref 0–0.1)
BASOPHILS NFR BLD AUTO: 1 % (ref 0–1)
BILIRUB SERPL-MCNC: 0.48 MG/DL (ref 0.2–1)
BUN SERPL-MCNC: 21 MG/DL (ref 5–25)
CALCIUM SERPL-MCNC: 9.2 MG/DL (ref 8.3–10.1)
CHLORIDE SERPL-SCNC: 108 MMOL/L (ref 96–108)
CHOLEST SERPL-MCNC: 89 MG/DL
CO2 SERPL-SCNC: 26 MMOL/L (ref 21–32)
CREAT SERPL-MCNC: 0.97 MG/DL (ref 0.6–1.3)
EOSINOPHIL # BLD AUTO: 0.08 THOUSAND/ÂΜL (ref 0–0.61)
EOSINOPHIL NFR BLD AUTO: 1 % (ref 0–6)
ERYTHROCYTE [DISTWIDTH] IN BLOOD BY AUTOMATED COUNT: 13.2 % (ref 11.6–15.1)
EST. AVERAGE GLUCOSE BLD GHB EST-MCNC: 143 MG/DL
GFR SERPL CREATININE-BSD FRML MDRD: 71 ML/MIN/1.73SQ M
GLUCOSE P FAST SERPL-MCNC: 170 MG/DL (ref 65–99)
HBA1C MFR BLD: 6.6 %
HCT VFR BLD AUTO: 39.6 % (ref 36.5–49.3)
HDLC SERPL-MCNC: 37 MG/DL
HGB BLD-MCNC: 12.7 G/DL (ref 12–17)
IMM GRANULOCYTES # BLD AUTO: 0.01 THOUSAND/UL (ref 0–0.2)
IMM GRANULOCYTES NFR BLD AUTO: 0 % (ref 0–2)
LDLC SERPL CALC-MCNC: 29 MG/DL (ref 0–100)
LYMPHOCYTES # BLD AUTO: 2.82 THOUSANDS/ÂΜL (ref 0.6–4.47)
LYMPHOCYTES NFR BLD AUTO: 37 % (ref 14–44)
MCH RBC QN AUTO: 31.4 PG (ref 26.8–34.3)
MCHC RBC AUTO-ENTMCNC: 32.1 G/DL (ref 31.4–37.4)
MCV RBC AUTO: 98 FL (ref 82–98)
MONOCYTES # BLD AUTO: 0.63 THOUSAND/ÂΜL (ref 0.17–1.22)
MONOCYTES NFR BLD AUTO: 8 % (ref 4–12)
NEUTROPHILS # BLD AUTO: 4.04 THOUSANDS/ÂΜL (ref 1.85–7.62)
NEUTS SEG NFR BLD AUTO: 53 % (ref 43–75)
NRBC BLD AUTO-RTO: 0 /100 WBCS
PLATELET # BLD AUTO: 245 THOUSANDS/UL (ref 149–390)
PMV BLD AUTO: 11.7 FL (ref 8.9–12.7)
POTASSIUM SERPL-SCNC: 4.1 MMOL/L (ref 3.5–5.3)
PROT SERPL-MCNC: 6.8 G/DL (ref 6.4–8.4)
RBC # BLD AUTO: 4.05 MILLION/UL (ref 3.88–5.62)
SODIUM SERPL-SCNC: 139 MMOL/L (ref 135–147)
TRIGL SERPL-MCNC: 114 MG/DL
TSH SERPL DL<=0.05 MIU/L-ACNC: 3.52 UIU/ML (ref 0.45–4.5)
WBC # BLD AUTO: 7.63 THOUSAND/UL (ref 4.31–10.16)

## 2023-03-08 NOTE — TELEPHONE ENCOUNTER
Abad Gaona to establish care after receiving notification by RadOnc RN on 3/7/23 that pt is appropriate for oncology nutrition care (reason for referral: HNC dx)  Spoke with Pete Hatch, introduced self and oncology nutrition services  Pete Hatch reports that he is doing okay right now and does not have any questions/concerns  Encouraged him to reach out if he does have questions/concerns and/or if he would like to set up nutrition consult  Provided this RD’s contact information  Pete Hatch also asks if I can send my business card in the mail, will mail him RD business card and [address]

## 2023-03-09 ENCOUNTER — PATIENT OUTREACH (OUTPATIENT)
Dept: HEMATOLOGY ONCOLOGY | Facility: CLINIC | Age: 83
End: 2023-03-09

## 2023-03-09 NOTE — PROGRESS NOTES
I reached out to Harsh today to follow up post consult and reassess for any barriers to Tx  He has a good understanding of the plan  We reviewed his upcoming appnts  He is managing his schedule OK  No issues with transportation  Harsh denies any pain or difficulty with swallowing  He is establishing with RD  A referral had been placed for palliative care and he spoke with them but chose not to schedule at this time due to the volume of appnts at this time  Offered speech therapy as well for base line and optimization prior to treatment  Harsh does not wish to establish with any other specialties at this time but is aware that he can request assistance with this in the future  Harsh has my direct number and is encouraged to call if he needs assistance  I will reach out to him approximately USP through treatment  He was appreciative for the call

## 2023-03-09 NOTE — PROGRESS NOTES
MSW made outreach to the pt after his oncology appointment to introduce self and the role of the OSW  Contact information was provided  The pt was open and receptive of this call  He rated his stress at an 8 on the Distress Thermometer and states this was due to his care not being established with a plan and his wife's health  The pt sounded quite anxious on the phone as he explained the Doctor had wanted him to have an MRI  "urgently" and this was scheduled for Monday  He received a call earlier today that his MRI was cancelled due to the machine not working and that he could anticipate a call later this day with a rescheduled date  The pt states he worries this may not be scheduled quickly and this was what the Doctor had ordered  MSW assured the pt this would be rescheduled as soon as they could get him in again  The pt then became anxious as he states he was not able to get a dental appointment until 3/21 and he is aware that he can not start treatment until he has been to the dentist   MSW spent time encouraging the pt to take deep breaths and assuring him that his schedule would get worked out as needed  MSW acknowledged his feeling overwhelmed and validated his feelings  The pt explained that he worries about his wife as he helps to care for her  He states that she has dementia and cardiac issues and "is in and out of the hospital all of the time "  The pt verbalized concern over her care should he not be able to help her  MSW discussed private duty caregivers and he states he could not afford this  MSW then spoke about the waiver program and various programs available through the [facility]  The pt shared that his son is coming to visit from out of state and he will discuss with him first and notify MSW if they wish to proceed with a referral   Pt was open to ongoing support and MSW will plan to reach out next week to reassess  Emotional support provided

## 2023-03-12 DIAGNOSIS — I10 HTN (HYPERTENSION), BENIGN: ICD-10-CM

## 2023-03-12 DIAGNOSIS — N40.0 BENIGN PROSTATIC HYPERPLASIA, UNSPECIFIED WHETHER LOWER URINARY TRACT SYMPTOMS PRESENT: ICD-10-CM

## 2023-03-12 DIAGNOSIS — I10 HYPERTENSION, UNSPECIFIED TYPE: ICD-10-CM

## 2023-03-12 DIAGNOSIS — K21.9 GASTROESOPHAGEAL REFLUX DISEASE WITHOUT ESOPHAGITIS: ICD-10-CM

## 2023-03-12 DIAGNOSIS — E11.8 TYPE 2 DIABETES MELLITUS WITH COMPLICATION (HCC): ICD-10-CM

## 2023-03-13 ENCOUNTER — HOSPITAL ENCOUNTER (OUTPATIENT)
Dept: RADIOLOGY | Facility: HOSPITAL | Age: 83
Discharge: HOME | End: 2023-03-13

## 2023-03-13 DIAGNOSIS — C32.9 LARYNGEAL CANCER (HCC): ICD-10-CM

## 2023-03-13 RX ORDER — PANTOPRAZOLE SODIUM 40 MG/1
40 TABLET, DELAYED RELEASE ORAL EVERY MORNING
Qty: 90 TABLET | Refills: 1 | Status: SHIPPED | OUTPATIENT
Start: 2023-03-13

## 2023-03-13 RX ORDER — AMLODIPINE BESYLATE 10 MG/1
TABLET ORAL
Qty: 90 TABLET | Refills: 1 | Status: SHIPPED | OUTPATIENT
Start: 2023-03-13

## 2023-03-13 RX ORDER — TAMSULOSIN HYDROCHLORIDE 0.4 MG/1
0.4 CAPSULE ORAL
Qty: 90 CAPSULE | Refills: 1 | Status: SHIPPED | OUTPATIENT
Start: 2023-03-13

## 2023-03-13 RX ORDER — OLMESARTAN MEDOXOMIL 40 MG/1
40 TABLET ORAL EVERY MORNING
Qty: 90 TABLET | Refills: 1 | Status: SHIPPED | OUTPATIENT
Start: 2023-03-13

## 2023-03-13 RX ADMIN — GADOBUTROL 9 ML: 604.72 INJECTION INTRAVENOUS at 21:37

## 2023-03-16 ENCOUNTER — OFFICE VISIT (OUTPATIENT)
Dept: PAIN MEDICINE | Facility: CLINIC | Age: 83
End: 2023-03-16

## 2023-03-16 VITALS
DIASTOLIC BLOOD PRESSURE: 66 MMHG | SYSTOLIC BLOOD PRESSURE: 128 MMHG | BODY MASS INDEX: 29.35 KG/M2 | WEIGHT: 193 LBS | HEART RATE: 66 BPM

## 2023-03-16 DIAGNOSIS — G62.9 POLYNEUROPATHY: ICD-10-CM

## 2023-03-16 DIAGNOSIS — M54.16 LUMBAR RADICULOPATHY: Primary | ICD-10-CM

## 2023-03-16 NOTE — PROGRESS NOTES
Assessment:  1  Lumbar radiculopathy    2  Polyneuropathy        Plan:  Mr Druscilla Hammans is a pleasant 79-year-old male who presents to [facility] for follow-up and reevaluation regarding continued radiculopathy into bilateral anteromedial thighs  We previously performed an L4-L5 LESI which has provided significant relief in the low back pain  However, he reports continued radicular symptoms in the anteromedial thighs bilaterally and he is demonstrating clinical and diagnostic evidence of ongoing lumbar radiculopathy as well as mild to moderate foraminal and central narrowing most notable at L3-L4  At this time we will plan for bilateral L3-L4 TFESI under fluoroscopy guidance  All questions answered, patient is agreeable with plan  Complete risks and benefits including bleeding, infection, tissue reaction, nerve injury and allergic reaction were discussed  The approach was demonstrated using models and literature was provided  Verbal and written consent was obtained  My impressions and treatment recommendations were discussed in detail with the patient who verbalized understanding and had no further questions  Discharge instructions were provided  I personally saw and examined the patient and I agree with the above discussed plan of care  Orders Placed This Encounter   Procedures   • FL spine and pain procedure     Standing Status:   Future     Standing Expiration Date:   3/16/2027     Order Specific Question:   Reason for Exam:     Answer:   Bilateral L3-L4 TFESI     Order Specific Question:   Anticoagulant hold needed? Answer:   No     No orders of the defined types were placed in this encounter  History of Present Illness:  Brandie Fleming is a 79 y o  male who presents for a follow up office visit in regards to Hip Pain (Bilateral/)  The patient’s current symptoms include bilateral low back, hip and groin pain    Previously performed an L4-L5 LESI August 8, 2022 which continues to provide significant relief  Currently reporting 2-7 out of 10 pain that is described as an occasional dull ache  Presents today for follow-up and reevaluation  I have personally reviewed and/or updated the patient's past medical history, past surgical history, family history, social history, current medications, allergies, and vital signs today  Review of Systems   Respiratory: Negative for shortness of breath  Cardiovascular: Negative for chest pain  Gastrointestinal: Negative for constipation, diarrhea, nausea and vomiting  Musculoskeletal: Positive for back pain, gait problem and joint swelling  Negative for arthralgias and myalgias  Skin: Negative for rash  Neurological: Positive for weakness  Negative for dizziness and seizures  All other systems reviewed and are negative  Patient Active Problem List   Diagnosis   • Hypertension   • Type 2 diabetes mellitus with complication (HCC)   • Allergic rhinitis   • Anemia   • Benign prostatic hyperplasia   • Hyperlipidemia   • Obesity   • Left-sided low back pain with left-sided sciatica   • Prostate cancer screening   • Obesity (BMI 30 0-34  9)   • Healthcare maintenance   • Cataracta   • Pre-op examination   • Gastroesophageal reflux disease without esophagitis   • Lumbar radiculopathy   • Allergies   • Postnasal drip   • Polyneuropathy   • Laryngeal squamous cell carcinoma (HCC)       Past Medical History:   Diagnosis Date   • Diabetes mellitus ([facility] [address] )    • GERD (gastroesophageal reflux disease)    • Hyperlipidemia    • Hypertension    • Laryngeal cancer [facility])        Past Surgical History:   Procedure Laterality Date   • GALLBLADDER SURGERY  1968   • [place] LARYNGOSCOPY W/BIOPSY MICROSCOPE/TELESCOPE N/A 2/15/2023    Procedure: MICROLARYGOSCOPY AND BIOPSY; FROZEN SECTION;  Surgeon: Delores Sidhu MD;  [facility]: [facility];  Service: ENT       Family History   Problem Relation Age of Onset   • Breast cancer Mother    • No Known Problems Father        Social History     Occupational History   • Not on file   Tobacco Use   • Smoking status: Former     Packs/day: 0 50     Types: Cigarettes   • Smokeless tobacco: Never   • Tobacco comments:     no passive smoke exposure    Vaping Use   • Vaping Use: Never used   Substance and Sexual Activity   • Alcohol use: Not Currently   • Drug use: Never   • Sexual activity: Not on file       Current Outpatient Medications on File Prior to Visit   Medication Sig   • amLODIPine (NORVASC) 10 mg tablet TAKE ONE TABLET BY MOUTH EVERY DAY   • aspirin (ECOTRIN LOW STRENGTH) 81 mg EC tablet 81 mg every other day   • atorvastatin (LIPITOR) 10 mg tablet TAKE ONE TABLET BY MOUTH EVERY DAY (Patient taking differently: Take 10 mg by mouth daily at bedtime)   • clobetasol (TEMOVATE) 0 05 % ointment 1 application every 24 hours   • fluticasone (FLONASE) 50 mcg/act nasal spray 1 spray into each nostril daily (Patient taking differently: 1 spray into each nostril every morning)   • gabapentin (NEURONTIN) 100 mg capsule TAKE ONE CAPSULE BY MOUTH TWICE DAILY (Patient taking differently: Take 100 mg by mouth 2 (two) times a day)   • ibuprofen (MOTRIN) 600 mg tablet TAKE ONE TABLET BY MOUTH THREE TIMES DAILY WITH FOOD  (Patient taking differently: Take 600 mg by mouth)   • LORazepam (ATIVAN) 0 5 mg tablet Take 1 tablet (0 5 mg total) by mouth once as needed for anxiety for up to 1 dose Take 30 minutes prior to scheduled MRI, do not drive     • magnesium chloride (MAG64) 64 MG TBEC EC tablet Take 128 mg by mouth daily   • metFORMIN (GLUCOPHAGE) 1000 MG tablet Take 1 tablet (1,000 mg total) by mouth 2 (two) times a day with meals   • olmesartan (BENICAR) 40 mg tablet Take 1 tablet (40 mg total) by mouth every morning   • Omega-3 Fatty Acids (FISH OIL) 1,000 mg 1,000 mg see administration instructions Takes 4 times per week   • pantoprazole (PROTONIX) 40 mg tablet Take 1 tablet (40 mg total) by mouth every morning   • tamsulosin (FLOMAX) 0 4 mg Take 1 capsule (0 4 mg total) by mouth daily with dinner     No current facility-administered medications on file prior to visit  Allergies   Allergen Reactions   • Penicillins Other (See Comments)     "Unknown Reaction"       Physical Exam:    /66   Pulse 66   Wt 87 5 kg (193 lb)   BMI 29 35 kg/m²     Constitutional:normal, well developed, well nourished, alert, in no distress and non-toxic and no overt pain behavior    Eyes:anicteric  HEENT:grossly intact  Neck:supple, symmetric, trachea midline and no masses   Pulmonary:even and unlabored  Cardiovascular:No edema or pitting edema present  Skin:Normal without rashes or lesions and well hydrated  Psychiatric:Mood and affect appropriate  Neurologic:Cranial Nerves II-XII grossly intact  Musculoskeletal:antalgic and Ambulates with rollator    Imaging

## 2023-03-20 ENCOUNTER — APPOINTMENT (OUTPATIENT)
Dept: RADIATION ONCOLOGY | Facility: HOSPITAL | Age: 83
End: 2023-03-20

## 2023-03-20 ENCOUNTER — APPOINTMENT (OUTPATIENT)
Dept: RADIATION ONCOLOGY | Facility: HOSPITAL | Age: 83
End: 2023-03-20
Attending: RADIOLOGY

## 2023-03-22 ENCOUNTER — APPOINTMENT (OUTPATIENT)
Dept: RADIATION ONCOLOGY | Facility: HOSPITAL | Age: 83
End: 2023-03-22
Attending: STUDENT IN AN ORGANIZED HEALTH CARE EDUCATION/TRAINING PROGRAM

## 2023-03-24 ENCOUNTER — PATIENT OUTREACH (OUTPATIENT)
Dept: HEMATOLOGY ONCOLOGY | Facility: CLINIC | Age: 83
End: 2023-03-24

## 2023-03-24 NOTE — PROGRESS NOTES
MSW made outreach this day and the pt was open and receptive to this call  The pt sounded much less anxious than he did last week and he openly spoke about his upcoming radiation treatments  He was not as anxious over the needs of his wife as he was previously  The pt reports that he was set up with transport but was concerned as he had not yet heard anything  While he was on the phone, MSW made outreach to [facility] and learned that he is confirmed for all of this treatments  Pt shared that he does not mind driving local however he is not comfortable on [address]  MSW assured him he can have this service to any of his oncology appointments  Pt has no other needs at this time  Support was offered and MSW will continue to follow

## 2023-03-30 ENCOUNTER — APPOINTMENT (OUTPATIENT)
Dept: RADIATION ONCOLOGY | Facility: HOSPITAL | Age: 83
End: 2023-03-30
Attending: STUDENT IN AN ORGANIZED HEALTH CARE EDUCATION/TRAINING PROGRAM

## 2023-03-31 ENCOUNTER — APPOINTMENT (OUTPATIENT)
Dept: RADIATION ONCOLOGY | Facility: HOSPITAL | Age: 83
End: 2023-03-31

## 2023-04-03 ENCOUNTER — APPOINTMENT (OUTPATIENT)
Dept: RADIATION ONCOLOGY | Facility: HOSPITAL | Age: 83
End: 2023-04-03

## 2023-04-04 ENCOUNTER — APPOINTMENT (OUTPATIENT)
Dept: RADIATION ONCOLOGY | Facility: HOSPITAL | Age: 83
End: 2023-04-04
Attending: STUDENT IN AN ORGANIZED HEALTH CARE EDUCATION/TRAINING PROGRAM

## 2023-04-04 ENCOUNTER — APPOINTMENT (OUTPATIENT)
Dept: RADIATION ONCOLOGY | Facility: HOSPITAL | Age: 83
End: 2023-04-04

## 2023-04-05 ENCOUNTER — APPOINTMENT (OUTPATIENT)
Dept: RADIATION ONCOLOGY | Facility: HOSPITAL | Age: 83
End: 2023-04-05

## 2023-04-06 ENCOUNTER — APPOINTMENT (OUTPATIENT)
Dept: RADIATION ONCOLOGY | Facility: HOSPITAL | Age: 83
End: 2023-04-06

## 2023-04-06 ENCOUNTER — APPOINTMENT (OUTPATIENT)
Dept: RADIATION ONCOLOGY | Facility: HOSPITAL | Age: 83
End: 2023-04-06
Attending: STUDENT IN AN ORGANIZED HEALTH CARE EDUCATION/TRAINING PROGRAM

## 2023-04-07 ENCOUNTER — APPOINTMENT (OUTPATIENT)
Dept: RADIATION ONCOLOGY | Facility: HOSPITAL | Age: 83
End: 2023-04-07

## 2023-04-10 ENCOUNTER — APPOINTMENT (OUTPATIENT)
Dept: RADIATION ONCOLOGY | Facility: HOSPITAL | Age: 83
End: 2023-04-10

## 2023-04-11 ENCOUNTER — APPOINTMENT (OUTPATIENT)
Dept: RADIATION ONCOLOGY | Facility: HOSPITAL | Age: 83
End: 2023-04-11

## 2023-04-11 ENCOUNTER — APPOINTMENT (OUTPATIENT)
Dept: RADIATION ONCOLOGY | Facility: HOSPITAL | Age: 83
End: 2023-04-11
Attending: STUDENT IN AN ORGANIZED HEALTH CARE EDUCATION/TRAINING PROGRAM

## 2023-04-12 ENCOUNTER — APPOINTMENT (OUTPATIENT)
Dept: RADIATION ONCOLOGY | Facility: HOSPITAL | Age: 83
End: 2023-04-12

## 2023-04-12 ENCOUNTER — APPOINTMENT (OUTPATIENT)
Dept: RADIATION ONCOLOGY | Facility: HOSPITAL | Age: 83
End: 2023-04-12
Attending: STUDENT IN AN ORGANIZED HEALTH CARE EDUCATION/TRAINING PROGRAM

## 2023-04-13 ENCOUNTER — APPOINTMENT (OUTPATIENT)
Dept: RADIATION ONCOLOGY | Facility: HOSPITAL | Age: 83
End: 2023-04-13

## 2023-04-14 ENCOUNTER — APPOINTMENT (OUTPATIENT)
Dept: RADIATION ONCOLOGY | Facility: HOSPITAL | Age: 83
End: 2023-04-14

## 2023-04-17 ENCOUNTER — APPOINTMENT (OUTPATIENT)
Dept: RADIATION ONCOLOGY | Facility: HOSPITAL | Age: 83
End: 2023-04-17

## 2023-04-18 ENCOUNTER — APPOINTMENT (OUTPATIENT)
Dept: RADIATION ONCOLOGY | Facility: HOSPITAL | Age: 83
End: 2023-04-18

## 2023-04-19 ENCOUNTER — APPOINTMENT (OUTPATIENT)
Dept: RADIATION ONCOLOGY | Facility: HOSPITAL | Age: 83
End: 2023-04-19

## 2023-04-20 ENCOUNTER — APPOINTMENT (OUTPATIENT)
Dept: RADIATION ONCOLOGY | Facility: HOSPITAL | Age: 83
End: 2023-04-20

## 2023-04-21 ENCOUNTER — APPOINTMENT (OUTPATIENT)
Dept: RADIATION ONCOLOGY | Facility: HOSPITAL | Age: 83
End: 2023-04-21

## 2023-04-24 ENCOUNTER — APPOINTMENT (OUTPATIENT)
Dept: RADIATION ONCOLOGY | Facility: HOSPITAL | Age: 83
End: 2023-04-24

## 2023-04-25 ENCOUNTER — PATIENT OUTREACH (OUTPATIENT)
Dept: HEMATOLOGY ONCOLOGY | Facility: CLINIC | Age: 83
End: 2023-04-25

## 2023-04-25 ENCOUNTER — APPOINTMENT (OUTPATIENT)
Dept: RADIATION ONCOLOGY | Facility: HOSPITAL | Age: 83
End: 2023-04-25

## 2023-04-25 NOTE — PROGRESS NOTES
I reached out to Natty Payne today now that he has been on treatment to reassess for any barriers to care and offer and support he may need  He admits to have some pain in his throat and skin of neck but feels that Dr Kirstie Alvarez is doing well helping him manage this  We discussed palliative care and their role in his care but he does not wish to establish with them at this time  Natty Payne reports that he does have a sensation of something stuck in his throat which makes it a little difficult to swallow  He has modified his diet to include soft foods  He believes he has only lost a couple of pounds due to this and does not fell it is an issue at this time  I reminded him that his dietician Hieu Barrera is available any time to assist with good nutritional options to maintain his weight  I will include her info in a list of contacts for him  We discussed the role of Speech Therapy at this time to assist in optimizing his ability to swallow and helping with recovery after treatment is complete but he feels is he managing well at this time and does not wish to add any further appnts to treatment schedule  He is aware that he may contact me if he changes his mind to initiate SLP  Natty Payne feels that he is managing his schedules well and is utilizing [facility]  When confirming that he knows how to reach the members of his care team he requested that I send him a contact list in the mail  I confirmed his mailing address and will do so  He was appreciative for the call and the assistance

## 2023-04-26 ENCOUNTER — APPOINTMENT (OUTPATIENT)
Dept: RADIATION ONCOLOGY | Facility: HOSPITAL | Age: 83
End: 2023-04-26

## 2023-04-27 ENCOUNTER — APPOINTMENT (OUTPATIENT)
Dept: RADIATION ONCOLOGY | Facility: HOSPITAL | Age: 83
End: 2023-04-27

## 2023-04-28 ENCOUNTER — APPOINTMENT (OUTPATIENT)
Dept: RADIATION ONCOLOGY | Facility: HOSPITAL | Age: 83
End: 2023-04-28

## 2023-05-01 ENCOUNTER — APPOINTMENT (OUTPATIENT)
Dept: RADIATION ONCOLOGY | Facility: HOSPITAL | Age: 83
End: 2023-05-01
Payer: COMMERCIAL

## 2023-05-01 PROCEDURE — 77014 CHG CT GUIDANCE RADIATION THERAPY FLDS PLACEMENT: CPT | Performed by: INTERNAL MEDICINE

## 2023-05-01 PROCEDURE — 77387 GUIDANCE FOR RADJ TX DLVR: CPT | Performed by: INTERNAL MEDICINE

## 2023-05-01 PROCEDURE — 77412 RADIATION TX DELIVERY LVL 3: CPT | Performed by: INTERNAL MEDICINE

## 2023-05-02 ENCOUNTER — APPOINTMENT (OUTPATIENT)
Dept: RADIATION ONCOLOGY | Facility: HOSPITAL | Age: 83
End: 2023-05-02
Attending: STUDENT IN AN ORGANIZED HEALTH CARE EDUCATION/TRAINING PROGRAM
Payer: COMMERCIAL

## 2023-05-02 ENCOUNTER — APPOINTMENT (OUTPATIENT)
Dept: RADIATION ONCOLOGY | Facility: HOSPITAL | Age: 83
End: 2023-05-02
Payer: COMMERCIAL

## 2023-05-02 PROCEDURE — 77387 GUIDANCE FOR RADJ TX DLVR: CPT | Performed by: RADIOLOGY

## 2023-05-02 PROCEDURE — 77412 RADIATION TX DELIVERY LVL 3: CPT | Performed by: RADIOLOGY

## 2023-05-03 ENCOUNTER — APPOINTMENT (OUTPATIENT)
Dept: RADIATION ONCOLOGY | Facility: HOSPITAL | Age: 83
End: 2023-05-03
Attending: STUDENT IN AN ORGANIZED HEALTH CARE EDUCATION/TRAINING PROGRAM
Payer: COMMERCIAL

## 2023-05-03 ENCOUNTER — APPOINTMENT (OUTPATIENT)
Dept: RADIATION ONCOLOGY | Facility: HOSPITAL | Age: 83
End: 2023-05-03
Payer: COMMERCIAL

## 2023-05-03 PROCEDURE — 77412 RADIATION TX DELIVERY LVL 3: CPT | Performed by: STUDENT IN AN ORGANIZED HEALTH CARE EDUCATION/TRAINING PROGRAM

## 2023-05-03 PROCEDURE — 77387 GUIDANCE FOR RADJ TX DLVR: CPT | Performed by: STUDENT IN AN ORGANIZED HEALTH CARE EDUCATION/TRAINING PROGRAM

## 2023-05-04 ENCOUNTER — APPOINTMENT (OUTPATIENT)
Dept: RADIATION ONCOLOGY | Facility: HOSPITAL | Age: 83
End: 2023-05-04
Attending: STUDENT IN AN ORGANIZED HEALTH CARE EDUCATION/TRAINING PROGRAM
Payer: COMMERCIAL

## 2023-05-04 ENCOUNTER — APPOINTMENT (OUTPATIENT)
Dept: RADIATION ONCOLOGY | Facility: HOSPITAL | Age: 83
End: 2023-05-04
Payer: COMMERCIAL

## 2023-05-04 PROCEDURE — 77427 RADIATION TX MANAGEMENT X5: CPT | Performed by: INTERNAL MEDICINE

## 2023-05-04 PROCEDURE — 77412 RADIATION TX DELIVERY LVL 3: CPT | Performed by: INTERNAL MEDICINE

## 2023-05-04 PROCEDURE — 77387 GUIDANCE FOR RADJ TX DLVR: CPT | Performed by: INTERNAL MEDICINE

## 2023-05-04 PROCEDURE — 77014 CHG CT GUIDANCE RADIATION THERAPY FLDS PLACEMENT: CPT | Performed by: INTERNAL MEDICINE

## 2023-05-05 ENCOUNTER — APPOINTMENT (OUTPATIENT)
Dept: RADIATION ONCOLOGY | Facility: HOSPITAL | Age: 83
End: 2023-05-05
Payer: COMMERCIAL

## 2023-05-05 PROCEDURE — 77412 RADIATION TX DELIVERY LVL 3: CPT | Performed by: RADIOLOGY

## 2023-05-05 PROCEDURE — 77014 CHG CT GUIDANCE RADIATION THERAPY FLDS PLACEMENT: CPT | Performed by: RADIOLOGY

## 2023-05-05 PROCEDURE — 77336 RADIATION PHYSICS CONSULT: CPT | Performed by: INTERNAL MEDICINE

## 2023-05-05 PROCEDURE — 77387 GUIDANCE FOR RADJ TX DLVR: CPT | Performed by: RADIOLOGY

## 2023-05-08 ENCOUNTER — APPOINTMENT (OUTPATIENT)
Dept: RADIATION ONCOLOGY | Facility: HOSPITAL | Age: 83
End: 2023-05-08
Payer: COMMERCIAL

## 2023-05-08 PROCEDURE — 77014 CHG CT GUIDANCE RADIATION THERAPY FLDS PLACEMENT: CPT | Performed by: INTERNAL MEDICINE

## 2023-05-08 PROCEDURE — 77387 GUIDANCE FOR RADJ TX DLVR: CPT | Performed by: INTERNAL MEDICINE

## 2023-05-08 PROCEDURE — 77336 RADIATION PHYSICS CONSULT: CPT | Performed by: INTERNAL MEDICINE

## 2023-05-08 PROCEDURE — 77412 RADIATION TX DELIVERY LVL 3: CPT | Performed by: INTERNAL MEDICINE

## 2023-05-11 ENCOUNTER — APPOINTMENT (OUTPATIENT)
Dept: RADIATION ONCOLOGY | Facility: HOSPITAL | Age: 83
End: 2023-05-11
Attending: STUDENT IN AN ORGANIZED HEALTH CARE EDUCATION/TRAINING PROGRAM
Payer: COMMERCIAL

## 2023-05-11 ENCOUNTER — APPOINTMENT (OUTPATIENT)
Dept: RADIATION ONCOLOGY | Facility: HOSPITAL | Age: 83
End: 2023-05-11
Payer: COMMERCIAL

## 2023-05-22 ENCOUNTER — HOSPITAL ENCOUNTER (OUTPATIENT)
Dept: RADIOLOGY | Facility: CLINIC | Age: 83
Discharge: HOME | End: 2023-05-22
Admitting: PHYSICAL MEDICINE & REHABILITATION

## 2023-05-22 VITALS
TEMPERATURE: 98.2 F | RESPIRATION RATE: 20 BRPM | OXYGEN SATURATION: 94 % | DIASTOLIC BLOOD PRESSURE: 74 MMHG | HEART RATE: 65 BPM | SYSTOLIC BLOOD PRESSURE: 136 MMHG

## 2023-05-22 DIAGNOSIS — G62.9 POLYNEUROPATHY: ICD-10-CM

## 2023-05-22 DIAGNOSIS — M54.16 LUMBAR RADICULOPATHY: ICD-10-CM

## 2023-05-22 RX ORDER — BUPIVACAINE HCL/PF 2.5 MG/ML
2 VIAL (ML) INJECTION ONCE
Status: COMPLETED | OUTPATIENT
Start: 2023-05-22 | End: 2023-05-22

## 2023-05-22 RX ORDER — PAPAVERINE HCL 150 MG
10 CAPSULE, EXTENDED RELEASE ORAL ONCE
Status: COMPLETED | OUTPATIENT
Start: 2023-05-22 | End: 2023-05-22

## 2023-05-22 RX ORDER — 0.9 % SODIUM CHLORIDE 0.9 %
10 VIAL (ML) INJECTION ONCE
Status: COMPLETED | OUTPATIENT
Start: 2023-05-22 | End: 2023-05-22

## 2023-05-22 RX ADMIN — BUPIVACAINE HYDROCHLORIDE 2 ML: 2.5 INJECTION, SOLUTION EPIDURAL; INFILTRATION; INTRACAUDAL at 13:44

## 2023-05-22 RX ADMIN — Medication 10 MG: at 13:44

## 2023-05-22 RX ADMIN — IOHEXOL 2 ML: 300 INJECTION, SOLUTION INTRAVENOUS at 13:44

## 2023-05-22 RX ADMIN — SODIUM CHLORIDE 2 ML: 9 INJECTION, SOLUTION INTRAMUSCULAR; INTRAVENOUS; SUBCUTANEOUS at 13:41

## 2023-05-22 RX ADMIN — Medication 2 ML: at 13:41

## 2023-05-22 NOTE — DISCHARGE INSTR - LAB
Epidural Steroid Injection   WHAT YOU NEED TO KNOW:   An epidural steroid injection (EDEN) is a procedure to inject steroid medicine into the epidural space  The epidural space is between your spinal cord and vertebrae  Steroids reduce inflammation and fluid buildup in your spine that may be causing pain  You may be given pain medicine along with the steroids  ACTIVITY  Do not drive or operate machinery today  No strenuous activity today - bending, lifting, etc   You may resume normal activites starting tomorrow - start slowly and as tolerated  You may shower today, but no tub baths or hot tubs  You may have numbness for several hours from the local anesthetic  Please use caution and common sense, especially with weight-bearing activities  CARE OF THE INJECTION SITE  If you have soreness or pain, apply ice to the area today (20 minutes on/20 minutes off)  Starting tomorrow, you may use warm, moist heat or ice if needed  You may have an increase or change in your discomfort for 36-48 hours after your treatment  Apply ice and continue with any pain medication you have been prescribed  Notify the [facility] if you have any of the following: redness, drainage, swelling, headache, stiff neck or fever above 100°F     SPECIAL INSTRUCTIONS  Our office will contact you in approximately 7 days for a progress report  MEDICATIONS  Continue to take all routine medications  Our office may have instructed you to hold some medications  As no general anesthesia was used in today's procedure, you should not experience any side effects related to anesthesia  If you are diabetic, the steroids used in today's injection may temporarily increase your blood sugar levels after the first few days after your injection  Please keep a close eye on your sugars and alert the doctor who manages your diabetes if your sugars are significantly high from your baseline or you are symptomatic       If you have a problem specifically related to your procedure, please call our office at [phone]  Problems not related to your procedure should be directed to your primary care physician

## 2023-05-22 NOTE — H&P
History of Present Illness:  The patient is a 79 y o  male who presents with complaints of low back pain    Past Medical History:   Diagnosis Date   • Diabetes mellitus ([facility] [address] )    • GERD (gastroesophageal reflux disease)    • Hyperlipidemia    • Hypertension    • Laryngeal cancer ([facility] [address] )        Past Surgical History:   Procedure Laterality Date   • GALLBLADDER SURGERY  1968   • [place] LARYNGOSCOPY W/BIOPSY MICROSCOPE/TELESCOPE N/A 2/15/2023    Procedure: MICROLARYGOSCOPY AND BIOPSY; FROZEN SECTION;  Surgeon: Juanito Stephens MD;  [facility];  Service: ENT         Current Outpatient Medications:   •  al mag oxide-diphenhydramine-lidocaine viscous (MAGIC MOUTHWASH) 1:1:1 suspension, Swish and swallow 10 mL every 6 (six) hours as needed for mouth pain or discomfort, Disp: 500 mL, Rfl: 1  •  amLODIPine (NORVASC) 10 mg tablet, TAKE ONE TABLET BY MOUTH EVERY DAY, Disp: 90 tablet, Rfl: 1  •  aspirin (ECOTRIN LOW STRENGTH) 81 mg EC tablet, 81 mg every other day, Disp: , Rfl:   •  atorvastatin (LIPITOR) 10 mg tablet, Take 1 tablet (10 mg total) by mouth daily at bedtime, Disp: 90 tablet, Rfl: 1  •  benzocaine-menthol (CEPACOL) 15-3 6 mg per lozenge, Apply 1 lozenge to the mouth or throat every 2 (two) hours as needed (Sore throat), Disp: 18 lozenge, Rfl: 6  •  Ca Carbonate-Mag Hydroxide (MI-ACID PO), , Disp: , Rfl:   •  clobetasol (TEMOVATE) 0 05 % ointment, 1 application every 24 hours, Disp: , Rfl:   •  fluticasone (FLONASE) 50 mcg/act nasal spray, 1 spray into each nostril daily (Patient taking differently: 1 spray into each nostril every morning), Disp: 16 g, Rfl: 3  •  gabapentin (NEURONTIN) 100 mg capsule, TAKE ONE CAPSULE BY MOUTH TWICE DAILY, Disp: 180 capsule, Rfl: 0  •  ibuprofen (MOTRIN) 600 mg tablet, TAKE ONE TABLET BY MOUTH THREE TIMES DAILY WITH FOOD  (Patient taking differently: Take 600 mg by mouth), Disp: 45 tablet, Rfl: 0  •  LORazepam (ATIVAN) 0 5 mg tablet, Take 1 tablet (0 5 mg total) by mouth once "as needed for anxiety for up to 1 dose Take 30 minutes prior to scheduled MRI, do not drive , Disp: 1 tablet, Rfl: 0  •  magnesium chloride (MAG64) 64 MG TBEC EC tablet, Take 128 mg by mouth daily, Disp: , Rfl:   •  metFORMIN (GLUCOPHAGE) 1000 MG tablet, Take 1 tablet (1,000 mg total) by mouth 2 (two) times a day with meals, Disp: 180 tablet, Rfl: 1  •  olmesartan (BENICAR) 40 mg tablet, Take 1 tablet (40 mg total) by mouth every morning, Disp: 90 tablet, Rfl: 1  •  Omega-3 Fatty Acids (FISH OIL) 1,000 mg, 1,000 mg see administration instructions Takes 4 times per week, Disp: , Rfl:   •  pantoprazole (PROTONIX) 40 mg tablet, Take 1 tablet (40 mg total) by mouth every morning, Disp: 90 tablet, Rfl: 1  •  tamsulosin (FLOMAX) 0 4 mg, Take 1 capsule (0 4 mg total) by mouth daily with dinner, Disp: 90 capsule, Rfl: 1    Current Facility-Administered Medications:   •  bupivacaine (PF) (MARCAINE) 0 25 % injection 2 mL, 2 mL, Epidural, Once, Dorothe Colonel, DO  •  dexamethasone (PF) (DECADRON) injection 10 mg, 10 mg, Epidural, Once, Dorothe Colonel, DO  •  iohexol (OMNIPAQUE) 300 mg/mL injection 50 mL, 50 mL, Epidural, Once, Dorothe Colonel, DO  •  lidocaine (PF) (XYLOCAINE-MPF) 2 % injection 4 mL, 4 mL, Infiltration, Once, Dorothe Colonel, DO  •  sodium chloride (PF) 0 9 % injection 10 mL, 10 mL, Infiltration, Once, Dorothe Colonel, DO    Allergies   Allergen Reactions   • Penicillins Other (See Comments)     \"Unknown Reaction\"       Physical Exam:   Vitals:    05/22/23 1320   BP: 133/64   Pulse: 70   Resp: 20   Temp: 98 2 °F (36 8 °C)   SpO2: 93%     General: Awake, Alert, Oriented x 3, Mood and affect appropriate  Respiratory: Respirations even and unlabored  Cardiovascular: Peripheral pulses intact; no edema  Musculoskeletal Exam: Tenderness palpation bilateral lumbar paraspinals    ASA Score: 2    Patient/Chart Verification  Patient ID Verified: Verbal  Consents Confirmed:  To be obtained in the " Pre-Procedure area  Interval H&P(within 24 hr) Complete (required for Outpatients and Surgery Admit only): To be obtained in the Pre-Procedure area  Allergies Reviewed: Yes  Anticoag/NSAID held?: NA  Currently on antibiotics?: No    Assessment:   1  Polyneuropathy    2   Lumbar radiculopathy        Plan: Bilateral L3-L4 TFESI

## 2023-05-30 ENCOUNTER — TELEPHONE (OUTPATIENT)
Dept: RADIOLOGY | Facility: MEDICAL CENTER | Age: 83
End: 2023-05-30

## 2023-05-30 NOTE — TELEPHONE ENCOUNTER
Patient Reports     50   %     improvement post injection    Pain Level  2   /10    Patient is aware we will call back next week for an update

## 2023-06-05 ENCOUNTER — TELEMEDICINE (OUTPATIENT)
Dept: RADIATION ONCOLOGY | Facility: HOSPITAL | Age: 83
End: 2023-06-05
Attending: STUDENT IN AN ORGANIZED HEALTH CARE EDUCATION/TRAINING PROGRAM

## 2023-06-05 DIAGNOSIS — C32.9 LARYNGEAL SQUAMOUS CELL CARCINOMA (HCC): Primary | ICD-10-CM

## 2023-06-05 PROCEDURE — 99024 POSTOP FOLLOW-UP VISIT: CPT | Performed by: STUDENT IN AN ORGANIZED HEALTH CARE EDUCATION/TRAINING PROGRAM

## 2023-06-05 NOTE — PROGRESS NOTES
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  On 5/8/23 he completed a course of definitive RT to a dose of 6300 cGy in 28 fractions  He did not receive any concurrent therapy  He returns today for follow-up  Interval History:  The patient tolerated RT well overall though did experience expected treatment related morbidity  He did experience dysphagia and odynophagia, which was managed with MMW without good effect  He did not require any opioid analgesia  He also experienced 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, which was managed with Aquaphor and ultimately Silvadene (when desquamation ensued)  5/16/23 - Otolaryngology - Camilla Christiansen MD  1 month f/u for 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  He finished XRT last Monday  Pt c/o difficulty swallowing a bit  No shortness of breath  Flexible fiberoptic nasolaryngoscopy performed - unremarkable    Currently he is doing well overall  His dysphagia and odynophagia have markedly improved, though he does have some residual thickened saliva and sore throat  His prior hoarse voice is improving  His skin has healed over with mild residual erythema  He will continue with monthly endoscopic exams with Dr Oneyda Mitchell for the time being       Historical Information   Oncology History   Laryngeal squamous cell carcinoma ([facility] [address] )   2023 Initial Diagnosis    Laryngeal squamous cell carcinoma ([facility] [address] )     2/15/2023 Biopsy    MDL with biopsy:  Larynx, laryngeal mass, intraoperative and permanent biopsies:  - Multiple fragments of at least superficially invasive squamous cell carcinoma, conventional (keratinizing), moderately differentiated, extending to tissue edges         3/7/2023 -  Cancer Staged    Staging form: Larynx - Glottis, AJCC 8th Edition  - Clinical stage from 3/7/2023: Stage I (cT1b, cN0, cM0) - Signed by An Metcalf MD on 3/7/2023  Stage prefix: Initial diagnosis           Past Medical History:   Diagnosis Date   • Diabetes mellitus ([facility] [address] )    • GERD (gastroesophageal reflux disease)    • Hyperlipidemia    • Hypertension    • Laryngeal cancer ([facility] [address] )      Past Surgical History:   Procedure Laterality Date   • GALLBLADDER SURGERY  1968   • RI LARYNGOSCOPY W/BIOPSY MICROSCOPE/TELESCOPE N/A 2/15/2023    Procedure: MICROLARYGOSCOPY AND BIOPSY; FROZEN SECTION;  Surgeon: Zeenat Vazquez MD;  Location: [facility];  Service: ENT       Social History   Social History     Substance and Sexual Activity   Alcohol Use Not Currently     Social History     Substance and Sexual Activity   Drug Use Never     Social History     Tobacco Use   Smoking Status Former   • Packs/day: 0 50   • Types: Cigarettes   Smokeless Tobacco Never   Tobacco Comments    no passive smoke exposure          Meds/Allergies     Current Outpatient Medications:   •  al mag oxide-diphenhydramine-lidocaine viscous (MAGIC MOUTHWASH) 1:1:1 suspension, Swish and swallow 10 mL every 6 (six) hours as needed for mouth pain or discomfort, Disp: 500 mL, Rfl: 1  •  amLODIPine (NORVASC) 10 mg tablet, TAKE ONE TABLET BY MOUTH EVERY DAY, Disp: 90 tablet, Rfl: 1  •  aspirin (ECOTRIN LOW STRENGTH) 81 mg EC tablet, 81 mg every other day, Disp: , Rfl:   •  atorvastatin (LIPITOR) 10 mg tablet, Take 1 tablet (10 mg total) by mouth daily at bedtime, Disp: 90 tablet, Rfl: 1  •  benzocaine-menthol (CEPACOL) 15-3 6 mg per lozenge, Apply 1 lozenge to the mouth or throat every 2 (two) hours as needed (Sore throat), Disp: 18 lozenge, Rfl: 6  •  Ca Carbonate-Mag Hydroxide (MI-ACID PO), , Disp: ", Rfl:   •  clobetasol (TEMOVATE) 0 05 % ointment, 1 application every 24 hours, Disp: , Rfl:   •  fluticasone (FLONASE) 50 mcg/act nasal spray, 1 spray into each nostril daily (Patient taking differently: 1 spray into each nostril every morning), Disp: 16 g, Rfl: 3  •  gabapentin (NEURONTIN) 100 mg capsule, TAKE ONE CAPSULE BY MOUTH TWICE DAILY, Disp: 180 capsule, Rfl: 0  •  ibuprofen (MOTRIN) 600 mg tablet, TAKE ONE TABLET BY MOUTH THREE TIMES DAILY WITH FOOD  (Patient taking differently: Take 600 mg by mouth), Disp: 45 tablet, Rfl: 0  •  LORazepam (ATIVAN) 0 5 mg tablet, Take 1 tablet (0 5 mg total) by mouth once as needed for anxiety for up to 1 dose Take 30 minutes prior to scheduled MRI, do not drive , Disp: 1 tablet, Rfl: 0  •  magnesium chloride (MAG64) 64 MG TBEC EC tablet, Take 128 mg by mouth daily, Disp: , Rfl:   •  metFORMIN (GLUCOPHAGE) 1000 MG tablet, Take 1 tablet (1,000 mg total) by mouth 2 (two) times a day with meals, Disp: 180 tablet, Rfl: 1  •  olmesartan (BENICAR) 40 mg tablet, Take 1 tablet (40 mg total) by mouth every morning, Disp: 90 tablet, Rfl: 1  •  Omega-3 Fatty Acids (FISH OIL) 1,000 mg, 1,000 mg see administration instructions Takes 4 times per week, Disp: , Rfl:   •  pantoprazole (PROTONIX) 40 mg tablet, Take 1 tablet (40 mg total) by mouth every morning, Disp: 90 tablet, Rfl: 1  •  tamsulosin (FLOMAX) 0 4 mg, Take 1 capsule (0 4 mg total) by mouth daily with dinner, Disp: 90 capsule, Rfl: 1  Allergies   Allergen Reactions   • Penicillins Other (See Comments)     \"Unknown Reaction\"     OBJECTIVE:   There were no vitals taken for this visit  No exam performed  RESULTS    Lab Results: No results found for this or any previous visit (from the past 672 hour(s))  Imaging Studies:FL spine and pain procedure    Result Date: 5/22/2023  Narrative:  Indication: Leg pain Preoperative diagnosis: Lumbar radiculitis Postoperative diagnosis: Lumbar radiculitis Procedure: " Fluoroscopically-guided bilateral L3-L4 transforaminal epidural steroid injection under fluoroscopy EBL: none Specimens: not applicable After discussing the risks, benefits, and alternatives to the procedure, the patient expressed understanding and wished to proceed  The patient was brought to the fluoroscopy suite and placed in the prone position  A procedural pause was conducted to verify: correct patient identity, procedure to be performed and as applicable, correct side and site, correct patient position, and availability of implants, special equipment and special requirements  After identifying the right L3 pedicle fluoroscopically with an oblique view, the skin was sterilely prepped and draped in the usual fashion using Chloraprep skin prep  The skin and subcutaneous tissues were anesthetized with 1% lidocaine  A 3 5 inch 22-gauge  spinal needle was then advanced under fluoroscopic guidance to the neural foramen  Appropriate foraminal depth was determined with a lateral fluoroscopic view, and AP visualization confirmed needle positioning at approximately the 6 o'clock position relative to the pedicle  After negative aspiration, Omnipaque 300 contrast was injected using live fluoroscopy confirming appropriate transforaminal spread without evidence of intravascular or intrathecal uptake  Next, a 1 5 ml solution consisting of 5 mg of dexamethasone and 0 25% bupivacaine was injected slowly and incrementally into the epidural space  Following the injection the needle was withdrawn slightly and flushed with lidocaine as it was fully extracted  The procedure was then repeated in the exact same way on the opposite side at the same level  The patient tolerated the procedure well and there were no apparent complications  The patient did not develop any new neurologic deficits  After appropriate observation, the patient was dismissed from the clinic in good condition under their own power            Assessment/Plan:  No "orders of the defined types were placed in this encounter  Approximately 1 month following completion of definitive RT the patient is doing well overall  He has started to recover from his prior RT though does still have ongoing soreness, hoarse voice, globus sensation, and thickened saliva  He will continue endoscopic surveillance with Dr Gaston Peterson  I have additionally recommended close dental follow-up going forward; the patient states he has an appointment with his dentist in the near future  I will plan to see him back in our office in approximately 6 months  Given his T1N0 disease I recommend clinical follow-up alone with imaging as needed based on symptoms or exam findings  Devaughn Berrios MD  0/1/0563,7:38 PM    Portions of the record may have been created with voice recognition software   Occasional wrong word or \"sound a like\" substitutions may have occurred due to the inherent limitations of voice recognition software   Read the chart carefully and recognize, using context, where substitutions have occurred        "

## 2023-07-03 LAB
LEFT EYE DIABETIC RETINOPATHY: NORMAL
RIGHT EYE DIABETIC RETINOPATHY: NORMAL

## 2023-07-11 DIAGNOSIS — G89.4 CHRONIC PAIN SYNDROME: ICD-10-CM

## 2023-07-11 DIAGNOSIS — M51.26 LUMBAR DISC HERNIATION: ICD-10-CM

## 2023-07-11 DIAGNOSIS — M21.371 FOOT DROP, RIGHT: ICD-10-CM

## 2023-07-11 DIAGNOSIS — M51.16 LUMBAR DISC DISEASE WITH RADICULOPATHY: ICD-10-CM

## 2023-07-11 DIAGNOSIS — Z91.81 FALLS INFREQUENTLY: ICD-10-CM

## 2023-07-11 RX ORDER — GABAPENTIN 100 MG/1
CAPSULE ORAL
Qty: 180 CAPSULE | Refills: 0 | Status: SHIPPED | OUTPATIENT
Start: 2023-07-11

## 2023-07-14 ENCOUNTER — PATIENT OUTREACH (OUTPATIENT)
Dept: HEMATOLOGY ONCOLOGY | Facility: CLINIC | Age: 83
End: 2023-07-14

## 2023-07-14 NOTE — PROGRESS NOTES
Pt has not had any OSW issues or concerns. MSW will close this chart and if the pt has any needs moving forward, another order can be placed at that time.

## 2023-09-06 DIAGNOSIS — K21.9 GASTROESOPHAGEAL REFLUX DISEASE WITHOUT ESOPHAGITIS: ICD-10-CM

## 2023-09-06 DIAGNOSIS — E11.8 TYPE 2 DIABETES MELLITUS WITH COMPLICATION (HCC): ICD-10-CM

## 2023-09-06 DIAGNOSIS — N40.0 BENIGN PROSTATIC HYPERPLASIA, UNSPECIFIED WHETHER LOWER URINARY TRACT SYMPTOMS PRESENT: ICD-10-CM

## 2023-09-06 DIAGNOSIS — I10 HYPERTENSION, UNSPECIFIED TYPE: ICD-10-CM

## 2023-09-06 RX ORDER — AMLODIPINE BESYLATE 10 MG/1
TABLET ORAL
Qty: 90 TABLET | Refills: 0 | Status: SHIPPED | OUTPATIENT
Start: 2023-09-06

## 2023-09-06 RX ORDER — TAMSULOSIN HYDROCHLORIDE 0.4 MG/1
0.4 CAPSULE ORAL
Qty: 90 CAPSULE | Refills: 0 | Status: SHIPPED | OUTPATIENT
Start: 2023-09-06

## 2023-09-06 RX ORDER — PANTOPRAZOLE SODIUM 40 MG/1
40 TABLET, DELAYED RELEASE ORAL DAILY
Qty: 90 TABLET | Refills: 0 | Status: SHIPPED | OUTPATIENT
Start: 2023-09-06

## 2023-10-06 DIAGNOSIS — E78.5 HYPERLIPIDEMIA, UNSPECIFIED HYPERLIPIDEMIA TYPE: ICD-10-CM

## 2023-10-06 RX ORDER — ATORVASTATIN CALCIUM 10 MG/1
10 TABLET, FILM COATED ORAL
Qty: 90 TABLET | Refills: 0 | Status: SHIPPED | OUTPATIENT
Start: 2023-10-06

## 2023-10-10 DIAGNOSIS — M51.16 LUMBAR DISC DISEASE WITH RADICULOPATHY: ICD-10-CM

## 2023-10-10 DIAGNOSIS — M51.26 LUMBAR DISC HERNIATION: ICD-10-CM

## 2023-10-10 DIAGNOSIS — M21.371 FOOT DROP, RIGHT: ICD-10-CM

## 2023-10-10 DIAGNOSIS — G89.4 CHRONIC PAIN SYNDROME: ICD-10-CM

## 2023-10-10 DIAGNOSIS — Z91.81 FALLS INFREQUENTLY: ICD-10-CM

## 2023-10-11 RX ORDER — GABAPENTIN 100 MG/1
CAPSULE ORAL
Qty: 180 CAPSULE | Refills: 0 | Status: SHIPPED | OUTPATIENT
Start: 2023-10-11

## 2023-10-12 ENCOUNTER — TELEPHONE (OUTPATIENT)
Dept: FAMILY MEDICINE CLINIC | Facility: CLINIC | Age: 83
End: 2023-10-12

## 2023-10-18 ENCOUNTER — OFFICE VISIT (OUTPATIENT)
Dept: FAMILY MEDICINE CLINIC | Facility: CLINIC | Age: 83
End: 2023-10-18
Payer: COMMERCIAL

## 2023-10-18 VITALS
OXYGEN SATURATION: 96 % | SYSTOLIC BLOOD PRESSURE: 138 MMHG | TEMPERATURE: 97.9 F | RESPIRATION RATE: 16 BRPM | BODY MASS INDEX: 30.92 KG/M2 | HEIGHT: 68 IN | WEIGHT: 204 LBS | HEART RATE: 73 BPM | DIASTOLIC BLOOD PRESSURE: 80 MMHG

## 2023-10-18 DIAGNOSIS — Z00.00 MEDICARE ANNUAL WELLNESS VISIT, SUBSEQUENT: ICD-10-CM

## 2023-10-18 DIAGNOSIS — I10 PRIMARY HYPERTENSION: ICD-10-CM

## 2023-10-18 DIAGNOSIS — E78.2 MIXED HYPERLIPIDEMIA: ICD-10-CM

## 2023-10-18 DIAGNOSIS — N40.0 BENIGN PROSTATIC HYPERPLASIA, UNSPECIFIED WHETHER LOWER URINARY TRACT SYMPTOMS PRESENT: ICD-10-CM

## 2023-10-18 DIAGNOSIS — C32.9 LARYNGEAL SQUAMOUS CELL CARCINOMA (HCC): ICD-10-CM

## 2023-10-18 DIAGNOSIS — K21.9 GASTROESOPHAGEAL REFLUX DISEASE WITHOUT ESOPHAGITIS: ICD-10-CM

## 2023-10-18 DIAGNOSIS — E11.8 TYPE 2 DIABETES MELLITUS WITH COMPLICATION (HCC): Primary | ICD-10-CM

## 2023-10-18 PROCEDURE — 99214 OFFICE O/P EST MOD 30 MIN: CPT | Performed by: FAMILY MEDICINE

## 2023-10-18 PROCEDURE — G0444 DEPRESSION SCREEN ANNUAL: HCPCS | Performed by: FAMILY MEDICINE

## 2023-10-18 PROCEDURE — G0439 PPPS, SUBSEQ VISIT: HCPCS | Performed by: FAMILY MEDICINE

## 2023-10-18 NOTE — PROGRESS NOTES
Assessment and Plan:     Problem List Items Addressed This Visit        Digestive    Gastroesophageal reflux disease without esophagitis     Stable on pantoprazole 40 mg daily. Continue same. We will continue to monitor. Endocrine    Type 2 diabetes mellitus with complication (HCC) - Primary     A1c is well controlled at 6.6. Continue on metformin. Continue to follow low-carb diet. Continue to monitor fasting glucose and A1c. Come back in 6 months. Lab Results   Component Value Date    HGBA1C 6.6 (H) 03/08/2023            Relevant Orders    Albumin / creatinine urine ratio    Comprehensive metabolic panel    Hemoglobin A1C    CBC and differential    Lipid Panel with Direct LDL reflex    TSH, 3rd generation with Free T4 reflex       Respiratory    Laryngeal squamous cell carcinoma (HCC)     Stable. Continue to follow-up with oncology. Cardiovascular and Mediastinum    Hypertension     Well-controlled on amlodipine and Benicar. We will continue to monitor. Genitourinary    Benign prostatic hyperplasia     Stable on Flomax. Continue same. We will continue to monitor. Other    Hyperlipidemia     Well-controlled on atorvastatin 10 mg daily. Continue same. We will continue to monitor. Medicare annual wellness visit, subsequent     It was discussed about immunizations, diet, exercise and safety measures. BMI Counseling: Body mass index is 31.02 kg/m². The BMI is above normal. Nutrition recommendations include decreasing portion sizes, encouraging healthy choices of fruits and vegetables and decreasing fast food intake. Rationale for BMI follow-up plan is due to patient being overweight or obese. Depression Screening and Follow-up Plan: Patient was screened for depression during today's encounter. They screened negative with a PHQ-2 score of 1.       Preventive health issues were discussed with patient, and age appropriate screening tests were ordered as noted in patient's After Visit Summary. Personalized health advice and appropriate referrals for health education or preventive services given if needed, as noted in patient's After Visit Summary. History of Present Illness:     Patient presents for a Medicare Wellness Visit    Is here today for follow-up multiple medical problems. He has been taking his medications. He denies any side effect from his medications. He did not get his blood work done yet. He continues to follow-up with his oncology for his laryngeal squamous cell carcinoma. Patient Care Team:  Jacqueline Lemons MD as PCP - General (Family Medicine)  Jacqueline Lemons MD as PCP - [facility] [facility] ([facility])  Paramjit Wayne as Care Coordinator (Hematology and Oncology)  Juan Gee RN as Nurse Navigator (Hematology and Oncology)  Renay Chatman MD (Otolaryngology)  SARAH Shelton as  Care Manager (Oncology)  Veena Bhatia RD (Nutrition)  Fatoumata Plaza MD (Radiation Oncology)     Review of Systems:     Review of Systems   Constitutional:  Negative for chills and fever. HENT:  Negative for trouble swallowing. Eyes:  Negative for visual disturbance. Respiratory:  Negative for cough and shortness of breath. Cardiovascular:  Negative for chest pain and palpitations. Gastrointestinal:  Negative for abdominal pain, blood in stool and vomiting. Endocrine: Negative for cold intolerance and heat intolerance. Genitourinary:  Negative for difficulty urinating and dysuria. Skin:  Negative for rash. Neurological:  Negative for dizziness, syncope and headaches. Hematological:  Negative for adenopathy. Psychiatric/Behavioral:  Negative for behavioral problems.          Problem List:     Patient Active Problem List   Diagnosis   • Hypertension   • Type 2 diabetes mellitus with complication (HCC)   • Allergic rhinitis   • Anemia   • Benign prostatic hyperplasia   • Hyperlipidemia   • Obesity   • Left-sided low back pain with left-sided sciatica   • Prostate cancer screening   • Obesity (BMI 30.0-34. 9)   • Healthcare maintenance   • Cataracta   • Pre-op examination   • Gastroesophageal reflux disease without esophagitis   • Lumbar radiculopathy   • Allergies   • Postnasal drip   • Polyneuropathy   • Laryngeal squamous cell carcinoma (HCC)   • Medicare annual wellness visit, subsequent      Past Medical and Surgical History:     Past Medical History:   Diagnosis Date   • Diabetes mellitus ([address] )    • GERD (gastroesophageal reflux disease)    • Hyperlipidemia    • Hypertension    • Laryngeal cancer ([address])      Past Surgical History:   Procedure Laterality Date   • GALLBLADDER SURGERY  1968   • TN LARYNGOSCOPY W/BIOPSY MICROSCOPE/TELESCOPE N/A 2/15/2023    Procedure: MICROLARYGOSCOPY AND BIOPSY; FROZEN SECTION;  Surgeon: Anibal Del Real MD;  Location: [facility];  Service: ENT      Family History:     Family History   Problem Relation Age of Onset   • Breast cancer Mother    • No Known Problems Father       Social History:     Social History     Socioeconomic History   • Marital status: /Civil Union     Spouse name: None   • Number of children: None   • Years of education: None   • Highest education level: None   Occupational History   • None   Tobacco Use   • Smoking status: Former     Packs/day: 0.50     Types: Cigarettes   • Smokeless tobacco: Never   • Tobacco comments:     no passive smoke exposure    Vaping Use   • Vaping Use: Never used   Substance and Sexual Activity   • Alcohol use: Not Currently   • Drug use: Never   • Sexual activity: None   Other Topics Concern   • None   Social History Narrative   • None     Social Determinants of Health     Financial Resource Strain: Not on file   Food Insecurity: Not on file   Transportation Needs: Not on file   Physical Activity: Not on file   Stress: Not on file   Social Connections: Not on file   Intimate Partner Violence: Not on file   Housing Stability: Not on file      Medications and Allergies:     Current Outpatient Medications   Medication Sig Dispense Refill   • amLODIPine (NORVASC) 10 mg tablet TAKE ONE TABLET BY MOUTH EVERY DAY 90 tablet 0   • aspirin (ECOTRIN LOW STRENGTH) 81 mg EC tablet 81 mg every other day     • atorvastatin (LIPITOR) 10 mg tablet TAKE 1 TABLET BY MOUTH ONCE DAILY AT BEDTIME 90 tablet 0   • fluticasone (FLONASE) 50 mcg/act nasal spray 1 spray into each nostril daily (Patient taking differently: 1 spray into each nostril every morning) 16 g 3   • gabapentin (NEURONTIN) 100 mg capsule TAKE ONE CAPSULE BY MOUTH TWICE DAILY 180 capsule 0   • ibuprofen (MOTRIN) 600 mg tablet TAKE ONE TABLET BY MOUTH THREE TIMES DAILY WITH FOOD  (Patient taking differently: Take 600 mg by mouth) 45 tablet 0   • magnesium chloride (MAG64) 64 MG TBEC EC tablet Take 128 mg by mouth daily     • metFORMIN (GLUCOPHAGE) 1000 MG tablet TAKE ONE TABLET BY MOUTH TWICE DAILY WITH MEALS. 180 tablet 0   • olmesartan (BENICAR) 40 mg tablet Take 1 tablet (40 mg total) by mouth every morning 90 tablet 1   • Omega-3 Fatty Acids (FISH OIL) 1,000 mg 1,000 mg see administration instructions Takes 4 times per week     • pantoprazole (PROTONIX) 40 mg tablet TAKE ONE TABLET BY MOUTH EVERY MORNING 90 tablet 0   • tamsulosin (FLOMAX) 0.4 mg Take 1 capsule by mouth daily with dinner.  90 capsule 0   • al mag oxide-diphenhydramine-lidocaine viscous (MAGIC MOUTHWASH) 1:1:1 suspension Swish and swallow 10 mL every 6 (six) hours as needed for mouth pain or discomfort 500 mL 1   • benzocaine-menthol (CEPACOL) 15-3.6 mg per lozenge Apply 1 lozenge to the mouth or throat every 2 (two) hours as needed (Sore throat) 18 lozenge 6   • Ca Carbonate-Mag Hydroxide (MI-ACID PO)      • clobetasol (TEMOVATE) 0.05 % ointment 1 application every 24 hours     • LORazepam (ATIVAN) 0.5 mg tablet Take 1 tablet (0.5 mg total) by mouth once as needed for anxiety for up to 1 dose Take 30 minutes prior to scheduled MRI, do not drive. (Patient not taking: Reported on 10/18/2023) 1 tablet 0     No current facility-administered medications for this visit. Allergies   Allergen Reactions   • Penicillins Other (See Comments)     "Unknown Reaction"      Immunizations:     Immunization History   Administered Date(s) Administered   • COVID-19 MODERNA VACC 0.5 ML IM 01/18/2021, 02/15/2021, 12/15/2021   • INFLUENZA 10/16/2014, 10/24/2015, 10/05/2016, 09/19/2017, 10/05/2018, 09/01/2020, 09/03/2021, 10/28/2022, 09/12/2023   • Influenza, Seasonal Vaccine, Quadrivalent, Adjuvanted, .5e 09/12/2023   • Pneumococcal Conjugate 13-Valent 07/22/2015   • Pneumococcal Polysaccharide PPV23 10/01/2011   • Td (adult), Unspecified 04/07/2016   • Zoster Vaccine Recombinant 10/05/2019, 12/12/2019   • influenza, trivalent, adjuvanted 09/18/2019      Health Maintenance: There are no preventive care reminders to display for this patient. Topic Date Due   • COVID-19 Vaccine (4 - Booster for Moderna series) 02/09/2022      Medicare Screening Tests and Risk Assessments:     Leo Coleman is here for his Subsequent Wellness visit. Health Risk Assessment:   Patient rates overall health as very good. Patient feels that their physical health rating is same. Patient is satisfied with their life. Eyesight was rated as same. Hearing was rated as same. Patient feels that their emotional and mental health rating is same. Patients states they are never, rarely angry. Patient states they are sometimes unusually tired/fatigued. Pain experienced in the last 7 days has been some. Patient's pain rating has been 5/10. Patient states that he has experienced no weight loss or gain in last 6 months. Depression Screening:   PHQ-2 Score: 1      Fall Risk Screening:    In the past year, patient has experienced: no history of falling in past year      Home Safety:  Patient has trouble with stairs inside or outside of their home. Patient has working smoke alarms and has working carbon monoxide detector. Home safety hazards include: none. Nutrition:   Current diet is Diabetic and Frequent junk food. Medications:   Patient is currently taking over-the-counter supplements. OTC medications include: see medication list. Patient is able to manage medications. Activities of Daily Living (ADLs)/Instrumental Activities of Daily Living (IADLs):   Walk and transfer into and out of bed and chair?: Yes  Dress and groom yourself?: Yes    Bathe or shower yourself?: Yes    Feed yourself? Yes  Do your laundry/housekeeping?: Yes  Manage your money, pay your bills and track your expenses?: Yes  Make your own meals?: Yes    Do your own shopping?: Yes    Previous Hospitalizations:   Any hospitalizations or ED visits within the last 12 months?: No      Advance Care Planning:   Living will: No    Durable POA for healthcare: No    Advanced directive: No      Cognitive Screening:   Provider or family/friend/caregiver concerned regarding cognition?: No    PREVENTIVE SCREENINGS      Cardiovascular Screening:    General: Screening Not Indicated and History Lipid Disorder      Diabetes Screening:     General: Screening Not Indicated and History Diabetes      Colorectal Cancer Screening:     General: Screening Not Indicated      Prostate Cancer Screening:    General: Screening Not Indicated      Osteoporosis Screening:    General: Risks and Benefits Discussed      Abdominal Aortic Aneurysm (AAA) Screening:    Risk factors include: tobacco use        General: Risks and Benefits Discussed      Lung Cancer Screening:     General: Screening Not Indicated      Hepatitis C Screening:    General: Risks and Benefits Discussed    Screening, Brief Intervention, and Referral to Treatment (SBIRT)    Screening  Typical number of drinks in a day: 0  Typical number of drinks in a week: 0  Interpretation: Low risk drinking behavior.     AUDIT-C Screenin) How often did you have a drink containing alcohol in the past year? never  2) How many drinks did you have on a typical day when you were drinking in the past year? 0  3) How often did you have 6 or more drinks on one occasion in the past year? never    AUDIT-C Score: 0  Interpretation: Score 0-3 (male): Negative screen for alcohol misuse    Single Item Drug Screening:  How often have you used an illegal drug (including marijuana) or a prescription medication for non-medical reasons in the past year? never    Single Item Drug Screen Score: 0  Interpretation: Negative screen for possible drug use disorder    Brief Intervention  Alcohol & drug use screenings were reviewed. No concerns regarding substance use disorder identified. Annual Depression Screening  Time spent screening and evaluating the patient for depression during today's encounter was 7 minutes. Other Counseling Topics:   Calcium and vitamin D intake and regular weightbearing exercise. No results found. Physical Exam:     /80 (BP Location: Left arm, Patient Position: Sitting, Cuff Size: Adult)   Pulse 73   Temp 97.9 °F (36.6 °C) (Tympanic)   Resp 16   Ht 5' 8" (1.727 m)   Wt 92.5 kg (204 lb)   SpO2 96%   BMI 31.02 kg/m²     Physical Exam  Vitals and nursing note reviewed. Constitutional:       General: He is not in acute distress. Appearance: He is well-developed. HENT:      Head: Normocephalic and atraumatic. Eyes:      Conjunctiva/sclera: Conjunctivae normal.   Cardiovascular:      Rate and Rhythm: Normal rate and regular rhythm. Heart sounds: No murmur heard. Pulmonary:      Effort: Pulmonary effort is normal. No respiratory distress. Breath sounds: Normal breath sounds. Abdominal:      Palpations: Abdomen is soft. Tenderness: There is no abdominal tenderness. Musculoskeletal:         General: No swelling. Cervical back: Neck supple. Skin:     General: Skin is warm and dry.       Capillary Refill: Capillary refill takes less than 2 seconds. Neurological:      Mental Status: He is alert.    Psychiatric:         Mood and Affect: Mood normal.          Vimal Ribera MD

## 2023-10-18 NOTE — ASSESSMENT & PLAN NOTE
A1c is well controlled at 6.6. Continue on metformin. Continue to follow low-carb diet. Continue to monitor fasting glucose and A1c. Come back in 6 months.   Lab Results   Component Value Date    HGBA1C 6.6 (H) 03/08/2023

## 2023-10-30 ENCOUNTER — APPOINTMENT (OUTPATIENT)
Dept: LAB | Facility: IMAGING CENTER | Age: 83
End: 2023-10-30
Payer: COMMERCIAL

## 2023-10-30 DIAGNOSIS — E11.8 TYPE 2 DIABETES MELLITUS WITH COMPLICATION (HCC): ICD-10-CM

## 2023-10-30 LAB
ALBUMIN SERPL BCP-MCNC: 4.2 G/DL (ref 3.5–5)
ALP SERPL-CCNC: 79 U/L (ref 34–104)
ALT SERPL W P-5'-P-CCNC: 23 U/L (ref 7–52)
ANION GAP SERPL CALCULATED.3IONS-SCNC: 13 MMOL/L
AST SERPL W P-5'-P-CCNC: 23 U/L (ref 13–39)
BASOPHILS # BLD AUTO: 0.05 THOUSANDS/ÂΜL (ref 0–0.1)
BASOPHILS NFR BLD AUTO: 1 % (ref 0–1)
BILIRUB SERPL-MCNC: 0.62 MG/DL (ref 0.2–1)
BUN SERPL-MCNC: 18 MG/DL (ref 5–25)
CALCIUM SERPL-MCNC: 9 MG/DL (ref 8.4–10.2)
CHLORIDE SERPL-SCNC: 101 MMOL/L (ref 96–108)
CHOLEST SERPL-MCNC: 111 MG/DL
CO2 SERPL-SCNC: 26 MMOL/L (ref 21–32)
CREAT SERPL-MCNC: 1.08 MG/DL (ref 0.6–1.3)
CREAT UR-MCNC: 136.5 MG/DL
EOSINOPHIL # BLD AUTO: 0.08 THOUSAND/ÂΜL (ref 0–0.61)
EOSINOPHIL NFR BLD AUTO: 1 % (ref 0–6)
ERYTHROCYTE [DISTWIDTH] IN BLOOD BY AUTOMATED COUNT: 13.2 % (ref 11.6–15.1)
EST. AVERAGE GLUCOSE BLD GHB EST-MCNC: 154 MG/DL
GFR SERPL CREATININE-BSD FRML MDRD: 62 ML/MIN/1.73SQ M
GLUCOSE P FAST SERPL-MCNC: 221 MG/DL (ref 65–99)
HBA1C MFR BLD: 7 %
HCT VFR BLD AUTO: 40.6 % (ref 36.5–49.3)
HDLC SERPL-MCNC: 42 MG/DL
HGB BLD-MCNC: 13.4 G/DL (ref 12–17)
IMM GRANULOCYTES # BLD AUTO: 0.02 THOUSAND/UL (ref 0–0.2)
IMM GRANULOCYTES NFR BLD AUTO: 0 % (ref 0–2)
LDLC SERPL CALC-MCNC: 39 MG/DL (ref 0–100)
LYMPHOCYTES # BLD AUTO: 2.18 THOUSANDS/ÂΜL (ref 0.6–4.47)
LYMPHOCYTES NFR BLD AUTO: 30 % (ref 14–44)
MCH RBC QN AUTO: 32.8 PG (ref 26.8–34.3)
MCHC RBC AUTO-ENTMCNC: 33 G/DL (ref 31.4–37.4)
MCV RBC AUTO: 99 FL (ref 82–98)
MICROALBUMIN UR-MCNC: 349.7 MG/L
MICROALBUMIN/CREAT 24H UR: 256 MG/G CREATININE (ref 0–30)
MONOCYTES # BLD AUTO: 0.58 THOUSAND/ÂΜL (ref 0.17–1.22)
MONOCYTES NFR BLD AUTO: 8 % (ref 4–12)
NEUTROPHILS # BLD AUTO: 4.43 THOUSANDS/ÂΜL (ref 1.85–7.62)
NEUTS SEG NFR BLD AUTO: 60 % (ref 43–75)
NRBC BLD AUTO-RTO: 0 /100 WBCS
PLATELET # BLD AUTO: 245 THOUSANDS/UL (ref 149–390)
PMV BLD AUTO: 11.5 FL (ref 8.9–12.7)
POTASSIUM SERPL-SCNC: 4.1 MMOL/L (ref 3.5–5.3)
PROT SERPL-MCNC: 6.9 G/DL (ref 6.4–8.4)
RBC # BLD AUTO: 4.09 MILLION/UL (ref 3.88–5.62)
SODIUM SERPL-SCNC: 140 MMOL/L (ref 135–147)
T4 FREE SERPL-MCNC: 1.09 NG/DL (ref 0.61–1.12)
TRIGL SERPL-MCNC: 148 MG/DL
TSH SERPL DL<=0.05 MIU/L-ACNC: 5.86 UIU/ML (ref 0.45–4.5)
WBC # BLD AUTO: 7.34 THOUSAND/UL (ref 4.31–10.16)

## 2023-10-30 PROCEDURE — 80061 LIPID PANEL: CPT

## 2023-10-30 PROCEDURE — 84443 ASSAY THYROID STIM HORMONE: CPT

## 2023-10-30 PROCEDURE — 82570 ASSAY OF URINE CREATININE: CPT

## 2023-10-30 PROCEDURE — 83036 HEMOGLOBIN GLYCOSYLATED A1C: CPT

## 2023-10-30 PROCEDURE — 82043 UR ALBUMIN QUANTITATIVE: CPT

## 2023-10-30 PROCEDURE — 80053 COMPREHEN METABOLIC PANEL: CPT

## 2023-10-30 PROCEDURE — 36415 COLL VENOUS BLD VENIPUNCTURE: CPT

## 2023-10-30 PROCEDURE — 84439 ASSAY OF FREE THYROXINE: CPT

## 2023-10-30 PROCEDURE — 85025 COMPLETE CBC W/AUTO DIFF WBC: CPT

## 2023-11-14 DIAGNOSIS — E78.5 HYPERLIPIDEMIA, UNSPECIFIED HYPERLIPIDEMIA TYPE: ICD-10-CM

## 2023-11-14 RX ORDER — ATORVASTATIN CALCIUM 10 MG/1
10 TABLET, FILM COATED ORAL
Qty: 90 TABLET | Refills: 0 | Status: SHIPPED | OUTPATIENT
Start: 2023-11-14

## 2023-12-04 DIAGNOSIS — E11.8 TYPE 2 DIABETES MELLITUS WITH COMPLICATION (HCC): ICD-10-CM

## 2023-12-04 DIAGNOSIS — I10 HYPERTENSION, UNSPECIFIED TYPE: ICD-10-CM

## 2023-12-04 DIAGNOSIS — N40.0 BENIGN PROSTATIC HYPERPLASIA, UNSPECIFIED WHETHER LOWER URINARY TRACT SYMPTOMS PRESENT: ICD-10-CM

## 2023-12-04 DIAGNOSIS — K21.9 GASTROESOPHAGEAL REFLUX DISEASE WITHOUT ESOPHAGITIS: ICD-10-CM

## 2023-12-04 RX ORDER — PANTOPRAZOLE SODIUM 40 MG/1
40 TABLET, DELAYED RELEASE ORAL DAILY
Qty: 90 TABLET | Refills: 0 | Status: SHIPPED | OUTPATIENT
Start: 2023-12-04

## 2023-12-04 RX ORDER — TAMSULOSIN HYDROCHLORIDE 0.4 MG/1
CAPSULE ORAL
Qty: 90 CAPSULE | Refills: 0 | Status: SHIPPED | OUTPATIENT
Start: 2023-12-04

## 2023-12-05 RX ORDER — PANTOPRAZOLE SODIUM 40 MG/1
40 TABLET, DELAYED RELEASE ORAL DAILY
Qty: 90 TABLET | Refills: 0 | OUTPATIENT
Start: 2023-12-05

## 2023-12-05 RX ORDER — TAMSULOSIN HYDROCHLORIDE 0.4 MG/1
CAPSULE ORAL
Qty: 90 CAPSULE | Refills: 0 | OUTPATIENT
Start: 2023-12-05

## 2023-12-05 RX ORDER — AMLODIPINE BESYLATE 10 MG/1
10 TABLET ORAL DAILY
Qty: 90 TABLET | Refills: 0 | Status: SHIPPED | OUTPATIENT
Start: 2023-12-05

## 2023-12-05 NOTE — TELEPHONE ENCOUNTER
Pt last seen 10/18/23, has appt 4/17/23. Amlodipine sent to pharmacy. Flomax, Metformin, and Protonix refused.  Duplicate request.

## 2023-12-11 ENCOUNTER — CLINICAL SUPPORT (OUTPATIENT)
Dept: RADIATION ONCOLOGY | Facility: HOSPITAL | Age: 83
End: 2023-12-11
Attending: STUDENT IN AN ORGANIZED HEALTH CARE EDUCATION/TRAINING PROGRAM
Payer: COMMERCIAL

## 2023-12-11 VITALS
RESPIRATION RATE: 18 BRPM | TEMPERATURE: 98.1 F | SYSTOLIC BLOOD PRESSURE: 148 MMHG | BODY MASS INDEX: 31.02 KG/M2 | OXYGEN SATURATION: 95 % | HEART RATE: 69 BPM | DIASTOLIC BLOOD PRESSURE: 80 MMHG | HEIGHT: 68 IN

## 2023-12-11 DIAGNOSIS — C32.9 LARYNGEAL SQUAMOUS CELL CARCINOMA (HCC): Primary | ICD-10-CM

## 2023-12-11 PROCEDURE — 99211 OFF/OP EST MAY X REQ PHY/QHP: CPT | Performed by: STUDENT IN AN ORGANIZED HEALTH CARE EDUCATION/TRAINING PROGRAM

## 2023-12-11 PROCEDURE — 99213 OFFICE O/P EST LOW 20 MIN: CPT | Performed by: STUDENT IN AN ORGANIZED HEALTH CARE EDUCATION/TRAINING PROGRAM

## 2023-12-11 NOTE — PROGRESS NOTES
Lilo  1940 is a 79 y.o. 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 On 5/8/23 he completed a course of definitive RT to a dose of 6300 cGy in 28 fractions. He did not receive any concurrent therapy. EOT telemedicine phone call 6/5/23. He presents today for follow up.     6/16/23 Otolaryngology - Dr. Sky Gilliland  Pt c/o phlegm in throat. No shortness of breath. Flexible Fiberoptic Nasolaryngoscopy Procedure Note:  Indication:  Laryngeal cancer  Verbal consent obtained. Surgeon: Jackson Ferrell MD  Anesthesia: 4% lidocaine, oxymetazoline  Scope passed through nasal cavity   Nasopharynx: unremarkable  Oropharynx: unremarkable  Hypopharynx/Larynx:              Vocal fold mobility = nl              Laryngeal edema  = mod PCE              Laryngeal erythema = none              Vocal folds = nl              Valleculae= clear              Piriform Sinuses= clear              Other findings = none  Patient tolerated procedure well without complications    0/06/95 Otolaryngology - Dr. Sky Gilliland  Pt c/o phlegm in throat. No shortness of breath. Flexible Fiberoptic Nasolaryngoscopy Procedure Note:  Indication:  Laryngeal cancer  Verbal consent obtained. Surgeon: Jackson Ferrell MD  Anesthesia: 4% lidocaine, oxymetazoline  Scope passed through nasal cavity   Nasopharynx: unremarkable  Oropharynx: unremarkable  Hypopharynx/Larynx:              Vocal fold mobility = nl              Laryngeal edema  = mod PCE              Laryngeal erythema = none              Vocal folds = nl              Valleculae= clear              Piriform Sinuses= clear              Other findings = none  Patient tolerated procedure well without complications    2/18/45 Otolaryngology - Dr. Sky Gilliland  Pt c/o phlegm in throat and hoarseness. No shortness of breath, or trouble sleeping. Flexible Fiberoptic Nasolaryngoscopy Procedure Note:  Indication:  Laryngeal cancer  Verbal consent obtained.   Surgeon: Jackson Ferrell MD  Anesthesia: 4% lidocaine, oxymetazoline  Scope passed through nasal cavity   Nasopharynx: unremarkable  Oropharynx: unremarkable  Hypopharynx/Larynx:              Vocal fold mobility = nl              Laryngeal edema  = mod PCE              Laryngeal erythema = none              Vocal folds = nl              Valleculae= clear              Piriform Sinuses= clear              Other findings = none  Patient tolerated procedure well without complications    4/59/81 Otolaryngology - Dr. Freedman Postal  Flexible Fiberoptic Nasolaryngoscopy Procedure Note:  Indication:  Laryngeal cancer  Verbal consent obtained. Surgeon: Christy Vega MD  Anesthesia: 4% lidocaine, oxymetazoline  Scope passed through nasal cavity   Nasopharynx: unremarkable  Oropharynx: unremarkable  Hypopharynx/Larynx:              Vocal fold mobility = nl              Laryngeal edema  = mod PCE              Laryngeal erythema = none              Vocal folds = nl              Valleculae= clear              Piriform Sinuses= clear              Other findings = none  Patient tolerated procedure well without complications    76/61/59 Otolaryngology - Dr. Freedman Postal  Flexible Fiberoptic Nasolaryngoscopy Procedure Note:  Indication:  Laryngeal cancer  Verbal consent obtained. Surgeon: Christy Vega MD  Anesthesia: 4% lidocaine, oxymetazoline  Scope passed through nasal cavity   Nasopharynx: unremarkable  Oropharynx: unremarkable  Hypopharynx/Larynx:              Vocal fold mobility = nl              Laryngeal edema  = mod PCE              Laryngeal erythema = none              Vocal folds = nl              Valleculae= clear              Piriform Sinuses= clear              Other findings = none  Patient tolerated procedure well without complications    60/50/10 Otolaryngology - Dr. Freedman Postal  Flexible Fiberoptic Nasolaryngoscopy Procedure Note:  Indication:  Laryngeal cancer  Verbal consent obtained.   Surgeon: Christy Vega MD  Anesthesia: 4% lidocaine, oxymetazoline  Scope passed through nasal cavity   Nasopharynx: unremarkable  Oropharynx: unremarkable  Hypopharynx/Larynx:              Vocal fold mobility = nl              Laryngeal edema  = mod PCE              Laryngeal erythema = none              Vocal folds = nl              Valleculae= clear              Piriform Sinuses= clear              Other findings = none  Patient tolerated procedure well without complications    Upcomin23 ENT    Oncology History   Laryngeal squamous cell carcinoma ([address])    Initial Diagnosis    Laryngeal squamous cell carcinoma ([address])     2/15/2023 Biopsy    MDL with biopsy:  Larynx, laryngeal mass, intraoperative and permanent biopsies:  - Multiple fragments of at least superficially invasive squamous cell carcinoma, conventional (keratinizing), moderately differentiated, extending to tissue edges. 3/7/2023 -  Cancer Staged    Staging form: Larynx - Glottis, AJCC 8th Edition  - Clinical stage from 3/7/2023: Stage I (cT1b, cN0, cM0) - Signed by Aly Gutierrez MD on 3/7/2023  Stage prefix: Initial diagnosis           Review of Systems:  Review of Systems   Constitutional: Negative. HENT:  Positive for dental problem (New chipped tooth, seeing the dentist next week). Negative for sore throat and trouble swallowing. Feels like he has phlegm, does not expectorate   Eyes: Negative. Respiratory: Negative. Cardiovascular: Negative. Gastrointestinal: Negative. Endocrine: Negative. Genitourinary: Negative. Musculoskeletal: Negative. Skin: Negative. Allergic/Immunologic: Negative. Neurological: Negative. Hematological: Negative. Psychiatric/Behavioral: Negative.          Clinical Trial: no        Health Maintenance   Topic Date Due    Diabetic Foot Exam  2021    COVID-19 Vaccine (4 - Booster for Moderna series) 2022    PT PLAN OF CARE  2023    HEMOGLOBIN A1C  2024    Fall Risk  10/18/2024    Medicare Annual Wellness Visit (AWV)  10/18/2024 BMI: Followup Plan  10/18/2024    BMI: Adult  11/27/2024    Depression Screening  12/11/2024    DM Eye Exam  07/03/2025    Pneumococcal Vaccine: 65+ Years  Completed    Influenza Vaccine  Completed    HIB Vaccine  Aged Out    IPV Vaccine  Aged Out    Hepatitis A Vaccine  Aged Out    Meningococcal ACWY Vaccine  Aged Out    HPV Vaccine  Aged Out     Patient Active Problem List   Diagnosis    Hypertension    Type 2 diabetes mellitus with complication ([address])    Allergic rhinitis    Anemia    Benign prostatic hyperplasia    Hyperlipidemia    Obesity    Left-sided low back pain with left-sided sciatica    Prostate cancer screening    Obesity (BMI 30.0-34. 9)    Healthcare maintenance    Cataracta    Pre-op examination    Gastroesophageal reflux disease without esophagitis    Lumbar radiculopathy    Allergies    Postnasal drip    Polyneuropathy    Laryngeal squamous cell carcinoma (HCC)    Medicare annual wellness visit, subsequent     Past Medical History:   Diagnosis Date    Diabetes mellitus ([address])     GERD (gastroesophageal reflux disease)     Hyperlipidemia     Hypertension     Laryngeal cancer ([address])      Past Surgical History:   Procedure Laterality Date    GALLBLADDER SURGERY  1968    LA LARYNGOSCOPY W/BIOPSY MICROSCOPE/TELESCOPE N/A 2/15/2023    Procedure: MICROLARYGOSCOPY AND BIOPSY; FROZEN SECTION;  Surgeon: Washington Amato MD;  Location: [facility];  Service: ENT     Family History   Problem Relation Age of Onset    Breast cancer Mother     No Known Problems Father      Social History     Socioeconomic History    Marital status: /Civil Union     Spouse name: Not on file    Number of children: Not on file    Years of education: Not on file    Highest education level: Not on file   Occupational History    Not on file   Tobacco Use    Smoking status: Former     Packs/day: 0.50     Types: Cigarettes    Smokeless tobacco: Never    Tobacco comments:     no passive smoke exposure    Vaping Use    Vaping Use: Never used   Substance and Sexual Activity    Alcohol use: Not Currently    Drug use: Never    Sexual activity: Not on file   Other Topics Concern    Not on file   Social History Narrative    Not on file     Social Determinants of Health     Financial Resource Strain: Not on file   Food Insecurity: Not on file   Transportation Needs: Not on file   Physical Activity: Not on file   Stress: Not on file   Social Connections: Not on file   Intimate Partner Violence: Not on file   Housing Stability: Not on file       Current Outpatient Medications:     amLODIPine (NORVASC) 10 mg tablet, TAKE ONE TABLET BY MOUTH ONCE DAILY, Disp: 90 tablet, Rfl: 0    aspirin (ECOTRIN LOW STRENGTH) 81 mg EC tablet, 81 mg every other day, Disp: , Rfl:     atorvastatin (LIPITOR) 10 mg tablet, TAKE 1 TABLET BY MOUTH ONCE DAILY AT BEDTIME, Disp: 90 tablet, Rfl: 0    benzocaine-menthol (CEPACOL) 15-3.6 mg per lozenge, Apply 1 lozenge to the mouth or throat every 2 (two) hours as needed (Sore throat), Disp: 18 lozenge, Rfl: 6    Ca Carbonate-Mag Hydroxide (MI-ACID PO), , Disp: , Rfl:     clobetasol (TEMOVATE) 0.05 % ointment, 1 application every 24 hours, Disp: , Rfl:     gabapentin (NEURONTIN) 100 mg capsule, TAKE ONE CAPSULE BY MOUTH TWICE DAILY, Disp: 180 capsule, Rfl: 0    magnesium chloride (MAG64) 64 MG TBEC EC tablet, Take 128 mg by mouth daily, Disp: , Rfl:     metFORMIN (GLUCOPHAGE) 1000 MG tablet, TAKE ONE TABLET BY MOUTH TWICE A DAY WITH MEALS, Disp: 180 tablet, Rfl: 0    olmesartan (BENICAR) 40 mg tablet, Take 1 tablet (40 mg total) by mouth every morning, Disp: 90 tablet, Rfl: 1    Omega-3 Fatty Acids (FISH OIL) 1,000 mg, 1,000 mg see administration instructions Takes 4 times per week, Disp: , Rfl:     pantoprazole (PROTONIX) 40 mg tablet, TAKE ONE TABLET BY MOUTH EVERY MORNING, Disp: 90 tablet, Rfl: 0    tamsulosin (FLOMAX) 0.4 mg, TAKE ONE CAPSULE BY MOUTH ONCE DAILY WITH DINNER, Disp: 90 capsule, Rfl: 0    al [employer] oxide-diphenhydramine-lidocaine viscous (MAGIC MOUTHWASH) 1:1:1 suspension, Swish and swallow 10 mL every 6 (six) hours as needed for mouth pain or discomfort (Patient not taking: Reported on 12/11/2023), Disp: 500 mL, Rfl: 1    fluticasone (FLONASE) 50 mcg/act nasal spray, 1 spray into each nostril daily (Patient not taking: Reported on 12/11/2023), Disp: 16 g, Rfl: 3    ibuprofen (MOTRIN) 600 mg tablet, TAKE ONE TABLET BY MOUTH THREE TIMES DAILY WITH FOOD  (Patient not taking: Reported on 12/11/2023), Disp: 45 tablet, Rfl: 0    LORazepam (ATIVAN) 0.5 mg tablet, Take 1 tablet (0.5 mg total) by mouth once as needed for anxiety for up to 1 dose Take 30 minutes prior to scheduled MRI, do not drive.  (Patient not taking: Reported on 10/18/2023), Disp: 1 tablet, Rfl: 0  Allergies   Allergen Reactions    Penicillins Other (See Comments)     "Unknown Reaction"     Vitals:    12/11/23 1345   BP: 148/80   BP Location: Left arm   Patient Position: Sitting   Cuff Size: Standard   Pulse: 69   Resp: 18   Temp: 98.1 °F (36.7 °C)   TempSrc: Temporal   SpO2: 95%   Height: 5' 8" (1.727 m)      Pain Score: 0-No pain

## 2023-12-12 NOTE — PROGRESS NOTES
Follow-up - Radiation Oncology   Christie Walls 1940 79 y.o. male [id number]      History of Present Illness   Cancer Staging   Laryngeal squamous cell carcinoma ([facility])  Staging form: Larynx - Glottis, AJCC 8th Edition  - Clinical stage from 3/7/2023: Stage I (cT1b, cN0, cM0) - Signed by Maulik Guevara MD on 3/7/2023  Stage prefix: Initial diagnosis    Mr. 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 On 5/8/23 he completed a course of definitive RT to a dose of 6300 cGy in 28 fractions. He did not receive any concurrent therapy. He returns today for follow-up. Interval History:  The patient was last seen in visit on 6/5/23, shortly after completion of RT. At that time he was doing well overall with improvement (though not resolution) of his RT related dysphagia, odynophagia, and sore throat. Since then he has had continued improvement in symptoms, at this point only reporting ongoing hoarse voice. He does not have any difficulty eating, dysphagia, or pain. He continues to follow with Dr. Ian Silva undergoing regular endoscopic surveillance with HOLLI on exam (most recently on 11/27/23). Historical Information   Oncology History   Laryngeal squamous cell carcinoma ([facility])   2023 Initial Diagnosis    Laryngeal squamous cell carcinoma ([facility])     2/15/2023 Biopsy    MDL with biopsy:  Larynx, laryngeal mass, intraoperative and permanent biopsies:  - Multiple fragments of at least superficially invasive squamous cell carcinoma, conventional (keratinizing), moderately differentiated, extending to tissue edges.        3/7/2023 -  Cancer Staged    Staging form: Larynx - Glottis, AJCC 8th Edition  - Clinical stage from 3/7/2023: Stage I (cT1b, cN0, cM0) - Signed by Maulik Guevara MD on 3/7/2023  Stage prefix: Initial diagnosis           Past Medical History:   Diagnosis Date    Diabetes mellitus ([facility])     GERD (gastroesophageal reflux disease)     Hyperlipidemia Hypertension     Laryngeal cancer [facility])      Past Surgical History:   Procedure Laterality Date    GALLBLADDER SURGERY  1968    SD LARYNGOSCOPY W/BIOPSY MICROSCOPE/TELESCOPE N/A 2/15/2023    Procedure: MICROLARYGOSCOPY AND BIOPSY; FROZEN SECTION;  Surgeon: Anibal Del Real MD;  Location: [facility];  Service: ENT       Social History   Social History     Substance and Sexual Activity   Alcohol Use Not Currently     Social History     Substance and Sexual Activity   Drug Use Never     Social History     Tobacco Use   Smoking Status Former    Packs/day: 0.50    Types: Cigarettes   Smokeless Tobacco Never   Tobacco Comments    no passive smoke exposure          Meds/Allergies     Current Outpatient Medications:     amLODIPine (NORVASC) 10 mg tablet, TAKE ONE TABLET BY MOUTH ONCE DAILY, Disp: 90 tablet, Rfl: 0    aspirin (ECOTRIN LOW STRENGTH) 81 mg EC tablet, 81 mg every other day, Disp: , Rfl:     atorvastatin (LIPITOR) 10 mg tablet, TAKE 1 TABLET BY MOUTH ONCE DAILY AT BEDTIME, Disp: 90 tablet, Rfl: 0    benzocaine-menthol (CEPACOL) 15-3.6 mg per lozenge, Apply 1 lozenge to the mouth or throat every 2 (two) hours as needed (Sore throat), Disp: 18 lozenge, Rfl: 6    Ca Carbonate-Mag Hydroxide (MI-ACID PO), , Disp: , Rfl:     clobetasol (TEMOVATE) 0.05 % ointment, 1 application every 24 hours, Disp: , Rfl:     gabapentin (NEURONTIN) 100 mg capsule, TAKE ONE CAPSULE BY MOUTH TWICE DAILY, Disp: 180 capsule, Rfl: 0    magnesium chloride (MAG64) 64 MG TBEC EC tablet, Take 128 mg by mouth daily, Disp: , Rfl:     metFORMIN (GLUCOPHAGE) 1000 MG tablet, TAKE ONE TABLET BY MOUTH TWICE A DAY WITH MEALS, Disp: 180 tablet, Rfl: 0    olmesartan (BENICAR) 40 mg tablet, Take 1 tablet (40 mg total) by mouth every morning, Disp: 90 tablet, Rfl: 1    Omega-3 Fatty Acids (FISH OIL) 1,000 mg, 1,000 mg see administration instructions Takes 4 times per week, Disp: , Rfl:     pantoprazole (PROTONIX) 40 mg tablet, TAKE ONE TABLET BY MOUTH EVERY MORNING, Disp: 90 tablet, Rfl: 0    tamsulosin (FLOMAX) 0.4 mg, TAKE ONE CAPSULE BY MOUTH ONCE DAILY WITH DINNER, Disp: 90 capsule, Rfl: 0    al mag oxide-diphenhydramine-lidocaine viscous (MAGIC MOUTHWASH) 1:1:1 suspension, Swish and swallow 10 mL every 6 (six) hours as needed for mouth pain or discomfort (Patient not taking: Reported on 12/11/2023), Disp: 500 mL, Rfl: 1    fluticasone (FLONASE) 50 mcg/act nasal spray, 1 spray into each nostril daily (Patient not taking: Reported on 12/11/2023), Disp: 16 g, Rfl: 3    ibuprofen (MOTRIN) 600 mg tablet, TAKE ONE TABLET BY MOUTH THREE TIMES DAILY WITH FOOD  (Patient not taking: Reported on 12/11/2023), Disp: 45 tablet, Rfl: 0    LORazepam (ATIVAN) 0.5 mg tablet, Take 1 tablet (0.5 mg total) by mouth once as needed for anxiety for up to 1 dose Take 30 minutes prior to scheduled MRI, do not drive. (Patient not taking: Reported on 10/18/2023), Disp: 1 tablet, Rfl: 0  Allergies   Allergen Reactions    Penicillins Other (See Comments)     "Unknown Reaction"     Review of Systems   Constitutional: Negative. HENT:  Positive for dental problem (New chipped tooth, seeing the dentist next week). Negative for sore throat and trouble swallowing. Feels like he has phlegm, does not expectorate   Eyes: Negative. Respiratory: Negative. Cardiovascular: Negative. Gastrointestinal: Negative. Endocrine: Negative. Genitourinary: Negative. Musculoskeletal: Negative. Skin: Negative. Allergic/Immunologic: Negative. Neurological: Negative. Hematological: Negative. Psychiatric/Behavioral: Negative. OBJECTIVE:   /80 (BP Location: Left arm, Patient Position: Sitting, Cuff Size: Standard)   Pulse 69   Temp 98.1 °F (36.7 °C) (Temporal)   Resp 18   Ht 5' 8" (1.727 m)   SpO2 95%   BMI 31.02 kg/m²   Pain Assessment:  0  ECOG/Zubrod/WHO: 1 - Symptomatic but completely ambulatory    Physical Exam   Well appearing. NAD.    No increased work of breathing. Extremities warm and well perfused. Mild hoarseness of voice appreciated. No residual skin changes. RESULTS    Lab Results: No results found for this or any previous visit (from the past 672 hour(s)). Imaging Studies:No results found. Assessment/Plan:  No orders of the defined types were placed in this encounter. Approximately 6 months following completion of definitive RT the patient is doing well overall and remains clinically without evidence of recurrence. He has additionally recovered well following RT without substantial ongoing side effects, apart from mild hoarse voice. He will continue to follow with Dr. Marlin Gaspar going forward for close surveillance; as he has well established follow-up with ENT I will defer further follow-up in our department. I will remain available should any questions or concerns arise at any point. Ellen Yin MD  22/09/7572,1:11 PM    Portions of the record may have been created with voice recognition software. Occasional wrong word or "sound a like" substitutions may have occurred due to the inherent limitations of voice recognition software. Read the chart carefully and recognize, using context, where substitutions have occurred.

## 2023-12-17 PROBLEM — Z00.00 MEDICARE ANNUAL WELLNESS VISIT, SUBSEQUENT: Status: RESOLVED | Noted: 2023-10-18 | Resolved: 2023-12-17

## 2024-01-17 DIAGNOSIS — G89.4 CHRONIC PAIN SYNDROME: ICD-10-CM

## 2024-01-17 DIAGNOSIS — M51.16 LUMBAR DISC DISEASE WITH RADICULOPATHY: ICD-10-CM

## 2024-01-17 DIAGNOSIS — M51.26 LUMBAR DISC HERNIATION: ICD-10-CM

## 2024-01-17 DIAGNOSIS — M21.371 FOOT DROP, RIGHT: ICD-10-CM

## 2024-01-17 DIAGNOSIS — Z91.81 FALLS INFREQUENTLY: ICD-10-CM

## 2024-01-17 NOTE — TELEPHONE ENCOUNTER
Reason for call:   [x] Refill   [] Prior Auth  [] Other:     Office:   [] PCP/Provider -   [x] Specialty/Provider - spine and pain/ [facility]     Medication: gabapentin     Dose/Frequency: 100 mg/ twice daily     Quantity: 90 day supply     Pharmacy: [facility]     Does the patient have enough for 3 days?   [x] Yes   [] No - Send as HP to POD

## 2024-01-18 RX ORDER — GABAPENTIN 100 MG/1
100 CAPSULE ORAL 2 TIMES DAILY
Qty: 180 CAPSULE | Refills: 0 | Status: SHIPPED | OUTPATIENT
Start: 2024-01-18

## 2024-01-18 NOTE — TELEPHONE ENCOUNTER
Refilled as I do not want patient to abruptly stop gabapentin  However, would still like him to be seen in office with me or CRNP as it has been nearly 10 months since last office evaluation  Thank you

## 2024-02-06 DIAGNOSIS — I10 HTN (HYPERTENSION), BENIGN: ICD-10-CM

## 2024-02-06 RX ORDER — OLMESARTAN MEDOXOMIL 40 MG/1
40 TABLET ORAL DAILY
Qty: 90 TABLET | Refills: 0 | Status: SHIPPED | OUTPATIENT
Start: 2024-02-06

## 2024-02-09 DIAGNOSIS — E78.5 HYPERLIPIDEMIA, UNSPECIFIED HYPERLIPIDEMIA TYPE: ICD-10-CM

## 2024-02-09 RX ORDER — ATORVASTATIN CALCIUM 10 MG/1
10 TABLET, FILM COATED ORAL
Qty: 90 TABLET | Refills: 1 | Status: SHIPPED | OUTPATIENT
Start: 2024-02-09

## 2024-02-21 PROBLEM — Z12.5 PROSTATE CANCER SCREENING: Status: RESOLVED | Noted: 2019-09-20 | Resolved: 2024-02-21

## 2024-02-21 PROBLEM — Z00.00 HEALTHCARE MAINTENANCE: Status: RESOLVED | Noted: 2020-07-14 | Resolved: 2024-02-21

## 2024-03-01 DIAGNOSIS — N40.0 BENIGN PROSTATIC HYPERPLASIA, UNSPECIFIED WHETHER LOWER URINARY TRACT SYMPTOMS PRESENT: ICD-10-CM

## 2024-03-01 DIAGNOSIS — K21.9 GASTROESOPHAGEAL REFLUX DISEASE WITHOUT ESOPHAGITIS: ICD-10-CM

## 2024-03-01 DIAGNOSIS — I10 HYPERTENSION, UNSPECIFIED TYPE: ICD-10-CM

## 2024-03-01 DIAGNOSIS — E11.8 TYPE 2 DIABETES MELLITUS WITH COMPLICATION (HCC): ICD-10-CM

## 2024-03-01 RX ORDER — AMLODIPINE BESYLATE 10 MG/1
10 TABLET ORAL DAILY
Qty: 90 TABLET | Refills: 0 | Status: ON HOLD | OUTPATIENT
Start: 2024-03-01

## 2024-03-01 RX ORDER — PANTOPRAZOLE SODIUM 40 MG/1
40 TABLET, DELAYED RELEASE ORAL DAILY
Qty: 90 TABLET | Refills: 0 | Status: ON HOLD | OUTPATIENT
Start: 2024-03-01

## 2024-03-04 ENCOUNTER — APPOINTMENT (EMERGENCY)
Dept: RADIOLOGY | Facility: HOSPITAL | Age: 84
DRG: 480 | End: 2024-03-04
Payer: COMMERCIAL

## 2024-03-04 ENCOUNTER — HOSPITAL ENCOUNTER (INPATIENT)
Facility: HOSPITAL | Age: 84
LOS: 10 days | Discharge: NURSING, REHAB OR LONG-TERM CARE | DRG: 480 | End: 2024-03-14
Attending: EMERGENCY MEDICINE | Admitting: SURGERY
Payer: COMMERCIAL

## 2024-03-04 DIAGNOSIS — R50.9 FEVER: ICD-10-CM
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
## 2024-03-04 DIAGNOSIS — S72.141A CLOSED FRACTURE OF FEMUR, INTERTROCHANTERIC, RIGHT, INITIAL ENCOUNTER (HCC): Primary | ICD-10-CM

## 2024-03-04 DIAGNOSIS — U07.1 COVID: ICD-10-CM

## 2024-03-04 LAB
25(OH)D3 SERPL-MCNC: 30.6 NG/ML (ref 30–100)
ABO GROUP BLD: NORMAL
ALBUMIN SERPL BCP-MCNC: 4.3 G/DL (ref 3.5–5)
ALP SERPL-CCNC: 80 U/L (ref 34–104)
ALT SERPL W P-5'-P-CCNC: 22 U/L (ref 7–52)
ANION GAP SERPL CALCULATED.3IONS-SCNC: 12 MMOL/L
APTT PPP: 27 SECONDS (ref 23–37)
AST SERPL W P-5'-P-CCNC: 22 U/L (ref 13–39)
BACTERIA UR QL AUTO: ABNORMAL /HPF
BASOPHILS # BLD AUTO: 0.03 THOUSANDS/ÂΜL (ref 0–0.1)
BASOPHILS NFR BLD AUTO: 0 % (ref 0–1)
BILIRUB SERPL-MCNC: 0.62 MG/DL (ref 0.2–1)
BILIRUB UR QL STRIP: NEGATIVE
BLD GP AB SCN SERPL QL: NEGATIVE
BUN SERPL-MCNC: 16 MG/DL (ref 5–25)
CALCIUM SERPL-MCNC: 9.3 MG/DL (ref 8.4–10.2)
CHLORIDE SERPL-SCNC: 101 MMOL/L (ref 96–108)
CLARITY UR: CLEAR
CO2 SERPL-SCNC: 24 MMOL/L (ref 21–32)
COLOR UR: ABNORMAL
CREAT SERPL-MCNC: 1.19 MG/DL (ref 0.6–1.3)
EOSINOPHIL # BLD AUTO: 0 THOUSAND/ÂΜL (ref 0–0.61)
EOSINOPHIL NFR BLD AUTO: 0 % (ref 0–6)
ERYTHROCYTE [DISTWIDTH] IN BLOOD BY AUTOMATED COUNT: 13.4 % (ref 11.6–15.1)
ERYTHROCYTE [SEDIMENTATION RATE] IN BLOOD: 13 MM/HOUR (ref 0–19)
FLUAV RNA RESP QL NAA+PROBE: NEGATIVE
FLUBV RNA RESP QL NAA+PROBE: NEGATIVE
GFR SERPL CREATININE-BSD FRML MDRD: 55 ML/MIN/1.73SQ M
GLUCOSE SERPL-MCNC: 245 MG/DL (ref 65–140)
GLUCOSE UR STRIP-MCNC: ABNORMAL MG/DL
HCT VFR BLD AUTO: 38.5 % (ref 36.5–49.3)
HGB BLD-MCNC: 12.8 G/DL (ref 12–17)
HGB UR QL STRIP.AUTO: NEGATIVE
HYALINE CASTS #/AREA URNS LPF: ABNORMAL /LPF
IMM GRANULOCYTES # BLD AUTO: 0.03 THOUSAND/UL (ref 0–0.2)
IMM GRANULOCYTES NFR BLD AUTO: 0 % (ref 0–2)
INR PPP: 1.16 (ref 0.84–1.19)
KETONES UR STRIP-MCNC: ABNORMAL MG/DL
LACTATE SERPL-SCNC: 1.7 MMOL/L (ref 0.5–2)
LACTATE SERPL-SCNC: 3.4 MMOL/L (ref 0.5–2)
LEUKOCYTE ESTERASE UR QL STRIP: NEGATIVE
LYMPHOCYTES # BLD AUTO: 0.48 THOUSANDS/ÂΜL (ref 0.6–4.47)
LYMPHOCYTES NFR BLD AUTO: 6 % (ref 14–44)
MCH RBC QN AUTO: 31.8 PG (ref 26.8–34.3)
MCHC RBC AUTO-ENTMCNC: 33.2 G/DL (ref 31.4–37.4)
MCV RBC AUTO: 96 FL (ref 82–98)
MONOCYTES # BLD AUTO: 0.74 THOUSAND/ÂΜL (ref 0.17–1.22)
MONOCYTES NFR BLD AUTO: 9 % (ref 4–12)
MUCOUS THREADS UR QL AUTO: ABNORMAL
NEUTROPHILS # BLD AUTO: 6.68 THOUSANDS/ÂΜL (ref 1.85–7.62)
NEUTS SEG NFR BLD AUTO: 85 % (ref 43–75)
NITRITE UR QL STRIP: NEGATIVE
NON-SQ EPI CELLS URNS QL MICRO: ABNORMAL /HPF
NRBC BLD AUTO-RTO: 0 /100 WBCS
PH UR STRIP.AUTO: 5.5 [PH]
PLATELET # BLD AUTO: 188 THOUSANDS/UL (ref 149–390)
PLATELET # BLD AUTO: 191 THOUSANDS/UL (ref 149–390)
PMV BLD AUTO: 11.6 FL (ref 8.9–12.7)
PMV BLD AUTO: 11.8 FL (ref 8.9–12.7)
POTASSIUM SERPL-SCNC: 4.2 MMOL/L (ref 3.5–5.3)
PROCALCITONIN SERPL-MCNC: 0.06 NG/ML
PROT SERPL-MCNC: 7 G/DL (ref 6.4–8.4)
PROT UR STRIP-MCNC: ABNORMAL MG/DL
PROTHROMBIN TIME: 14.7 SECONDS (ref 11.6–14.5)
PTH-INTACT SERPL-MCNC: 42.9 PG/ML (ref 12–88)
RBC # BLD AUTO: 4.03 MILLION/UL (ref 3.88–5.62)
RBC #/AREA URNS AUTO: ABNORMAL /HPF
RH BLD: POSITIVE
RSV RNA RESP QL NAA+PROBE: NEGATIVE
SARS-COV-2 RNA RESP QL NAA+PROBE: POSITIVE
SODIUM SERPL-SCNC: 137 MMOL/L (ref 135–147)
SP GR UR STRIP.AUTO: 1.03 (ref 1–1.03)
SPECIMEN EXPIRATION DATE: NORMAL
TSH SERPL DL<=0.05 MIU/L-ACNC: 1.93 UIU/ML (ref 0.45–4.5)
UROBILINOGEN UR STRIP-ACNC: <2 MG/DL
WBC # BLD AUTO: 7.96 THOUSAND/UL (ref 4.31–10.16)
WBC #/AREA URNS AUTO: ABNORMAL /HPF

## 2024-03-04 PROCEDURE — 36415 COLL VENOUS BLD VENIPUNCTURE: CPT

## 2024-03-04 PROCEDURE — 85025 COMPLETE CBC W/AUTO DIFF WBC: CPT

## 2024-03-04 PROCEDURE — 85652 RBC SED RATE AUTOMATED: CPT

## 2024-03-04 PROCEDURE — 85730 THROMBOPLASTIN TIME PARTIAL: CPT

## 2024-03-04 PROCEDURE — 99285 EMERGENCY DEPT VISIT HI MDM: CPT

## 2024-03-04 PROCEDURE — 0241U HB NFCT DS VIR RESP RNA 4 TRGT: CPT

## 2024-03-04 PROCEDURE — 84145 PROCALCITONIN (PCT): CPT

## 2024-03-04 PROCEDURE — 86900 BLOOD TYPING SEROLOGIC ABO: CPT

## 2024-03-04 PROCEDURE — 83970 ASSAY OF PARATHORMONE: CPT

## 2024-03-04 PROCEDURE — 86901 BLOOD TYPING SEROLOGIC RH(D): CPT

## 2024-03-04 PROCEDURE — 80053 COMPREHEN METABOLIC PANEL: CPT

## 2024-03-04 PROCEDURE — 96365 THER/PROPH/DIAG IV INF INIT: CPT

## 2024-03-04 PROCEDURE — 82306 VITAMIN D 25 HYDROXY: CPT

## 2024-03-04 PROCEDURE — 83605 ASSAY OF LACTIC ACID: CPT

## 2024-03-04 PROCEDURE — 72170 X-RAY EXAM OF PELVIS: CPT

## 2024-03-04 PROCEDURE — 85610 PROTHROMBIN TIME: CPT

## 2024-03-04 PROCEDURE — 73552 X-RAY EXAM OF FEMUR 2/>: CPT

## 2024-03-04 PROCEDURE — 81001 URINALYSIS AUTO W/SCOPE: CPT

## 2024-03-04 PROCEDURE — 93005 ELECTROCARDIOGRAM TRACING: CPT

## 2024-03-04 PROCEDURE — 86850 RBC ANTIBODY SCREEN: CPT

## 2024-03-04 PROCEDURE — 83036 HEMOGLOBIN GLYCOSYLATED A1C: CPT | Performed by: SURGERY

## 2024-03-04 PROCEDURE — 99285 EMERGENCY DEPT VISIT HI MDM: CPT | Performed by: EMERGENCY MEDICINE

## 2024-03-04 PROCEDURE — 99222 1ST HOSP IP/OBS MODERATE 55: CPT | Performed by: SURGERY

## 2024-03-04 PROCEDURE — 71045 X-RAY EXAM CHEST 1 VIEW: CPT

## 2024-03-04 PROCEDURE — 87040 BLOOD CULTURE FOR BACTERIA: CPT

## 2024-03-04 PROCEDURE — 84443 ASSAY THYROID STIM HORMONE: CPT

## 2024-03-04 PROCEDURE — 96375 TX/PRO/DX INJ NEW DRUG ADDON: CPT

## 2024-03-04 PROCEDURE — 85049 AUTOMATED PLATELET COUNT: CPT

## 2024-03-04 RX ORDER — ONDANSETRON 2 MG/ML
4 INJECTION INTRAMUSCULAR; INTRAVENOUS EVERY 6 HOURS PRN
Status: DISCONTINUED | OUTPATIENT
Start: 2024-03-04 | End: 2024-03-14 | Stop reason: HOSPADM

## 2024-03-04 RX ORDER — HYDROMORPHONE HCL/PF 1 MG/ML
0.5 SYRINGE (ML) INJECTION ONCE
Status: COMPLETED | OUTPATIENT
Start: 2024-03-04 | End: 2024-03-04

## 2024-03-04 RX ORDER — GABAPENTIN 100 MG/1
100 CAPSULE ORAL 3 TIMES DAILY
Status: DISCONTINUED | OUTPATIENT
Start: 2024-03-04 | End: 2024-03-14 | Stop reason: HOSPADM

## 2024-03-04 RX ORDER — ENOXAPARIN SODIUM 100 MG/ML
30 INJECTION SUBCUTANEOUS EVERY 12 HOURS
Status: DISCONTINUED | OUTPATIENT
Start: 2024-03-04 | End: 2024-03-14 | Stop reason: HOSPADM

## 2024-03-04 RX ORDER — METHOCARBAMOL 500 MG/1
500 TABLET, FILM COATED ORAL EVERY 6 HOURS SCHEDULED
Status: DISCONTINUED | OUTPATIENT
Start: 2024-03-05 | End: 2024-03-14 | Stop reason: HOSPADM

## 2024-03-04 RX ORDER — OXYCODONE HYDROCHLORIDE 5 MG/1
5 TABLET ORAL EVERY 4 HOURS PRN
Status: DISCONTINUED | OUTPATIENT
Start: 2024-03-04 | End: 2024-03-14 | Stop reason: HOSPADM

## 2024-03-04 RX ORDER — ACETAMINOPHEN 160 MG/5ML
1 SUSPENSION, ORAL (FINAL DOSE FORM) ORAL ONCE
Status: COMPLETED | OUTPATIENT
Start: 2024-03-04 | End: 2024-03-04

## 2024-03-04 RX ORDER — SODIUM CHLORIDE, SODIUM LACTATE, POTASSIUM CHLORIDE, CALCIUM CHLORIDE 600; 310; 30; 20 MG/100ML; MG/100ML; MG/100ML; MG/100ML
4 INJECTION, SOLUTION INTRAVENOUS CONTINUOUS
Status: DISCONTINUED | OUTPATIENT
Start: 2024-03-04 | End: 2024-03-07

## 2024-03-04 RX ORDER — ACETAMINOPHEN 325 MG/1
650 TABLET ORAL EVERY 6 HOURS SCHEDULED
Status: DISCONTINUED | OUTPATIENT
Start: 2024-03-05 | End: 2024-03-14 | Stop reason: HOSPADM

## 2024-03-04 RX ORDER — PANTOPRAZOLE SODIUM 40 MG/1
40 TABLET, DELAYED RELEASE ORAL DAILY
Status: DISCONTINUED | OUTPATIENT
Start: 2024-03-05 | End: 2024-03-14 | Stop reason: HOSPADM

## 2024-03-04 RX ORDER — TAMSULOSIN HYDROCHLORIDE 0.4 MG/1
CAPSULE ORAL
Qty: 90 CAPSULE | Refills: 0 | Status: ON HOLD | OUTPATIENT
Start: 2024-03-04

## 2024-03-04 RX ORDER — DOCUSATE SODIUM 100 MG/1
100 CAPSULE, LIQUID FILLED ORAL 2 TIMES DAILY
Status: DISCONTINUED | OUTPATIENT
Start: 2024-03-04 | End: 2024-03-14 | Stop reason: HOSPADM

## 2024-03-04 RX ORDER — GABAPENTIN 100 MG/1
100 CAPSULE ORAL 2 TIMES DAILY
Status: DISCONTINUED | OUTPATIENT
Start: 2024-03-04 | End: 2024-03-04

## 2024-03-04 RX ORDER — HYDROMORPHONE HCL IN WATER/PF 6 MG/30 ML
0.2 PATIENT CONTROLLED ANALGESIA SYRINGE INTRAVENOUS
Status: DISCONTINUED | OUTPATIENT
Start: 2024-03-04 | End: 2024-03-14 | Stop reason: HOSPADM

## 2024-03-04 RX ORDER — INSULIN LISPRO 100 [IU]/ML
1-6 INJECTION, SOLUTION INTRAVENOUS; SUBCUTANEOUS
Status: DISCONTINUED | OUTPATIENT
Start: 2024-03-05 | End: 2024-03-14 | Stop reason: HOSPADM

## 2024-03-04 RX ORDER — SENNOSIDES 8.6 MG
2 TABLET ORAL DAILY
Status: DISCONTINUED | OUTPATIENT
Start: 2024-03-05 | End: 2024-03-14 | Stop reason: HOSPADM

## 2024-03-04 RX ORDER — TAMSULOSIN HYDROCHLORIDE 0.4 MG/1
0.4 CAPSULE ORAL
Status: DISCONTINUED | OUTPATIENT
Start: 2024-03-05 | End: 2024-03-14 | Stop reason: HOSPADM

## 2024-03-04 RX ADMIN — SODIUM CHLORIDE, SODIUM LACTATE, POTASSIUM CHLORIDE, AND CALCIUM CHLORIDE 1000 ML: .6; .31; .03; .02 INJECTION, SOLUTION INTRAVENOUS at 20:54

## 2024-03-04 RX ADMIN — ENOXAPARIN SODIUM 30 MG: 30 INJECTION SUBCUTANEOUS at 22:16

## 2024-03-04 RX ADMIN — ACETAMINOPHEN 650 MG: 325 TABLET, FILM COATED ORAL at 23:29

## 2024-03-04 RX ADMIN — DOCUSATE SODIUM 100 MG: 100 CAPSULE, LIQUID FILLED ORAL at 22:16

## 2024-03-04 RX ADMIN — METHOCARBAMOL 500 MG: 500 TABLET ORAL at 23:29

## 2024-03-04 RX ADMIN — HYDROMORPHONE HYDROCHLORIDE 0.5 MG: 1 INJECTION, SOLUTION INTRAMUSCULAR; INTRAVENOUS; SUBCUTANEOUS at 20:54

## 2024-03-04 RX ADMIN — SODIUM CHLORIDE, SODIUM LACTATE, POTASSIUM CHLORIDE, AND CALCIUM CHLORIDE 1000 ML: .6; .31; .03; .02 INJECTION, SOLUTION INTRAVENOUS at 23:37

## 2024-03-04 RX ADMIN — GABAPENTIN 100 MG: 100 CAPSULE ORAL at 22:16

## 2024-03-05 ENCOUNTER — PATIENT OUTREACH (OUTPATIENT)
Dept: HEMATOLOGY ONCOLOGY | Facility: CLINIC | Age: 84
End: 2024-03-05

## 2024-03-05 ENCOUNTER — ANESTHESIA (INPATIENT)
Dept: PERIOP | Facility: HOSPITAL | Age: 84
DRG: 480 | End: 2024-03-05
Payer: COMMERCIAL

## 2024-03-05 ENCOUNTER — APPOINTMENT (INPATIENT)
Dept: RADIOLOGY | Facility: HOSPITAL | Age: 84
DRG: 480 | End: 2024-03-05
Payer: COMMERCIAL

## 2024-03-05 ENCOUNTER — ANESTHESIA EVENT (INPATIENT)
Dept: PERIOP | Facility: HOSPITAL | Age: 84
DRG: 480 | End: 2024-03-05
Payer: COMMERCIAL
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
## 2024-03-05 PROBLEM — S72.143A INTERTROCHANTERIC FRACTURE (HCC): Status: ACTIVE | Noted: 2024-03-05

## 2024-03-05 LAB
ABO GROUP BLD: NORMAL
ANION GAP SERPL CALCULATED.3IONS-SCNC: 9 MMOL/L
ATRIAL RATE: 123 BPM
BASOPHILS # BLD AUTO: 0.02 THOUSANDS/ÂΜL (ref 0–0.1)
BASOPHILS NFR BLD AUTO: 0 % (ref 0–1)
BUN SERPL-MCNC: 15 MG/DL (ref 5–25)
CA-I BLD-SCNC: 1.09 MMOL/L (ref 1.12–1.32)
CALCIUM SERPL-MCNC: 8.3 MG/DL (ref 8.4–10.2)
CHLORIDE SERPL-SCNC: 100 MMOL/L (ref 96–108)
CO2 SERPL-SCNC: 27 MMOL/L (ref 21–32)
CREAT SERPL-MCNC: 0.97 MG/DL (ref 0.6–1.3)
EOSINOPHIL # BLD AUTO: 0.02 THOUSAND/ÂΜL (ref 0–0.61)
EOSINOPHIL NFR BLD AUTO: 0 % (ref 0–6)
ERYTHROCYTE [DISTWIDTH] IN BLOOD BY AUTOMATED COUNT: 13.5 % (ref 11.6–15.1)
EST. AVERAGE GLUCOSE BLD GHB EST-MCNC: 174 MG/DL
GFR SERPL CREATININE-BSD FRML MDRD: 71 ML/MIN/1.73SQ M
GLUCOSE SERPL-MCNC: 184 MG/DL (ref 65–140)
GLUCOSE SERPL-MCNC: 186 MG/DL (ref 65–140)
GLUCOSE SERPL-MCNC: 189 MG/DL (ref 65–140)
GLUCOSE SERPL-MCNC: 199 MG/DL (ref 65–140)
GLUCOSE SERPL-MCNC: 208 MG/DL (ref 65–140)
HBA1C MFR BLD: 7.7 %
HCT VFR BLD AUTO: 32.6 % (ref 36.5–49.3)
HGB BLD-MCNC: 11.2 G/DL (ref 12–17)
IMM GRANULOCYTES # BLD AUTO: 0.02 THOUSAND/UL (ref 0–0.2)
IMM GRANULOCYTES NFR BLD AUTO: 0 % (ref 0–2)
LYMPHOCYTES # BLD AUTO: 1.19 THOUSANDS/ÂΜL (ref 0.6–4.47)
LYMPHOCYTES NFR BLD AUTO: 17 % (ref 14–44)
MAGNESIUM SERPL-MCNC: 1.3 MG/DL (ref 1.9–2.7)
MCH RBC QN AUTO: 31.6 PG (ref 26.8–34.3)
MCHC RBC AUTO-ENTMCNC: 34.4 G/DL (ref 31.4–37.4)
MCV RBC AUTO: 92 FL (ref 82–98)
MONOCYTES # BLD AUTO: 1.02 THOUSAND/ÂΜL (ref 0.17–1.22)
MONOCYTES NFR BLD AUTO: 15 % (ref 4–12)
NEUTROPHILS # BLD AUTO: 4.72 THOUSANDS/ÂΜL (ref 1.85–7.62)
NEUTS SEG NFR BLD AUTO: 68 % (ref 43–75)
NRBC BLD AUTO-RTO: 0 /100 WBCS
PHOSPHATE SERPL-MCNC: 3.9 MG/DL (ref 2.3–4.1)
PLATELET # BLD AUTO: 149 THOUSANDS/UL (ref 149–390)
PMV BLD AUTO: 11.1 FL (ref 8.9–12.7)
POTASSIUM SERPL-SCNC: 3.7 MMOL/L (ref 3.5–5.3)
PR INTERVAL: 104 MS
QRS AXIS: 133 DEGREES
QRSD INTERVAL: 100 MS
QT INTERVAL: 348 MS
QTC INTERVAL: 498 MS
RBC # BLD AUTO: 3.54 MILLION/UL (ref 3.88–5.62)
RH BLD: POSITIVE
SODIUM SERPL-SCNC: 136 MMOL/L (ref 135–147)
T WAVE AXIS: -3 DEGREES
VENTRICULAR RATE: 123 BPM
VIT B12 SERPL-MCNC: 140 PG/ML (ref 180–914)
WBC # BLD AUTO: 6.99 THOUSAND/UL (ref 4.31–10.16)

## 2024-03-05 PROCEDURE — 82330 ASSAY OF CALCIUM: CPT

## 2024-03-05 PROCEDURE — 27245 TREAT THIGH FRACTURE: CPT | Performed by: ORTHOPAEDIC SURGERY

## 2024-03-05 PROCEDURE — 83735 ASSAY OF MAGNESIUM: CPT

## 2024-03-05 PROCEDURE — C1713 ANCHOR/SCREW BN/BN,TIS/BN: HCPCS | Performed by: ORTHOPAEDIC SURGERY

## 2024-03-05 PROCEDURE — 84100 ASSAY OF PHOSPHORUS: CPT

## 2024-03-05 PROCEDURE — 82607 VITAMIN B-12: CPT | Performed by: INTERNAL MEDICINE

## 2024-03-05 PROCEDURE — 99222 1ST HOSP IP/OBS MODERATE 55: CPT | Performed by: INTERNAL MEDICINE

## 2024-03-05 PROCEDURE — 93010 ELECTROCARDIOGRAM REPORT: CPT | Performed by: INTERNAL MEDICINE

## 2024-03-05 PROCEDURE — 99223 1ST HOSP IP/OBS HIGH 75: CPT | Performed by: ORTHOPAEDIC SURGERY

## 2024-03-05 PROCEDURE — NC001 PR NO CHARGE: Performed by: ORTHOPAEDIC SURGERY

## 2024-03-05 PROCEDURE — 73502 X-RAY EXAM HIP UNI 2-3 VIEWS: CPT

## 2024-03-05 PROCEDURE — 82948 REAGENT STRIP/BLOOD GLUCOSE: CPT

## 2024-03-05 PROCEDURE — 0QS636Z REPOSITION RIGHT UPPER FEMUR WITH INTRAMEDULLARY INTERNAL FIXATION DEVICE, PERCUTANEOUS APPROACH: ICD-10-PCS | Performed by: ORTHOPAEDIC SURGERY

## 2024-03-05 PROCEDURE — 80048 BASIC METABOLIC PNL TOTAL CA: CPT

## 2024-03-05 PROCEDURE — NC001 PR NO CHARGE: Performed by: STUDENT IN AN ORGANIZED HEALTH CARE EDUCATION/TRAINING PROGRAM

## 2024-03-05 PROCEDURE — 85025 COMPLETE CBC W/AUTO DIFF WBC: CPT

## 2024-03-05 PROCEDURE — 99232 SBSQ HOSP IP/OBS MODERATE 35: CPT | Performed by: SURGERY

## 2024-03-05 PROCEDURE — C1769 GUIDE WIRE: HCPCS | Performed by: ORTHOPAEDIC SURGERY

## 2024-03-05 DEVICE — LOCKING SCREW FOR IM NAIL Ø 5MM/ 34MM/ XL25/ STERILE: Type: IMPLANTABLE DEVICE | Site: FEMUR | Status: FUNCTIONAL

## 2024-03-05 DEVICE — 10MM/130 DEG TI CANN TFNA 170MM - STERILE
Type: IMPLANTABLE DEVICE | Site: FEMUR | Status: FUNCTIONAL
Brand: TFN-ADVANCE

## 2024-03-05 DEVICE — TFNA FENESTRATED SCREW 105MM - STERILE
Type: IMPLANTABLE DEVICE | Site: FEMUR | Status: FUNCTIONAL
Brand: TFN-ADVANCE

## 2024-03-05 RX ORDER — ROCURONIUM BROMIDE 10 MG/ML
INJECTION, SOLUTION INTRAVENOUS AS NEEDED
Status: DISCONTINUED | OUTPATIENT
Start: 2024-03-05 | End: 2024-03-05

## 2024-03-05 RX ORDER — ONDANSETRON 2 MG/ML
INJECTION INTRAMUSCULAR; INTRAVENOUS AS NEEDED
Status: DISCONTINUED | OUTPATIENT
Start: 2024-03-05 | End: 2024-03-05

## 2024-03-05 RX ORDER — LABETALOL HYDROCHLORIDE 5 MG/ML
10 INJECTION, SOLUTION INTRAVENOUS EVERY 6 HOURS
Status: DISCONTINUED | OUTPATIENT
Start: 2024-03-05 | End: 2024-03-05

## 2024-03-05 RX ORDER — SUCCINYLCHOLINE/SOD CL,ISO/PF 100 MG/5ML
SYRINGE (ML) INTRAVENOUS AS NEEDED
Status: DISCONTINUED | OUTPATIENT
Start: 2024-03-05 | End: 2024-03-05

## 2024-03-05 RX ORDER — CALCIUM CARBONATE 500 MG/1
500 TABLET, CHEWABLE ORAL
Status: DISCONTINUED | OUTPATIENT
Start: 2024-03-06 | End: 2024-03-14 | Stop reason: HOSPADM

## 2024-03-05 RX ORDER — SODIUM CHLORIDE, SODIUM LACTATE, POTASSIUM CHLORIDE, CALCIUM CHLORIDE 600; 310; 30; 20 MG/100ML; MG/100ML; MG/100ML; MG/100ML
INJECTION, SOLUTION INTRAVENOUS CONTINUOUS PRN
Status: DISCONTINUED | OUTPATIENT
Start: 2024-03-05 | End: 2024-03-05

## 2024-03-05 RX ORDER — CEFAZOLIN SODIUM 2 G/50ML
2000 SOLUTION INTRAVENOUS EVERY 8 HOURS
Status: COMPLETED | OUTPATIENT
Start: 2024-03-05 | End: 2024-03-06

## 2024-03-05 RX ORDER — LABETALOL HYDROCHLORIDE 5 MG/ML
10 INJECTION, SOLUTION INTRAVENOUS EVERY 6 HOURS PRN
Status: DISCONTINUED | OUTPATIENT
Start: 2024-03-05 | End: 2024-03-14 | Stop reason: HOSPADM

## 2024-03-05 RX ORDER — PROPOFOL 10 MG/ML
INJECTION, EMULSION INTRAVENOUS AS NEEDED
Status: DISCONTINUED | OUTPATIENT
Start: 2024-03-05 | End: 2024-03-05

## 2024-03-05 RX ORDER — CEFAZOLIN SODIUM 2 G/50ML
2000 SOLUTION INTRAVENOUS
Status: DISCONTINUED | OUTPATIENT
Start: 2024-03-06 | End: 2024-03-07

## 2024-03-05 RX ORDER — MAGNESIUM HYDROXIDE 1200 MG/15ML
LIQUID ORAL AS NEEDED
Status: DISCONTINUED | OUTPATIENT
Start: 2024-03-05 | End: 2024-03-05 | Stop reason: HOSPADM

## 2024-03-05 RX ORDER — CALCIUM GLUCONATE 20 MG/ML
1 INJECTION, SOLUTION INTRAVENOUS ONCE
Qty: 50 ML | Refills: 0 | Status: COMPLETED | OUTPATIENT
Start: 2024-03-05 | End: 2024-03-05

## 2024-03-05 RX ORDER — MELATONIN
2000 DAILY
Status: DISCONTINUED | OUTPATIENT
Start: 2024-03-06 | End: 2024-03-14 | Stop reason: HOSPADM

## 2024-03-05 RX ORDER — FENTANYL CITRATE 50 UG/ML
INJECTION, SOLUTION INTRAMUSCULAR; INTRAVENOUS AS NEEDED
Status: DISCONTINUED | OUTPATIENT
Start: 2024-03-05 | End: 2024-03-05

## 2024-03-05 RX ORDER — TRANEXAMIC ACID 100 MG/ML
INJECTION, SOLUTION INTRAVENOUS AS NEEDED
Status: DISCONTINUED | OUTPATIENT
Start: 2024-03-05 | End: 2024-03-05

## 2024-03-05 RX ORDER — ENOXAPARIN SODIUM 100 MG/ML
40 INJECTION SUBCUTANEOUS DAILY
Qty: 11.2 ML | Refills: 0 | Status: ON HOLD | OUTPATIENT
Start: 2024-03-05 | End: 2024-04-02

## 2024-03-05 RX ORDER — LIDOCAINE HYDROCHLORIDE 10 MG/ML
INJECTION, SOLUTION EPIDURAL; INFILTRATION; INTRACAUDAL; PERINEURAL AS NEEDED
Status: DISCONTINUED | OUTPATIENT
Start: 2024-03-05 | End: 2024-03-05

## 2024-03-05 RX ORDER — MAGNESIUM SULFATE 1 G/100ML
1 INJECTION INTRAVENOUS ONCE
Qty: 100 ML | Refills: 0 | Status: COMPLETED | OUTPATIENT
Start: 2024-03-05 | End: 2024-03-05

## 2024-03-05 RX ADMIN — GABAPENTIN 100 MG: 100 CAPSULE ORAL at 18:13

## 2024-03-05 RX ADMIN — FENTANYL CITRATE 25 MCG: 50 INJECTION INTRAMUSCULAR; INTRAVENOUS at 17:25

## 2024-03-05 RX ADMIN — TAMSULOSIN HYDROCHLORIDE 0.4 MG: 0.4 CAPSULE ORAL at 18:13

## 2024-03-05 RX ADMIN — FENTANYL CITRATE 50 MCG: 50 INJECTION INTRAMUSCULAR; INTRAVENOUS at 16:14

## 2024-03-05 RX ADMIN — METHOCARBAMOL 500 MG: 500 TABLET ORAL at 05:47

## 2024-03-05 RX ADMIN — LIDOCAINE HYDROCHLORIDE 80 MG: 10 INJECTION, SOLUTION EPIDURAL; INFILTRATION; INTRACAUDAL; PERINEURAL at 15:57

## 2024-03-05 RX ADMIN — INSULIN LISPRO 1 UNITS: 100 INJECTION, SOLUTION INTRAVENOUS; SUBCUTANEOUS at 08:06

## 2024-03-05 RX ADMIN — ACETAMINOPHEN 650 MG: 325 TABLET, FILM COATED ORAL at 11:25

## 2024-03-05 RX ADMIN — ONDANSETRON 4 MG: 2 INJECTION INTRAMUSCULAR; INTRAVENOUS at 15:57

## 2024-03-05 RX ADMIN — DOCUSATE SODIUM 100 MG: 100 CAPSULE, LIQUID FILLED ORAL at 18:30

## 2024-03-05 RX ADMIN — INSULIN LISPRO 1 UNITS: 100 INJECTION, SOLUTION INTRAVENOUS; SUBCUTANEOUS at 11:26

## 2024-03-05 RX ADMIN — SODIUM CHLORIDE, SODIUM LACTATE, POTASSIUM CHLORIDE, AND CALCIUM CHLORIDE 4 ML/KG/HR: .6; .31; .03; .02 INJECTION, SOLUTION INTRAVENOUS at 01:17

## 2024-03-05 RX ADMIN — TRANEXAMIC ACID 1000 MG: 100 INJECTION INTRAVENOUS at 16:22

## 2024-03-05 RX ADMIN — ENOXAPARIN SODIUM 30 MG: 30 INJECTION SUBCUTANEOUS at 20:09

## 2024-03-05 RX ADMIN — MAGNESIUM SULFATE HEPTAHYDRATE 1 G: 1 INJECTION, SOLUTION INTRAVENOUS at 10:10

## 2024-03-05 RX ADMIN — CALCIUM GLUCONATE 1 G: 20 INJECTION, SOLUTION INTRAVENOUS at 07:56

## 2024-03-05 RX ADMIN — SUGAMMADEX 200 MG: 100 INJECTION, SOLUTION INTRAVENOUS at 16:57

## 2024-03-05 RX ADMIN — ENOXAPARIN SODIUM 30 MG: 30 INJECTION SUBCUTANEOUS at 09:18

## 2024-03-05 RX ADMIN — FENTANYL CITRATE 50 MCG: 50 INJECTION INTRAMUSCULAR; INTRAVENOUS at 15:57

## 2024-03-05 RX ADMIN — CEFAZOLIN SODIUM 2000 MG: 2 SOLUTION INTRAVENOUS at 20:00

## 2024-03-05 RX ADMIN — OXYCODONE HYDROCHLORIDE 5 MG: 5 TABLET ORAL at 20:07

## 2024-03-05 RX ADMIN — INSULIN LISPRO 2 UNITS: 100 INJECTION, SOLUTION INTRAVENOUS; SUBCUTANEOUS at 18:30

## 2024-03-05 RX ADMIN — ROCURONIUM BROMIDE 25 MG: 10 INJECTION, SOLUTION INTRAVENOUS at 16:29

## 2024-03-05 RX ADMIN — Medication 100 MG: at 15:57

## 2024-03-05 RX ADMIN — PROPOFOL 100 MG: 10 INJECTION, EMULSION INTRAVENOUS at 15:57

## 2024-03-05 RX ADMIN — OXYCODONE HYDROCHLORIDE 5 MG: 5 TABLET ORAL at 03:14

## 2024-03-05 RX ADMIN — ROCURONIUM BROMIDE 25 MG: 10 INJECTION, SOLUTION INTRAVENOUS at 16:04

## 2024-03-05 RX ADMIN — METHOCARBAMOL 500 MG: 500 TABLET ORAL at 11:25

## 2024-03-05 RX ADMIN — ACETAMINOPHEN 650 MG: 325 TABLET, FILM COATED ORAL at 18:13

## 2024-03-05 RX ADMIN — HYDROMORPHONE HYDROCHLORIDE 0.2 MG: 0.2 INJECTION, SOLUTION INTRAMUSCULAR; INTRAVENOUS; SUBCUTANEOUS at 18:24

## 2024-03-05 RX ADMIN — ACETAMINOPHEN 650 MG: 325 TABLET, FILM COATED ORAL at 05:47

## 2024-03-05 RX ADMIN — GABAPENTIN 100 MG: 100 CAPSULE ORAL at 09:19

## 2024-03-05 RX ADMIN — METHOCARBAMOL 500 MG: 500 TABLET ORAL at 18:30

## 2024-03-05 RX ADMIN — SODIUM CHLORIDE, SODIUM LACTATE, POTASSIUM CHLORIDE, AND CALCIUM CHLORIDE: .6; .31; .03; .02 INJECTION, SOLUTION INTRAVENOUS at 15:51

## 2024-03-05 RX ADMIN — SODIUM CHLORIDE, SODIUM LACTATE, POTASSIUM CHLORIDE, AND CALCIUM CHLORIDE 4 ML/KG/HR: .6; .31; .03; .02 INJECTION, SOLUTION INTRAVENOUS at 06:00

## 2024-03-05 RX ADMIN — LABETALOL HYDROCHLORIDE 10 MG: 5 INJECTION, SOLUTION INTRAVENOUS at 06:20

## 2024-03-05 RX ADMIN — PHENYLEPHRINE HYDROCHLORIDE 40 MCG/MIN: 10 INJECTION INTRAVENOUS at 16:02

## 2024-03-05 RX ADMIN — PANTOPRAZOLE SODIUM 40 MG: 40 TABLET, DELAYED RELEASE ORAL at 09:19

## 2024-03-05 NOTE — ED PROVIDER NOTES
History  Chief Complaint   Patient presents with    Fall     Ems reports pt was in bathroom lost balance and fell, -HS, pts reports taking baby aspirin approx once every 5 days, right hip shortened rotated out, 101.6 temp for ems they gave 650 tylenol      83-year-old man with relevant past med history of laryngeal cancer, GERD, hyperlipidemia, type 2 diabetes, and hypertension presents after fall.  [place] and his walker fell out from under him.  He fell onto his right hip and felt immediate pain of the hip.  He was unable to stand up due to the pain.  He denies hitting his head.  He is having no pain aside from the right hip.  On route by EMS, patient was noted to have a fever.  He himself was not aware he had a fever but has not had some throat congestion.  He has not had a cough or muscle aches.  No dysuria.        Prior to Admission Medications   Prescriptions Last Dose Informant Patient Reported? Taking?   Ca Carbonate-Mag Hydroxide (MI-ACID PO)   Yes No   LORazepam (ATIVAN) 0.5 mg tablet   No No   Sig: Take 1 tablet (0.5 mg total) by mouth once as needed for anxiety for up to 1 dose Take 30 minutes prior to scheduled MRI, do not drive.   Patient not taking: Reported on 10/18/2023   Omega-3 Fatty Acids (FISH OIL) 1,000 mg  Self Yes No   Si,000 mg see administration instructions Takes 4 times per week   al mag oxide-diphenhydramine-lidocaine viscous (MAGIC MOUTHWASH) 1:1:1 suspension   No No   Sig: Swish and swallow 10 mL every 6 (six) hours as needed for mouth pain or discomfort   Patient not taking: Reported on 2023   amLODIPine (NORVASC) 10 mg tablet   No No   Sig: TAKE ONE TABLET BY MOUTH ONCE DAILY   aspirin (ECOTRIN LOW STRENGTH) 81 mg EC tablet  Self Yes No   Si mg every other day   atorvastatin (LIPITOR) 10 mg tablet   No No   Sig: TAKE 1 TABLET BY MOUTH ONCE DAILY AT BEDTIME   benzocaine-menthol (CEPACOL) 15-3.6 mg per lozenge   No No   Sig:  Apply 1 lozenge to the mouth or throat every 2 (two) hours as needed (Sore throat)   clobetasol (TEMOVATE) 0.05 % ointment  Self Yes No   Si application every 24 hours   fluticasone (FLONASE) 50 mcg/act nasal spray  Self No No   Si spray into each nostril daily   Patient not taking: Reported on 2023   gabapentin (NEURONTIN) 100 mg capsule   No No   Sig: Take 1 capsule (100 mg total) by mouth 2 (two) times a day   ibuprofen (MOTRIN) 600 mg tablet  Self No No   Sig: TAKE ONE TABLET BY MOUTH THREE TIMES DAILY WITH FOOD    Patient not taking: Reported on 2023   magnesium chloride (MAG64) 64 MG TBEC EC tablet  Self Yes No   Sig: Take 128 mg by mouth daily   metFORMIN (GLUCOPHAGE) 1000 MG tablet   No No   Sig: TAKE ONE TABLET BY MOUTH TWICE A DAY WITH MEALS   olmesartan (BENICAR) 40 mg tablet   No No   Sig: TAKE ONE TABLET BY MOUTH EVERY MORNING   pantoprazole (PROTONIX) 40 mg tablet   No No   Sig: TAKE ONE TABLET BY MOUTH EVERY MORNING   tamsulosin (FLOMAX) 0.4 mg   No No   Sig: TAKE ONE CAPSULE BY MOUTH ONCE DAILY WITH DINNER      Facility-Administered Medications: None       Past Medical History:   Diagnosis Date    Diabetes mellitus (HCC)     GERD (gastroesophageal reflux disease)     Hyperlipidemia     Hypertension     Laryngeal cancer (HCC)        Past Surgical History:   Procedure Laterality Date    GALLBLADDER SURGERY      UT LARYNGOSCOPY W/BIOPSY MICROSCOPE/TELESCOPE N/A 2/15/2023    Procedure: MICROLARYGOSCOPY AND BIOPSY; FROZEN SECTION;  Surgeon: Bennett Butler MD;  Location: [facility];  Service: ENT       Family History   Problem Relation Age of Onset    Breast cancer Mother     No Known Problems Father      I have reviewed and agree with the history as documented.    E-Cigarette/Vaping    E-Cigarette Use Never User      E-Cigarette/Vaping Substances     Social History     Tobacco Use    Smoking status: Former     Current packs/day: 0.50     Types: Cigarettes    Smokeless  tobacco: Never    Tobacco comments:     no passive smoke exposure    Vaping Use    Vaping status: Never Used   Substance Use Topics    Alcohol use: Not Currently    Drug use: Never        Review of Systems    Physical Exam  ED Triage Vitals   Temperature Pulse Respirations Blood Pressure SpO2   03/04/24 1820 03/04/24 1823 03/04/24 1823 03/04/24 1823 03/04/24 1823   99.1 °F (37.3 °C) (!) 127 18 145/96 94 %      Temp Source Heart Rate Source Patient Position - Orthostatic VS BP Location FiO2 (%)   03/04/24 1820 03/04/24 1830 -- -- --   Oral Monitor         Pain Score       03/04/24 1823       No Pain             Orthostatic Vital Signs  Vitals:    03/04/24 1900 03/04/24 2000 03/04/24 2130 03/04/24 2138   BP: 126/67 100/67 150/76    Pulse: (!) 121 (!) 122 91 105       Physical Exam  Vitals and nursing note reviewed.   Constitutional:       Appearance: Normal appearance.   HENT:      Head: Normocephalic and atraumatic.      Right Ear: External ear normal.      Left Ear: External ear normal.   Cardiovascular:      Rate and Rhythm: Normal rate.   Pulmonary:      Effort: Pulmonary effort is normal. No respiratory distress.      Breath sounds: Normal breath sounds.   Abdominal:      Palpations: Abdomen is soft.      Tenderness: There is no abdominal tenderness. There is no guarding or rebound.   Musculoskeletal:      Cervical back: Normal range of motion and neck supple.      Comments: Right leg is shortened and externally rotated.  There is pain with passive ROM of the hip.  Right leg is otherwise neurovascularly intact with 2+ DP pulse and sensation of the right extremity.  There is no C, T, or L spinal tenderness.  Full ROM of the bilateral upper and left lower extremities without tenderness.   Skin:     General: Skin is warm and dry.   Neurological:      Mental Status: He is alert and oriented to person, place, and time. Mental status is at baseline.   Psychiatric:         Mood and Affect: Mood normal.         Behavior:  Behavior normal.         ED Medications  Medications   lactated ringers bolus 1,000 mL (1,000 mL Intravenous New Bag 3/4/24 2054)   acetaminophen (FOR EMS ONLY) (TYLENOL) oral suspension 650 mg (0 mg Does not apply Given to EMS 3/4/24 1851)   HYDROmorphone (DILAUDID) injection 0.5 mg (0.5 mg Intravenous Given 3/4/24 2054)       Diagnostic Studies  Results Reviewed       Procedure Component Value Units Date/Time    Lactic acid 2 Hours [[id number]] Collected: 03/04/24 2140    Lab Status: No result Specimen: Blood from Arm, Right     Sedimentation rate, automated [[id number]] Collected: 03/04/24 2140    Lab Status: No result Specimen: Blood from Arm, Right     PTH, intact [[id number]] Collected: 03/04/24 2140    Lab Status: No result Specimen: Blood from Arm, Right     TSH, 3rd generation [[id number]] Collected: 03/04/24 2140    Lab Status: No result Specimen: Blood from Arm, Right     Vitamin D 25 hydroxy [[id number]] Collected: 03/04/24 2140    Lab Status: No result Specimen: Blood from Arm, Right     UA w Reflex to Microscopic w Reflex to Culture [[id number]] Collected: 03/04/24 2139    Lab Status: No result Specimen: Urine, Clean Catch     FLU/RSV/COVID - if FLU/RSV clinically relevant [[id number]]  (Abnormal) Collected: 03/04/24 1914    Lab Status: Final result Specimen: Nares from Nose Updated: 03/04/24 2047     SARS-CoV-2 Positive     INFLUENZA A PCR Negative     INFLUENZA B PCR Negative     RSV PCR Negative    Narrative:      FOR PEDIATRIC PATIENTS - copy/paste COVID Guidelines URL to browser: [email or web address]    SARS-CoV-2 assay is a Nucleic Acid Amplification assay intended for the  qualitative detection of nucleic acid from SARS-CoV-2 in nasopharyngeal  swabs. Results are for the presumptive identification of SARS-CoV-2 RNA.    Positive results are indicative of infection with SARS-CoV-2, the virus  causing COVID-19, but do not rule out bacterial infection  or co-infection  with other viruses. Laboratories within the [place] and its  territories are required to report all positive results to the appropriate  public health authorities. Negative results do not preclude SARS-CoV-2  infection and should not be used as the sole basis for treatment or other  patient management decisions. Negative results must be combined with  clinical observations, patient history, and epidemiological information.  This test has not been FDA cleared or approved.    This test has been authorized by FDA under an Emergency Use Authorization  (EUA). This test is only authorized for the duration of time the  declaration that circumstances exist justifying the authorization of the  emergency use of an in vitro diagnostic tests for detection of SARS-CoV-2  virus and/or diagnosis of COVID-19 infection under section 564(b)(1) of  the Act, 21 U.S.C. 360bbb-3(b)(1), unless the authorization is terminated  or revoked sooner. The test has been validated but independent review by FDA  and CLIA is pending.    Test performed using [employer] GeneXpert: This RT-PCR assay targets N2,  a region unique to SARS-CoV-2. A conserved region in the E-gene was chosen  for pan-Sarbecovirus detection which includes SARS-CoV-2.    According to CMS-2020-01-R, this platform meets the definition of high-throughput technology.    Lactic acid [[id number]]  (Abnormal) Collected: 03/04/24 1830    Lab Status: Final result Specimen: Blood from Arm, Right Updated: 03/04/24 2024     LACTIC ACID 3.4 mmol/L     Narrative:      Result may be elevated if tourniquet was used during collection.    Comprehensive metabolic panel [[id number]]  (Abnormal) Collected: 03/04/24 1830    Lab Status: Final result Specimen: Blood from Arm, Right Updated: 03/04/24 1956     Sodium 137 mmol/L      Potassium 4.2 mmol/L      Chloride 101 mmol/L      CO2 24 mmol/L      ANION GAP 12 mmol/L      BUN 16 mg/dL      Creatinine 1.19 mg/dL      Glucose 245  mg/dL      Calcium 9.3 mg/dL      AST 22 U/L      ALT 22 U/L      Alkaline Phosphatase 80 U/L      Total Protein 7.0 g/dL      Albumin 4.3 g/dL      Total Bilirubin 0.62 mg/dL      eGFR 55 ml/min/1.73sq m     Narrative:      National Kidney Disease Foundation guidelines for Chronic Kidney Disease (CKD):     Stage 1 with normal or high GFR (GFR > 90 mL/min/1.73 square meters)    Stage 2 Mild CKD (GFR = 60-89 mL/min/1.73 square meters)    Stage 3A Moderate CKD (GFR = 45-59 mL/min/1.73 square meters)    Stage 3B Moderate CKD (GFR = 30-44 mL/min/1.73 square meters)    Stage 4 Severe CKD (GFR = 15-29 mL/min/1.73 square meters)    Stage 5 End Stage CKD (GFR <15 mL/min/1.73 square meters)  Note: GFR calculation is accurate only with a steady state creatinine    Procalcitonin [[id number]]  (Normal) Collected: 03/04/24 1830    Lab Status: Final result Specimen: Blood from Arm, Right Updated: 03/04/24 1951     Procalcitonin 0.06 ng/ml     Protime-INR [[id number]]  (Abnormal) Collected: 03/04/24 1830    Lab Status: Final result Specimen: Blood from Arm, Right Updated: 03/04/24 1938     Protime 14.7 seconds      INR 1.16    APTT [[id number]]  (Normal) Collected: 03/04/24 1830    Lab Status: Final result Specimen: Blood from Arm, Right Updated: 03/04/24 1938     PTT 27 seconds     Blood culture #1 [[id number]] Collected: 03/04/24 1922    Lab Status: In process Specimen: Blood from Arm, Left Updated: 03/04/24 1933    CBC and differential [[id number]]  (Abnormal) Collected: 03/04/24 1830    Lab Status: Final result Specimen: Blood from Arm, Right Updated: 03/04/24 1928     WBC 7.96 Thousand/uL      RBC 4.03 Million/uL      Hemoglobin 12.8 g/dL      Hematocrit 38.5 %      MCV 96 fL      MCH 31.8 pg      MCHC 33.2 g/dL      RDW 13.4 %      MPV 11.8 fL      Platelets 191 Thousands/uL      nRBC 0 /100 WBCs      Neutrophils Relative 85 %      Immat GRANS % 0 %      Lymphocytes Relative 6 %      Monocytes Relative 9 %      Eosinophils  "Relative 0 %      Basophils Relative 0 %      Neutrophils Absolute 6.68 Thousands/µL      Immature Grans Absolute 0.03 Thousand/uL      Lymphocytes Absolute 0.48 Thousands/µL      Monocytes Absolute 0.74 Thousand/µL      Eosinophils Absolute 0.00 Thousand/µL      Basophils Absolute 0.03 Thousands/µL     Blood culture #2 [[id number]] Collected: 03/04/24 1830    Lab Status: In process Specimen: Blood from Arm, Right Updated: 03/04/24 1920                   XR pelvis ap only 1 or 2 vw   ED Interpretation by Rancho Stephens MD (03/04 2047)   Right intertrochanteric fx      XR chest 1 view portable    (Results Pending)   XR femur 2 vw right    (Results Pending)         Procedures  Procedures      ED Course  ED Course as of 03/04/24 2143   Mon Mar 04, 2024   2050 SARS-COV-2(!): Positive       Medical Decision Making  Presents after ambulatory fall onto right hip.  Radiographs show right intertrochanteric hip fracture.  Patient noted to have COVID as well during SIRS workup secondary to fever and tachycardia.  Patient does not have sepsis from a bacterial source.  Patient admitted to trauma for right femur fracture. Patient in agreement with plan and questions were answered.    Previous charting was reviewed.  History obtained from patient.    Portions or all of this note were generated using voice recognition software.  Occasional wrong word or \"sound a like\" substitutions may have occurred due to the inherent limitations of voice recognition software.  Please interpret any errors within the intended context of the whole sentence or idea.      Amount and/or Complexity of Data Reviewed  Labs: ordered. Decision-making details documented in ED Course.  Radiology: ordered and independent interpretation performed.    Risk  OTC drugs.  Prescription drug management.  Decision regarding hospitalization.          Disposition  Final diagnoses:   Closed fracture of femur, intertrochanteric, right, initial encounter (HCC)   Fever "   Fall, initial encounter   COVID     Time reflects when diagnosis was documented in both MDM as applicable and the Disposition within this note       Time User Action Codes Description Comment    3/4/2024  8:49 PM Rancho Stephens [S72.141S] Closed fracture of intertrochanteric section of femur, right, sequela     3/4/2024  8:49 PM Rancho Stephens Remove [S72.141S] Closed fracture of intertrochanteric section of femur, right, sequela     3/4/2024  8:49 PM Rancho Stephens Add [S72.141A] Closed fracture of femur, intertrochanteric, right, initial encounter ([facility])     3/4/2024  8:50 PM Rancho Stephens [R50.9] Fever     3/4/2024  8:50 PM Rancho Stephens [W19.XXXA] Fall, initial encounter     3/4/2024  8:50 PM Rancho Stephens [U07.1] COVID           ED Disposition       ED Disposition   Admit    Condition   Stable    Date/Time   Mon Mar 4, 2024  8:49 PM    Comment   Case was discussed with trauma and the patient's admission status was agreed to be Admission Status: inpatient status to the service of   .               Follow-up Information    None         Patient's Medications   Discharge Prescriptions    No medications on file     No discharge procedures on file.    PDMP Review         Value Time User    PDMP Reviewed  Yes 3/7/2023  2:39 PM Asif Feliciano MD             ED Provider  Attending physically available and evaluated Zaho Walls. I managed the patient along with the ED Attending.    Electronically Signed by           Rancho Stephens MD  03/04/24 8818

## 2024-03-05 NOTE — ANESTHESIA PREPROCEDURE EVALUATION
Procedure:  INSERTION NAIL IM FEMUR ANTEGRADE (TROCHANTERIC) (Right: Leg Upper)    Relevant Problems   ANESTHESIA (within normal limits)      CARDIO   (+) Hyperlipidemia   (+) Hypertension      ENDO   (+) Type 2 diabetes mellitus with complication (HCC)      GI/HEPATIC   (+) Gastroesophageal reflux disease without esophagitis      /RENAL   (+) Benign prostatic hyperplasia      HEMATOLOGY   (+) Anemia      MUSCULOSKELETAL   (+) Left-sided low back pain with left-sided sciatica      Respiratory   (+) Laryngeal squamous cell carcinoma (HCC)      Nervous and Auditory   (+) Lumbar radiculopathy   (+) Polyneuropathy      Musculoskeletal and Integument   (+) Intertrochanteric fracture (HCC)      Other   (+) Fall   (+) Obesity (BMI 30.0-34.9)      Fingerstick Glucose 11:22: 186    EKG 3/4/2024:  Sinus tachycardia with short OK  Right axis deviation  Incomplete right bundle branch block  ST & T wave abnormality, consider inferior ischemia  Abnormal ECG  When compared with ECG of 13-FEB-2023 12:57,  Premature atrial complexes are no longer Present  OK interval has decreased  QRS axis Shifted right    CXR 3/4/2024:  Clear lungs. No pneumothorax or pleural effusion.       Lab Results   Component Value Date    WBC 6.99 03/05/2024    HGB 11.2 (L) 03/05/2024    HCT 32.6 (L) 03/05/2024    MCV 92 03/05/2024     03/05/2024     Lab Results   Component Value Date    SODIUM 136 03/05/2024    K 3.7 03/05/2024     03/05/2024    CO2 27 03/05/2024    BUN 15 03/05/2024    CREATININE 0.97 03/05/2024    GLUC 184 (H) 03/05/2024    CALCIUM 8.3 (L) 03/05/2024     Lab Results   Component Value Date    INR 1.16 03/04/2024    PROTIME 14.7 (H) 03/04/2024     Lab Results   Component Value Date    HGBA1C 7.7 (H) 03/04/2024          Physical Exam    Airway    Mallampati score: II  TM Distance: >3 FB  Neck ROM: full     Dental       Cardiovascular  Cardiovascular exam normal    Pulmonary  Pulmonary exam normal     Other Findings  O2 2  L/min NC      Anesthesia Plan  ASA Score- 3     Anesthesia Type- general with ASA Monitors.         Additional Monitors:     Airway Plan: ETT.           Plan Factors-    Chart reviewed. EKG reviewed. Imaging results reviewed. Existing labs reviewed. Patient summary reviewed.                  Induction- intravenous.    Postoperative Plan-     Informed Consent- Anesthetic plan and risks discussed with patient.  I personally reviewed this patient with the CRNA. Discussed and agreed on the Anesthesia Plan with the CRNA..

## 2024-03-05 NOTE — PLAN OF CARE
Problem: PAIN - ADULT  Goal: Verbalizes/displays adequate comfort level or baseline comfort level  Description: Interventions:  - Encourage patient to monitor pain and request assistance  - Assess pain using appropriate pain scale  - Administer analgesics based on type and severity of pain and evaluate response  - Implement non-pharmacological measures as appropriate and evaluate response  - Consider cultural and social influences on pain and pain management  - Notify physician/advanced practitioner if interventions unsuccessful or patient reports new pain  Outcome: Progressing     Problem: SAFETY ADULT  Goal: Patient will remain free of falls  Description: INTERVENTIONS:  - Educate patient/family on patient safety including physical limitations  - Instruct patient to call for assistance with activity   - Consult OT/PT to assist with strengthening/mobility   - Keep Call bell within reach  - Keep bed low and locked with side rails adjusted as appropriate  - Keep care items and personal belongings within reach  - Initiate and maintain comfort rounds  - Make Fall Risk Sign visible to staff  - Offer Toileting every 2 Hours, in advance of need  - Initiate/Maintain bed/chair alarm  - Obtain necessary fall risk management equipment:   - Apply yellow socks and bracelet for high fall risk patients  - Consider moving patient to room near nurses station  Outcome: Progressing  Goal: Maintain or return to baseline ADL function  Description: INTERVENTIONS:  -  Assess patient's ability to carry out ADLs; assess patient's baseline for ADL function and identify physical deficits which impact ability to perform ADLs (bathing, care of mouth/teeth, toileting, grooming, dressing, etc.)  - Assess/evaluate cause of self-care deficits   - Assess range of motion  - Assess patient's mobility; develop plan if impaired  - Assess patient's need for assistive devices and provide as appropriate  - Encourage maximum independence but intervene  and supervise when necessary  - Involve family in performance of ADLs  - Assess for home care needs following discharge   - Consider OT consult to assist with ADL evaluation and planning for discharge  - Provide patient education as appropriate  Outcome: Progressing  Goal: Maintains/Returns to pre admission functional level  Description: INTERVENTIONS:  - Perform AM-PAC 6 Click Basic Mobility/ Daily Activity assessment daily.  - Set and communicate daily mobility goal to care team and patient/family/caregiver.   - Collaborate with rehabilitation services on mobility goals if consulted  - Perform Range of Motion 3 times a day.  - Reposition patient every 2 hours.  - Dangle patient 3 times a day  - Stand patient 3 times a day  - Ambulate patient 3 times a day  - Out of bed to chair 3 times a day   - Out of bed for meals 3 times a day  - Out of bed for toileting  - Record patient progress and toleration of activity level   Outcome: Progressing     Problem: DISCHARGE PLANNING  Goal: Discharge to home or other facility with appropriate resources  Description: INTERVENTIONS:  - Identify barriers to discharge w/patient and caregiver  - Arrange for needed discharge resources and transportation as appropriate  - Identify discharge learning needs (meds, wound care, etc.)  - Arrange for interpretive services to assist at discharge as needed  - Refer to Case Management Department for coordinating discharge planning if the patient needs post-hospital services based on physician/advanced practitioner order or complex needs related to functional status, cognitive ability, or social support system  Outcome: Progressing     Problem: Knowledge Deficit  Goal: Patient/family/caregiver demonstrates understanding of disease process, treatment plan, medications, and discharge instructions  Description: Complete learning assessment and assess knowledge base.  Interventions:  - Provide teaching at level of understanding  - Provide teaching  via preferred learning methods  Outcome: Progressing     Problem: SKIN/TISSUE INTEGRITY - ADULT  Goal: Skin Integrity remains intact(Skin Breakdown Prevention)  Description: Assess:  -Perform [place] assessment every shift   -Clean and moisturize skin   -Inspect skin when repositioning, toileting, and assisting with ADLS  -Assess under medical devices -Assess extremities for adequate circulation and sensation     Bed Management:  -Have minimal linens on bed & keep smooth, unwrinkled  -Change linens as needed when moist or perspiring  -Avoid sitting or lying in one position for more than 2 hours while in bed  -Keep HOB at 30 degrees     Toileting:  -Offer bedside commode  -Assess for incontinence every shift   -Use incontinent care products after each incontinent episode     Activity:  -Mobilize patient 3 times a day  -Encourage activity and walks on unit  -Encourage or provide ROM exercises   -Turn and reposition patient every 2 Hours  -Use appropriate equipment to lift or move patient in bed  -Instruct/ Assist with weight shifting every 20 minutes  when out of bed in chair  -Consider limitation of chair time 2 hour intervals    Skin Care:  -Avoid use of baby powder, tape, friction and shearing, hot water or constrictive clothing  -Relieve pressure over bony prominences   -Do not massage red bony areas    Next Steps:  -Teach patient strategies to minimize risks    -Consider consults to  interdisciplinary teams   Outcome: Progressing  Goal: Incision(s), wounds(s) or drain site(s) healing without S/S of infection  Description: INTERVENTIONS  - Assess and document dressing, incision, wound bed, drain sites and surrounding tissue  - Provide patient and family education  - Perform skin care/dressing changes   Outcome: Progressing     Problem: MUSCULOSKELETAL - ADULT  Goal: Maintain or return mobility to safest level of function  Description: INTERVENTIONS:  - Assess patient's ability to carry out ADLs; assess patient's  baseline for ADL function and identify physical deficits which impact ability to perform ADLs (bathing, care of mouth/teeth, toileting, grooming, dressing, etc.)  - Assess/evaluate cause of self-care deficits   - Assess range of motion  - Assess patient's mobility  - Assess patient's need for assistive devices and provide as appropriate  - Encourage maximum independence but intervene and supervise when necessary  - Involve family in performance of ADLs  - Assess for home care needs following discharge   - Consider OT consult to assist with ADL evaluation and planning for discharge  - Provide patient education as appropriate  Outcome: Progressing  Goal: Maintain proper alignment of affected body part  Description: INTERVENTIONS:  - Support, maintain and protect limb and body alignment  - Provide patient/ family with appropriate education  Outcome: Progressing

## 2024-03-05 NOTE — PROGRESS NOTES
"[facility]  TERTIARY/ Progress Note  Name: Zhao Walls I  MRN: [id number]  Unit/Bed#: [facility] 614-01 I Date of Admission: 3/4/2024   Date of Service: 3/5/2024 I Hospital Day: 1    Assessment/Plan   Intertrochanteric fracture (HCC)  Assessment & Plan  - s/p fall  - Ortho consulted plans for Or  - PT/Ot  - Pain control     Fall  Assessment & Plan  - fall from standing onto side of hip  - Intertrochanteric fracture on R  - Ortho consulted and plans for OR  - PT/ OT consulted  - Michelle consulted  - Pain control               TRAUMA TERTIARY SURVEY NOTE    VTE Prophylaxis:Enoxaparin (Lovenox)     Disposition: pending    Code status:  Level 1 - Full Code    Consultants: IP CONSULT TO ORTHOPEDIC SURGERY  IP CONSULT TO ENDOCRINOLOGY  IP CONSULT TO GERONTOLOGY    Subjective     Mechanism of Injury:Fall     Chief Complaint: right hip pain    HPI/Last 24 hour events:  Per H&P \"Zhao Walls is a 83 y.o. male who presents with right hip pain in the setting of a fall from standing height. The patient states he was attempting to go to the bathroom when the walker \"got away from him\" and he tripped and fell. The patient denies head strike, denies loss of consciousness. The patient denies having nausea, vomiting, fevers, chills, chest pain, shortness of breath. The patient is tolerating PO intake without difficulty. Patient denies having difficulty with urination. PMHX is notable for diabetes, GERD, HLD, HTN, and laryngeal cancer. PSHX is notable for cholecystectomy. Patient denies taking any anticoagulation. Takes ASA 4 times a week. On presentation to the ED the patient was afebrile but was tachycardic to the 120's. The patientts wbc was normal, however, glucose was elevated to 245 and he had a lactic acidosis to 3.4. The patient did test COVID positive. Blood cultures are pending. See above for the assessment and plan.\"    Patient covid positive. No acute events overnight. " Pain well controled. Plan for OR 3/5 with Dr. Richmond       Objective   Vitals:   Temp:  [99.1 °F (37.3 °C)-99.6 °F (37.6 °C)] 99.6 °F (37.6 °C)  HR:  [] 73  Resp:  [16-20] 18  BP: (100-186)/(61-96) 146/85    I/O         03/03 0701  03/04 0700 03/04 0701  03/05 0700 03/05 0701  03/06 0700    I.V. (mL/kg)  417 (4.4) 1231.2 (13)    IV Piggyback   150    Total Intake(mL/kg)  417 (4.4) 1381.2 (14.5)    Urine (mL/kg/hr)  600     Total Output  600     Net  -183 +1381.2                    Physical Exam:   General: No acute distress  Neuro: A&O; Following commands; Moving all extremities  HEENT: Moist mucus membranes  CV: Regular rate  Pulm: No respiratory distress  GI: No abdominal tenderness  MSK: No ecchymosis, TTP right hip, M&S intact, 2+DP.   Skin: Warm and dry      Invasive Devices       Peripheral Intravenous Line  Duration             Peripheral IV 03/04/24 Distal;Right;Upper;Ventral (anterior) Arm <1 day    Peripheral IV 03/04/24 Left;Proximal;Ventral (anterior) Forearm <1 day              Drain  Duration             External Urinary Catheter Medium <1 day                       1. Before the illness or injury that brought you to the Emergency, did you need someone to help you on a regular basis? 0=No   2. Since the illness or injury that brought you to the Emergency, have you needed more help than usual to take care of yourself? 1=Yes   3. Have you been hospitalized for one or more nights during the past 6 months (excluding a stay in the Emergency Department)? 0=No   4. In general, do you see well? 0=Yes   5. In general, do you have serious problems with your memory? 0=No   6. Do you take more than three different medications everyday? 1=Yes   TOTAL   2     Did you order a geriatric consult if the score was 2 or greater?: yes         Lab Results: Results: I have personally reviewed all pertinent laboratory/tests results and BMP/CMP:   Lab Results   Component Value Date    SODIUM 136 03/05/2024    K 3.7  03/05/2024     03/05/2024    CO2 27 03/05/2024    BUN 15 03/05/2024    CREATININE 0.97 03/05/2024    CALCIUM 8.3 (L) 03/05/2024    AST 22 03/04/2024    ALT 22 03/04/2024    ALKPHOS 80 03/04/2024    EGFR 71 03/05/2024       Imaging Results: I have personally reviewed pertinent reports.    Chest Xray(s): negative for acute findings   FAST exam(s): N/A   CT Scan(s): N/A   Additional Xray(s): positive for acute findings:       Other Studies:

## 2024-03-05 NOTE — OP NOTE
OPERATIVE REPORT  PATIENT NAME: Zhao Walls    :  1940  MRN: [id number]  Pt Location:  OR ROOM 17    SURGERY DATE: 3/5/2024    Surgeons and Role:     * Sean Molina MD - Primary     * Hua Dominguez MD - Assisting    Preop Diagnosis:  Closed fracture of femur, intertrochanteric, right, initial encounter ([facility]) [S72.141A]    Post-Op Diagnosis Codes:     * Closed fracture of femur, intertrochanteric, right, initial encounter ([facility]) [S72.141A]    Procedure(s):  Right - INSERTION NAIL IM FEMUR ANTEGRADE (TROCHANTERIC)    Specimen(s):  * No specimens in log *    Estimated Blood Loss:   Minimal    Drains:  External Urinary Catheter Medium (Active)   Collection Container Standard drainage bag 24 1101   Output (mL) 1440 mL 24 1532   Number of days: 0       Anesthesia Type:   Choice    Operative Indications:  Closed fracture of femur, intertrochanteric, right, initial encounter ([facility]) [S72.141A]      Operative Findings:  Short 10 mm nail, locked distally    Complications:   None    Procedure and Technique:  Following induction of an adequate level of general anesthesia, this patient was then placed on the fracture table.  Bony problems well-padded.  Antibiotics were administered.  Right hip was imaged fluoroscopically once a closed reduction was performed.  Close reduction was deemed adequate.  The right hip and lateral thigh were then prepped draped sterilely.  A small incision approximately hip.  Under fluoroscopic control the proximal femur was opened up.  A beaded tip guide chiqui was then placed down the metric of the femur.  Reaming proximally was to 16 mm.  The short nail was introduced and cannulated fashion.  A lag screw was then placed within the center of the femoral head.  This was placed over a terminally threaded K wire over which gentleman ream but not tapped.  The setscrew was deployed backed off quarter turn.  The leg was taken out of traction, the compression wheel  was spun generating compression at the fracture site.  Utilizing a outrigger device a single distal locking bolt was placed.  Final fluoroscopic films document a well aligned fracture, good hardware position.  Satisfied with the extent of surgery, the wounds then flushed with saline and closed.  Subcu tissue closed with 2-0 Vicryl suture.  The skin was closed with 2-0 nylon's.  Sterile dressings were applied.  He was then awakened from general anesthesia, will recover in the operating room as he is COVID and requires a direct transfer to the floor.  Postoperative plans are for physical therapy for weightbearing to tolerance, and he will require DVT prophylaxis with Lovenox while in house and aspirin when at home   I was present for the entire procedure.    Patient Disposition:  extubated and stable        SIGNATURE: Sean Molina MD  DATE: March 5, 2024  TIME: 4:55 PM

## 2024-03-05 NOTE — H&P
"H&P - Trauma   Zhao Walls 83 y.o. male MRN: [id number]  Unit/Bed#: ED 22 Encounter: [id number]    Trauma Alert: Evaluation; trauma team notified at 9:30 via text   Model of Arrival: Ambulance    Trauma Team: Attending To, Residents Analilia, and Fellow Reece  Consultants:     Orthopedics: routine consult; Epic consult order placed;     Assessment/Plan   Active Problems / Assessment:   84 yo male who presents with right hip pain s/p fall leading to right intertrochanteric femur fracture.     Plan:   NPO at midnight  Continue IV fluids  PVI protocol  PRN pain meds  PRN anti nausea meds  DVT prophylaxis  Own the bone consult  Sliding scale insulin  Appreciate orthopaedic surgery recommendations    History of Present Illness     Chief Complaint: Right hip pain  Mechanism:Fall     HPI:    Zhao Walls is a 83 y.o. male who presents with right hip pain in the setting of a fall from standing height. The patient states he was attempting to go to the bathroom when the walker \"got away from him\" and he tripped and fell. The patient denies head strike, denies loss of consciousness. The patient denies having nausea, vomiting, fevers, chills, chest pain, shortness of breath. The patient is tolerating PO intake without difficulty. Patient denies having difficulty with urination. PMHX is notable for diabetes, GERD, HLD, HTN, and laryngeal cancer. PSHX is notable for cholecystectomy. Patient denies taking any anticoagulation. Takes ASA 4 times a week. On presentation to the ED the patient was afebrile but was tachycardic to the 120's. The patientts wbc was normal, however, glucose was elevated to 245 and he had a lactic acidosis to 3.4. The patient did test COVID positive. Blood cultures are pending. See above for the assessment and plan.    3/4 xray chest: final read pending    3/4 xray pelvis: final read pending    3/4 xray femur: final read pending    Review of Systems   Constitutional:  Negative for " activity change, appetite change, chills and fever.   HENT:  Negative for congestion, postnasal drip, rhinorrhea, sinus pressure and sinus pain.    Eyes:  Negative for visual disturbance.   Respiratory:  Negative for chest tightness and shortness of breath.    Cardiovascular:  Negative for chest pain and palpitations.   Gastrointestinal:  Negative for abdominal pain, nausea and vomiting.   Genitourinary:  Negative for difficulty urinating, flank pain and frequency.   Musculoskeletal:  Positive for back pain.        Right leg pain   Skin:  Negative for wound.   Neurological:  Negative for dizziness and headaches.   Psychiatric/Behavioral:  Negative for agitation and confusion.      12-point, complete review of systems was reviewed and negative except as stated above.     Historical Information     Past Medical History:   Diagnosis Date    Diabetes mellitus (HCC)     GERD (gastroesophageal reflux disease)     Hyperlipidemia     Hypertension     Laryngeal cancer (HCC)      Past Surgical History:   Procedure Laterality Date    GALLBLADDER SURGERY  1968    CO LARYNGOSCOPY W/BIOPSY MICROSCOPE/TELESCOPE N/A 2/15/2023    Procedure: MICROLARYGOSCOPY AND BIOPSY; FROZEN SECTION;  Surgeon: Bennett Butler MD;  Location: [facility];  Service: ENT        Social History     Tobacco Use    Smoking status: Former     Current packs/day: 0.50     Types: Cigarettes    Smokeless tobacco: Never    Tobacco comments:     no passive smoke exposure    Vaping Use    Vaping status: Never Used   Substance Use Topics    Alcohol use: Not Currently    Drug use: Never     Immunization History   Administered Date(s) Administered    COVID-19 MODERNA VACC 0.5 ML IM 01/18/2021, 02/15/2021, 12/15/2021    INFLUENZA 10/16/2014, 10/24/2015, 10/05/2016, 09/19/2017, 10/05/2018, 09/01/2020, 09/03/2021, 10/28/2022, 09/12/2023    Influenza, Seasonal Vaccine, Quadrivalent, Adjuvanted, .5e 09/12/2023    Pneumococcal Conjugate 13-Valent 07/22/2015     "Pneumococcal Polysaccharide PPV23 10/01/2011    Td (adult), Unspecified 04/07/2016    Zoster Vaccine Recombinant 10/05/2019, 12/12/2019    influenza, trivalent, adjuvanted 09/18/2019     Last Tetanus: N/a  Family History: Non-contributory    Did you order a geriatric consult if the score was 2 or greater?: yes     Meds/Allergies   all current active meds have been reviewed     Allergies   Allergen Reactions    Penicillins Other (See Comments)     \"Unknown Reaction\"       Objective   Initial Vitals:   Temperature: 99.1 °F (37.3 °C) (03/04/24 1820)  Pulse: (!) 127 (03/04/24 1823)  Respirations: 18 (03/04/24 1823)  Blood Pressure: 145/96 (03/04/24 1823)    Primary Survey:   Airway:        Status: patent;        Pre-hospital Interventions: none        Hospital Interventions: none  Breathing:        Pre-hospital Interventions: none       Effort: normal       Right breath sounds: normal       Left breath sounds: normal  Circulation:        Rhythm: regular       Rate: regular   Right Pulses Left Pulses    R radial: 2+    R pedal: 2+     L radial: 2+    L pedal: 2+       Disability:        GCS: Eye: 4; Verbal: 5 Motor: 6 Total: 15       Right Pupil: round;  reactive         Left Pupil:  round;  reactive      R Motor Strength L Motor Strength    R : 5/5  R dorsiflex: 5/5  R plantarflex: 5/5 L : 5/5  L dorsiflex: 5/5  L plantarflex: 5/5        Sensory:  No sensory deficit  Exposure:       Completed: No      Secondary Survey:  Physical Exam  HENT:      Head: Normocephalic.      Nose: Nose normal.      Mouth/Throat:      Mouth: Mucous membranes are moist.      Pharynx: Oropharynx is clear.   Eyes:      Extraocular Movements: Extraocular movements intact.      Pupils: Pupils are equal, round, and reactive to light.   Cardiovascular:      Rate and Rhythm: Normal rate.      Pulses: Normal pulses.   Pulmonary:      Effort: Pulmonary effort is normal.   Abdominal:      Palpations: Abdomen is soft.   Musculoskeletal:         " General: Tenderness present.      Cervical back: No rigidity or tenderness.      Comments: Right leg tender to palpation. Sensation intact in the right lower extremity.    Neurological:      General: No focal deficit present.      Mental Status: He is alert and oriented to person, place, and time.   Psychiatric:         Mood and Affect: Mood normal.         Behavior: Behavior normal.         Invasive Devices       Peripheral Intravenous Line  Duration             Peripheral IV 03/04/24 Distal;Right;Upper;Ventral (anterior) Arm <1 day                  Lab Results: I have personally reviewed all pertinent laboratory/test results 03/04/24 and in the preceding 24 hours.  Recent Labs     03/04/24  1830   WBC 7.96   HGB 12.8   HCT 38.5      SODIUM 137   K 4.2      CO2 24   BUN 16   CREATININE 1.19   GLUC 245*   AST 22   ALT 22   ALB 4.3   TBILI 0.62   ALKPHOS 80   PTT 27   INR 1.16   LACTICACID 3.4*       Imaging Results: I have personally reviewed pertinent images saved in PACS. CT scan findings (and other pertinent positive findings on images) were discussed with radiology. My interpretation of the images/reports are as follows:  Chest Xray(s): pending   FAST exam(s): N/A   CT Scan(s): N/A   Additional Xray(s): pending     Other Studies: See above    Code Status: No Order  Advance Directive and Living Will:      Power of :    POLST:    I have spent 30 minutes with Patient  today in which greater than 50% of this time was spent in counseling/coordination of care regarding Diagnostic results and Prognosis.

## 2024-03-05 NOTE — CONSULTS
Orthopedics   Zhao Walls 83 y.o. male MRN: [id number]  Unit/Bed#: X ray      Chief Complaint:   right hip pain    HPI:   83 y.o. male ambulates with walker status post mechanical fall onto right side complaining of right hip pain and inability to bear weight.  Denies Headstrike, denies LOC, takes aspirin every other day. Pain is sharp in character, Located right hip and groin, acute in onset, constant in duration, severe in intensity. Exacerbating factors range of motion, remitting factors rest.  Nonradiating, no numbness, no tingling, no open wounds noted. No other complaints at this time. 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 Occupation retired.    Review Of Systems:   Skin: Normal  Neuro: See HPI  Musculoskeletal: See HPI  14 point review of systems negative except as stated above     Past Medical History:   Past Medical History:   Diagnosis Date    Diabetes mellitus (HCC)     GERD (gastroesophageal reflux disease)     Hyperlipidemia     Hypertension     Laryngeal cancer (HCC)        Past Surgical History:   Past Surgical History:   Procedure Laterality Date    GALLBLADDER SURGERY  1968    [place] LARYNGOSCOPY W/BIOPSY MICROSCOPE/TELESCOPE N/A 2/15/2023    Procedure: MICROLARYGOSCOPY AND BIOPSY; FROZEN SECTION;  Surgeon: Bennett Butler MD;  Location: [facility];  Service: ENT       Family History:  Family history reviewed and non-contributory  Family History   Problem Relation Age of Onset    Breast cancer Mother     No Known Problems Father        Social History:  Social History     Socioeconomic History    Marital status: /Civil Union     Spouse name: None    Number of children: None    Years of education: None    Highest education level: None   Occupational History    None   Tobacco Use    Smoking status: Former     Current packs/day: 0.50     Types: Cigarettes    Smokeless tobacco: Never    Tobacco comments:     " no passive smoke exposure    Vaping Use    Vaping status: Never Used   Substance and Sexual Activity    Alcohol use: Not Currently    Drug use: Never    Sexual activity: None   Other Topics Concern    None   Social History Narrative    None     Social Determinants of Health     Financial Resource Strain: Not on file   Food Insecurity: Not on file   Transportation Needs: Not on file   Physical Activity: Not on file   Stress: Not on file   Social Connections: Not on file   Intimate Partner Violence: Not on file   Housing Stability: Not on file       Allergies:   Allergies   Allergen Reactions    Penicillins Other (See Comments)     \"Unknown Reaction\"           Labs:  0   Lab Value Date/Time    HCT 38.5 03/04/2024 1830    HCT 40.6 10/30/2023 0956    HCT 39.6 03/08/2023 0944    HGB 12.8 03/04/2024 1830    HGB 13.4 10/30/2023 0956    HGB 12.7 03/08/2023 0944    INR 1.16 03/04/2024 1830    WBC 7.96 03/04/2024 1830    WBC 7.34 10/30/2023 0956    WBC 7.63 03/08/2023 0944       Meds:    Current Facility-Administered Medications:     lactated ringers bolus 1,000 mL, 1,000 mL, Intravenous, Once, Rancho Stephens MD, Last Rate: 1,000 mL/hr at 03/04/24 2054, 1,000 mL at 03/04/24 2054    Current Outpatient Medications:     al mag oxide-diphenhydramine-lidocaine viscous (MAGIC MOUTHWASH) 1:1:1 suspension, Swish and swallow 10 mL every 6 (six) hours as needed for mouth pain or discomfort (Patient not taking: Reported on 12/11/2023), Disp: 500 mL, Rfl: 1    amLODIPine (NORVASC) 10 mg tablet, TAKE ONE TABLET BY MOUTH ONCE DAILY, Disp: 90 tablet, Rfl: 0    aspirin (ECOTRIN LOW STRENGTH) 81 mg EC tablet, 81 mg every other day, Disp: , Rfl:     atorvastatin (LIPITOR) 10 mg tablet, TAKE 1 TABLET BY MOUTH ONCE DAILY AT BEDTIME, Disp: 90 tablet, Rfl: 1    benzocaine-menthol (CEPACOL) 15-3.6 mg per lozenge, Apply 1 lozenge to the mouth or throat every 2 (two) hours as needed (Sore throat), Disp: 18 lozenge, Rfl: 6    Ca Carbonate-Mag " "Hydroxide (MI-ACID PO), , Disp: , Rfl:     clobetasol (TEMOVATE) 0.05 % ointment, 1 application every 24 hours, Disp: , Rfl:     fluticasone (FLONASE) 50 mcg/act nasal spray, 1 spray into each nostril daily (Patient not taking: Reported on 12/11/2023), Disp: 16 g, Rfl: 3    gabapentin (NEURONTIN) 100 mg capsule, Take 1 capsule (100 mg total) by mouth 2 (two) times a day, Disp: 180 capsule, Rfl: 0    ibuprofen (MOTRIN) 600 mg tablet, TAKE ONE TABLET BY MOUTH THREE TIMES DAILY WITH FOOD  (Patient not taking: Reported on 12/11/2023), Disp: 45 tablet, Rfl: 0    LORazepam (ATIVAN) 0.5 mg tablet, Take 1 tablet (0.5 mg total) by mouth once as needed for anxiety for up to 1 dose Take 30 minutes prior to scheduled MRI, do not drive. (Patient not taking: Reported on 10/18/2023), Disp: 1 tablet, Rfl: 0    magnesium chloride (MAG64) 64 MG TBEC EC tablet, Take 128 mg by mouth daily, Disp: , Rfl:     metFORMIN (GLUCOPHAGE) 1000 MG tablet, TAKE ONE TABLET BY MOUTH TWICE A DAY WITH MEALS, Disp: 180 tablet, Rfl: 0    olmesartan (BENICAR) 40 mg tablet, TAKE ONE TABLET BY MOUTH EVERY MORNING, Disp: 90 tablet, Rfl: 0    Omega-3 Fatty Acids (FISH OIL) 1,000 mg, 1,000 mg see administration instructions Takes 4 times per week, Disp: , Rfl:     pantoprazole (PROTONIX) 40 mg tablet, TAKE ONE TABLET BY MOUTH EVERY MORNING, Disp: 90 tablet, Rfl: 0    tamsulosin (FLOMAX) 0.4 mg, TAKE ONE CAPSULE BY MOUTH ONCE DAILY WITH DINNER, Disp: 90 capsule, Rfl: 0    Blood Culture:   No results found for: \"BLOODCX\"    Wound Culture:   No results found for: \"WOUNDCULT\"    Ins and Outs:  No intake/output data recorded.          Physical Exam:   /67   Pulse (!) 122   Temp 99.1 °F (37.3 °C) (Oral)   Resp 18   SpO2 92%   Gen: No acute distress, resting comfortably in bed  HEENT: Eyes clear, moist mucus membranes, hearing intact  Respiratory: No audible wheezing or stridor  Cardiovascular: Well Perfused peripherally, 2+ distal pulse  Abdomen: " nondistended, no peritoneal signs  Musculoskeletal: right lower extremity  Skin intact, limb shortened and externally rotated  Tender to palpation over hip  ROM not assessed 2/2 known fracture  Sensation intact  SPN, DPN, sural, saphenous, and tibial distributions  Intact ankle dorsi/plantar flexion, EHL/FHL  2+ DP/ PT pulse  Musculature is soft and compressible, no pain with passive stretch    Radiology:   I personally reviewed the films.  X-rays AP/Lateral views of right hip shows Intertrochanteric femur fracture    Assessment:  83 y.o.male status post mechanical fall with rightIntertrochanteric femur fracture.  Patient is indicated for operative intervention in the form of cephalomedullary nail for early mobilization and pain control.  Currently being worked up for infection secondary to tachycardia to the 120s, workup currently positive for COVID.  Glucose 245 on admission, would benefit from stricter glycemic control perioperatively.  Lactic acid elevated to 3.2 on admission, will follow-up repeat results.  Trauma team for clearance to the OR.    Plan:   Non weight bearing right lower extremity  Analgesics for pain  NPO at midnight  Moise Catheter insertion  PVI Protocol Initiated  Preop labs obtained in the ED  Clearance per trauma team  Informed consent obtained  To OR for IM nail femur fracture of Intertrochanteric femur fracture  Own the bone consult placed  There is no height or weight on file to calculate BMI.   Dispo: Ortho will follow    Layla Spivey MD

## 2024-03-05 NOTE — PROGRESS NOTES
Was seen and examined at 12:20 PM today.  Case was reviewed with the resident physician at 6:45 AM today.  I agree with documentation thus far  83-year-old male presents following a fall during which he injured his right hip.  He noted immediate onset of pain in the right hip and inability ambulate.  Complicating matters is that he is COVID-positive  Examination this afternoon finds alert male, in moderate distress at rest.  His wife is in the examining room.  Right hip has intact soft tissue envelope.  There is pain with attempted motion of the right hip.  No right thigh atrophy.  There is no right knee effusion.  There is absent foot and toe dorsiflexion on the right  I have personally reviewed x-rays of the right hip and my interpretation is as follows:  Views of the right hip show a displaced basicervical fracture of the right hip  Assessment: Adult male presents following a fall with an acute traumatic fracture of the right hip which was displaced basicervical fracture.  All diagnoses were discussed with the patient.  Treatment option including risk, benefits, return discussed in detail.  ORIF of the right hip is indicated.  Consent was established to reflect this.  No guarantees were given  Plan: As he is medically cleared, an OR time should become available later this afternoon, operative treatment of the right hip can be undertaken while inpatient at [facility] today

## 2024-03-05 NOTE — RESPIRATORY THERAPY NOTE
RT Protocol Note  Zhao Walls 83 y.o. male MRN: [id number]  Unit/Bed#: ED 22 Encounter: [id number]    Assessment    Active Problems:  There are no active Hospital Problems.      Home Pulmonary Medications:  None        Past Medical History:   Diagnosis Date    Diabetes mellitus (HCC)     GERD (gastroesophageal reflux disease)     Hyperlipidemia     Hypertension     Laryngeal cancer (HCC)      Social History     Socioeconomic History    Marital status: /Civil Union     Spouse name: None    Number of children: None    Years of education: None    Highest education level: None   Occupational History    None   Tobacco Use    Smoking status: Former     Current packs/day: 0.50     Types: Cigarettes    Smokeless tobacco: Never    Tobacco comments:     no passive smoke exposure    Vaping Use    Vaping status: Never Used   Substance and Sexual Activity    Alcohol use: Not Currently    Drug use: Never    Sexual activity: None   Other Topics Concern    None   Social History Narrative    None     Social Determinants of Health     Financial Resource Strain: Not on file   Food Insecurity: Not on file   Transportation Needs: Not on file   Physical Activity: Not on file   Stress: Not on file   Social Connections: Not on file   Intimate Partner Violence: Not on file   Housing Stability: Not on file       Subjective         Objective    Physical Exam:   Assessment Type: (P) Assess only  General Appearance: (P) Alert, Awake  Respiratory Pattern: (P) Normal  Chest Assessment: (P) Chest expansion symmetrical  Bilateral Breath Sounds: (P) Diminished, Clear    Vitals:  Blood pressure 164/89, pulse 93, temperature 99.1 °F (37.3 °C), temperature source Oral, resp. rate 17, SpO2 96%.          Imaging and other studies:           Plan    Respiratory Plan: (P) Discontinue Protocol  Airway Clearance Plan: (P) Incentive Spirometer     Resp Comments: (P) Pt. presents following a fall at home resulting in a fractured hip. Pt.  denies ant resp hx or meds. Will D/C resp protocol. Will beging IS at a goal of 1100 for airway clearance.

## 2024-03-05 NOTE — UTILIZATION REVIEW
Initial Clinical Review    Admission: Date/Time/Statement:   Admission Orders (From admission, onward)       Ordered        03/04/24 2151  Inpatient Admission  Once                          Orders Placed This Encounter   Procedures    Inpatient Admission     Standing Status:   Standing     Number of Occurrences:   1     Order Specific Question:   Level of Care     Answer:   Med Surg [16]     Order Specific Question:   Estimated length of stay     Answer:   More than 2 Midnights     Order Specific Question:   Certification     Answer:   I certify that inpatient services are medically necessary for this patient for a duration of greater than two midnights. See H&P and MD Progress Notes for additional information about the patient's course of treatment.     ED Arrival Information       Expected   -    Arrival   3/4/2024 18:14    Acuity   Urgent              Means of arrival   Ambulance    Escorted by   [facility]    Service   Trauma    Admission type   Emergency              Arrival complaint   Hip pain             Chief Complaint   Patient presents with    Fall     Ems reports pt was in bathroom lost balance and fell, -HS, pts reports taking baby aspirin approx once every 5 days, right hip shortened rotated out, 101.6 temp for ems they gave 650 tylenol        Initial Presentation:  83 yom to ER from home via EMS [place]. [place] walker fell out from under him. He fell onto his right hip and felt immediate pain of the hip, unable to stand up due to the pain. No head strike. Hx laryngeal cancer, GERD, hyperlipidemia, type 2 diabetes, and hypertension. Presents febrile, tachycardic. Right leg is shortened and externally rotated.  There is pain with passive ROM of the hip.  Right leg is otherwise neurovascularly intact with 2+ DP pulse and sensation of the right extremity.  There is no C, T, or L spinal tenderness.  Full ROM of the bilateral upper and left  lower extremities without tenderness.  Admission xray showing acute displaced R femoral intertrochanteric fx. Labs showing COVID+, lactic acid 3.4   Admitted to inpatient status for R femoral fx. Ortho consulted.    Date: 3/5/24   Day 2:   Per ortho:right intertrochanteric femur fracture   Plan OR for cephalomedullary nail fixation today.    To OR for: Right - INSERTION NAIL IM FEMUR ANTEGRADE (TROCHANTERIC)   Anesthesia Type: Choice  Operative Indications: Closed fracture of femur, intertrochanteric, right, initial encounter  Operative Findings: Short 10 mm nail, locked distally    Date: 3/6/24    Day 3: Has surpassed a 2nd midnight with active treatments and services.  POD#1:  right femoral intramedullary nail.   WBAT, PT/OT, pain controlled with Oxycodone. Post-op course IVABT in progress. IV CaGluc & IV Mg repletion given. IVF maintained. Hgb drop from 11.2 to 8.8, monitor.    RLE ortho exam:   Surgical dressing clean dry intact  TTP juan incisionally  Sensation intact in sural, saphenous, superficial peroneal, deep peroneal, tibial distributions  Motor intact to ankle plantarflexion, EHL/FHL, absent ankle dorsiflexion consistent with history of foot drop.  2+ DP pulse, extremity is warm and well perfused with capillary refill <2 seconds    ED Triage Vitals   Temperature Pulse Respirations Blood Pressure SpO2   03/04/24 1820 03/04/24 1823 03/04/24 1823 03/04/24 1823 03/04/24 1823   99.1 °F (37.3 °C) (!) 127 18 145/96 94 %      Temp Source Heart Rate Source Patient Position - Orthostatic VS BP Location FiO2 (%)   03/04/24 1820 03/04/24 1830 03/05/24 0100 -- --   Oral Monitor Lying        Pain Score       03/04/24 1823       No Pain          Wt Readings from Last 1 Encounters:   03/05/24 95 kg (209 lb 7 oz)     Additional Vital Signs:   Date/Time Temp Pulse Resp BP MAP (mmHg) SpO2 Calculated FIO2 (%) - Nasal Cannula Nasal Cannula O2 Flow Rate (L/min) O2 Device Patient Position - Orthostatic VS   03/05/24 0818 --  -- -- -- -- 96 % -- -- Nasal cannula --   03/05/24 0810 99.5 °F (37.5 °C) 71 18 161/86 111 93 % -- -- -- Lying   03/05/24 06:02:19 -- 72 20 172/91 Abnormal  118 94 % -- -- -- --   03/05/24 03:23:49 99.1 °F (37.3 °C) 59 18 178/88 Abnormal  118 94 % -- -- -- --   03/05/24 0316 99.1 °F (37.3 °C) -- -- -- -- -- -- -- -- --   03/05/24 03:09:44 -- 82 16 186/87 Abnormal  120 95 % -- -- -- --   03/05/24 0300 -- -- -- -- -- -- 28 2 L/min Nasal cannula --   03/05/24 0100 -- 79 18 126/61 -- 96 % -- -- Nasal cannula Lying   03/04/24 2200 -- 93 17 164/89 118 96 % 28 2 L/min Nasal cannula --   03/04/24 2138 -- 105 -- -- -- -- -- -- -- --   03/04/24 2130 -- 91 -- 150/76 106 98 % 28 2 L/min Nasal cannula --   03/04/24 2000 -- 122 Abnormal  18 100/67 80 92 % -- -- None (Room air) --   03/04/24 1900 -- 121 Abnormal  19 126/67 88 91 % -- -- None (Room air) --   03/04/24 1837 -- -- -- -- -- -- -- -- None (Room air) --   03/04/24 1830 -- 121 Abnormal  19 136/85 104 91 % -- -- None (Room air) --   03/04/24 1823 -- 127 Abnormal  18 145/96 114 94 % -- -- None (Room air) --   03/04/24 1820 99.1 °F (37.3 °C) -- -- -- -- -- -- -- -- --     Pertinent Labs/Diagnostic Test Results:   XR femur 2 vw right   Final Result (03/05 0902)      Acute displaced right femoral intertrochanteric fracture. Remainder of the femur is intact.         XR chest 1 view portable   Final Result  (03/05 0844)      No acute cardiopulmonary disease.      XR pelvis ap only 1 or 2 vw   ED Interpretation  (03/04 2047)   Right intertrochanteric fx      Final Result (03/05 0900)      Acute displaced right femoral intertrochanteric fracture.     3/4 EKG=  Sinus tachycardia with short WA  Right axis deviation  Incomplete right bundle branch block  ST & T wave abnormality, consider inferior ischemia    Results from last 7 days   Lab Units 03/04/24  1914   SARS-COV-2  Positive*     Results from last 7 days   Lab Units 03/06/24  0526 03/05/24  0546 03/04/24  2140 03/04/24  1830    WBC Thousand/uL 7.02 6.99  --  7.96   HEMOGLOBIN g/dL 8.8* 11.2*  --  12.8   HEMATOCRIT % 25.7* 32.6*  --  38.5   PLATELETS Thousands/uL 120* 149 188 191   NEUTROS ABS Thousands/µL 5.23 4.72  --  6.68     Results from last 7 days   Lab Units 03/06/24  0526 03/05/24  0546 03/04/24  1830   SODIUM mmol/L 136 136 137   POTASSIUM mmol/L 4.0 3.7 4.2   CHLORIDE mmol/L 100 100 101   CO2 mmol/L 27 27 24   ANION GAP mmol/L 9 9 12   BUN mg/dL 18 15 16   CREATININE mg/dL 1.24 0.97 1.19   EGFR ml/min/1.73sq m 53 71 55   CALCIUM mg/dL 7.7* 8.3* 9.3   CALCIUM, IONIZED mmol/L 1.05* 1.09*  --    MAGNESIUM mg/dL 1.5* 1.3*  --    PHOSPHORUS mg/dL 5.2* 3.9  --      Results from last 7 days   Lab Units 03/06/24  0526 03/04/24  1830   AST U/L 41* 22   ALT U/L 30 22   ALK PHOS U/L 71 80   TOTAL PROTEIN g/dL 5.2* 7.0   ALBUMIN g/dL 3.3* 4.3   TOTAL BILIRUBIN mg/dL 0.58 0.62     Results from last 7 days   Lab Units 03/05/24  2055 03/05/24  1825 03/05/24  1122 03/05/24  0803   POC GLUCOSE mg/dl 199* 208* 186* 189*     Results from last 7 days   Lab Units 03/06/24  0526 03/05/24  0546 03/04/24  1830   GLUCOSE RANDOM mg/dL 208* 184* 245*     Results from last 7 days   Lab Units 03/04/24  2140   HEMOGLOBIN A1C % 7.7*   EAG mg/dl 174     Results from last 7 days   Lab Units 03/04/24  1830   PROTIME seconds 14.7*   INR  1.16   PTT seconds 27     Results from last 7 days   Lab Units 03/04/24  2140   TSH 3RD GENERATON uIU/mL 1.930     Results from last 7 days   Lab Units 03/04/24  1830   PROCALCITONIN ng/ml 0.06     Results from last 7 days   Lab Units 03/04/24 2140 03/04/24  1830   LACTIC ACID mmol/L 1.7 3.4*     Results from last 7 days   Lab Units 03/04/24 2140   SED RATE mm/hour 13     Results from last 7 days   Lab Units 03/04/24 2139   CLARITY UA  Clear   COLOR UA  Dark Yellow   SPEC GRAV UA  1.030   PH UA  5.5   GLUCOSE UA mg/dl 100 (1/10%)*   KETONES UA mg/dl 10 (1+)*   BLOOD UA  Negative   PROTEIN UA mg/dl 300 (3+)*   NITRITE UA   Negative   BILIRUBIN UA  Negative   UROBILINOGEN UA (BE) mg/dl <2.0   LEUKOCYTES UA  Negative   WBC UA /hpf 1-2   RBC UA /hpf 1-2   BACTERIA UA /hpf Occasional   EPITHELIAL CELLS WET PREP /hpf Occasional   MUCUS THREADS  Moderate*     Results from last 7 days   Lab Units 03/04/24  1914   INFLUENZA A PCR  Negative   INFLUENZA B PCR  Negative   RSV PCR  Negative     Results from last 7 days   Lab Units 03/04/24  1922 03/04/24  1830   BLOOD CULTURE  No Growth at 24 hrs. No Growth at 24 hrs.       ED Treatment:   Medication Administration from 03/04/2024 1814 to 03/05/2024 0256         Date/Time Order Dose Route Action     03/04/2024 1851 EST acetaminophen (FOR EMS ONLY) (TYLENOL) oral suspension 650 mg 0 mg Does not apply Given to EMS     03/04/2024 2054 EST HYDROmorphone (DILAUDID) injection 0.5 mg 0.5 mg Intravenous Given     03/04/2024 2054 EST lactated ringers bolus 1,000 mL 1,000 mL Intravenous New Bag     03/04/2024 2337 EST lactated ringers bolus 1,000 mL 1,000 mL Intravenous New Bag     03/05/2024 0117 EST lactated ringers infusion 4 mL/kg/hr Intravenous New Bag     03/04/2024 2216 EST docusate sodium (COLACE) capsule 100 mg 100 mg Oral Given     03/04/2024 2216 EST enoxaparin (LOVENOX) subcutaneous injection 30 mg 30 mg Subcutaneous Given     03/04/2024 2329 EST acetaminophen (TYLENOL) tablet 650 mg 650 mg Oral Given     03/04/2024 2216 EST gabapentin (NEURONTIN) capsule 100 mg 100 mg Oral Given     03/04/2024 2329 EST methocarbamol (ROBAXIN) tablet 500 mg 500 mg Oral Given          Past Medical History:   Diagnosis Date    Diabetes mellitus (HCC)     GERD (gastroesophageal reflux disease)     Hyperlipidemia     Hypertension     Laryngeal cancer (HCC)      Admitting Diagnosis: Fever [R50.9]  Closed fracture of femur, intertrochanteric, right, initial encounter ([facility]) [S72.141A]  Unspecified multiple injuries, initial encounter [T07.XXXA]  COVID [U07.1]  Age/Sex: 83 y.o. male  Admission Orders:  Consult  endocrinology  Accuchecks  Neuro checks q4h  Consult ortho  Scd/foot pumps  Consult gerontology   Pt/ot eval & tx    Scheduled Medications:  Medications 02/26 02/27 02/28 02/29 03/01 03/02 03/03 03/04 03/05 03/06   acetaminophen (FOR EMS ONLY) (TYLENOL) oral suspension 650 mg  Dose: 1 each  Freq: Once Route: XX  Start: 03/04/24 1830 End: 03/04/24 1851   Order specific questions:              1851          acetaminophen (TYLENOL) tablet 650 mg  Dose: 650 mg  Freq: Every 6 hours scheduled Route: PO  Start: 03/05/24 0000           2329      0547     1125     1813      0014 [C]     0510     1200     1800        calcium carbonate (TUMS) chewable tablet 500 mg  Dose: 500 mg  Freq: 2 times daily (before lunch and dinner) Route: PO  Start: 03/06/24 1130   Admin Instructions:               1550     1954      1130     1600        calcium gluconate 1 g in sodium chloride 0.9% 50 mL (premix)  Dose: 1 g  Freq: Once Route: IV  Last Dose: Stopped (03/05/24 0929)  Start: 03/05/24 0730 End: 03/05/24 0929   Admin Instructions:               0756     0929         calcium gluconate 2 g in sodium chloride 0.9% 100 mL (premix)  Dose: 2 g  Freq: Once Route: IV  Last Dose: 2 g (03/06/24 0757)  Start: 03/06/24 0700 End: 03/06/24 0857   Admin Instructions:                0757        ceFAZolin (ANCEF) IVPB (premix in dextrose) 2,000 mg 50 mL  Dose: 2,000 mg  Freq: Every 8 hours Route: IV  Last Dose: 2,000 mg (03/06/24 0520)  Start: 03/05/24 2000 End: 03/06/24 0550   Admin Instructions:      Order specific questions:               2000 0520        ceFAZolin (ANCEF) IVPB (premix in dextrose) 2,000 mg 50 mL  Dose: 2,000 mg  Freq: On call to O.R. Route: IV  Start: 03/06/24 0600 End: 03/07/24 0559   Admin Instructions:      Order specific questions:                (2026) [C]        cholecalciferol (VITAMIN D3) tablet 2,000 Units  Dose: 2,000 Units  Freq: Daily Route: PO  Start: 03/06/24 0900   Admin Instructions:               1550     1954       0940        docusate sodium (COLACE) capsule 100 mg  Dose: 100 mg  Freq: 2 times daily Route: PO  Indications of Use: CONSTIPATION  Start: 03/04/24 2200 2210 (5326) 3032 6364     1800        enoxaparin (LOVENOX) subcutaneous injection 30 mg  Dose: 30 mg  Freq: Every 12 hours Route: SC  Start: 03/04/24 2200   Admin Instructions:              2216      0918 [C]     2009 0940     2200        gabapentin (NEURONTIN) capsule 100 mg  Dose: 100 mg  Freq: 3 times daily Route: PO  Start: 03/04/24 2200   Admin Instructions:              2216      0919     1813     (2007) [C]      0940     1600     2100        gabapentin (NEURONTIN) capsule 100 mg  Dose: 100 mg  Freq: 2 times daily Route: PO  Start: 03/04/24 2200 End: 03/04/24 2152   Admin Instructions:              2152-D/C'd        HYDROmorphone (DILAUDID) injection 0.5 mg  Dose: 0.5 mg  Freq: Once Route: IV  Start: 03/04/24 2030 End: 03/04/24 2054   Admin Instructions:              2054           insulin lispro (HumALOG/ADMELOG) 100 units/mL subcutaneous injection 1-6 Units  Dose: 1-6 Units  Freq: 3 times daily before meals Route: SC  Start: 03/05/24 0700   Admin Instructions:               0806     1126     1830      0833     1139     1600        labetalol (NORMODYNE) injection 10 mg  Dose: 10 mg  Freq: Every 6 hours Route: IV  Indications Comment: Give for systolic blood pressure greater than 160.  Start: 03/05/24 0630 End: 03/05/24 0618   Admin Instructions:               0618-D/C'd        lactated ringers bolus 1,000 mL  Dose: 1,000 mL  Freq: Once Route: IV  Last Dose: Stopped (03/05/24 0244)  Start: 03/04/24 2200 End: 03/05/24 0244   Admin Instructions:              2337      0244         And  lactated ringers infusion  Rate: 273.6 mL/hr Dose: 4 mL/kg/hr  Weight Dosing Info: 68.4 kg (Ideal)  Freq: Continuous Route: IV  Indications of Use: IV Resuscitation  Last Dose: Stopped (03/05/24 1144)  Start: 03/04/24 2200 End: 03/07/24 0116   Admin  Instructions:               0117     0300     0600     1144 [C]         lactated ringers bolus 1,000 mL  Dose: 1,000 mL  Freq: Once Route: IV  Last Dose: Stopped (03/05/24 0118)  Start: 03/04/24 2045 End: 03/05/24 0118 2054 0118         magnesium sulfate 2 g/50 mL IVPB (premix) 2 g  Dose: 2 g  Freq: Once Route: IV  Last Dose: 2 g (03/06/24 0940)  Start: 03/06/24 0700   Admin Instructions:                0940        magnesium sulfate IVPB (premix) SOLN 1 g  Dose: 1 g  Freq: Once Route: IV  Last Dose: Stopped (03/05/24 1142)  Start: 03/05/24 0800 End: 03/05/24 1142   Admin Instructions:               1010 [C]     1142         methocarbamol (ROBAXIN) tablet 500 mg  Dose: 500 mg  Freq: Every 6 hours scheduled Route: PO  Start: 03/05/24 0000           2329      0547     1125     1830      0014     0510     1200     1800        pantoprazole (PROTONIX) EC tablet 40 mg  Dose: 40 mg  Freq: Daily Route: PO  Start: 03/05/24 0900   Admin Instructions:               0919      0940        senna (SENOKOT) tablet 17.2 mg  Dose: 2 tablet  Freq: Daily Route: PO  Indications of Use: CONSTIPATION  Start: 03/05/24 0900            (0818)      0941        tamsulosin (FLOMAX) capsule 0.4 mg  Dose: 0.4 mg  Freq: Daily with dinner Route: PO  Start: 03/05/24 1630   Admin Instructions:               1813      1630        Legend:       [id number]/2602/2702/2802/2903/0103/0203/0303/0403/0503/06        Continuous Meds Sorted by Name  for Zhao Walls as of 02/26/24 through 3/6/24  Legend:       Medications 02/26 02/27 02/28 02/29 03/01 03/02 03/03 03/04 03/05 03/06   lactated ringers infusion  Freq: Continuous PRN Route: IV  Last Dose: Stopped (03/05/24 1738)  Start: 03/05/24 1551 End: 03/05/24 1706            1551     1706-D/C'd  1738          lactated ringers bolus 1,000 mL  Dose: 1,000 mL  Freq: Once Route: IV  Last Dose: Stopped (03/05/24 0244)  Start: 03/04/24 2200 End: 03/05/24 0244   Admin Instructions:               2337      0244         And  lactated ringers infusion  Rate: 273.6 mL/hr Dose: 4 mL/kg/hr  Weight Dosing Info: 68.4 kg (Ideal)  Freq: Continuous Route: IV  Indications of Use: IV Resuscitation  Last Dose: Stopped (03/05/24 1144)  Start: 03/04/24 2200 End: 03/07/24 0116   Admin Instructions:               0117     0300     0600     1144 [C]         phenylephrine (SILVANO-SYNEPHRINE) 50 mg (STANDARD CONCENTRATION) in sodium chloride 0.9% 250 mL  Freq: Continuous PRN Route: IV  Last Dose: Stopped (03/05/24 1652)  Start: 03/05/24 1602 End: 03/05/24 1706            1602     1608     1644     1645     1648     1652     1706-D/C'd       Legend:       [id number]/2602/2702/2802/2903/0103/0203/0303/0403/0503/06        PRN Meds Sorted by Name  for Zhao Walls as of 02/26/24 through 3/6/24  Legend:         Medications 02/26 02/27 02/28 02/29 03/01 03/02 03/03 03/04 03/05 03/06   fentanyl citrate (PF) 100 MCG/2ML  Freq: As needed Route: IV  Start: 03/05/24 1557 End: 03/05/24 1706            1557     1614     1706-D/C'd  1725         HYDROmorphone HCl (DILAUDID) injection 0.2 mg  Dose: 0.2 mg  Freq: Every 3 hours PRN Route: IV  PRN Reason: breakthrough pain  Start: 03/04/24 2151   Admin Instructions:               1824         labetalol (NORMODYNE) injection 10 mg  Dose: 10 mg  Freq: Every 6 hours PRN Route: IV  PRN Reason: high blood pressure  PRN Comment: Give for systolic blood pressure greater than 160  Start: 03/05/24 0617   Admin Instructions:               0620     1550     1954          lactated ringers bolus 1,000 mL  Dose: 1,000 mL  Freq: Once as needed Route: IV  PRN Comment: For SBP less than 100mmHg and K level less than or equal to 4.8 as per Orthopedic Total Joint Hypotension Protocol  Start: 03/06/24 0221 End: 03/07/24 0220   Admin Instructions:                   And  lactated ringers bolus 1,000 mL  Dose: 1,000 mL  Freq: Once as needed Route: IV  PRN Comment: For SBP less than 100mmHg and K level  less than or equal to 4.8 as per Orthopedic Total Joint Hypotension Protocol  Start: 03/06/24 0221 End: 03/07/24 0220   Admin Instructions:                   lactated ringers infusion  Freq: Continuous PRN Route: IV  Start: 03/05/24 1551 End: 03/05/24 1706            1551     1706-D/C'd  1738         lidocaine (PF) (XYLOCAINE-MPF) 1 % injection  Freq: As needed Route: IV  Start: 03/05/24 1557 End: 03/05/24 1706            1557     1706-D/C'd       ondansetron (ZOFRAN) injection  Freq: As needed Route: IV  Start: 03/05/24 1557 End: 03/05/24 1706            1557     1706-D/C'd       ondansetron (ZOFRAN) injection 4 mg  Dose: 4 mg  Freq: Every 6 hours PRN Route: IV  PRN Reasons: nausea,vomiting  Start: 03/04/24 2151   Admin Instructions:                   oxyCODONE (ROXICODONE) IR tablet 5 mg  Dose: 5 mg  Freq: Every 4 hours PRN Route: PO  PRN Reason: severe pain  Start: 03/04/24 2151   Admin Instructions:               0314     (3427) [C]     2007      0015        oxyCODONE (ROXICODONE) split tablet 2.5 mg  Dose: 2.5 mg  Freq: Every 4 hours PRN Route: PO  PRN Reason: moderate pain  Start: 03/04/24 2151   Admin Instructions:                   phenylephrine (SILVANO-SYNEPHRINE) 50 mg (STANDARD CONCENTRATION) in sodium chloride 0.9% 250 mL  Freq: Continuous PRN Route: IV  Start: 03/05/24 1602 End: 03/05/24 1706            1602     1608     1644     1645     1648     1652     1706-D/C'd       phenylephrine bolus from bag  Freq: As needed Route: IV  Start: 03/05/24 1557 End: 03/05/24 1706            1557     1606     1608     1706-D/C'd       propofol (DIPRIVAN) 200 MG/20ML bolus injection  Freq: As needed Route: IV  Start: 03/05/24 1557 End: 03/05/24 1706            1557     1706-D/C'd       ROCuronium (ZEMURON) injection  Freq: As needed Route: IV  Start: 03/05/24 1604 End: 03/05/24 1706            1604     1629     1706-D/C'd        sodium chloride 0.9 % bolus 1,000 mL  Dose: 1,000 mL  Freq: Once as needed Route: IV  PRN  Comment: For SBP less than 100mmHg and K level greater than 4.8 as per Orthopedic Total Joint Hypotension Protocol  Start: 03/06/24 0221 End: 03/07/24 0220   Admin Instructions:                   And  sodium chloride 0.9 % bolus 1,000 mL  Dose: 1,000 mL  Freq: Once as needed Route: IV  PRN Comment: For SBP less than 100mmHg and K level greater than 4.8 as per Orthopedic Total Joint Hypotension Protocol  Start: 03/06/24 0221 End: 03/07/24 0220   Admin Instructions:                   sodium chloride 0.9 % irrigation solution  Freq: As needed  Start: 03/05/24 1648 End: 03/05/24 1704            1648     1704-D/C'd       Succinylcholine Chloride 100 mg/5 mL syringe  Freq: As needed Route: IV  Start: 03/05/24 1557 End: 03/05/24 1706            1557     1706-D/C'd       Sugammadex Sodium (BRIDION)  Freq: As needed Route: IV  Start: 03/05/24 1657 End: 03/05/24 1706            1657     1706-D/C'd       tranexamic Acid 1000 MG/10ML injection  Freq: As needed Route: IV  Start: 03/05/24 1622 End: 03/05/24 1706            1622 [C]     1706-D/C'd           Network Utilization Review Department  ATTENTION: Please call with any questions or concerns to [phone] and carefully listen to the prompts so that you are directed to the right person. All voicemails are confidential.   For Discharge needs, contact Care Management DC Support Team at [phone] opt. 2  Send all requests for admission clinical reviews, approved or denied determinations and any other requests to dedicated fax number below belonging to the campus where the patient is receiving treatment. List of dedicated fax numbers for the Facilities:  FACILITY NAME UR FAX NUMBER   ADMISSION DENIALS (Administrative/Medical Necessity) [phone]   DISCHARGE SUPPORT TEAM (NETWORK) [phone]   PARENT CHILD HEALTH (Maternity/NICU/Pediatrics) [phone]   [facility] [phone]   [facility] [phone]   [facility]  [facility] [phone]   General acute hospital [phone]   [facility] [phone]   [facility] [phone]   [facility] [phone]   [facility] [phone]   [facility] [phone]   [facility] [phone]   [facility] [phone]   [facility] [phone]

## 2024-03-05 NOTE — RESPIRATORY THERAPY NOTE
03/05/24 0700   Inhalation Therapy Tx   Resp Comments ACP reevaluated. Pt with no rib fxs. Original intervention for IS only, not vest or flutter. plan to DC ACP.

## 2024-03-05 NOTE — ASSESSMENT & PLAN NOTE
- fall from standing onto side of hip  - Intertrochanteric fracture on R  - Ortho consulted and plans for OR  - PT/ OT consulted  - Michelle consulted  - Pain control

## 2024-03-05 NOTE — CONSULTS
Consultation - Geriatric Medicine   Zhao Cheek Danielert 83 y.o. male MRN: [id number]  [facility] Encounter: [id number]      Assessment/Plan     Ambulatory dysfunction with fall  -Reportedly mechanical fall 3/4/24  -(-) head strike (-) loss of consciousness  -injuries as outlined below  -Requires use of walker for ambulation at baseline  -no prior hx recurrent falls  -Increased risk future falls due to age, now having had hx of fall, deconditioning/debility and unfamiliar environment   -encourage good body mechanics and assist with all transfers  -keep personal items and call bell close to prevent reaching  -maintain environment free of fall hazards  -encourage appropriate footwear and adequate lighting at all times when out of bed  -consider home fall risk assessment and personal fall alert system on returning home  -PT and OT eval and treat postop    Right intertrochanteric femur fracture  -S/p fall down and above  -RLE deformity noted on physical exam, fracture noted on review of imaging right femur XR obtained on admission  -NWB RLE  -Neurovascular checks per protocol  -Acute pain control  -Ortho on consult, tentative for or today    Acute pain due to trauma  -Recommend pain control per Geriatric pain protocol:  Tylenol 975mg Q8H scheduled  Roxicodone 2.5mg Q4H PRN moderate pain  Roxicodone 5mg Q4H PRN severe pain  Dilaudid 0.2mg Q3H PRN  -Consider adjuncts such as lidocaine patch topically to appropriate areas and continuation of home gabapentin regimen with close monitoring of renal function  -encourage addition of non-pharmacologic pain treatment including ice and frequent repositioning  -recommend  bowel regimen to prevent and treat constipation due to increased risk with acute pain and opiate pain medications    COVID-19  -Incidentally noted on admission  -Positive by PCR  -Symptomatic management with close monitoring of respiratory status   -Isolation per protocol, all appropriate PPE  utilized for duration of encounter    Elevated lactic acid  -Lactic acid 3.4 admission improved to 1.7 with IVF    Osteoporosis  -Evidenced by fragility fracture of right femur sustained in fall from standing height  -Vitamin D 30.6  -Osteoporosis workup in progress  -No prior DEXA on record for review  -Endo consult for on the bone pending    DM-II with diabetic peripheral neuropathy  -A1c 7.7, given age and comorbidities goal of approximately 7.5 is not unreasonable  -Home metformin on hold during hospitalization  -covering with ISS, will blood sugar goal during hospitalization 140-180 to reduce risk hypoglycemia  -Continue close outpatient follow-up with PCP for ongoing age-appropriate diabetic screenings and cares    Cognitive screening  -Alert and fully oriented, denies memory or chronic concerns  -At baseline fully independent with ADLs and IADLs  -No prior cognitive testing on record for review  -No CTH or MRI brain on record for review  -TSH WNL at 1.93, no recent B12 on record for review, given chronic metformin use with symptomatic peripheral neuropathy and increased risk deficiency will check with routine labs  -At risk age-related cognitive decline, continue secondary risk modifications including use of sensory assistive devices such as corrective lenses and hearing aids at all appropriate times reduce risk of uncorrected sensory impairment from contributing to isolation, confusion, encephalopathy and more precipitous cognitive decline  -Encourage patient to remain physically, socially, and cognitively active and engaged to maintain cognitive acuity    Laryngeal squamous cell carcinoma  -s/p XRT May 2023  -Follows with ENT and radiation oncology regularly as outpatient, continue close follow-up as directed    Impaired Vision  -recommend use of corrective lenses at all appropriate times  -encourage adequate lighting and encourage use of assistance with ambulation  -keep personal belongings close to person  to avoid reaching  -encourage appropriate footwear at all times  -Consider large font for printed materials provided to patient    Impaired Hearing  -Encourage use of hearing aids at all appropriate times  -encourage providers and caregivers to speak slowly and clearly directly to patient standing on right side whenever possible as appears to hear better on this side  -minimize background noise to encourage patient engagement  -consider use of hearing amplifier to reduce risk of straining to hear if hearing aids are not present or are not sufficient   -Encourage use of teach back method to ensure clear communication    BPH  -Maintained on tamsulosin chronically as outpatient  -Increases risk of retention, recommend urinary retention protocol    Deconditioning/debility/frailty  -Clinical frailty scale stage V, mildly frail  -Multifactorial including age, ambulatory dysfunction, DM-II, peripheral neuropathy, laryngeal squamous cell carcinoma s/p radiation therapy and multiple additional chronic medical comorbidities now with ambulatory dysfunction and fall superimposed in elderly individual with limited physiologic and metabolic reserve increasing sensitivity to even seemingly mild additional metabolic stressors/derangements  -Encourage well-balanced nutritional intake  -Continue optimization of chronic medical conditions and address acute metabolic derangements as arise   -Monitor for and treat any underlying anxiety/mood/depression symptoms as may impact patient response to therapies as well as overall sense of wellbeing and quality of life  -Ensure that treatments and interventions continue to align with patient's wishes and goals of care  -Continue psychosocial supports    Delirium precautions  -Patient is high risk of delirium due to age, fall, traumatic injuries, acute pain, hospitalization  -Initiate delirium precautions  -maintain normal sleep/wake cycle  -minimize overnight interruptions, group overnight  vitals/labs/nursing checks as possible  -dim lights, close blinds and turn off tv to minimize stimulation and encourage sleep environment in evenings  -ensure that pain is well controlled  -monitor for fecal and urinary retention which may precipitate delirium  -encourage early mobilization and ambulation with assistance once cleared to safely do so  -provide frequent reorientation and redirection as indicated and appropriate  -Minimize use of medications which may precipitate or worsen delirium such as tramadol, benzodiazepine, anticholinergics, and benadryl whenever possible  -encourage hydration and nutrition   -redirect unwanted behaviors as first line    Home medication review    Norvasc 10 Mg daily  Aspirin 81 Mg   Lipitor 10 Mg daily  Gabapentin 100 Mg twice daily  Metformin 1000 Mg twice daily  Olmesartan 40 Mg daily  Pantoprazole 40 Mg daily  Tamsulosin 0.4 Mg daily  Magnesium supplement    Care coordination: Rounded with Moni (RN)    History of Present Illness   Physician Requesting Consult: La Valdez DO  Reason for Consult / Principal Problem: Fall  Hx and PE limited by: N/A  Additional history obtained from: Chart review and patient evaluation    HPI: Zhao Walls is a 83 y.o. year old male with BPH, GERD, hypertension, hyperlipidemia, lumbar radiculopathy, laryngeal squamous cell carcinoma s/p radiation therapy, and type 2 diabetes with diabetic peripheral polyneuropathy was admitted to the trauma service with amatory function and fall found to have right intertrochanteric femur fracture and acute COVID-19 infection on admission, he is being seen in consultation by geriatrics for high risk of developing delirium during hospitalization.  Admitted seen examined at the bedside where he is lying resting, he explains that he sustained a mechanical fall yesterday at the [facility] when he was walking into the restroom and his walker slid out from under him on a slick floor causing him to fall  landing on his right hip causing immediate severe pain.  On presentation to the ED he was found to have right intertrochanter femur fracture and has been evaluated by orthopedic surgery who is recommending surgical intervention tentative for later today.  Incidentally he was also found to have elevated lactic acid and be positive for COVID-19 infection for which she reports no acute symptoms.  He reports other than pain in his right hip he feels otherwise in his usual state of health and offers no additional acute complaints.    Zhao reports that prior to admission he residing home with his wife of nearly 65 years.  At baseline he independent with ADLs and IADLs and denies memory or cognitive concerns.  He utilizes a walker for ambulation at baseline and denies history of recurrent falls.  He requires use of glasses primarily for reading, he wears right-sided hearing aid, no use of dentures.    Inpatient consult to Gerontology  Consult performed by: Sana Velarde DO  Consult ordered by: Fady Billingsley MD      Review of Systems   Constitutional: Negative.  Negative for chills and fever.   HENT:  Positive for hearing loss (Wears hearing aid on right (noted on bedside table, patient reports battery  on admission)) and voice change (Due to history of laryngeal cancer, chronic and unchanged from baseline). Negative for dental problem.    Eyes:  Positive for visual disturbance (Glasses for reading).   Respiratory: Negative.  Negative for shortness of breath.    Cardiovascular: Negative.    Gastrointestinal: Negative.    Genitourinary: Negative.    Musculoskeletal:  Positive for arthralgias and gait problem.        R hip pain   Skin: Negative.    Neurological:  Positive for weakness (BLE, chronic, R>L at baseline). Negative for dizziness, light-headedness and headaches.   Hematological: Negative.    Psychiatric/Behavioral: Negative.  Negative for sleep disturbance.    All other systems reviewed and are  "negative.    Historical Information   Past Medical History:   Diagnosis Date    Diabetes mellitus (HCC)     GERD (gastroesophageal reflux disease)     Hyperlipidemia     Hypertension     Laryngeal cancer (HCC)      Past Surgical History:   Procedure Laterality Date    GALLBLADDER SURGERY  1968    DC LARYNGOSCOPY W/BIOPSY MICROSCOPE/TELESCOPE N/A 2/15/2023    Procedure: MICROLARYGOSCOPY AND BIOPSY; FROZEN SECTION;  Surgeon: Bennett Butler MD;  Location: [facility];  Service: ENT     Social History   Social History     Substance and Sexual Activity   Alcohol Use Not Currently     Social History     Substance and Sexual Activity   Drug Use Never     Social History     Tobacco Use   Smoking Status Former    Current packs/day: 0.50    Types: Cigarettes   Smokeless Tobacco Never   Tobacco Comments    no passive smoke exposure      Family History:   Family History   Problem Relation Age of Onset    Breast cancer Mother     No Known Problems Father      Meds/Allergies   all current active meds have been reviewed    Allergies   Allergen Reactions    Penicillins Other (See Comments)     \"Unknown Reaction\"     Objective     Intake/Output Summary (Last 24 hours) at 3/5/2024 0646  Last data filed at 3/5/2024 0601  Gross per 24 hour   Intake 417 ml   Output 600 ml   Net -183 ml     Invasive Devices       Peripheral Intravenous Line  Duration             Peripheral IV 03/04/24 Distal;Right;Upper;Ventral (anterior) Arm <1 day    Peripheral IV 03/04/24 Left;Proximal;Ventral (anterior) Forearm <1 day              Drain  Duration             External Urinary Catheter Medium <1 day                  Physical Exam  Vitals and nursing note reviewed.   Constitutional:       General: He is not in acute distress.     Appearance: Normal appearance. He is not toxic-appearing.   HENT:      Head: Normocephalic and atraumatic.      Nose: Nose normal.      Comments: O2 via NC     Mouth/Throat:      Mouth: Mucous membranes are moist.      " Comments: Dentition appears primarily intact  Eyes:      General: No scleral icterus.        Right eye: No discharge.         Left eye: No discharge.      Conjunctiva/sclera: Conjunctivae normal.      Comments: Not wearing glasses at time of eval   Neck:      Comments: Voice hoarse, phonation otherwise normal  Cardiovascular:      Rate and Rhythm: Normal rate.      Pulses: Normal pulses.      Heart sounds: No murmur heard.  Pulmonary:      Effort: Pulmonary effort is normal. No respiratory distress.      Breath sounds: No wheezing.      Comments: No conversational dyspnea  Abdominal:      General: Bowel sounds are normal. There is no distension.      Palpations: Abdomen is soft.      Tenderness: There is no abdominal tenderness.   Musculoskeletal:      Cervical back: Neck supple.      Right lower leg: No edema.      Left lower leg: No edema.      Comments: Reduced overall muscle bulk and tone    RLE shortened and rotated   Skin:     General: Skin is warm and dry.   Neurological:      Mental Status: He is alert.      Comments: Awake alert and oriented, answers questions appropriately, speech clear fluent   Psychiatric:         Mood and Affect: Mood normal.         Behavior: Behavior normal.      Comments: Polite pleasant cooperative easily engaged       Lab Results:     I have personally reviewed pertinent lab results including the following:    Results from last 7 days   Lab Units 03/05/24  0546 03/04/24  2140 03/04/24  1830   WBC Thousand/uL 6.99  --  7.96   HEMOGLOBIN g/dL 11.2*  --  12.8   HEMATOCRIT % 32.6*  --  38.5   PLATELETS Thousands/uL 149 188 191   NEUTROS PCT % 68  --  85*   MONOS PCT % 15*  --  9   EOS PCT % 0  --  0     Results from last 7 days   Lab Units 03/05/24  0546 03/04/24  1830   POTASSIUM mmol/L 3.7 4.2   CHLORIDE mmol/L 100 101   CO2 mmol/L 27 24   BUN mg/dL 15 16   CREATININE mg/dL 0.97 1.19   CALCIUM mg/dL 8.3* 9.3   ALK PHOS U/L  --  80   ALT U/L  --  22   AST U/L  --  22     I have  personally reviewed the following imaging study reports in PACS:    3/4/24 -portable chest x-ray and right femur x-ray images personally viewed, final report not yet available in PACS, on personal review of imaging right femur fracture is noted, and on chest x-ray trachea appears tortuous     Therapies:   PT: Pending  OT: Pending    VTE Prophylaxis: Enoxaparin (Lovenox)    Code Status: Level 1 - Full Code  Advance Directive and Living Will:      Power of :    POLST:      Family and Social Support: Wife    Goals of Care: Acute pain control

## 2024-03-05 NOTE — DISCHARGE INSTR - AVS FIRST PAGE
Discharge Instructions - Orthopedics  Zhao Sanchezert 83 y.o. male MRN: [id number]  Unit/Bed#: Operating Room    Weight Bearing Status:                                           Weight Bearing as tolerated to the right lower extremity.    DVT prophylaxis:  Complete course of Lovenox as directed    Pain:  Continue analgesics as directed    Showering Instructions:   Do not shower until followup     Dressing Instructions:   Keep dressing clean, dry and intact until follow up appointment.    Driving Instructions:  No driving until cleared by Orthopaedic Surgery.    PT/OT:  Continue PT/OT on outpatient basis as directed    Appt Instructions:   If you do not have your appointment, please call the clinic at [phone]  Otherwise followup as scheduled    Contact the office sooner if you experience any increased numbness/tingling in the extremities.      Miscellaneous:  None

## 2024-03-05 NOTE — PROGRESS NOTES
Progress Note - Orthopedics   Zhao Walls 83 y.o. male MRN: [id number]  Unit/Bed#: X ray      Subjective:    83 y.o.male with right intertrochanteric femur fracture. No acute events, no new complaints. Pain well controlled. Lactate cleared overnight. COVID +.    Labs:  0   Lab Value Date/Time    HCT 38.5 03/04/2024 1830    HCT 40.6 10/30/2023 0956    HCT 39.6 03/08/2023 0944    HGB 12.8 03/04/2024 1830    HGB 13.4 10/30/2023 0956    HGB 12.7 03/08/2023 0944    INR 1.16 03/04/2024 1830    WBC 7.96 03/04/2024 1830    WBC 7.34 10/30/2023 0956    WBC 7.63 03/08/2023 0944    ESR 13 03/04/2024 2140       Meds:    Current Facility-Administered Medications:     acetaminophen (TYLENOL) tablet 650 mg, 650 mg, Oral, Q6H DORIS, Fady Billingsley MD, 650 mg at 03/04/24 2329    docusate sodium (COLACE) capsule 100 mg, 100 mg, Oral, BID, Fady Billingsley MD, 100 mg at 03/04/24 2216    enoxaparin (LOVENOX) subcutaneous injection 30 mg, 30 mg, Subcutaneous, Q12H, Fady Billingsley MD, 30 mg at 03/04/24 2216    gabapentin (NEURONTIN) capsule 100 mg, 100 mg, Oral, TID, Fady Billingsley MD, 100 mg at 03/04/24 2216    HYDROmorphone HCl (DILAUDID) injection 0.2 mg, 0.2 mg, Intravenous, Q3H PRN, Fady Billingsley MD    insulin lispro (HumALOG/ADMELOG) 100 units/mL subcutaneous injection 1-6 Units, 1-6 Units, Subcutaneous, TID AC **AND** Fingerstick Glucose (POCT), , , TID AC, Fady Billingsley MD    Nursing Communication Measure and document PVi every 4 hours until patient goes to the OR., , , Until Discontinued **AND** Nursing Communication Notify physician if SpO2 <92% and stop IVF infusion., , , Until Discontinued **AND** Nursing Communication Check SpHb q4h and notify provider if <8., , , Until Discontinued **AND** Nursing Communication Obtain vital signs, PVi, and SpHb immediately prior to transfer to operating room., , , Until Discontinued **AND** lactated ringers bolus 1,000 mL, 1,000 mL, Intravenous, Once, Last Rate: 500 mL/hr at  03/04/24 2337, 1,000 mL at 03/04/24 2337 **AND** lactated ringers infusion, 4 mL/kg/hr (Ideal), Intravenous, Continuous, Fady Billingsley MD    methocarbamol (ROBAXIN) tablet 500 mg, 500 mg, Oral, Q6H DORIS, Fady Billingsley MD, 500 mg at 03/04/24 2329    ondansetron (ZOFRAN) injection 4 mg, 4 mg, Intravenous, Q6H PRN, Fady Billingsley MD    oxyCODONE (ROXICODONE) IR tablet 5 mg, 5 mg, Oral, Q4H PRN, Fady Billingsley MD    oxyCODONE (ROXICODONE) split tablet 2.5 mg, 2.5 mg, Oral, Q4H PRN, Fady Billingsley MD    pantoprazole (PROTONIX) EC tablet 40 mg, 40 mg, Oral, Daily, Fady Billingsley MD    senna (SENOKOT) tablet 17.2 mg, 2 tablet, Oral, Daily, Fady Billingsley MD    tamsulosin (FLOMAX) capsule 0.4 mg, 0.4 mg, Oral, Daily With Dinner, Fady Billingsley MD    Current Outpatient Medications:     al mag oxide-diphenhydramine-lidocaine viscous (MAGIC MOUTHWASH) 1:1:1 suspension, Swish and swallow 10 mL every 6 (six) hours as needed for mouth pain or discomfort (Patient not taking: Reported on 12/11/2023), Disp: 500 mL, Rfl: 1    amLODIPine (NORVASC) 10 mg tablet, TAKE ONE TABLET BY MOUTH ONCE DAILY, Disp: 90 tablet, Rfl: 0    aspirin (ECOTRIN LOW STRENGTH) 81 mg EC tablet, 81 mg every other day, Disp: , Rfl:     atorvastatin (LIPITOR) 10 mg tablet, TAKE 1 TABLET BY MOUTH ONCE DAILY AT BEDTIME, Disp: 90 tablet, Rfl: 1    benzocaine-menthol (CEPACOL) 15-3.6 mg per lozenge, Apply 1 lozenge to the mouth or throat every 2 (two) hours as needed (Sore throat), Disp: 18 lozenge, Rfl: 6    Ca Carbonate-Mag Hydroxide (MI-ACID PO), , Disp: , Rfl:     clobetasol (TEMOVATE) 0.05 % ointment, 1 application every 24 hours, Disp: , Rfl:     fluticasone (FLONASE) 50 mcg/act nasal spray, 1 spray into each nostril daily (Patient not taking: Reported on 12/11/2023), Disp: 16 g, Rfl: 3    gabapentin (NEURONTIN) 100 mg capsule, Take 1 capsule (100 mg total) by mouth 2 (two) times a day, Disp: 180 capsule, Rfl: 0    ibuprofen (MOTRIN) 600 mg tablet, TAKE ONE  "TABLET BY MOUTH THREE TIMES DAILY WITH FOOD  (Patient not taking: Reported on 12/11/2023), Disp: 45 tablet, Rfl: 0    LORazepam (ATIVAN) 0.5 mg tablet, Take 1 tablet (0.5 mg total) by mouth once as needed for anxiety for up to 1 dose Take 30 minutes prior to scheduled MRI, do not drive. (Patient not taking: Reported on 10/18/2023), Disp: 1 tablet, Rfl: 0    magnesium chloride (MAG64) 64 MG TBEC EC tablet, Take 128 mg by mouth daily, Disp: , Rfl:     metFORMIN (GLUCOPHAGE) 1000 MG tablet, TAKE ONE TABLET BY MOUTH TWICE A DAY WITH MEALS, Disp: 180 tablet, Rfl: 0    olmesartan (BENICAR) 40 mg tablet, TAKE ONE TABLET BY MOUTH EVERY MORNING, Disp: 90 tablet, Rfl: 0    Omega-3 Fatty Acids (FISH OIL) 1,000 mg, 1,000 mg see administration instructions Takes 4 times per week, Disp: , Rfl:     pantoprazole (PROTONIX) 40 mg tablet, TAKE ONE TABLET BY MOUTH EVERY MORNING, Disp: 90 tablet, Rfl: 0    tamsulosin (FLOMAX) 0.4 mg, TAKE ONE CAPSULE BY MOUTH ONCE DAILY WITH DINNER, Disp: 90 capsule, Rfl: 0    Blood Culture:   Lab Results   Component Value Date    BLOODCX Received in Microbiology Lab. Culture in Progress. 03/04/2024       Wound Culture:   No results found for: \"WOUNDCULT\"    Ins and Outs:  I/O last 24 hours:  In: -   Out: 100 [Urine:100]          Physical:  Vitals:    03/04/24 2200   BP: 164/89   Pulse: 93   Resp: 17   Temp:    SpO2: 96%     Musculoskeletal: right Lower Extremity  Skin intact. No erythema or ecchymosis.  TTP right hip and groin  Sensation intact to saphenous, sural, tibial, superficial peroneal nerve, and deep peroneal  Motor intact to +FHL/EHL, +ankle dorsi/plantar flexion  2+ DP pulse  Digits warm and well perfused  Capillary refill < 2 seconds    Assessment:    83 y.o.male with right intertrochanteric femur fracture. To OR for cephalomedullary nail fixation today. Please maintain NPO.    Plan:  NWB RLE  To OR today - please maintain NPO  Will monitor for ABLA and administer IVF/prbc as indicated for " Greater than 2 gram drop or Hgb < 7   PT/OT  Pain control  DVT ppx   Rest of care per primary team  OTB consult  Dispo: Ortho will follow    Layla Spivey MD    Please contact role [facility] for any questions or concerns.

## 2024-03-05 NOTE — ANESTHESIA POSTPROCEDURE EVALUATION
Post-Op Assessment Note    CV Status:  Stable    Pain management: adequate       Mental Status:  Alert and awake   Hydration Status:  Euvolemic   PONV Controlled:  Controlled   Airway Patency:  Patent     Post Op Vitals Reviewed: Yes    No anethesia notable event occurred.    Staff: Anesthesiologist, CRNA           Patient dropped off in room 614. /83, O2 sats 92 on 2L (he came to OR on 2L). Patient awake alert and responsive. Complains of mild right hip pain.      /87 (03/05/24 1807)    Temp 100.4 °F (38 °C) (03/05/24 1807)    Pulse 78 (03/05/24 1807)   Resp      SpO2 (!) 83 % (03/05/24 1807)

## 2024-03-05 NOTE — CONSULTS
Endocrinology Consultation  Zhao Walls 83 y.o. male MRN: [id number]  Unit/Bed#: [facility] 614-01 Encounter: [id number]      Assessment/Plan     Assessment:  This is a 83 y.o.-year-old male with displaced right intertrochanteric femur fracture after a low impact fall, pending OR.  Also found COVID-positive.    Plan:  --Recommend calcium 1200 mg daily  --Recommend vitamin D 2000 units daily  --Recommend outpatient DEXA scan  --Recommend outpatient endocrine follow up for the consideration of osteoporosis evaluation and therapy.  Would also benefit from outpatient evaluation for hypogonadism as a potential etiology of bone disease.    CC: [facility] consult    History of Present Illness     HPI: Zhao Walls is a 83 y.o. year old male with past medical history significant for DM2, GERD, HTN, HLD, laryngeal cancer post chemo and radiation.    Patient presented to ED on 3/4 with complaint of right hip pain after fall.  Slipped in bathroom near walker.  Admitted with intertrochanteric right femur fracture. Patient also COVID positive.   Endocrinology consulted for [facility].     Calcium: 9.3  (9.1 corrected)  PTH: 42.9  Vitamin D: 30.6  TSH: 1.93     Distant smoker.  Occasional alcohol.  Never previous fractures. Regular diet soda.  Varied diet, likes cheese and dairy.  Fish.  Does not appear to have been previously evaluated for hypogonadism but does report a history of erectile dysfunction.  Does take multivitamin, does think that he takes vitamin D pill but not sure dose    Inpatient consult to Endocrinology  Consult performed by: Lissett Saleh DO  Consult ordered by: Layla Spivey MD          Review of Systems   HENT:  Positive for hearing loss.    Musculoskeletal:  Positive for arthralgias and gait problem.       Historical Information   Past Medical History:   Diagnosis Date    Diabetes mellitus (HCC)     GERD (gastroesophageal reflux disease)     Hyperlipidemia     Hypertension      Laryngeal cancer (HCC)      Past Surgical History:   Procedure Laterality Date    GALLBLADDER SURGERY  1968    [facility] LARYNGOSCOPY W/BIOPSY MICROSCOPE/TELESCOPE N/A 2/15/2023    Procedure: MICROLARYGOSCOPY AND BIOPSY; FROZEN SECTION;  Surgeon: Bennett Butler MD;  [facility];  Service: ENT     Social History   Social History     Substance and Sexual Activity   Alcohol Use Not Currently     Social History     Substance and Sexual Activity   Drug Use Never     Social History     Tobacco Use   Smoking Status Former    Current packs/day: 0.50    Types: Cigarettes   Smokeless Tobacco Never   Tobacco Comments    no passive smoke exposure      Family History:   Family History   Problem Relation Age of Onset    Breast cancer Mother     No Known Problems Father        Meds/Allergies   Current Facility-Administered Medications   Medication Dose Route Frequency Provider Last Rate Last Admin    acetaminophen (TYLENOL) tablet 650 mg  650 mg Oral Q6H DORIS Fady Billingsley MD   650 mg at 03/05/24 1125    [START ON 3/6/2024] calcium carbonate (TUMS) chewable tablet 500 mg  500 mg Oral BID before lunch/dinner Lissett Saleh DO        [START ON 3/6/2024] cholecalciferol (VITAMIN D3) tablet 2,000 Units  2,000 Units Oral Daily Lissett Saleh DO        docusate sodium (COLACE) capsule 100 mg  100 mg Oral BID Fady Billingsley MD   100 mg at 03/04/24 2216    enoxaparin (LOVENOX) subcutaneous injection 30 mg  30 mg Subcutaneous Q12H Fady Billingsley MD   30 mg at 03/05/24 0918    gabapentin (NEURONTIN) capsule 100 mg  100 mg Oral TID Fady Billingsley MD   100 mg at 03/05/24 0919    HYDROmorphone HCl (DILAUDID) injection 0.2 mg  0.2 mg Intravenous Q3H PRN Fady Billingsley MD        insulin lispro (HumALOG/ADMELOG) 100 units/mL subcutaneous injection 1-6 Units  1-6 Units Subcutaneous TID AC Fady Billingsley MD   1 Units at 03/05/24 1126    labetalol (NORMODYNE) injection 10 mg  10 mg Intravenous Q6H PRN Fady Billingsley MD   10 mg at  "03/05/24 0620    lactated ringers infusion  4 mL/kg/hr (Ideal) Intravenous Continuous Fady Billingsley MD   Stopped at 03/05/24 1144    methocarbamol (ROBAXIN) tablet 500 mg  500 mg Oral Q6H DORIS Fady Billingsley MD   500 mg at 03/05/24 1125    ondansetron (ZOFRAN) injection 4 mg  4 mg Intravenous Q6H PRN Fady Billingsley MD        oxyCODONE (ROXICODONE) IR tablet 5 mg  5 mg Oral Q4H PRN Fady Billingsley MD   5 mg at 03/05/24 0314    oxyCODONE (ROXICODONE) split tablet 2.5 mg  2.5 mg Oral Q4H PRN Fady Billingsley MD        pantoprazole (PROTONIX) EC tablet 40 mg  40 mg Oral Daily Fady Billingsley MD   40 mg at 03/05/24 0919    senna (SENOKOT) tablet 17.2 mg  2 tablet Oral Daily Fady Billingsley MD        tamsulosin (FLOMAX) capsule 0.4 mg  0.4 mg Oral Daily With Dinner Fady Billingsley MD         Allergies   Allergen Reactions    Penicillins Other (See Comments)     \"Unknown Reaction\"       Objective   Vitals: Blood pressure 146/85, pulse 73, temperature 99.6 °F (37.6 °C), resp. rate 18, height 5' 8\" (1.727 m), weight 95 kg (209 lb 7 oz), SpO2 92%.    Intake/Output Summary (Last 24 hours) at 3/5/2024 1437  Last data filed at 3/5/2024 1144  Gross per 24 hour   Intake 1798.2 ml   Output 600 ml   Net 1198.2 ml     Invasive Devices       Peripheral Intravenous Line  Duration             Peripheral IV 03/04/24 Distal;Right;Upper;Ventral (anterior) Arm <1 day    Peripheral IV 03/04/24 Left;Proximal;Ventral (anterior) Forearm <1 day              Drain  Duration             External Urinary Catheter Medium <1 day                    Physical Exam  Constitutional:       General: He is not in acute distress.     Appearance: Normal appearance. He is not ill-appearing, toxic-appearing or diaphoretic.      Comments: Hard of hearing   HENT:      Head: Normocephalic and atraumatic.      Nose: Nose normal.   Eyes:      Extraocular Movements: Extraocular movements intact.      Conjunctiva/sclera: Conjunctivae normal.      Pupils: Pupils are equal, " "round, and reactive to light.   Cardiovascular:      Rate and Rhythm: Normal rate.   Pulmonary:      Effort: Pulmonary effort is normal. No respiratory distress.   Abdominal:      General: There is no distension.   Musculoskeletal:         General: No deformity.   Skin:     General: Skin is warm and dry.   Neurological:      General: No focal deficit present.      Mental Status: He is alert. Mental status is at baseline.   Psychiatric:         Mood and Affect: Mood normal.         Behavior: Behavior normal.         Thought Content: Thought content normal.         The history was obtained from the review of the chart, patient and family.    Imaging Studies: I have personally reviewed pertinent reports.      Lissett Saleh  Endocrinology PGY-4    Please Tigertext questions to the physician covering the \"[id number]\" Role. Thank you.   "

## 2024-03-06 LAB
ALBUMIN SERPL BCP-MCNC: 3.3 G/DL (ref 3.5–5)
ALP SERPL-CCNC: 71 U/L (ref 34–104)
ALT SERPL W P-5'-P-CCNC: 30 U/L (ref 7–52)
ANION GAP SERPL CALCULATED.3IONS-SCNC: 9 MMOL/L
AST SERPL W P-5'-P-CCNC: 41 U/L (ref 13–39)
BASOPHILS # BLD AUTO: 0.02 THOUSANDS/ÂΜL (ref 0–0.1)
BASOPHILS NFR BLD AUTO: 0 % (ref 0–1)
BILIRUB SERPL-MCNC: 0.58 MG/DL (ref 0.2–1)
BUN SERPL-MCNC: 18 MG/DL (ref 5–25)
CA-I BLD-SCNC: 1.05 MMOL/L (ref 1.12–1.32)
CALCIUM ALBUM COR SERPL-MCNC: 8.3 MG/DL (ref 8.3–10.1)
CALCIUM SERPL-MCNC: 7.7 MG/DL (ref 8.4–10.2)
CHLORIDE SERPL-SCNC: 100 MMOL/L (ref 96–108)
CO2 SERPL-SCNC: 27 MMOL/L (ref 21–32)
CREAT SERPL-MCNC: 1.24 MG/DL (ref 0.6–1.3)
EOSINOPHIL # BLD AUTO: 0.01 THOUSAND/ÂΜL (ref 0–0.61)
EOSINOPHIL NFR BLD AUTO: 0 % (ref 0–6)
ERYTHROCYTE [DISTWIDTH] IN BLOOD BY AUTOMATED COUNT: 13.6 % (ref 11.6–15.1)
GFR SERPL CREATININE-BSD FRML MDRD: 53 ML/MIN/1.73SQ M
GLUCOSE SERPL-MCNC: 196 MG/DL (ref 65–140)
GLUCOSE SERPL-MCNC: 201 MG/DL (ref 65–140)
GLUCOSE SERPL-MCNC: 203 MG/DL (ref 65–140)
GLUCOSE SERPL-MCNC: 208 MG/DL (ref 65–140)
GLUCOSE SERPL-MCNC: 212 MG/DL (ref 65–140)
HCT VFR BLD AUTO: 25.1 % (ref 36.5–49.3)
HCT VFR BLD AUTO: 25.7 % (ref 36.5–49.3)
HGB BLD-MCNC: 8.6 G/DL (ref 12–17)
HGB BLD-MCNC: 8.8 G/DL (ref 12–17)
IMM GRANULOCYTES # BLD AUTO: 0.02 THOUSAND/UL (ref 0–0.2)
IMM GRANULOCYTES NFR BLD AUTO: 0 % (ref 0–2)
LYMPHOCYTES # BLD AUTO: 0.86 THOUSANDS/ÂΜL (ref 0.6–4.47)
LYMPHOCYTES NFR BLD AUTO: 12 % (ref 14–44)
MAGNESIUM SERPL-MCNC: 1.5 MG/DL (ref 1.9–2.7)
MCH RBC QN AUTO: 32.1 PG (ref 26.8–34.3)
MCHC RBC AUTO-ENTMCNC: 34.2 G/DL (ref 31.4–37.4)
MCV RBC AUTO: 94 FL (ref 82–98)
MONOCYTES # BLD AUTO: 0.88 THOUSAND/ÂΜL (ref 0.17–1.22)
MONOCYTES NFR BLD AUTO: 13 % (ref 4–12)
NEUTROPHILS # BLD AUTO: 5.23 THOUSANDS/ÂΜL (ref 1.85–7.62)
NEUTS SEG NFR BLD AUTO: 75 % (ref 43–75)
NRBC BLD AUTO-RTO: 0 /100 WBCS
PHOSPHATE SERPL-MCNC: 5.2 MG/DL (ref 2.3–4.1)
PLATELET # BLD AUTO: 120 THOUSANDS/UL (ref 149–390)
PMV BLD AUTO: 11.5 FL (ref 8.9–12.7)
POTASSIUM SERPL-SCNC: 4 MMOL/L (ref 3.5–5.3)
PROT SERPL-MCNC: 5.2 G/DL (ref 6.4–8.4)
RBC # BLD AUTO: 2.74 MILLION/UL (ref 3.88–5.62)
SODIUM SERPL-SCNC: 136 MMOL/L (ref 135–147)
WBC # BLD AUTO: 7.02 THOUSAND/UL (ref 4.31–10.16)

## 2024-03-06 PROCEDURE — 82330 ASSAY OF CALCIUM: CPT | Performed by: ORTHOPAEDIC SURGERY

## 2024-03-06 PROCEDURE — 99232 SBSQ HOSP IP/OBS MODERATE 35: CPT | Performed by: SURGERY

## 2024-03-06 PROCEDURE — 82948 REAGENT STRIP/BLOOD GLUCOSE: CPT

## 2024-03-06 PROCEDURE — 83735 ASSAY OF MAGNESIUM: CPT | Performed by: ORTHOPAEDIC SURGERY

## 2024-03-06 PROCEDURE — NC001 PR NO CHARGE: Performed by: ORTHOPAEDIC SURGERY

## 2024-03-06 PROCEDURE — 84100 ASSAY OF PHOSPHORUS: CPT | Performed by: ORTHOPAEDIC SURGERY

## 2024-03-06 PROCEDURE — 97163 PT EVAL HIGH COMPLEX 45 MIN: CPT

## 2024-03-06 PROCEDURE — 85018 HEMOGLOBIN: CPT

## 2024-03-06 PROCEDURE — 99232 SBSQ HOSP IP/OBS MODERATE 35: CPT | Performed by: INTERNAL MEDICINE

## 2024-03-06 PROCEDURE — 97167 OT EVAL HIGH COMPLEX 60 MIN: CPT

## 2024-03-06 PROCEDURE — 80053 COMPREHEN METABOLIC PANEL: CPT | Performed by: ORTHOPAEDIC SURGERY

## 2024-03-06 PROCEDURE — 85014 HEMATOCRIT: CPT

## 2024-03-06 PROCEDURE — 85025 COMPLETE CBC W/AUTO DIFF WBC: CPT | Performed by: ORTHOPAEDIC SURGERY

## 2024-03-06 RX ORDER — CALCIUM GLUCONATE 20 MG/ML
2 INJECTION, SOLUTION INTRAVENOUS ONCE
Status: COMPLETED | OUTPATIENT
Start: 2024-03-06 | End: 2024-03-06

## 2024-03-06 RX ORDER — MAGNESIUM SULFATE HEPTAHYDRATE 40 MG/ML
2 INJECTION, SOLUTION INTRAVENOUS ONCE
Status: COMPLETED | OUTPATIENT
Start: 2024-03-06 | End: 2024-03-06

## 2024-03-06 RX ADMIN — GABAPENTIN 100 MG: 100 CAPSULE ORAL at 09:40

## 2024-03-06 RX ADMIN — CALCIUM GLUCONATE 2 G: 20 INJECTION, SOLUTION INTRAVENOUS at 07:57

## 2024-03-06 RX ADMIN — GABAPENTIN 100 MG: 100 CAPSULE ORAL at 22:10

## 2024-03-06 RX ADMIN — Medication 2.5 MG: at 10:53

## 2024-03-06 RX ADMIN — METHOCARBAMOL 500 MG: 500 TABLET ORAL at 12:45

## 2024-03-06 RX ADMIN — SENNOSIDES 17.2 MG: 8.6 TABLET, FILM COATED ORAL at 09:41

## 2024-03-06 RX ADMIN — TAMSULOSIN HYDROCHLORIDE 0.4 MG: 0.4 CAPSULE ORAL at 17:45

## 2024-03-06 RX ADMIN — DOCUSATE SODIUM 100 MG: 100 CAPSULE, LIQUID FILLED ORAL at 17:45

## 2024-03-06 RX ADMIN — INSULIN LISPRO 2 UNITS: 100 INJECTION, SOLUTION INTRAVENOUS; SUBCUTANEOUS at 08:13

## 2024-03-06 RX ADMIN — CALCIUM CARBONATE (ANTACID) CHEW TAB 500 MG 500 MG: 500 CHEW TAB at 17:45

## 2024-03-06 RX ADMIN — INSULIN LISPRO 2 UNITS: 100 INJECTION, SOLUTION INTRAVENOUS; SUBCUTANEOUS at 12:22

## 2024-03-06 RX ADMIN — INSULIN LISPRO 2 UNITS: 100 INJECTION, SOLUTION INTRAVENOUS; SUBCUTANEOUS at 17:47

## 2024-03-06 RX ADMIN — ENOXAPARIN SODIUM 30 MG: 30 INJECTION SUBCUTANEOUS at 22:09

## 2024-03-06 RX ADMIN — ACETAMINOPHEN 650 MG: 325 TABLET, FILM COATED ORAL at 12:45

## 2024-03-06 RX ADMIN — MAGNESIUM SULFATE HEPTAHYDRATE 2 G: 40 INJECTION, SOLUTION INTRAVENOUS at 09:40

## 2024-03-06 RX ADMIN — METHOCARBAMOL 500 MG: 500 TABLET ORAL at 05:10

## 2024-03-06 RX ADMIN — METHOCARBAMOL 500 MG: 500 TABLET ORAL at 17:45

## 2024-03-06 RX ADMIN — OXYCODONE HYDROCHLORIDE 5 MG: 5 TABLET ORAL at 00:15

## 2024-03-06 RX ADMIN — ENOXAPARIN SODIUM 30 MG: 30 INJECTION SUBCUTANEOUS at 09:40

## 2024-03-06 RX ADMIN — ACETAMINOPHEN 650 MG: 325 TABLET, FILM COATED ORAL at 17:45

## 2024-03-06 RX ADMIN — METHOCARBAMOL 500 MG: 500 TABLET ORAL at 00:14

## 2024-03-06 RX ADMIN — Medication 2000 UNITS: at 09:40

## 2024-03-06 RX ADMIN — CEFAZOLIN SODIUM 2000 MG: 2 SOLUTION INTRAVENOUS at 05:20

## 2024-03-06 RX ADMIN — GABAPENTIN 100 MG: 100 CAPSULE ORAL at 17:45

## 2024-03-06 RX ADMIN — ACETAMINOPHEN 650 MG: 325 TABLET, FILM COATED ORAL at 05:10

## 2024-03-06 RX ADMIN — ACETAMINOPHEN 650 MG: 325 TABLET, FILM COATED ORAL at 00:14

## 2024-03-06 RX ADMIN — DOCUSATE SODIUM 100 MG: 100 CAPSULE, LIQUID FILLED ORAL at 09:41

## 2024-03-06 RX ADMIN — PANTOPRAZOLE SODIUM 40 MG: 40 TABLET, DELAYED RELEASE ORAL at 09:40

## 2024-03-06 NOTE — OCCUPATIONAL THERAPY NOTE
Occupational Therapy Evaluation     Patient Name: Zhao Walls  Today's Date: 3/6/2024  Problem List  Active Problems:    Fall    Intertrochanteric fracture (HCC)    Past Medical History  Past Medical History:   Diagnosis Date    Diabetes mellitus (HCC)     GERD (gastroesophageal reflux disease)     Hyperlipidemia     Hypertension     Laryngeal cancer (HCC)      Past Surgical History  Past Surgical History:   Procedure Laterality Date    GALLBLADDER SURGERY  1968    [facility] LARYNGOSCOPY W/BIOPSY MICROSCOPE/TELESCOPE N/A 2/15/2023    Procedure: MICROLARYGOSCOPY AND BIOPSY; FROZEN SECTION;  Surgeon: Bennett Butler MD;  [facility]: [facility];  Service: ENT    CA OPTX FEM SHFT FX W/INSJ IMED IMPLT W/WO SCREW Right 3/5/2024    Procedure: INSERTION NAIL IM FEMUR ANTEGRADE (TROCHANTERIC);  Surgeon: Sean Molina MD;  [facility];  Service: Orthopedics         03/06/24 1205   OT Last Visit   OT Visit Date 03/06/24   Note Type   Note type Evaluation   Pain Assessment   Pain Assessment Tool 0-10   Pain Score 5   Pain Location/Orientation Orientation: Right;Location: Leg   Patient's Stated Pain Goal No pain   Hospital Pain Intervention(s) Repositioned;Ambulation/increased activity;Emotional support   Restrictions/Precautions   Weight Bearing Precautions Per Order Yes   RLE Weight Bearing Per Order WBAT   Other Precautions Chair Alarm;Bed Alarm;Multiple lines;Fall Risk;Pain;WBS;Hard of hearing;O2   Home Living   Type of Home House   Home Layout Multi-level  (BILEVEL; 0 ARIANA; 2 STAIRGLIDES)   Bathroom Shower/Tub Tub/shower unit   Bathroom Toilet Standard   Bathroom Equipment Shower chair   Bathroom Accessibility Accessible   Home Equipment Walker   Additional Comments + USE OF RW, SC AND STAIRGLIDES   Prior Function   Level of [place] Independent with ADLs;Independent with functional mobility;Needs assistance with IADLS   Lives With Spouse   Receives Help From Family   IADLs Independent with  "driving;Independent with medication management;Family/Friend/Other provides meals   Falls in the last 6 months 1 to 4  (1- REASON FOR ADMISSION)   Vocational Retired   Lifestyle   Autonomy PT REPORTS BEING I WITH ADLS/LT IADLS/DRIVING PTA. SPOUSE ASSISTS WITH IADLS AS NEEDED   Reciprocal Relationships LIVES WITH SUPPORTIVE SPOUSE.   Service to Others RETIRED; MANAGER   Intrinsic Gratification ENJOYS GOING TO THE CASINO   Subjective   Subjective \"I CAN'T BELIEVE I AM OUT OF BED\"   ADL   Eating Assistance 5  Supervision/Setup   Grooming Assistance 5  Supervision/Setup   UB Bathing Assistance 4  Minimal Assistance   LB Bathing Assistance 2  Maximal Assistance   UB Dressing Assistance 4  Minimal Assistance   LB Dressing Assistance 2  Maximal Assistance   Toileting Assistance  2  Maximal Assistance   Functional Assistance 2  Maximal Assistance   Bed Mobility   Supine to Sit 3  Moderate assistance   Additional items Assist x 2;Increased time required;Verbal cues;LE management   Sit to Supine Unable to assess   Additional Comments PT LEFT OOB WITH ALL NEEDS IN REACH + CHAIR ALARM ACTIVATED.   Transfers   Sit to Stand 3  Moderate assistance   Additional items Assist x 2;Increased time required;Verbal cues   Stand to Sit 3  Moderate assistance   Additional items Assist x 2;Increased time required;Verbal cues   Stand pivot 2  Maximal assistance   Additional items Assist x 2;Increased time required;Verbal cues   Balance   Static Sitting Poor +   Static Standing Poor   Dynamic Standing Poor -   Activity Tolerance   Activity Tolerance Patient limited by fatigue;Patient limited by pain   Medical Staff Made Aware PT SEEN FOR CO-EVAL WITH SKILLED PHYSICAL THERAPIST 2' POLY-TRAUAMTIC INJURIES, NEW PRECAUTIONS/LIMITATIONS, AND LIMITED ACTIVITY TOLERANCE WHICH IMPACT PERFORMANCE AND ARE A REGRESSION FROM PT'S BASELINE.   Nurse Made Aware APPROPRIATE TO SEE PER RN.   RUE Assessment   RUE Assessment WFL   LUE Assessment   LUE Assessment " WFL   Hand Function   Gross Motor Coordination Functional   Fine Motor Coordination Functional   Psychosocial   Psychosocial (WDL) WDL   Cognition   Overall Cognitive Status WFL   Arousal/Participation Alert;Cooperative   Attention Within functional limits   Orientation Level Oriented X4   Memory Within functional limits   Following Commands Follows all commands and directions without difficulty   Comments PT IS PLEASANT AND COOPERATIVE. +[place]. ALARM ON FOR SAFETY   Assessment   Limitation Decreased ADL status;Decreased Safe judgement during ADL;Decreased cognition;Decreased endurance;Decreased self-care trans;Decreased high-level ADLs   Prognosis Good   Assessment 84 YO Male SEEN FOR INITIAL OCCUPATIONAL THERAPY EVALUATION FOLLOWING ADMISSION TO [facility] S/P FALL RESULTING IN R INTERTROCHANTERIC FEMUR FX. PT IS S/P INSERTION OF R IMN ON 3/5/24. PER ORTHO, PT IS WBAT ON RLE. PT IS COVID-19+.  PROBLEMS LIST/PMH INCLUDES Diabetes mellitus (HCC), GERD (gastroesophageal reflux disease), Hyperlipidemia, Hypertension, and Laryngeal cancer (HCC). PT IS FROM HOME WITH SPOUSE WHERE HE REPORTS BEING INDEPENDENT WITH ADLS/LT IADLS/DRIVING PTA. PT CURRENTLY REQUIRES OVERALL MIN A WITH UB ADLS, MAX A WITH LB ADLS, MOD A X2 WITH SIT<-> STAND TRANSFERS AND MAX A X2 WITH SPT WITH HHA. PT IS LIMITED 2' PAIN, FATIGUE, IMPAIRED BALANCE, FALL RISK , ORTHOPEDIC RESTRICTIONS, OVERALL WEAKNESS/DECONDITIONING , SOB, [place], and OVERALL LIMITED ACTIVITY TOLERANCE. PT EDUCATED ON DEEP BREATHING TECHNIQUES T/O ACTIVITY, SLOWING OF PACE, INCREASED FAMILY SUPPORT, and CONTINUE PARTICIPATION IN SELF-CARE/MOBILITY WITH STAFF WHILE IN THE HOSPITAL . The patient's raw score on the AM-PAC Daily Activity Inpatient Short Form is 15. A raw score of less than 19 suggests the patient may benefit from discharge to post-acute rehabilitation services. Please refer to the recommendation of the Occupational Therapist for safe discharge planning. FROM  AN OCCUPATIONAL THERAPY PERSPECTIVE, RECOMMEND LEVEL I RESOURCES UPON D/C. WILL CONT TO FOLLOW TO ADDRESS THE BELOW DESCRIBED GOALS.   Goals   Patient Goals TO GET BETTER   LTG Time Frame 10-14   Long Term Goal #1 SEE BELOW   Plan   Treatment Interventions ADL retraining;Functional transfer training;Endurance training;Cognitive reorientation;Patient/family training;Equipment evaluation/education;Compensatory technique education;Energy conservation;Activityengagement   Goal Expiration Date 03/20/24   OT Frequency 3-5x/wk   Discharge Recommendation   Rehab Resource Intensity Level, OT I (Maximum Resource Intensity)   AM-PAC Daily Activity Inpatient   Lower Body Dressing 2   Bathing 2   Toileting 2   Upper Body Dressing 3   Grooming 3   Eating 3   Daily Activity Raw Score 15   Daily Activity Standardized Score (Calc for Raw Score >=11) 34.69   AM-PAC Applied Cognition Inpatient   Following a Speech/Presentation 4   Understanding Ordinary Conversation 4   Taking Medications 4   Remembering Where Things Are Placed or Put Away 4   Remembering List of 4-5 Errands 3   Taking Care of Complicated Tasks 3   Applied Cognition Raw Score 22   Applied Cognition Standardized Score 47.83       OCCUPATIONAL THERAPY GOALS TO BE MET WITHIN 14 DAYS:    -Pt will increase bed mobility to MOD I to participate in functional activities with G tolerance and balance.  -Pt will improve functional mobility and transfers to MOD I on/off all surfaces w/ G balance/safety including toileting.  -Pt will participate in lt grooming task with MOD I after set-up standing at sink ~3-5 minutes with G safety and balance.   -Pt will increase independence in all ADLS to MOD I with G balance sitting upright in chair.  -Pt will improve activity tolerance to G for 30 min txment sessions w/ G carry over of learned energy conservation techniques.  -Pt will improve independence in lt homemaking activities to MOD I without requiring cues for safety.  -Pt will  demonstrate G carryover of learned safety techniques and proper body mechanics in functional and leisure activities with use of DME.    Documentation completed by ALEXUS Mcconnell, OTR/L  M[facility]A Certified ID# [id number]

## 2024-03-06 NOTE — ARC ADMISSION
Patient is pre-approved for ARC pending medical stability, LOF at discharge, bed availability and insurance authorization.  ARC admissions will continue to follow patient for progression of care and discharge readiness.

## 2024-03-06 NOTE — PHYSICAL THERAPY NOTE
PHYSICAL THERAPY EVALUATION  NAME:  Zhao Walls  DATE: 03/06/24    AGE:   83 y.o.  Mrn:   [id number]  ADMIT DX:  Fever [R50.9]  Closed fracture of femur, intertrochanteric, right, initial encounter ([facility]) [S72.141A]  Unspecified multiple injuries, initial encounter [T07.XXXA]  COVID [U07.1]    Past Medical History:   Diagnosis Date    Diabetes mellitus (HCC)     GERD (gastroesophageal reflux disease)     Hyperlipidemia     Hypertension     Laryngeal cancer (HCC)        Past Surgical History:   Procedure Laterality Date    GALLBLADDER SURGERY  1968    [place] LARYNGOSCOPY W/BIOPSY MICROSCOPE/TELESCOPE N/A 2/15/2023    Procedure: MICROLARYGOSCOPY AND BIOPSY; FROZEN SECTION;  Surgeon: Bennett Butler MD;  [facility];  Service: ENT    MD OPTX FEM SHFT FX W/INSJ IMED IMPLT W/WO SCREW Right 3/5/2024    Procedure: INSERTION NAIL IM FEMUR ANTEGRADE (TROCHANTERIC);  Surgeon: Sean Molina MD;  [facility];  Service: Orthopedics       Length Of Stay: 2    PHYSICAL THERAPY EVALUATION:        03/06/24 1206   Note Type   Note type Evaluation   Pain Assessment   Pain Assessment Tool 0-10   Pain Score 5   Pain Location/Orientation Orientation: Right;Location: Leg   Pain Onset/Description Onset: Ongoing;Frequency: Constant/Continuous;Descriptor: Aching   Effect of Pain on Daily Activities increased pain with activity   Patient's Stated Pain Goal No pain   Hospital Pain Intervention(s) Ambulation/increased activity;Repositioned   Restrictions/Precautions   Weight Bearing Precautions Per Order Yes   RLE Weight Bearing Per Order WBAT   Other Precautions Chair Alarm;Bed Alarm;Multiple lines;Fall Risk;Pain;Contact/isolation;Airborne/isolation   Home Living   Type of Home House  (bi level home)   Home Layout Two level  (0 ARIANA; stairglide between floors)   Home Equipment Walker;Cane   Additional Comments Pt reports living with his souse who is able to provide assist if needed   Prior Function    Level of [place] Independent with functional mobility   Lives With Spouse   Receives Help From Family   Falls in the last 6 months 1 to 4   Comments Pt reports the use of a RW for ambulation PTA   General   Family/Caregiver Present No   Cognition   Overall Cognitive Status WFL   Arousal/Participation Alert   Orientation Level Oriented X4   Memory Within functional limits   Following Commands Follows one step commands without difficulty   RUE Assessment   RUE Assessment WFL   LUE Assessment   LUE Assessment WFL   RLE Assessment   RLE Assessment X   Strength RLE   RLE Overall Strength 3-/5   LLE Assessment   LLE Assessment WFL   Strength LLE   LLE Overall Strength 4-/5   Bed Mobility   Supine to Sit 3  Moderate assistance   Additional items Assist x 2;Increased time required;Verbal cues   Transfers   Sit to Stand 3  Moderate assistance   Additional items Assist x 2;Increased time required;Verbal cues   Stand to Sit 3  Moderate assistance   Additional items Assist x 2;Increased time required;Verbal cues   Stand pivot 2  Maximal assistance   Additional items Assist x 2;Increased time required;Verbal cues   Additional Comments b/l HHA utilized for transfers   Ambulation/Elevation   Gait pattern Not appropriate  (decreased standing tolerance)   Balance   Static Sitting Poor +   Dynamic Sitting Poor   Static Standing Poor   Dynamic Standing Poor -   Endurance Deficit   Endurance Deficit Yes   Endurance Deficit Description fatigue, pain   Activity Tolerance   Activity Tolerance Patient limited by fatigue;Patient limited by pain   Medical Staff Made Aware Chise, OT; OT present for co evaluation due to pts current medical presentation   Nurse Made Aware Pt appropriate to be seen and mobilize per nsg   Assessment   Prognosis Good   Problem List Decreased strength;Decreased range of motion;Decreased endurance;Impaired balance;Decreased mobility;Pain   Assessment Pt is 83 y.o. male seen for PT evaluation s/p [facility]. Two pt identifiers were used to confirm. Pt presented s/p fall.  Pt was admitted with a primary dx of: R intertrochanteric femur fx and other active problems including COVID 19 +. Pt underwent Right - INSERTION NAIL IM FEMUR ANTEGRADE (TROCHANTERIC) which was performed on 3/5/24.  PT now consulted for assessment of mobility and d/c needs. .  Pts current co morbidities affecting treatment include:  has a past medical history of Diabetes mellitus (HCC), GERD (gastroesophageal reflux disease), Hyperlipidemia, Hypertension, and Laryngeal cancer (HCC). . Pts current clinical presentation is Unstable/ Unpredictable (high complexity) due to Ongoing medical management for primary dx, Decreased activity tolerance compared to baseline, Fall risk, Increased assistance needed from caregiver at current time, Continuous pulse oximetry monitoring , s/p surgical intervention  .  Upon evaluation, pt currently is requiring Mod Ax2 for bed mobility; Mod Ax2/max Ax2 for transfers.  Pt presents at PT eval functioning below baseline and currently w/ overall mobility deficits 2* to: BLE weakness, decreased ROM, impaired balance, pain, decreased activity tolerance compared to baseline, fall risk. Pt currently at a fall risk 2* to impairments listed above.  Based on the aforementioned PT evaluation, pt will continue to benefit from skilled Acute PT interventions to address stated impairments; to maximize functional mobility; for ongoing pt/ family training; and DME needs. At conclusion of PT session pt returned back in chair and chair alarm engaged with phone and call bell within reach. Pt denies any further questions at this time. PT is currently recommending Rehab.  PT will continue to follow during hospital stay.   Barriers to Discharge Inaccessible home environment;Decreased caregiver support   Goals   Patient Goals \"to get better\"   [facility] Expiration Date 03/20/24   Short Term Goal #1 In 14 days pt will " complete: 1) Bed mobility skills with min Ax1 to increase safety and independence as well as decrease caregiver burden. 2) Functional transfers with min Ax1 to promote increased independence, safety, and QOL.  3) Improve balance grades by 1/2 grade to increase safety with all mobility and decrease fall risk.  4) Improve BLE strength by 1/2 grade to help increase overall functional mobility and decrease fall risk. 5) ambulation to be assessed when appropriate, PT to see at that time   Plan   Treatment/Interventions Functional transfer training;LE strengthening/ROM;Therapeutic exercise;Endurance training;Patient/family training;Equipment eval/education;Bed mobility;Spoke to nursing;OT   PT Frequency 3-5x/wk   Discharge Recommendation   Rehab Resource Intensity Level, PT II (Moderate Resource Intensity)   AM-PAC Basic Mobility Inpatient   Turning in Flat Bed Without Bedrails 2   Lying on Back to Sitting on Edge of Flat Bed Without Bedrails 1   Moving Bed to Chair 1   Standing Up From Chair Using Arms 1   Walk in Room 1   Climb 3-5 Stairs With Railing 1   Basic Mobility Inpatient Raw Score 7   Turning Head Towards Sound 4   Follow Simple Instructions 4   Low Function Basic Mobility Raw Score  15   Low Function Basic Mobility Standardized Score  23.9   Highest Level Of Mobility   JH-HLM Goal 2: Bed activities/Dependent transfer   JH-HLM Achieved 4: Move to chair/commode   Modified [place] Scale   Modified Bubba Scale 4   Barthel Index   Feeding 10   Bathing 0   Grooming Score 0   Dressing Score 0   Bladder Score 10   Bowels Score 10   Toilet Use Score 5   Transfers (Bed/Chair) Score 5   Mobility (Level Surface) Score 0   Stairs Score 0   Barthel Index Score 40   Portions of the documentation may have been created using voice recognition software.Occasional wrong word or sound alike substitutions may have occurred due to the inherent limitations of the voice recognition software. Read the chart carefully and recognize,  using context, where substitutions have occurred.    Ciro Hernandez, PT, DPT

## 2024-03-06 NOTE — PROGRESS NOTES
Orthopedics   Zhao Walls 83 y.o. male MRN: [id number]  Unit/Bed#: [facility] 614-01      Subjective:  83 y.o.male post operative day 0 right femoral intramedullary nail. Pt doing well. Pain controlled. Denies CP, SOB, HA, dizziness, fever, chills, nausea, vomiting. Reports numbness consistent with previously known history of peripheral neuropathy. Also reports weakness with dorsiflexion of the foot consistent with previously known history of a few years of right foot drop.    Labs:  0   Lab Value Date/Time    HCT 32.6 (L) 03/05/2024 0546    HCT 38.5 03/04/2024 1830    HCT 40.6 10/30/2023 0956    HGB 11.2 (L) 03/05/2024 0546    HGB 12.8 03/04/2024 1830    HGB 13.4 10/30/2023 0956    INR 1.16 03/04/2024 1830    WBC 6.99 03/05/2024 0546    WBC 7.96 03/04/2024 1830    WBC 7.34 10/30/2023 0956    ESR 13 03/04/2024 2140       Meds:    Current Facility-Administered Medications:     acetaminophen (TYLENOL) tablet 650 mg, 650 mg, Oral, Q6H DORIS, Fady Billingsley MD, 650 mg at 03/05/24 1813    [Transfer Hold] calcium carbonate (TUMS) chewable tablet 500 mg, 500 mg, Oral, BID before lunch/dinner, Lissett Saleh DO    [Transfer Hold] cholecalciferol (VITAMIN D3) tablet 2,000 Units, 2,000 Units, Oral, Daily, Lissett Saleh DO    docusate sodium (COLACE) capsule 100 mg, 100 mg, Oral, BID, Fady Billingsley MD, 100 mg at 03/05/24 1830    enoxaparin (LOVENOX) subcutaneous injection 30 mg, 30 mg, Subcutaneous, Q12H, Fady Billingsley MD, 30 mg at 03/05/24 0918    gabapentin (NEURONTIN) capsule 100 mg, 100 mg, Oral, TID, Fady Billingsley MD, 100 mg at 03/05/24 1813    HYDROmorphone HCl (DILAUDID) injection 0.2 mg, 0.2 mg, Intravenous, Q3H PRN, Fady Billingsley MD, 0.2 mg at 03/05/24 1824    insulin lispro (HumALOG/ADMELOG) 100 units/mL subcutaneous injection 1-6 Units, 1-6 Units, Subcutaneous, TID AC, 2 Units at 03/05/24 1830 **AND** Fingerstick Glucose (POCT), , , TID AC, Fady Billingsley MD    [Transfer Hold] labetalol (NORMODYNE)  "injection 10 mg, 10 mg, Intravenous, Q6H PRN, Fady Billingsley MD, 10 mg at 03/05/24 0620    Nursing Communication Measure and document PVi every 4 hours until patient goes to the OR., , , Until Discontinued **AND** Nursing Communication Notify physician if SpO2 <92% and stop IVF infusion., , , Until Discontinued **AND** Nursing Communication Check SpHb q4h and notify provider if <8., , , Until Discontinued **AND** Nursing Communication Obtain vital signs, PVi, and SpHb immediately prior to transfer to operating room., , , Until Discontinued **AND** [COMPLETED] lactated ringers bolus 1,000 mL, 1,000 mL, Intravenous, Once, Stopped at 03/05/24 0244 **AND** lactated ringers infusion, 4 mL/kg/hr (Ideal), Intravenous, Continuous, Fady Billingsley MD, Stopped at 03/05/24 1144    methocarbamol (ROBAXIN) tablet 500 mg, 500 mg, Oral, Q6H DORIS, Fady Billingsley MD, 500 mg at 03/05/24 1830    ondansetron (ZOFRAN) injection 4 mg, 4 mg, Intravenous, Q6H PRN, Fady Billingsley MD    oxyCODONE (ROXICODONE) IR tablet 5 mg, 5 mg, Oral, Q4H PRN, Fady Billingsley MD, 5 mg at 03/05/24 0314    oxyCODONE (ROXICODONE) split tablet 2.5 mg, 2.5 mg, Oral, Q4H PRN, Fady Billingsley MD    pantoprazole (PROTONIX) EC tablet 40 mg, 40 mg, Oral, Daily, Fady Billingsley MD, 40 mg at 03/05/24 0919    senna (SENOKOT) tablet 17.2 mg, 2 tablet, Oral, Daily, Fady Billingsley MD    tamsulosin (FLOMAX) capsule 0.4 mg, 0.4 mg, Oral, Daily With Dinner, Fady Billingsley MD, 0.4 mg at 03/05/24 1813    Blood Culture:   Lab Results   Component Value Date    BLOODCX Received in Microbiology Lab. Culture in Progress. 03/04/2024       Wound Culture:   No results found for: \"WOUNDCULT\"    Ins and Outs:  I/O last 24 hours:  In: 2298.2 [I.V.:2148.2; IV Piggyback:150]  Out: 2040 [Urine:2040]          Physical exam:  Vitals:    03/05/24 1915   BP: 147/79   Pulse: 71   Resp: 18   Temp: 99.6 °F (37.6 °C)   SpO2: 94%     right lower extremity  Surgical dressing clean dry intact  Mild TTP " over surgical site  Sensation intact in sural, saphenous, superficial peroneal, deep peroneal, tibial distributions  Motor intact to ankle plantarflexion, EHL/FHL, absent ankle dorsiflexion consistent with history of foot drop.  2+ DP pulse, extremity is warm and well perfused with capillary refill <2 seconds.    Assessment: 83 y.o.male post operative day 0 right femur IMN. Pt doing well    Plan:  Up and out of bed  Weightbearing as tolerated right lower extremity  PT/OT  DVT prophylaxis  Analgesics  Dispo: Ortho will follow  Will continue to assess for acute blood loss anemia    Pawel Mendez MD

## 2024-03-06 NOTE — ASSESSMENT & PLAN NOTE
- fall from standing onto side of hip  - Intertrochanteric fracture on R  - s/p R femoral intramedullary nail  - PT/ OT consulted  - Michelle consulted  - Pain control

## 2024-03-06 NOTE — CASE MANAGEMENT
Case Management Assessment & Discharge Planning Note    Patient name Zhao Walls  Location [facility] 614/[facility] 614-01 MRN [id number]  : 1940 Date 3/6/2024       Current Admission Date: 3/4/2024  Current Admission Diagnosis:Fall   Patient Active Problem List    Diagnosis Date Noted    Fall 2024    Intertrochanteric fracture (HCC) 2024    Polyneuropathy 2023    Laryngeal squamous cell carcinoma (HCC)     Allergies 2022    Postnasal drip 2022    Lumbar radiculopathy     Gastroesophageal reflux disease without esophagitis 2022    Cataracta 2021    Pre-op examination 2021    Left-sided low back pain with left-sided sciatica 2019    Obesity (BMI 30.0-34.9) 2019    Type 2 diabetes mellitus with complication (HCC) 2014    Anemia 2014    Benign prostatic hyperplasia 2014    Hyperlipidemia 2014    Allergic rhinitis 2013    Obesity 09/10/1998    Hypertension 1997      LOS (days): 2  Geometric Mean LOS (GMLOS) (days): 9  Days to GMLOS:7.4     OBJECTIVE:    Risk of Unplanned Readmission Score: 15.74         Current admission status: Inpatient       Preferred Pharmacy:   [id number] [place] [place] [address] [address]  Phone: [phone] Fax: [phone]    [facility]  [place], [place] - [address] [facility] [address] [facility] [address]  [facility] [place] [address]  Phone: [phone] Fax: [phone]    Primary Care Provider: Bello Montanez MD    Primary Insurance: [employer] [facility]  Secondary Insurance:     ASSESSMENT:  Active Health Care Proxies       Zhao Walls Health Care Representative - Son   Primary Phone: [phone] (Mobile)                 Advance Directives  Does patient have a Health Care POA?: Yes  Does patient have Advance Directives?: Yes  Advance Directives: Living will  Primary Contact: Zhao Walls (Son)  [phone]    Readmission Root Cause  30 Day Readmission: No    Patient Information  Admitted from:: Home  Mental Status: Alert  During Assessment patient was accompanied by: Not accompanied during assessment  Assessment information provided by:: Patient  Primary Caregiver: Self  Support Systems: Spouse/significant other, Family members, Self  County of Residence: [place]  What [facility] do you live in?: [place]  Living Arrangements: Lives w/ Spouse/significant other  Is patient a ?: No    Activities of Daily Living Prior to Admission  Functional Status: Independent  Completes ADLs independently?: Yes  Ambulates independently?: Yes  Does patient have a history of Outpatient Therapy (PT/OT)?: Yes  Does patient have a history of HHC?: No  Does patient currently have HHC?: No    Patient Information Continued  Income Source: Pension/FDC  Does patient have prescription coverage?: Yes  Does patient receive dialysis treatments?: No  Does patient have a history of substance abuse?: No  Does patient have a history of Mental Health Diagnosis?: No    Means of Transportation  Means of Transport to Appts:: Drives Self    Social Determinants of Health (SDOH)      Flowsheet Row Most Recent Value   Housing Stability    In the last 12 months, was there a time when you were not able to pay the mortgage or rent on time? N   In the last 12 months, how many places have you lived? 1   In the last 12 months, was there a time when you did not have a steady place to sleep or slept in a shelter (including now)? N   Transportation Needs    In the past 12 months, has lack of transportation kept you from medical appointments or from getting medications? no   In the past 12 months, has lack of transportation kept you from meetings, work, or from getting things needed for daily living? No   Food Insecurity    Within the past 12 months, you worried that your food would run out before you got the money to buy more. Never true   Within  the past 12 months, the food you bought just didn't last and you didn't have money to get more. Never true   Utilities    In the past 12 months has the electric, gas, oil, or water company threatened to shut off services in your home? No          DISCHARGE DETAILS:    Discharge planning discussed with:: patient and wife  Freedom of Choice: Yes     CM contacted family/caregiver?: Yes  Were Treatment Team discharge recommendations reviewed with patient/caregiver?: Yes  Did patient/caregiver verbalize understanding of patient care needs?: N/A- going to facility  Were patient/caregiver advised of the risks associated with not following Treatment Team discharge recommendations?: Yes    Contacts  Patient Contacts: Angelia chino (Spouse) [phone]  Relationship to Patient:: Family  Contact Method: Phone  Phone Number: [phone]  Reason/Outcome: Continuity of Care, Emergency Contact, Discharge Planning    Requested Home Health Care         Is the patient interested in HHC at discharge?: No    DME Referral Provided  Referral made for DME?: No    Other Referral/Resources/Interventions Provided:  Interventions: Short Term Rehab    Treatment Team Recommendation: Short Term Rehab  Discharge Destination Plan:: Short Term Rehab      Cm met with pt and his wife; then spoke to pt's son on the telephone, regarding d/c planning  Pt was evaluated by OT/PT and recommended for IP rehab.  CM reviewed the different levels of rehab, as well as different locations/options  Pt, and his son's, preference was the [facility]. They're also amenable to  TCF in the event that insurance won't approve acute.   CM will place referrals and follow up      CM reviewed d/c planning process including the following: identifying help at home, patient preference for d/c planning needs, Discharge Lounge, [facility] program, availability of treatment team to discuss questions or concerns patient and/or family may have regarding  understanding medications and recognizing signs and symptoms once discharged.  CM also encouraged patient to follow up with all recommended appointments after discharge. Patient advised of importance for patient and family to participate in managing patient’s medical well being.

## 2024-03-06 NOTE — PLAN OF CARE
Problem: PHYSICAL THERAPY ADULT  Goal: Performs mobility at highest level of function for planned discharge setting.  See evaluation for individualized goals.  Description: Treatment/Interventions: Functional transfer training, LE strengthening/ROM, Therapeutic exercise, Endurance training, Patient/family training, Equipment eval/education, Bed mobility, Spoke to nursing, OT          See flowsheet documentation for full assessment, interventions and recommendations.  Note: Prognosis: Good  Problem List: Decreased strength, Decreased range of motion, Decreased endurance, Impaired balance, Decreased mobility, Pain  Assessment: Pt is 83 y.o. male seen for PT evaluation s/p admit to [facility] on 3/4/2024. Two pt identifiers were used to confirm. Pt presented s/p fall.  Pt was admitted with a primary dx of: R intertrochanteric femur fx and other active problems including COVID 19 +. Pt underwent Right - INSERTION NAIL IM FEMUR ANTEGRADE (TROCHANTERIC) which was performed on 3/5/24.  PT now consulted for assessment of mobility and d/c needs. .  Pts current co morbidities affecting treatment include:  has a past medical history of Diabetes mellitus (HCC), GERD (gastroesophageal reflux disease), Hyperlipidemia, Hypertension, and Laryngeal cancer (HCC). . Pts current clinical presentation is Unstable/ Unpredictable (high complexity) due to Ongoing medical management for primary dx, Decreased activity tolerance compared to baseline, Fall risk, Increased assistance needed from caregiver at current time, Continuous pulse oximetry monitoring , s/p surgical intervention  .  Upon evaluation, pt currently is requiring Mod Ax2 for bed mobility; Mod Ax2/max Ax2 for transfers.  Pt presents at PT eval functioning below baseline and currently w/ overall mobility deficits 2* to: BLE weakness, decreased ROM, impaired balance, pain, decreased activity tolerance compared to baseline, fall risk. Pt currently at a fall risk 2* to  impairments listed above.  Based on the aforementioned PT evaluation, pt will continue to benefit from skilled Acute PT interventions to address stated impairments; to maximize functional mobility; for ongoing pt/ family training; and DME needs. At conclusion of PT session pt returned back in chair and chair alarm engaged with phone and call bell within reach. Pt denies any further questions at this time. PT is currently recommending Rehab.  PT will continue to follow during hospital stay.  Barriers to Discharge: Inaccessible home environment, Decreased caregiver support     Rehab Resource Intensity Level, PT: II (Moderate Resource Intensity)    See flowsheet documentation for full assessment.

## 2024-03-06 NOTE — PLAN OF CARE
Problem: PAIN - ADULT  Goal: Verbalizes/displays adequate comfort level or baseline comfort level  Description: Interventions:  - Encourage patient to monitor pain and request assistance  - Assess pain using appropriate pain scale  - Administer analgesics based on type and severity of pain and evaluate response  - Implement non-pharmacological measures as appropriate and evaluate response  - Consider cultural and social influences on pain and pain management  - Notify physician/advanced practitioner if interventions unsuccessful or patient reports new pain  Outcome: Progressing     Problem: SAFETY ADULT  Goal: Patient will remain free of falls  Description: INTERVENTIONS:  - Educate patient/family on patient safety including physical limitations  - Instruct patient to call for assistance with activity   - Consult OT/PT to assist with strengthening/mobility   - Keep Call bell within reach  - Keep bed low and locked with side rails adjusted as appropriate  - Keep care items and personal belongings within reach  - Initiate and maintain comfort rounds  - Make Fall Risk Sign visible to staff  - Offer Toileting every 2 Hours, in advance of need  - Initiate/Maintain bed alarm  - Obtain necessary fall risk management equipment:    - Apply yellow socks and bracelet for high fall risk patients  - Consider moving patient to room near nurses station  Outcome: Progressing  Goal: Maintain or return to baseline ADL function  Description: INTERVENTIONS:  -  Assess patient's ability to carry out ADLs; assess patient's baseline for ADL function and identify physical deficits which impact ability to perform ADLs (bathing, care of mouth/teeth, toileting, grooming, dressing, etc.)  - Assess/evaluate cause of self-care deficits   - Assess range of motion  - Assess patient's mobility; develop plan if impaired  - Assess patient's need for assistive devices and provide as appropriate  - Encourage maximum independence but intervene and  supervise when necessary  - Involve family in performance of ADLs  - Assess for home care needs following discharge   - Consider OT consult to assist with ADL evaluation and planning for discharge  - Provide patient education as appropriate  Outcome: Progressing  Goal: Maintains/Returns to pre admission functional level  Description: INTERVENTIONS:  - Perform AM-PAC 6 Click Basic Mobility/ Daily Activity assessment daily.  - Set and communicate daily mobility goal to care team and patient/family/caregiver.   - Collaborate with rehabilitation services on mobility goals if consulted  - Perform Range of Motion 3 times a day.  - Reposition patient every 2 hours.  - Dangle patient 3 times a day  - Stand patient 3 times a day  - Ambulate patient 3 times a day  - Out of bed to chair 3 times a day   - Out of bed for meals 3 times a day  - Out of bed for toileting  - Record patient progress and toleration of activity level   Outcome: Progressing     Problem: DISCHARGE PLANNING  Goal: Discharge to home or other facility with appropriate resources  Description: INTERVENTIONS:  - Identify barriers to discharge w/patient and caregiver  - Arrange for needed discharge resources and transportation as appropriate  - Identify discharge learning needs (meds, wound care, etc.)  - Arrange for interpretive services to assist at discharge as needed  - Refer to Case Management Department for coordinating discharge planning if the patient needs post-hospital services based on physician/advanced practitioner order or complex needs related to functional status, cognitive ability, or social support system  Outcome: Progressing     Problem: Knowledge Deficit  Goal: Patient/family/caregiver demonstrates understanding of disease process, treatment plan, medications, and discharge instructions  Description: Complete learning assessment and assess knowledge base.  Interventions:  - Provide teaching at level of understanding  - Provide teaching via  preferred learning methods  Outcome: Progressing     Problem: MUSCULOSKELETAL - ADULT  Goal: Maintain or return mobility to safest level of function  Description: INTERVENTIONS:  - Assess patient's ability to carry out ADLs; assess patient's baseline for ADL function and identify physical deficits which impact ability to perform ADLs (bathing, care of mouth/teeth, toileting, grooming, dressing, etc.)  - Assess/evaluate cause of self-care deficits   - Assess range of motion  - Assess patient's mobility  - Assess patient's need for assistive devices and provide as appropriate  - Encourage maximum independence but intervene and supervise when necessary  - Involve family in performance of ADLs  - Assess for home care needs following discharge   - Consider OT consult to assist with ADL evaluation and planning for discharge  - Provide patient education as appropriate  Outcome: Progressing  Goal: Maintain proper alignment of affected body part  Description: INTERVENTIONS:  - Support, maintain and protect limb and body alignment  - Provide patient/ family with appropriate education  Outcome: Progressing     Problem: Prexisting or High Potential for Compromised Skin Integrity  Goal: Skin integrity is maintained or improved  Description: INTERVENTIONS:  - Identify patients at risk for skin breakdown  - Assess and monitor skin integrity  - Assess and monitor nutrition and hydration status  - Monitor labs   - Assess for incontinence   - Turn and reposition patient  - Assist with mobility/ambulation  - Relieve pressure over bony prominences  - Avoid friction and shearing  - Provide appropriate hygiene as needed including keeping skin clean and dry  - Evaluate need for skin moisturizer/barrier cream  - Collaborate with interdisciplinary team   - Patient/family teaching  - Consider wound care consult   Outcome: Progressing

## 2024-03-06 NOTE — PLAN OF CARE
Problem: Knowledge Deficit  Goal: Patient/family/caregiver demonstrates understanding of disease process, treatment plan, medications, and discharge instructions  Description: Complete learning assessment and assess knowledge base.  Interventions:  - Provide teaching at level of understanding  - Provide teaching via preferred learning methods  Outcome: Progressing     Problem: DISCHARGE PLANNING  Goal: Discharge to home or other facility with appropriate resources  Description: INTERVENTIONS:  - Identify barriers to discharge w/patient and caregiver  - Arrange for needed discharge resources and transportation as appropriate  - Identify discharge learning needs (meds, wound care, etc.)  - Arrange for interpretive services to assist at discharge as needed  - Refer to Case Management Department for coordinating discharge planning if the patient needs post-hospital services based on physician/advanced practitioner order or complex needs related to functional status, cognitive ability, or social support system  Outcome: Progressing     Problem: SAFETY ADULT  Goal: Maintains/Returns to pre admission functional level  Description: INTERVENTIONS:  - Perform AM-PAC 6 Click Basic Mobility/ Daily Activity assessment daily.  - Set and communicate daily mobility goal to care team and patient/family/caregiver.     - Record patient progress and toleration of activity level   Outcome: Progressing     Problem: PAIN - ADULT  Goal: Verbalizes/displays adequate comfort level or baseline comfort level  Description: Interventions:  - Encourage patient to monitor pain and request assistance  - Assess pain using appropriate pain scale  - Administer analgesics based on type and severity of pain and evaluate response  - Implement non-pharmacological measures as appropriate and evaluate response  - Consider cultural and social influences on pain and pain management  - Notify physician/advanced practitioner if interventions unsuccessful or  patient reports new pain  Outcome: Progressing

## 2024-03-06 NOTE — PROGRESS NOTES
Progress Note - Geriatric Medicine   Zhao Sanchezert 83 y.o. male MRN: [id number]  [facility] Encounter: [id number]      Assessment/Plan:    Ambulatory dysfunction with fall  -Reportedly mechanical fall 3/4/24  -injuries as outlined below  -uses walker for ambulation at baseline  -at increased risk future falls, cont strict fall precautions   -consider home fall risk assessment and personal fall alert system on returning home  -PT and OT following      Right intertrochanteric femur fracture  -S/p fall as outlined above  -Noted on right femur XR obtained 3/4/24  -S/p right IM nail with Ortho on 3/5/2024  -Continue acute multimodal pain control  -Neurovascular checks per protocol  -PT OT following     Acute pain due to trauma  -Continue acute multimodal pain control per geriatric pain protocol  -encourage addition of non-pharmacologic pain treatment including ice and frequent repositioning  -recommend  bowel regimen to prevent and treat constipation due to increased risk with acute pain and opiate pain medications     COVID-19  -Incidentally noted on admission  -Positive by PCR  -Symptomatic management with close monitoring of respiratory status   -Continue titration supplemental O2 to maintain appropriate saturations  -Isolation per protocol, all appropriate PPE utilized for duration of encounter     Elevated lactic acid  -Lactic acid 3.4 admission improved to 1.7 with IVF     Osteoporosis  -Evidenced by fragility fracture of right femur sustained in fall from standing height  -Vitamin D 30.6  -Osteoporosis workup in progress  -No prior DEXA on record for review  -Endo consult for on the bone      DM-II with diabetic peripheral neuropathy  -A1c 7.7, given age and comorbidities goal of approximately 7.5 is not unreasonable  -Home metformin on hold during hospitalization  -covering with ISS, will blood sugar goal during hospitalization 140-180 to reduce risk hypoglycemia  -Continue close outpatient  follow-up with PCP for ongoing age-appropriate diabetic screenings and cares     Cognitive screening  -Alert and fully oriented, denies memory or chronic concerns  -At baseline fully independent with ADLs and IADLs  -No prior cognitive testing on record for review  -No CTH or MRI brain on record for review  -TSH WNL at 1.93, no recent B12 on record for review, given chronic metformin use with symptomatic peripheral neuropathy and increased risk deficiency will check with routine labs  -At risk age-related cognitive decline, continue secondary risk modifications including use of sensory assistive devices such as corrective lenses and hearing aids at all appropriate times reduce risk of uncorrected sensory impairment from contributing to isolation, confusion, encephalopathy and more precipitous cognitive decline  -Encourage patient to remain physically, socially, and cognitively active and engaged to maintain cognitive acuity     Laryngeal squamous cell carcinoma  -s/p XRT May 2023  -Follows with ENT and radiation oncology regularly as outpatient, continue close follow-up as directed     Impaired Vision  -recommend use of corrective lenses at all appropriate times  -Consider large font for printed materials provided to patient     Impaired Hearing  -Encourage use of hearing aids at all appropriate times  -encourage providers and caregivers to speak slowly and clearly directly to patient standing on right side whenever possible as appears to hear better on this side  -minimize background noise to encourage patient engagement  -consider use of hearing amplifier to reduce risk of straining to hear if hearing aids are not present or are not sufficient   -Encourage use of teach back method to ensure clear communication     BPH  -Maintained on tamsulosin chronically as outpatient  -Increases risk of retention, recommend urinary retention protocol     Frailty syndrome in geriatric patient  -Clinical frailty scale stage V,  mildly frail  -Multifactorial including age, ambulatory dysfunction, DM-II, peripheral neuropathy, laryngeal squamous cell carcinoma s/p radiation therapy and multiple additional chronic medical comorbidities now with ambulatory dysfunction and fall superimposed in elderly individual with limited physiologic and metabolic reserve increasing sensitivity to even seemingly mild additional metabolic stressors/derangements  -continue well-balanced nutritional intake  -ongoing optimization of chronic medical conditions, cont to address acute metabolic derangements as arise   -Monitor for and treat any underlying anxiety/mood/depression symptoms as may impact patient response to therapies as well as overall sense of wellbeing and quality of life  -Ensure that treatments and interventions continue to align with patient's wishes and goals of care  -Continue psychosocial supports     High risk developing delirium  -continue delirium precautions  -maintain normal sleep/wake cycle  -ensure that pain is well controlled  -monitor for fecal and urinary retention which may precipitate delirium  -provide frequent reorientation and redirection as indicated and appropriate    Care coordination: rounded with Rudy (RN), wife and son updated at bedside     Subjective:     Zhao is seen and examined at bedside where he is lying resting, he reports that his pain is manageable with the current analgesic regimen. He notes that he slept well and offers no acute complaints. He is very motivated for rehab and recovery. Nursing reports no acute events overnight.     Review of Systems   Constitutional: Negative.  Negative for chills and fever.   HENT: Negative.     Eyes: Negative.  Visual disturbance: wears glasses.   Respiratory: Negative.  Negative for shortness of breath.    Cardiovascular: Negative.    Gastrointestinal: Negative.    Genitourinary: Negative.    Musculoskeletal:  Positive for gait problem.        Right hip pain manageable with  "current analgesic regimen    Skin: Negative.    Hematological: Negative.    Psychiatric/Behavioral: Negative.  Negative for sleep disturbance.    All other systems reviewed and are negative.    Objective:     Vitals: Blood pressure 136/73, pulse 62, temperature 98.7 °F (37.1 °C), resp. rate 16, height 5' 8\" (1.727 m), weight 95 kg (209 lb 7 oz), SpO2 95%.,Body mass index is 31.84 kg/m².      Intake/Output Summary (Last 24 hours) at 3/6/2024 1434  Last data filed at 3/6/2024 1300  Gross per 24 hour   Intake 690 ml   Output 1840 ml   Net -1150 ml     Current Medications: Reviewed    Physical Exam:   Physical Exam  Vitals and nursing note reviewed.   Constitutional:       General: He is not in acute distress.     Appearance: Normal appearance. He is not toxic-appearing.   HENT:      Head: Normocephalic and atraumatic.      Nose: Nose normal.      Comments: O2 via NC     Mouth/Throat:      Mouth: Mucous membranes are moist.   Eyes:      General: No scleral icterus.        Right eye: No discharge.         Left eye: No discharge.      Conjunctiva/sclera: Conjunctivae normal.      Comments: Wearing glasses    Neck:      Comments: Voice hoarse, phonation otherwise norm   Cardiovascular:      Rate and Rhythm: Normal rate and regular rhythm.      Pulses: Normal pulses.   Pulmonary:      Effort: Pulmonary effort is normal. No respiratory distress.      Breath sounds: No wheezing.   Abdominal:      General: There is no distension.      Palpations: Abdomen is soft.      Tenderness: There is no abdominal tenderness.   Musculoskeletal:      Cervical back: Neck supple.      Right lower leg: No edema.      Left lower leg: No edema.      Comments: Reduced overall muscle mass    Skin:     General: Skin is warm and dry.   Neurological:      Mental Status: He is alert.      Comments: Awake and alert, answers questions appropriately, speech clear and fluent    Psychiatric:         Mood and Affect: Mood normal.         Behavior: Behavior " normal.      Comments: Polite pleasant and cooperative         Invasive Devices       Peripheral Intravenous Line  Duration             Peripheral IV 03/04/24 Distal;Right;Upper;Ventral (anterior) Arm 1 day    Peripheral IV 03/04/24 Left;Proximal;Ventral (anterior) Forearm 1 day              Drain  Duration             External Urinary Catheter Medium 1 day                  Lab Results:     I have personally reviewed pertinent lab results including the following:    Results from last 7 days   Lab Units 03/06/24  0526 03/05/24  0546 03/04/24  2140 03/04/24  1830   WBC Thousand/uL 7.02 6.99  --  7.96   HEMOGLOBIN g/dL 8.8* 11.2*  --  12.8   HEMATOCRIT % 25.7* 32.6*  --  38.5   PLATELETS Thousands/uL 120* 149 188 191   NEUTROS PCT % 75 68  --  85*   MONOS PCT % 13* 15*  --  9   EOS PCT % 0 0  --  0     Results from last 7 days   Lab Units 03/06/24  0526 03/05/24  0546 03/04/24  1830   POTASSIUM mmol/L 4.0 3.7 4.2   CHLORIDE mmol/L 100 100 101   CO2 mmol/L 27 27 24   BUN mg/dL 18 15 16   CREATININE mg/dL 1.24 0.97 1.19   CALCIUM mg/dL 7.7* 8.3* 9.3   ALK PHOS U/L 71  --  80   ALT U/L 30  --  22   AST U/L 41*  --  22     I have personally reviewed the following imaging study reports in PACS:    No new imaging overnight

## 2024-03-06 NOTE — PLAN OF CARE
Problem: OCCUPATIONAL THERAPY ADULT  Goal: Performs self-care activities at highest level of function for planned discharge setting.  See evaluation for individualized goals.  Description: Treatment Interventions: ADL retraining, Functional transfer training, Endurance training, Cognitive reorientation, Patient/family training, Equipment evaluation/education, Compensatory technique education, Energy conservation, Activityengagement          See flowsheet documentation for full assessment, interventions and recommendations.   Note: Limitation: Decreased ADL status, Decreased Safe judgement during ADL, Decreased cognition, Decreased endurance, Decreased self-care trans, Decreased high-level ADLs  Prognosis: Good  Assessment: 82 YO Male SEEN FOR INITIAL OCCUPATIONAL THERAPY EVALUATION FOLLOWING ADMISSION TO [facility] S/P FALL RESULTING IN R INTERTROCHANTERIC FEMUR FX. PT IS S/P INSERTION OF R IMN ON 3/5/24. PER ORTHO, PT IS WBAT ON RLE. PT IS COVID-19+.  PROBLEMS LIST/PMH INCLUDES Diabetes mellitus (HCC), GERD (gastroesophageal reflux disease), Hyperlipidemia, Hypertension, and Laryngeal cancer (HCC). PT IS FROM HOME WITH SPOUSE WHERE HE REPORTS BEING INDEPENDENT WITH ADLS/LT IADLS/DRIVING PTA. PT CURRENTLY REQUIRES OVERALL MIN A WITH UB ADLS, MAX A WITH LB ADLS, MOD A X2 WITH SIT<-> STAND TRANSFERS AND MAX A X2 WITH SPT WITH HHA. PT IS LIMITED 2' PAIN, FATIGUE, IMPAIRED BALANCE, FALL RISK , ORTHOPEDIC RESTRICTIONS, OVERALL WEAKNESS/DECONDITIONING , SOB, [place], and OVERALL LIMITED ACTIVITY TOLERANCE. PT EDUCATED ON DEEP BREATHING TECHNIQUES T/O ACTIVITY, SLOWING OF PACE, INCREASED FAMILY SUPPORT, and CONTINUE PARTICIPATION IN SELF-CARE/MOBILITY WITH STAFF WHILE IN THE HOSPITAL . The patient's raw score on the AM-PAC Daily Activity Inpatient Short Form is 15. A raw score of less than 19 suggests the patient may benefit from discharge to post-acute rehabilitation services. Please refer to the recommendation of  the Occupational Therapist for safe discharge planning. FROM AN OCCUPATIONAL THERAPY PERSPECTIVE, RECOMMEND LEVEL I RESOURCES UPON D/C. WILL CONT TO FOLLOW TO ADDRESS THE BELOW DESCRIBED GOALS.     Rehab Resource Intensity Level, OT: I (Maximum Resource Intensity)

## 2024-03-06 NOTE — PROGRESS NOTES
Orthopedics   Zhao Walls 83 y.o. male MRN: [id number]  Unit/Bed#: [facility] 614-01      Subjective:  83 y.o.male post operative day 1 right femoral intramedullary nail. Pt doing well. Pain controlled. Denies CP, SOB, HA, dizziness, fever, chills, nausea, vomiting. Hgb 8.8 this AM. Has not yet been able to work with PT.      Labs:  0   Lab Value Date/Time    HCT 25.7 (L) 03/06/2024 0526    HCT 32.6 (L) 03/05/2024 0546    HCT 38.5 03/04/2024 1830    HGB 8.8 (L) 03/06/2024 0526    HGB 11.2 (L) 03/05/2024 0546    HGB 12.8 03/04/2024 1830    INR 1.16 03/04/2024 1830    WBC 7.02 03/06/2024 0526    WBC 6.99 03/05/2024 0546    WBC 7.96 03/04/2024 1830    ESR 13 03/04/2024 2140       Meds:    Current Facility-Administered Medications:     acetaminophen (TYLENOL) tablet 650 mg, 650 mg, Oral, Q6H DORIS, Hua Dominguez MD, 650 mg at 03/06/24 0510    calcium carbonate (TUMS) chewable tablet 500 mg, 500 mg, Oral, BID before lunch/dinner, Hua Dominguez MD    ceFAZolin (ANCEF) IVPB (premix in dextrose) 2,000 mg 50 mL, 2,000 mg, Intravenous, On Call To OR, Layla Spivey MD    cholecalciferol (VITAMIN D3) tablet 2,000 Units, 2,000 Units, Oral, Daily, Hua Dominguez MD    docusate sodium (COLACE) capsule 100 mg, 100 mg, Oral, BID, Hua Dominguez MD, 100 mg at 03/05/24 1830    enoxaparin (LOVENOX) subcutaneous injection 30 mg, 30 mg, Subcutaneous, Q12H, Hua Dominguez MD, 30 mg at 03/05/24 2009    gabapentin (NEURONTIN) capsule 100 mg, 100 mg, Oral, TID, Hua Dominguez MD, 100 mg at 03/05/24 1813    HYDROmorphone HCl (DILAUDID) injection 0.2 mg, 0.2 mg, Intravenous, Q3H PRN, Hua Dominguez MD, 0.2 mg at 03/05/24 1824    insulin lispro (HumALOG/ADMELOG) 100 units/mL subcutaneous injection 1-6 Units, 1-6 Units, Subcutaneous, TID AC, 2 Units at 03/05/24 1830 **AND** Fingerstick Glucose (POCT), , , TID AC, Hua Dominguez MD    labetalol (NORMODYNE) injection 10 mg, 10 mg, Intravenous, Q6H PRN, Hua RAYMOND  "Tulio Dominguez MD, 10 mg at 03/05/24 0620    lactated ringers bolus 1,000 mL, 1,000 mL, Intravenous, Once PRN **AND** lactated ringers bolus 1,000 mL, 1,000 mL, Intravenous, Once PRN, Hua Dominguez MD    Nursing Communication Measure and document PVi every 4 hours until patient goes to the OR., , , Until Discontinued **AND** Nursing Communication Notify physician if SpO2 <92% and stop IVF infusion., , , Until Discontinued **AND** Nursing Communication Check SpHb q4h and notify provider if <8., , , Until Discontinued **AND** Nursing Communication Obtain vital signs, PVi, and SpHb immediately prior to transfer to operating room., , , Until Discontinued **AND** [COMPLETED] lactated ringers bolus 1,000 mL, 1,000 mL, Intravenous, Once, Stopped at 03/05/24 0244 **AND** lactated ringers infusion, 4 mL/kg/hr (Ideal), Intravenous, Continuous, Hua Dominguez MD, Stopped at 03/05/24 1144    methocarbamol (ROBAXIN) tablet 500 mg, 500 mg, Oral, Q6H DORIS, Hua Dominguez MD, 500 mg at 03/06/24 0510    ondansetron (ZOFRAN) injection 4 mg, 4 mg, Intravenous, Q6H PRN, Hua Dominguez MD    oxyCODONE (ROXICODONE) IR tablet 5 mg, 5 mg, Oral, Q4H PRN, Hua Dominguez MD, 5 mg at 03/06/24 0015    oxyCODONE (ROXICODONE) split tablet 2.5 mg, 2.5 mg, Oral, Q4H PRN, Hua Dominguez MD    pantoprazole (PROTONIX) EC tablet 40 mg, 40 mg, Oral, Daily, Hua Dominguez MD, 40 mg at 03/05/24 0919    senna (SENOKOT) tablet 17.2 mg, 2 tablet, Oral, Daily, Hua Dominguez MD    sodium chloride 0.9 % bolus 1,000 mL, 1,000 mL, Intravenous, Once PRN **AND** sodium chloride 0.9 % bolus 1,000 mL, 1,000 mL, Intravenous, Once PRN, Hua Dominguez MD    tamsulosin (FLOMAX) capsule 0.4 mg, 0.4 mg, Oral, Daily With Dinner, Hua Dominguez MD, 0.4 mg at 03/05/24 1813    Blood Culture:   Lab Results   Component Value Date    BLOODCX No Growth at 24 hrs. 03/04/2024       Wound Culture:   No results found for: \"WOUNDCULT\"    Ins and Outs:  I/O " last 24 hours:  In: 2298.2 [I.V.:2148.2; IV Piggyback:150]  Out: 2440 [Urine:2440]          Physical exam:  Vitals:    03/06/24 0237   BP: 133/74   Pulse: 58   Resp: 18   Temp: 98.7 °F (37.1 °C)   SpO2: 94%     right lower extremity  Surgical dressing clean dry intact  TTP juan incisionally  Sensation intact in sural, saphenous, superficial peroneal, deep peroneal, tibial distributions  Motor intact to ankle plantarflexion, EHL/FHL, absent ankle dorsiflexion consistent with history of foot drop.  2+ DP pulse, extremity is warm and well perfused with capillary refill <2 seconds.      Assessment: 83 y.o.male post operative day 1 right femur IMN. Pt doing well    Plan:  Up and out of bed  Weightbearing as tolerated right lower extremity  PT/OT  DVT prophylaxis  Pain control  Rest of care per primary team  OTB ordered  Dispo: Ortho will follow  Will continue to assess for acute blood loss anemia    Layla Spivey MD      Please contact role [facility]Or[facility]-Floor Call for any questions or concerns.

## 2024-03-06 NOTE — ASSESSMENT & PLAN NOTE
- s/p fall  - POD 1 s/p right femoral intramedullary nail  - PT/OT  - Pain control  - DVT ppx  - Up and out of bed  - WBAT  - Will continue to assess for ABL  - Ortho following

## 2024-03-06 NOTE — PROGRESS NOTES
[facility]  Progress Note  Name: Zhao Walls I  MRN: [id number]  Unit/Bed#: [facility] 614-01 I Date of Admission: 3/4/2024   Date of Service: 3/6/2024 I Hospital Day: 2    Assessment/Plan   Intertrochanteric fracture (HCC)  Assessment & Plan  - s/p fall  - POD 1 s/p right femoral intramedullary nail  - PT/OT  - Pain control  - DVT ppx  - Up and out of bed  - WBAT  - Will continue to assess for ABL  - Ortho following    Fall  Assessment & Plan  - fall from standing onto side of hip  - Intertrochanteric fracture on R  - s/p R femoral intramedullary nail  - PT/ OT consulted  - Michelle consulted  - Pain control               Bowel Regimen: colase  VTE Prophylaxis:Enoxaparin (Lovenox)     Disposition: pending    Subjective   Chief Complaint: right hip pain    Subjective: No acute events overnight. POD1 s/p right femoral intramedullary nail.      Objective   Vitals:   Temp:  [98.1 °F (36.7 °C)-100.4 °F (38 °C)] 98.4 °F (36.9 °C)  HR:  [50-79] 65  Resp:  [16-20] 18  BP: (112-169)/(71-95) 134/72    I/O         03/04 0701  03/05 0700 03/05 0701  03/06 0700 03/06 0701  03/07 0700    I.V. (mL/kg) 417 (4.4) 1731.2 (18.2)     IV Piggyback  150     Total Intake(mL/kg) 417 (4.4) 1881.2 (19.8)     Urine (mL/kg/hr) 600 1840 (0.8)     Total Output 600 1840     Net -183 +41.2                     Physical Exam:   General: No acute distress  Neuro: A&Ox3; Following commands; Moving all extremities  HEENT: Moist mucus membranes  CV: Regular rate  Pulm: No respiratory distress, 2L NC  GI: No abdominal tenderness  MSK: Incision CDI, TTP around incision, no erythema noted. MS intact. 2+DP.   Skin: Warm and dry      Invasive Devices       Peripheral Intravenous Line  Duration             Peripheral IV 03/04/24 Distal;Right;Upper;Ventral (anterior) Arm 1 day    Peripheral IV 03/04/24 Left;Proximal;Ventral (anterior) Forearm 1 day              Drain  Duration             External Urinary Catheter  Medium 1 day                          Lab Results: Results: I have personally reviewed all pertinent laboratory/tests results and BMP/CMP:   Lab Results   Component Value Date    SODIUM 136 03/06/2024    K 4.0 03/06/2024     03/06/2024    CO2 27 03/06/2024    BUN 18 03/06/2024    CREATININE 1.24 03/06/2024    CALCIUM 7.7 (L) 03/06/2024    AST 41 (H) 03/06/2024    ALT 30 03/06/2024    ALKPHOS 71 03/06/2024    EGFR 53 03/06/2024     Imaging: I have personally reviewed pertinent reports.     Other Studies:

## 2024-03-06 NOTE — PROGRESS NOTES
"Acute Care Surgery   Post-Op Check Progress Note   Zhao Walls 83 y.o. male MRN: [id number]  Unit/Bed#: [facility] 614-01 Encounter: [id number]    Assessment and Plan:    83-year-old Male with right intertrochanteric femur fracture status post right IM femoral nail insertion.   - P.R.N. Analgesia.   - Encouraged incentive spirometry use.      Subjective/Objective     Subjective: Patient doing well overall. Pain is well controlled. Denies SOB on 12L O2 NC. Denies CP, HA, dizziness, nausea, vomiting, fever, chills. Reports numbness in bilateral lower extremities consistent with history of peripheral neuropathy, and weakness with right foot dorsiflexion consistent with history of right foot drop.    Objective:     Blood pressure 147/79, pulse 71, temperature 99.6 °F (37.6 °C), resp. rate 18, height 5' 8\" (1.727 m), weight 95 kg (209 lb 7 oz), SpO2 94%.,Body mass index is 31.84 kg/m².      Intake/Output Summary (Last 24 hours) at 3/5/2024 1930  Last data filed at 3/5/2024 1738  Gross per 24 hour   Intake 2298.2 ml   Output 2040 ml   Net 258.2 ml       Invasive Devices       Peripheral Intravenous Line  Duration             Peripheral IV 03/04/24 Distal;Right;Upper;Ventral (anterior) Arm 1 day    Peripheral IV 03/04/24 Left;Proximal;Ventral (anterior) Forearm <1 day              Drain  Duration             External Urinary Catheter Medium <1 day                    Physical Exam:    GENERAL APPEARANCE: Patient in no acute distress.  HEENT: NCAT; PERRL, EOMs intact; Mucous membranes moist  CV: Regular rate and rhythm; + S1, S2; no murmur/gallops/rubs appreciated.  LUNGS: Clear to auscultation; no wheezes/rales/rhonci, normal work of breathing on 12L O2 NC  ABD: NABS; soft; non-distended; non-tender.  EXT: right lower extremity  Surgical dressing clean dry intact  Mild TTP over surgical site  Sensation intact in sural, saphenous, superficial peroneal, deep peroneal, tibial distributions  Motor intact to ankle " plantarflexion, EHL/FHL, absent ankle dorsiflexion consistent with history of foot drop.  2+ DP pulse, extremity is warm and well perfused with capillary refill <2 seconds.  NEURO: GCS 15; no focal neurologic deficits; neurovascularly intact.  SKIN: Warm, dry and well perfused; no rash; no jaundice.                Pawel Mendez MD  3/5/2024

## 2024-03-07 LAB
ANION GAP SERPL CALCULATED.3IONS-SCNC: 8 MMOL/L
BASOPHILS # BLD AUTO: 0.02 THOUSANDS/ÂΜL (ref 0–0.1)
BASOPHILS NFR BLD AUTO: 0 % (ref 0–1)
BUN SERPL-MCNC: 20 MG/DL (ref 5–25)
CA-I BLD-SCNC: 1.05 MMOL/L (ref 1.12–1.32)
CALCIUM SERPL-MCNC: 7.6 MG/DL (ref 8.4–10.2)
CHLORIDE SERPL-SCNC: 101 MMOL/L (ref 96–108)
CO2 SERPL-SCNC: 26 MMOL/L (ref 21–32)
CREAT SERPL-MCNC: 1.15 MG/DL (ref 0.6–1.3)
EOSINOPHIL # BLD AUTO: 0.02 THOUSAND/ÂΜL (ref 0–0.61)
EOSINOPHIL NFR BLD AUTO: 0 % (ref 0–6)
ERYTHROCYTE [DISTWIDTH] IN BLOOD BY AUTOMATED COUNT: 13.4 % (ref 11.6–15.1)
GFR SERPL CREATININE-BSD FRML MDRD: 58 ML/MIN/1.73SQ M
GLUCOSE SERPL-MCNC: 158 MG/DL (ref 65–140)
GLUCOSE SERPL-MCNC: 166 MG/DL (ref 65–140)
GLUCOSE SERPL-MCNC: 175 MG/DL (ref 65–140)
GLUCOSE SERPL-MCNC: 212 MG/DL (ref 65–140)
GLUCOSE SERPL-MCNC: 291 MG/DL (ref 65–140)
HCT VFR BLD AUTO: 24.5 % (ref 36.5–49.3)
HGB BLD-MCNC: 8.2 G/DL (ref 12–17)
IMM GRANULOCYTES # BLD AUTO: 0.02 THOUSAND/UL (ref 0–0.2)
IMM GRANULOCYTES NFR BLD AUTO: 0 % (ref 0–2)
LYMPHOCYTES # BLD AUTO: 1.14 THOUSANDS/ÂΜL (ref 0.6–4.47)
LYMPHOCYTES NFR BLD AUTO: 21 % (ref 14–44)
MAGNESIUM SERPL-MCNC: 1.7 MG/DL (ref 1.9–2.7)
MCH RBC QN AUTO: 32 PG (ref 26.8–34.3)
MCHC RBC AUTO-ENTMCNC: 33.5 G/DL (ref 31.4–37.4)
MCV RBC AUTO: 96 FL (ref 82–98)
MONOCYTES # BLD AUTO: 0.72 THOUSAND/ÂΜL (ref 0.17–1.22)
MONOCYTES NFR BLD AUTO: 13 % (ref 4–12)
NEUTROPHILS # BLD AUTO: 3.56 THOUSANDS/ÂΜL (ref 1.85–7.62)
NEUTS SEG NFR BLD AUTO: 66 % (ref 43–75)
NRBC BLD AUTO-RTO: 0 /100 WBCS
PLATELET # BLD AUTO: 119 THOUSANDS/UL (ref 149–390)
PMV BLD AUTO: 11.9 FL (ref 8.9–12.7)
POTASSIUM SERPL-SCNC: 3.6 MMOL/L (ref 3.5–5.3)
RBC # BLD AUTO: 2.56 MILLION/UL (ref 3.88–5.62)
SODIUM SERPL-SCNC: 135 MMOL/L (ref 135–147)
WBC # BLD AUTO: 5.48 THOUSAND/UL (ref 4.31–10.16)

## 2024-03-07 PROCEDURE — 80048 BASIC METABOLIC PNL TOTAL CA: CPT | Performed by: ORTHOPAEDIC SURGERY

## 2024-03-07 PROCEDURE — NC001 PR NO CHARGE: Performed by: ORTHOPAEDIC SURGERY

## 2024-03-07 PROCEDURE — 85025 COMPLETE CBC W/AUTO DIFF WBC: CPT | Performed by: ORTHOPAEDIC SURGERY

## 2024-03-07 PROCEDURE — 83735 ASSAY OF MAGNESIUM: CPT

## 2024-03-07 PROCEDURE — 99232 SBSQ HOSP IP/OBS MODERATE 35: CPT | Performed by: STUDENT IN AN ORGANIZED HEALTH CARE EDUCATION/TRAINING PROGRAM

## 2024-03-07 PROCEDURE — 82948 REAGENT STRIP/BLOOD GLUCOSE: CPT

## 2024-03-07 PROCEDURE — 99232 SBSQ HOSP IP/OBS MODERATE 35: CPT | Performed by: INTERNAL MEDICINE

## 2024-03-07 PROCEDURE — 82330 ASSAY OF CALCIUM: CPT

## 2024-03-07 RX ORDER — ALBUMIN, HUMAN INJ 5% 5 %
25 SOLUTION INTRAVENOUS ONCE
Status: COMPLETED | OUTPATIENT
Start: 2024-03-07 | End: 2024-03-07

## 2024-03-07 RX ORDER — CALCIUM GLUCONATE 20 MG/ML
2 INJECTION, SOLUTION INTRAVENOUS ONCE
Status: COMPLETED | OUTPATIENT
Start: 2024-03-07 | End: 2024-03-07

## 2024-03-07 RX ORDER — MAGNESIUM SULFATE HEPTAHYDRATE 40 MG/ML
2 INJECTION, SOLUTION INTRAVENOUS ONCE
Status: COMPLETED | OUTPATIENT
Start: 2024-03-07 | End: 2024-03-07

## 2024-03-07 RX ORDER — POLYETHYLENE GLYCOL 3350 17 G/17G
17 POWDER, FOR SOLUTION ORAL DAILY
Status: DISCONTINUED | OUTPATIENT
Start: 2024-03-07 | End: 2024-03-14 | Stop reason: HOSPADM

## 2024-03-07 RX ADMIN — CALCIUM GLUCONATE 2 G: 20 INJECTION, SOLUTION INTRAVENOUS at 12:09

## 2024-03-07 RX ADMIN — DOCUSATE SODIUM 100 MG: 100 CAPSULE, LIQUID FILLED ORAL at 09:12

## 2024-03-07 RX ADMIN — MAGNESIUM SULFATE HEPTAHYDRATE 2 G: 40 INJECTION, SOLUTION INTRAVENOUS at 12:09

## 2024-03-07 RX ADMIN — METHOCARBAMOL 500 MG: 500 TABLET ORAL at 18:06

## 2024-03-07 RX ADMIN — ACETAMINOPHEN 650 MG: 325 TABLET, FILM COATED ORAL at 10:43

## 2024-03-07 RX ADMIN — ALBUMIN (HUMAN) 25 G: 12.5 INJECTION, SOLUTION INTRAVENOUS at 09:12

## 2024-03-07 RX ADMIN — ENOXAPARIN SODIUM 30 MG: 30 INJECTION SUBCUTANEOUS at 09:12

## 2024-03-07 RX ADMIN — OXYCODONE HYDROCHLORIDE 5 MG: 5 TABLET ORAL at 18:06

## 2024-03-07 RX ADMIN — PANTOPRAZOLE SODIUM 40 MG: 40 TABLET, DELAYED RELEASE ORAL at 09:12

## 2024-03-07 RX ADMIN — DOCUSATE SODIUM 100 MG: 100 CAPSULE, LIQUID FILLED ORAL at 18:06

## 2024-03-07 RX ADMIN — TAMSULOSIN HYDROCHLORIDE 0.4 MG: 0.4 CAPSULE ORAL at 18:06

## 2024-03-07 RX ADMIN — CALCIUM CARBONATE (ANTACID) CHEW TAB 500 MG 500 MG: 500 CHEW TAB at 18:09

## 2024-03-07 RX ADMIN — INSULIN LISPRO 4 UNITS: 100 INJECTION, SOLUTION INTRAVENOUS; SUBCUTANEOUS at 12:12

## 2024-03-07 RX ADMIN — INSULIN LISPRO 1 UNITS: 100 INJECTION, SOLUTION INTRAVENOUS; SUBCUTANEOUS at 09:13

## 2024-03-07 RX ADMIN — ACETAMINOPHEN 650 MG: 325 TABLET, FILM COATED ORAL at 18:06

## 2024-03-07 RX ADMIN — METHOCARBAMOL 500 MG: 500 TABLET ORAL at 05:42

## 2024-03-07 RX ADMIN — SENNOSIDES 17.2 MG: 8.6 TABLET, FILM COATED ORAL at 09:12

## 2024-03-07 RX ADMIN — ACETAMINOPHEN 650 MG: 325 TABLET, FILM COATED ORAL at 23:00

## 2024-03-07 RX ADMIN — CALCIUM CARBONATE (ANTACID) CHEW TAB 500 MG 500 MG: 500 CHEW TAB at 10:43

## 2024-03-07 RX ADMIN — INSULIN LISPRO 1 UNITS: 100 INJECTION, SOLUTION INTRAVENOUS; SUBCUTANEOUS at 18:10

## 2024-03-07 RX ADMIN — ENOXAPARIN SODIUM 30 MG: 30 INJECTION SUBCUTANEOUS at 22:53

## 2024-03-07 RX ADMIN — GABAPENTIN 100 MG: 100 CAPSULE ORAL at 18:06

## 2024-03-07 RX ADMIN — METHOCARBAMOL 500 MG: 500 TABLET ORAL at 23:00

## 2024-03-07 RX ADMIN — Medication 2.5 MG: at 05:49

## 2024-03-07 RX ADMIN — ACETAMINOPHEN 650 MG: 325 TABLET, FILM COATED ORAL at 00:28

## 2024-03-07 RX ADMIN — HYDROMORPHONE HYDROCHLORIDE 0.2 MG: 0.2 INJECTION, SOLUTION INTRAMUSCULAR; INTRAVENOUS; SUBCUTANEOUS at 09:23

## 2024-03-07 RX ADMIN — GABAPENTIN 100 MG: 100 CAPSULE ORAL at 22:53

## 2024-03-07 RX ADMIN — Medication 2000 UNITS: at 09:12

## 2024-03-07 RX ADMIN — GABAPENTIN 100 MG: 100 CAPSULE ORAL at 09:12

## 2024-03-07 RX ADMIN — METHOCARBAMOL 500 MG: 500 TABLET ORAL at 00:28

## 2024-03-07 RX ADMIN — METHOCARBAMOL 500 MG: 500 TABLET ORAL at 10:44

## 2024-03-07 RX ADMIN — ACETAMINOPHEN 650 MG: 325 TABLET, FILM COATED ORAL at 05:42

## 2024-03-07 RX ADMIN — POLYETHYLENE GLYCOL 3350 17 G: 17 POWDER, FOR SOLUTION ORAL at 10:43

## 2024-03-07 NOTE — PROGRESS NOTES
[facility]  Progress Note  Name: Zhao Walls I  MRN: [id number]  Unit/Bed#: [facility] 614-01 I Date of Admission: 3/4/2024   Date of Service: 3/7/2024 I Hospital Day: 3    Assessment/Plan   Intertrochanteric fracture (HCC)  Assessment & Plan  - s/p fall  - S/p right femoral intramedullary nail  - PT/OT  - Pain control  - DVT ppx  - Up and out of bed  - WBAT  - Ortho following    Fall  Assessment & Plan  - fall from standing onto side of hip  - Intertrochanteric fracture on R  - s/p R femoral intramedullary nail  - PT/ OT consulted  - Michelle consulted  - Pain control               Bowel Regimen: senna, miralax  VTE Prophylaxis:Enoxaparin (Lovenox)     Disposition: pending    Subjective   Chief Complaint: none    Subjective: Patient feeling better this am. Patient states pain is well controled.      Objective   Vitals:   Temp:  [98.2 °F (36.8 °C)-100.3 °F (37.9 °C)] 98.2 °F (36.8 °C)  HR:  [59-77] 76  Resp:  [16-17] 16  BP: ()/(58-73) 133/69    I/O         03/05 0701  03/06 0700 03/06 0701  03/07 0700 03/07 0701  03/08 0700    P.O.  190     I.V. (mL/kg) 1731.2 (18.2)      IV Piggyback 150 50     Total Intake(mL/kg) 1881.2 (19.8) 240 (2.5)     Urine (mL/kg/hr) 1840 (0.8) 800 (0.4)     Total Output 1840 800     Net +41.2 -560                     Physical Exam:   General: No acute distress  Neuro: A&Ox3; Following commands; Moving all extremities  HEENT: Moist mucus membranes  CV: Regular rate  Pulm: 2L NC, No respiratory distress  GI: No abdominal tenderness  MSK: Incision CDI, MS intact, 2+ dp  Skin: Warm and dry      Invasive Devices       Peripheral Intravenous Line  Duration             Peripheral IV 03/04/24 Distal;Right;Upper;Ventral (anterior) Arm 2 days    Peripheral IV 03/04/24 Left;Proximal;Ventral (anterior) Forearm 2 days                          Lab Results: Results: I have personally reviewed all pertinent laboratory/tests results  Imaging: I have  personally reviewed pertinent reports.     Other Studies:

## 2024-03-07 NOTE — CASE MANAGEMENT
[facility] received request for authorization from Care Manager.  Authorization request for: Acute Rehab  Facility Name: [facility] NPI: [id number]  Facility MD: Ashley DePadua NPI: [id number]  Authorization initiated by contacting insurance: [facility]    Via: iLink submitted via: fax # [phone]  Pending Reference #: [id number]    Care Manager notified: Jeff Chaves

## 2024-03-07 NOTE — ASSESSMENT & PLAN NOTE
- s/p fall  - S/p right femoral intramedullary nail  - PT/OT  - Pain control  - DVT ppx  - Up and out of bed  - WBAT  - Ortho following

## 2024-03-07 NOTE — PROGRESS NOTES
Orthopedics   Zhao Walls 83 y.o. male MRN: [id number]  Unit/Bed#: [facility] 614-01      Subjective:  83 y.o.male post operative day 2 right femoral intramedullary nail. Pt doing well. Pain controlled. Denies CP, SOB, HA, dizziness, fever, chills, nausea, vomiting. Hgb pending this AM, was 8.8 yesterday. PT rec rehab. Has been pre approved for [facility]. Has COVID.      Labs:  0   Lab Value Date/Time    HCT 25.1 (L) 03/06/2024 1500    HCT 25.7 (L) 03/06/2024 0526    HCT 32.6 (L) 03/05/2024 0546    HGB 8.6 (L) 03/06/2024 1500    HGB 8.8 (L) 03/06/2024 0526    HGB 11.2 (L) 03/05/2024 0546    INR 1.16 03/04/2024 1830    WBC 7.02 03/06/2024 0526    WBC 6.99 03/05/2024 0546    WBC 7.96 03/04/2024 1830    ESR 13 03/04/2024 2140       Meds:    Current Facility-Administered Medications:     acetaminophen (TYLENOL) tablet 650 mg, 650 mg, Oral, Q6H DORIS, Hua Dominguez MD, 650 mg at 03/07/24 0542    calcium carbonate (TUMS) chewable tablet 500 mg, 500 mg, Oral, BID before lunch/dinner, Hua Dominguez MD, 500 mg at 03/06/24 1745    ceFAZolin (ANCEF) IVPB (premix in dextrose) 2,000 mg 50 mL, 2,000 mg, Intravenous, On Call To OR, Layla Spivey MD    cholecalciferol (VITAMIN D3) tablet 2,000 Units, 2,000 Units, Oral, Daily, Hua Dominguez MD, 2,000 Units at 03/06/24 0940    docusate sodium (COLACE) capsule 100 mg, 100 mg, Oral, BID, Hua Dominguez MD, 100 mg at 03/06/24 1745    enoxaparin (LOVENOX) subcutaneous injection 30 mg, 30 mg, Subcutaneous, Q12H, Hua Dominguez MD, 30 mg at 03/06/24 2209    gabapentin (NEURONTIN) capsule 100 mg, 100 mg, Oral, TID, Hua Dominguez MD, 100 mg at 03/06/24 2210    HYDROmorphone HCl (DILAUDID) injection 0.2 mg, 0.2 mg, Intravenous, Q3H PRN, Hua Dominguez MD, 0.2 mg at 03/05/24 1824    insulin lispro (HumALOG/ADMELOG) 100 units/mL subcutaneous injection 1-6 Units, 1-6 Units, Subcutaneous, TID AC, 2 Units at 03/06/24 1747 **AND** Fingerstick Glucose (POCT), , , TID  "AC, Hua Dominguez MD    labetalol (NORMODYNE) injection 10 mg, 10 mg, Intravenous, Q6H PRN, Hua Dominguez MD, 10 mg at 03/05/24 0620    methocarbamol (ROBAXIN) tablet 500 mg, 500 mg, Oral, Q6H DORIS, Hua Dominguez MD, 500 mg at 03/07/24 0542    ondansetron (ZOFRAN) injection 4 mg, 4 mg, Intravenous, Q6H PRN, Hua Dominguez MD    oxyCODONE (ROXICODONE) IR tablet 5 mg, 5 mg, Oral, Q4H PRN, Hua Dominguez MD, 5 mg at 03/06/24 0015    oxyCODONE (ROXICODONE) split tablet 2.5 mg, 2.5 mg, Oral, Q4H PRN, Hua Dominguez MD, 2.5 mg at 03/06/24 1053    pantoprazole (PROTONIX) EC tablet 40 mg, 40 mg, Oral, Daily, Hua Dominguez MD, 40 mg at 03/06/24 0940    senna (SENOKOT) tablet 17.2 mg, 2 tablet, Oral, Daily, Hua Dominguez MD, 17.2 mg at 03/06/24 0941    tamsulosin (FLOMAX) capsule 0.4 mg, 0.4 mg, Oral, Daily With Dinner, Hua Dominguez MD, 0.4 mg at 03/06/24 1745    Blood Culture:   Lab Results   Component Value Date    BLOODCX No Growth at 48 hrs. 03/04/2024       Wound Culture:   No results found for: \"WOUNDCULT\"    Ins and Outs:  I/O last 24 hours:  In: 240 [P.O.:190; IV Piggyback:50]  Out: 1200 [Urine:1200]          Physical exam:  Vitals:    03/06/24 2310   BP: 147/59   Pulse: 59   Resp:    Temp:    SpO2: 96%     right lower extremity  Surgical dressing clean dry intact, unchanged  TTP juan incisionally  Sensation intact in sural, saphenous, superficial peroneal, deep peroneal, tibial distributions  Motor intact to ankle plantarflexion, EHL/FHL, absent ankle dorsiflexion consistent with history of foot drop.  2+ DP pulse, extremity is warm and well perfused with capillary refill <2 seconds.      Assessment: 83 y.o.male post operative day 2 right femur IMN. Pt doing well    Plan:  Up and out of bed  Weightbearing as tolerated right lower extremity  PT/OT  DVT prophylaxis  Pain control  Rest of care per primary team  OTB ordered  Dispo: Ortho will follow  Will continue to assess for acute " blood loss anemia    Layla Spivey MD      Please contact role [facility] Call for any questions or concerns.

## 2024-03-07 NOTE — PROGRESS NOTES
Progress Note - Geriatric Medicine   Zhao Cheek Bachert 83 y.o. male MRN: [id number]  Unit[facility]Bed#: [facility] 614-01 Encounter: [id number]      Assessment/Plan:    Ambulatory dysfunction with fall  -Reportedly mechanical fall 3/4/24  -injuries as outlined below  -uses walker for ambulation at baseline  -at increased risk future falls, cont strict fall precautions   -consider home fall risk assessment and personal fall alert system on returning home  -PT and OT following, recommended for rehab on hosp dc      Right intertrochanteric femur fracture  -S/p fall as outlined above  -Noted on right femur XR obtained 3/4/24  -S/p right IM nail with Ortho on 3/5/2024  -Continue acute multimodal pain control  -Neurovascular checks per protocol  -PT OT following     Acute pain due to trauma  -Continue acute multimodal pain control per geriatric pain protocol  -encourage addition of non-pharmacologic pain treatment including ice and frequent repositioning  -recommend  bowel regimen to prevent and treat constipation due to increased risk with acute pain and opiate pain medications - giving miralax today for no BM since admit      COVID-19  -Incidentally noted on admission  -Positive by PCR  -Symptomatic management with close monitoring of respiratory status   -Continue titration supplemental O2 to maintain appropriate saturations  -Isolation per protocol, all appropriate PPE utilized for duration of encounter     Elevated lactic acid  -Lactic acid 3.4 admission improved to 1.7 with IVF     Osteoporosis  -Evidenced by fragility fracture of right femur sustained in fall from standing height  -Vitamin D 30.6  -Osteoporosis workup in progress  -No prior DEXA on record for review  -Endo consult for on the bone      DM-II with diabetic peripheral neuropathy  -A1c 7.7, given age and comorbidities goal of approximately 7.5 is not unreasonable  -Home metformin on hold during hospitalization  -covering with ISS, will blood sugar goal during  hospitalization 140-180 to reduce risk hypoglycemia, accuchecks have mostly been within target range with very few outliers   -Continue close outpatient follow-up with PCP for ongoing age-appropriate diabetic screenings and cares     Cognitive screening  -Alert and fully oriented, denies memory or chronic concerns  -At baseline fully independent with ADLs and IADLs  -No prior cognitive testing on record for review  -No CTH or MRI brain on record for review  -TSH WNL at 1.93, no recent B12 on record for review, given chronic metformin use with symptomatic peripheral neuropathy and increased risk deficiency will check with routine labs  -At risk age-related cognitive decline, continue secondary risk modifications including use of sensory assistive devices such as corrective lenses and hearing aids at all appropriate times reduce risk of uncorrected sensory impairment from contributing to isolation, confusion, encephalopathy and more precipitous cognitive decline  -Encourage patient to remain physically, socially, and cognitively active and engaged to maintain cognitive acuity     Laryngeal squamous cell carcinoma  -s/p XRT May 2023  -Follows with ENT and radiation oncology regularly as outpatient, continue close follow-up as directed     Impaired Vision  -recommend use of corrective lenses at all appropriate times  -Consider large font for printed materials provided to patient     Impaired Hearing  -cont use hearing aids all appropriate times   -encourage providers and caregivers to speak slowly and clearly directly to patient standing on right side whenever possible as appears to hear better on this side  -minimize background noise to encourage patient engagement  -consider use of hearing amplifier to reduce risk of straining to hear if hearing aids are not present or are not sufficient   -Encourage use of teach back method to ensure clear communication     BPH  -Maintained on tamsulosin chronically as  outpatient  -Increases risk of retention, recommend urinary retention protocol     Frailty syndrome in geriatric patient  -Clinical frailty scale stage V, mildly frail  -Multifactorial including age, ambulatory dysfunction, DM-II, peripheral neuropathy, laryngeal squamous cell carcinoma s/p radiation therapy and multiple additional chronic medical comorbidities now with ambulatory dysfunction and fall superimposed in elderly individual with limited physiologic and metabolic reserve increasing sensitivity to even seemingly mild additional metabolic stressors/derangements  -continue well-balanced nutritional intake  -ongoing optimization of chronic medical conditions, cont to address acute metabolic derangements as arise   -Monitor for and treat any underlying anxiety/mood/depression symptoms as may impact patient response to therapies as well as overall sense of wellbeing and quality of life  -Ensure that treatments and interventions continue to align with patient's wishes and goals of care  -Continue psychosocial supports     High risk developing delirium  -continue delirium precautions  -maintain normal sleep/wake cycle  -ensure that pain is well controlled  -monitor for fecal and urinary retention which may precipitate delirium  -provide frequent reorientation and redirection as indicated and appropriate    Care coordination: rounded with Jennifer (ALEXIA)    Subjective:     Zhao is seen and examined at bedside where he is sitting resting comfortably having just finished lunch. He endorses soreness in his hip but notes that it remains manageable with his current analgesic regimen. He endorses constipation and is currently sipping a drink with Miralax for no BM since admission. No other acute complaints, nursing reports no acute events overnight.      Review of Systems   Constitutional: Negative.  Negative for appetite change, chills and fever.   HENT: Negative.     Eyes:  Positive for visual disturbance (wears  "glasses).   Respiratory: Negative.  Negative for cough and shortness of breath.    Cardiovascular: Negative.    Gastrointestinal:  Positive for constipation.   Genitourinary: Negative.    Musculoskeletal:  Positive for arthralgias and gait problem.        Right hip sore    Skin: Negative.    Neurological:  Positive for weakness (BLE).   Hematological: Negative.    Psychiatric/Behavioral:  Positive for sleep disturbance (due to IV continuous beeping last night).    All other systems reviewed and are negative.    Objective:     Vitals: Blood pressure 133/69, pulse 76, temperature 98.2 °F (36.8 °C), resp. rate 16, height 5' 8\" (1.727 m), weight 95 kg (209 lb 7 oz), SpO2 94%.,Body mass index is 31.84 kg/m².      Intake/Output Summary (Last 24 hours) at 3/7/2024 1228  Last data filed at 3/7/2024 0547  Gross per 24 hour   Intake 190 ml   Output 500 ml   Net -310 ml     Current Medications: Reviewed    Physical Exam:   Physical Exam  Vitals and nursing note reviewed.   Constitutional:       General: He is not in acute distress.     Appearance: He is not toxic-appearing.   HENT:      Head: Normocephalic.      Nose: Nose normal.      Comments: O2 via NC     Mouth/Throat:      Mouth: Mucous membranes are dry.   Eyes:      General: No scleral icterus.        Right eye: No discharge.         Left eye: No discharge.      Conjunctiva/sclera: Conjunctivae normal.   Neck:      Comments: Voice hoarse  Cardiovascular:      Rate and Rhythm: Normal rate and regular rhythm.   Pulmonary:      Effort: Pulmonary effort is normal. No respiratory distress.   Abdominal:      General: There is no distension.      Palpations: Abdomen is soft.      Tenderness: There is no abdominal tenderness.   Musculoskeletal:      Cervical back: Neck supple.      Comments: Reduced overall muscle mass    Skin:     General: Skin is warm and dry.   Neurological:      Mental Status: He is alert.      Comments: Awake and alert, answering ques appropriately, speech " clear and fluent    Psychiatric:         Mood and Affect: Mood normal.         Behavior: Behavior normal.      Comments: Polite pleasant cooperative easily engaged         Invasive Devices       Peripheral Intravenous Line  Duration             Peripheral IV 03/04/24 Distal;Right;Upper;Ventral (anterior) Arm 2 days    Peripheral IV 03/04/24 Left;Proximal;Ventral (anterior) Forearm 2 days                  Lab Results:     I have personally reviewed pertinent lab results including the following:    Results from last 7 days   Lab Units 03/07/24  0514 03/06/24  1500 03/06/24  0526 03/05/24  0546   WBC Thousand/uL 5.48  --  7.02 6.99   HEMOGLOBIN g/dL 8.2* 8.6* 8.8* 11.2*   HEMATOCRIT % 24.5* 25.1* 25.7* 32.6*   PLATELETS Thousands/uL 119*  --  120* 149   NEUTROS PCT % 66  --  75 68   MONOS PCT % 13*  --  13* 15*   EOS PCT % 0  --  0 0     Results from last 7 days   Lab Units 03/07/24  0514 03/06/24  0526 03/05/24  0546 03/04/24  1830   POTASSIUM mmol/L 3.6 4.0 3.7 4.2   CHLORIDE mmol/L 101 100 100 101   CO2 mmol/L 26 27 27 24   BUN mg/dL 20 18 15 16   CREATININE mg/dL 1.15 1.24 0.97 1.19   CALCIUM mg/dL 7.6* 7.7* 8.3* 9.3   ALK PHOS U/L  --  71  --  80   ALT U/L  --  30  --  22   AST U/L  --  41*  --  22     I have personally reviewed the following imaging study reports in PACS:    No new imaging overnight

## 2024-03-07 NOTE — CASE MANAGEMENT
Case Management Discharge Planning Note    Patient name Zhao Walls  Location [facility] 614/[facility] 614-01 MRN [id number]  : 1940 Date 3/7/2024       Current Admission Date: 3/4/2024  Current Admission Diagnosis:Fall   Patient Active Problem List    Diagnosis Date Noted    Fall 2024    Intertrochanteric fracture (HCC) 2024    Polyneuropathy 2023    Laryngeal squamous cell carcinoma (HCC)     Allergies 2022    Postnasal drip 2022    Lumbar radiculopathy     Gastroesophageal reflux disease without esophagitis 2022    Cataracta 2021    Pre-op examination 2021    Left-sided low back pain with left-sided sciatica 2019    Obesity (BMI 30.0-34.9) 2019    Type 2 diabetes mellitus with complication (HCC) 2014    Anemia 2014    Benign prostatic hyperplasia 2014    Hyperlipidemia 2014    Allergic rhinitis 2013    Obesity 09/10/1998    Hypertension 1997      LOS (days): 3  Geometric Mean LOS (GMLOS) (days): 9  Days to GMLOS:6.5     OBJECTIVE:  Risk of Unplanned Readmission Score: 15.93         Current admission status: Inpatient   Preferred Pharmacy:   [id number] - [place] - [address] [address]  Phone: [phone] Fax: [phone]    [facility]  [place], [place] - [address]  [address]  [place] [address]  Phone: [phone] Fax: [phone]    Primary Care Provider: Bello Montanez MD    Primary Insurance: [facility]  Secondary Insurance:     DISCHARGE DETAILS:    Pt clinically accepted to [facility].  Will submit for auth today, if given the green light

## 2024-03-07 NOTE — PROGRESS NOTES
Patient:    MRN:  [id number]    Anil Request ID:  [id number]    Level of care reserved:  Inpatient Rehab Facility    Partner Reserved:  [facility] ([place]), [place] [id number] [phone]    Clinical needs requested:    Geography searched:  [place] miles around [id number]    Start of Service:    Request sent:  12:52pm EST on 3/6/2024 by Jeff Chaves    Partner reserved:  10:23am EST on 3/7/2024 by Jeff Chaves    Choice list shared:  10:23am EST on 3/7/2024 by Jeff Chaves

## 2024-03-07 NOTE — PLAN OF CARE
Problem: PAIN - ADULT  Goal: Verbalizes/displays adequate comfort level or baseline comfort level  Description: Interventions:  - Encourage patient to monitor pain and request assistance  - Assess pain using appropriate pain scale  - Administer analgesics based on type and severity of pain and evaluate response  - Implement non-pharmacological measures as appropriate and evaluate response  - Consider cultural and social influences on pain and pain management  - Notify physician/advanced practitioner if interventions unsuccessful or patient reports new pain  Outcome: Progressing     Problem: SAFETY ADULT  Goal: Patient will remain free of falls  Description: INTERVENTIONS:  - Educate patient/family on patient safety including physical limitations  - Instruct patient to call for assistance with activity   - Consult OT/PT to assist with strengthening/mobility   - Keep Call bell within reach  - Keep bed low and locked with side rails adjusted as appropriate  - Keep care items and personal belongings within reach  - Initiate and maintain comfort rounds  - Make Fall Risk Sign visible to staff  - Offer Toileting every  Hours, in advance of need  - Initiate/Maintain alarm  - Obtain necessary fall risk management equipment:   - Apply yellow socks and bracelet for high fall risk patients  - Consider moving patient to room near nurses station  Outcome: Progressing  Goal: Maintain or return to baseline ADL function  Description: INTERVENTIONS:  -  Assess patient's ability to carry out ADLs; assess patient's baseline for ADL function and identify physical deficits which impact ability to perform ADLs (bathing, care of mouth/teeth, toileting, grooming, dressing, etc.)  - Assess/evaluate cause of self-care deficits   - Assess range of motion  - Assess patient's mobility; develop plan if impaired  - Assess patient's need for assistive devices and provide as appropriate  - Encourage maximum independence but intervene and supervise  when necessary  - Involve family in performance of ADLs  - Assess for home care needs following discharge   - Consider OT consult to assist with ADL evaluation and planning for discharge  - Provide patient education as appropriate  Outcome: Progressing  Goal: Maintains/Returns to pre admission functional level  Description: INTERVENTIONS:  - Perform AM-PAC 6 Click Basic Mobility/ Daily Activity assessment daily.  - Set and communicate daily mobility goal to care team and patient/family/caregiver.   - Collaborate with rehabilitation services on mobility goals if consulted  - Perform Range of Motion  times a day.  - Reposition patient every  hours.  - Dangle patient  times a day  - Stand patient  times a day  - Ambulate patient  times a day  - Out of bed to chair  times a day   - Out of bed for meals  times a day  - Out of bed for toileting  - Record patient progress and toleration of activity level   Outcome: Progressing     Problem: DISCHARGE PLANNING  Goal: Discharge to home or other facility with appropriate resources  Description: INTERVENTIONS:  - Identify barriers to discharge w/patient and caregiver  - Arrange for needed discharge resources and transportation as appropriate  - Identify discharge learning needs (meds, wound care, etc.)  - Arrange for interpretive services to assist at discharge as needed  - Refer to Case Management Department for coordinating discharge planning if the patient needs post-hospital services based on physician/advanced practitioner order or complex needs related to functional status, cognitive ability, or social support system  Outcome: Progressing     Problem: Knowledge Deficit  Goal: Patient/family/caregiver demonstrates understanding of disease process, treatment plan, medications, and discharge instructions  Description: Complete learning assessment and assess knowledge base.  Interventions:  - Provide teaching at level of understanding  - Provide teaching via preferred  learning methods  Outcome: Progressing     Problem: SKIN/TISSUE INTEGRITY - ADULT  Goal: Skin Integrity remains intact(Skin Breakdown Prevention)  Description: Assess:  -Perform [place] assessment every   -Clean and moisturize skin every   -Inspect skin when repositioning, toileting, and assisting with ADLS  -Assess under medical devices such as  every   -Assess extremities for adequate circulation and sensation     Bed Management:  -Have minimal linens on bed & keep smooth, unwrinkled  -Change linens as needed when moist or perspiring  -Avoid sitting or lying in one position for more than  hours while in bed  -Keep HOB at degrees     Toileting:  -Offer bedside commode  -Assess for incontinence every   -Use incontinent care products after each incontinent episode such as    Activity:  -Mobilize patient  times a day  -Encourage activity and walks on unit  -Encourage or provide ROM exercises   -Turn and reposition patient every  Hours  -Use appropriate equipment to lift or move patient in bed  -Instruct/ Assist with weight shifting every  when out of bed in chair  -Consider limitation of chair time  hour intervals    Skin Care:  -Avoid use of baby powder, tape, friction and shearing, hot water or constrictive clothing  -Relieve pressure over bony prominences using   -Do not massage red bony areas    Next Steps:  -Teach patient strategies to minimize risks such as    -Consider consults to  interdisciplinary teams such as  Outcome: Progressing  Goal: Incision(s), wounds(s) or drain site(s) healing without S/S of infection  Description: INTERVENTIONS  - Assess and document dressing, incision, wound bed, drain sites and surrounding tissue  - Provide patient and family education  - Perform skin care/dressing changes every  Outcome: Progressing     Problem: MUSCULOSKELETAL - ADULT  Goal: Maintain or return mobility to safest level of function  Description: INTERVENTIONS:  - Assess patient's ability to carry out ADLs; assess  patient's baseline for ADL function and identify physical deficits which impact ability to perform ADLs (bathing, care of mouth/teeth, toileting, grooming, dressing, etc.)  - Assess/evaluate cause of self-care deficits   - Assess range of motion  - Assess patient's mobility  - Assess patient's need for assistive devices and provide as appropriate  - Encourage maximum independence but intervene and supervise when necessary  - Involve family in performance of ADLs  - Assess for home care needs following discharge   - Consider OT consult to assist with ADL evaluation and planning for discharge  - Provide patient education as appropriate  Outcome: Progressing  Goal: Maintain proper alignment of affected body part  Description: INTERVENTIONS:  - Support, maintain and protect limb and body alignment  - Provide patient/ family with appropriate education  Outcome: Progressing     Problem: Prexisting or High Potential for Compromised Skin Integrity  Goal: Skin integrity is maintained or improved  Description: INTERVENTIONS:  - Identify patients at risk for skin breakdown  - Assess and monitor skin integrity  - Assess and monitor nutrition and hydration status  - Monitor labs   - Assess for incontinence   - Turn and reposition patient  - Assist with mobility/ambulation  - Relieve pressure over bony prominences  - Avoid friction and shearing  - Provide appropriate hygiene as needed including keeping skin clean and dry  - Evaluate need for skin moisturizer/barrier cream  - Collaborate with interdisciplinary team   - Patient/family teaching  - Consider wound care consult   Outcome: Progressing     Problem: Nutrition/Hydration-ADULT  Goal: Nutrient/Hydration intake appropriate for improving, restoring or maintaining nutritional needs  Description: Monitor and assess patient's nutrition/hydration status for malnutrition. Collaborate with interdisciplinary team and initiate plan and interventions as ordered.  Monitor patient's  weight and dietary intake as ordered or per policy. Utilize nutrition screening tool and intervene as necessary. Determine patient's food preferences and provide high-protein, high-caloric foods as appropriate.     INTERVENTIONS:  - Monitor oral intake, urinary output, labs, and treatment plans  - Assess nutrition and hydration status and recommend course of action  - Evaluate amount of meals eaten  - Assist patient with eating if necessary   - Allow adequate time for meals  - Recommend/ encourage appropriate diets, oral nutritional supplements, and vitamin/mineral supplements  - Order, calculate, and assess calorie counts as needed  - Recommend, monitor, and adjust tube feedings and TPN/PPN based on assessed needs  - Assess need for intravenous fluids  - Provide specific nutrition/hydration education as appropriate  - Include patient/family/caregiver in decisions related to nutrition  Outcome: Progressing

## 2024-03-08 LAB
ANION GAP SERPL CALCULATED.3IONS-SCNC: 10 MMOL/L
BASOPHILS # BLD AUTO: 0.02 THOUSANDS/ÂΜL (ref 0–0.1)
BASOPHILS NFR BLD AUTO: 0 % (ref 0–1)
BUN SERPL-MCNC: 19 MG/DL (ref 5–25)
CALCIUM SERPL-MCNC: 7.7 MG/DL (ref 8.4–10.2)
CHLORIDE SERPL-SCNC: 101 MMOL/L (ref 96–108)
CO2 SERPL-SCNC: 25 MMOL/L (ref 21–32)
CREAT SERPL-MCNC: 1.04 MG/DL (ref 0.6–1.3)
EOSINOPHIL # BLD AUTO: 0.12 THOUSAND/ÂΜL (ref 0–0.61)
EOSINOPHIL NFR BLD AUTO: 2 % (ref 0–6)
ERYTHROCYTE [DISTWIDTH] IN BLOOD BY AUTOMATED COUNT: 13.3 % (ref 11.6–15.1)
GFR SERPL CREATININE-BSD FRML MDRD: 65 ML/MIN/1.73SQ M
GLUCOSE SERPL-MCNC: 156 MG/DL (ref 65–140)
GLUCOSE SERPL-MCNC: 161 MG/DL (ref 65–140)
GLUCOSE SERPL-MCNC: 183 MG/DL (ref 65–140)
GLUCOSE SERPL-MCNC: 251 MG/DL (ref 65–140)
GLUCOSE SERPL-MCNC: 278 MG/DL (ref 65–140)
HCT VFR BLD AUTO: 23.4 % (ref 36.5–49.3)
HGB BLD-MCNC: 8 G/DL (ref 12–17)
IMM GRANULOCYTES # BLD AUTO: 0.03 THOUSAND/UL (ref 0–0.2)
IMM GRANULOCYTES NFR BLD AUTO: 1 % (ref 0–2)
LYMPHOCYTES # BLD AUTO: 1.06 THOUSANDS/ÂΜL (ref 0.6–4.47)
LYMPHOCYTES NFR BLD AUTO: 21 % (ref 14–44)
MCH RBC QN AUTO: 31.7 PG (ref 26.8–34.3)
MCHC RBC AUTO-ENTMCNC: 34.2 G/DL (ref 31.4–37.4)
MCV RBC AUTO: 93 FL (ref 82–98)
MONOCYTES # BLD AUTO: 0.53 THOUSAND/ÂΜL (ref 0.17–1.22)
MONOCYTES NFR BLD AUTO: 11 % (ref 4–12)
NEUTROPHILS # BLD AUTO: 3.21 THOUSANDS/ÂΜL (ref 1.85–7.62)
NEUTS SEG NFR BLD AUTO: 65 % (ref 43–75)
NRBC BLD AUTO-RTO: 0 /100 WBCS
PLATELET # BLD AUTO: 124 THOUSANDS/UL (ref 149–390)
PMV BLD AUTO: 11.6 FL (ref 8.9–12.7)
POTASSIUM SERPL-SCNC: 3.7 MMOL/L (ref 3.5–5.3)
RBC # BLD AUTO: 2.52 MILLION/UL (ref 3.88–5.62)
SODIUM SERPL-SCNC: 136 MMOL/L (ref 135–147)
WBC # BLD AUTO: 4.97 THOUSAND/UL (ref 4.31–10.16)

## 2024-03-08 PROCEDURE — 97530 THERAPEUTIC ACTIVITIES: CPT

## 2024-03-08 PROCEDURE — 80048 BASIC METABOLIC PNL TOTAL CA: CPT | Performed by: ORTHOPAEDIC SURGERY

## 2024-03-08 PROCEDURE — 85025 COMPLETE CBC W/AUTO DIFF WBC: CPT | Performed by: ORTHOPAEDIC SURGERY

## 2024-03-08 PROCEDURE — 97112 NEUROMUSCULAR REEDUCATION: CPT

## 2024-03-08 PROCEDURE — 82948 REAGENT STRIP/BLOOD GLUCOSE: CPT

## 2024-03-08 PROCEDURE — 97535 SELF CARE MNGMENT TRAINING: CPT

## 2024-03-08 PROCEDURE — 99232 SBSQ HOSP IP/OBS MODERATE 35: CPT | Performed by: STUDENT IN AN ORGANIZED HEALTH CARE EDUCATION/TRAINING PROGRAM

## 2024-03-08 PROCEDURE — NC001 PR NO CHARGE: Performed by: INTERNAL MEDICINE

## 2024-03-08 RX ORDER — BISACODYL 10 MG
10 SUPPOSITORY, RECTAL RECTAL DAILY
Status: DISCONTINUED | OUTPATIENT
Start: 2024-03-08 | End: 2024-03-14 | Stop reason: HOSPADM

## 2024-03-08 RX ORDER — ENEMA 19; 7 G/133ML; G/133ML
1 ENEMA RECTAL ONCE
Status: DISCONTINUED | OUTPATIENT
Start: 2024-03-08 | End: 2024-03-08

## 2024-03-08 RX ADMIN — GABAPENTIN 100 MG: 100 CAPSULE ORAL at 09:11

## 2024-03-08 RX ADMIN — PANTOPRAZOLE SODIUM 40 MG: 40 TABLET, DELAYED RELEASE ORAL at 09:11

## 2024-03-08 RX ADMIN — CALCIUM CARBONATE (ANTACID) CHEW TAB 500 MG 500 MG: 500 CHEW TAB at 12:15

## 2024-03-08 RX ADMIN — GABAPENTIN 100 MG: 100 CAPSULE ORAL at 16:50

## 2024-03-08 RX ADMIN — POLYETHYLENE GLYCOL 3350 17 G: 17 POWDER, FOR SOLUTION ORAL at 09:11

## 2024-03-08 RX ADMIN — METHOCARBAMOL 500 MG: 500 TABLET ORAL at 05:59

## 2024-03-08 RX ADMIN — DOCUSATE SODIUM 100 MG: 100 CAPSULE, LIQUID FILLED ORAL at 09:11

## 2024-03-08 RX ADMIN — OXYCODONE HYDROCHLORIDE 5 MG: 5 TABLET ORAL at 16:51

## 2024-03-08 RX ADMIN — DOCUSATE SODIUM 100 MG: 100 CAPSULE, LIQUID FILLED ORAL at 17:51

## 2024-03-08 RX ADMIN — SENNOSIDES 17.2 MG: 8.6 TABLET, FILM COATED ORAL at 09:11

## 2024-03-08 RX ADMIN — ENOXAPARIN SODIUM 30 MG: 30 INJECTION SUBCUTANEOUS at 09:11

## 2024-03-08 RX ADMIN — TAMSULOSIN HYDROCHLORIDE 0.4 MG: 0.4 CAPSULE ORAL at 16:50

## 2024-03-08 RX ADMIN — ACETAMINOPHEN 650 MG: 325 TABLET, FILM COATED ORAL at 06:00

## 2024-03-08 RX ADMIN — INSULIN LISPRO 4 UNITS: 100 INJECTION, SOLUTION INTRAVENOUS; SUBCUTANEOUS at 18:02

## 2024-03-08 RX ADMIN — METHOCARBAMOL 500 MG: 500 TABLET ORAL at 23:53

## 2024-03-08 RX ADMIN — INSULIN LISPRO 1 UNITS: 100 INJECTION, SOLUTION INTRAVENOUS; SUBCUTANEOUS at 12:15

## 2024-03-08 RX ADMIN — ACETAMINOPHEN 650 MG: 325 TABLET, FILM COATED ORAL at 17:51

## 2024-03-08 RX ADMIN — Medication 2000 UNITS: at 09:11

## 2024-03-08 RX ADMIN — OXYCODONE HYDROCHLORIDE 5 MG: 5 TABLET ORAL at 21:13

## 2024-03-08 RX ADMIN — GABAPENTIN 100 MG: 100 CAPSULE ORAL at 21:13

## 2024-03-08 RX ADMIN — INSULIN LISPRO 1 UNITS: 100 INJECTION, SOLUTION INTRAVENOUS; SUBCUTANEOUS at 09:08

## 2024-03-08 RX ADMIN — METHOCARBAMOL 500 MG: 500 TABLET ORAL at 12:15

## 2024-03-08 RX ADMIN — CALCIUM CARBONATE (ANTACID) CHEW TAB 500 MG 500 MG: 500 CHEW TAB at 17:51

## 2024-03-08 RX ADMIN — ACETAMINOPHEN 650 MG: 325 TABLET, FILM COATED ORAL at 12:15

## 2024-03-08 RX ADMIN — METHOCARBAMOL 500 MG: 500 TABLET ORAL at 17:51

## 2024-03-08 RX ADMIN — ENOXAPARIN SODIUM 30 MG: 30 INJECTION SUBCUTANEOUS at 21:13

## 2024-03-08 RX ADMIN — ACETAMINOPHEN 650 MG: 325 TABLET, FILM COATED ORAL at 23:53

## 2024-03-08 NOTE — OCCUPATIONAL THERAPY NOTE
Occupational Therapy Treatment Note:      03/08/24 1440   OT Last Visit   OT Visit Date 03/08/24   Note Type   Note Type Treatment   Pain Assessment   Pain Assessment Tool FLACC   Pain Rating: FLACC (Rest) - Face 0   Pain Rating: FLACC (Rest) - Legs 0   Pain Rating: FLACC (Rest) - Activity 0   Pain Rating: FLACC (Rest) - Cry 0   Pain Rating: FLACC (Rest) - Consolability 0   Score: FLACC (Rest) 0   Pain Rating: FLACC (Activity) - Face 2   Pain Rating: FLACC (Activity) - Legs 1   Pain Rating: FLACC (Activity) - Activity 0   Pain Rating: FLACC (Activity) - Cry 1   Pain Rating: FLACC (Activity) - Consolability 1   Score: FLACC (Activity) 5   Restrictions/Precautions   RLE Weight Bearing Per Order WBAT   Other Precautions Chair Alarm;Bed Alarm;WBS;Fall Risk;Hard of hearing   ADL   Grooming Assistance 5  Supervision/Setup   UB Dressing Assistance 5  Supervision/Setup   LB Dressing Assistance 2  Maximal Assistance   Toileting Assistance  2  Maximal Assistance   Functional Standing Tolerance   Time f- balance dusing spt c rw   Bed Mobility   Supine to Sit 2  Maximal assistance   Transfers   Sit to Stand 3  Moderate assistance   Additional items Assist x 2   Stand to Sit 3  Moderate assistance   Additional items Assist x 2   Stand pivot 3  Moderate assistance   Additional items Assist x 2  (rw)   Cognition   Overall Cognitive Status WFL   Activity Tolerance   Activity Tolerance Patient tolerated treatment well   Assessment   Assessment pt participated in pm ot session and was seen focusing on bed mobility, lb dressing attempts, activity tolerence, sit to stand and stand pivot with rw taking steps with max vc's for new technique. pt limited by pain,  had tylenol prior. ice packs provided after session. pts wife was present this session. pt was eager for oob to chair.pt was able to take actual steps with max cues and increased time using rw.   Plan   Treatment Interventions ADL retraining;Functional transfer training;UE  strengthening/ROM;Endurance training;Patient/family training;Activityengagement   Goal Expiration Date 03/20/24   OT Treatment Day 1   OT Frequency 3-5x/wk   Discharge Recommendation   Rehab Resource Intensity Level, OT I (Maximum Resource Intensity)   AM-PAC Daily Activity Inpatient   Lower Body Dressing 2   Bathing 2   Toileting 2   Upper Body Dressing 3   Grooming 3   Eating 3   Daily Activity Raw Score 15   Daily Activity Standardized Score (Calc for Raw Score >=11) 34.69   AM-PAC Applied Cognition Inpatient   Following a Speech/Presentation 4   Understanding Ordinary Conversation 4   Taking Medications 4   Remembering Where Things Are Placed or Put Away 4   Remembering List of 4-5 Errands 3   Taking Care of Complicated Tasks 3   Applied Cognition Raw Score 22   Applied Cognition Standardized Score 47.83     April A Storm

## 2024-03-08 NOTE — CASE MANAGEMENT
Case Management Discharge Planning Note    Patient name Zhao Walls  Location [facility] 614/[facility] 614-01 MRN [id number]  : 1940 Date 3/8/2024       Current Admission Date: 3/4/2024  Current Admission Diagnosis:Fall   Patient Active Problem List    Diagnosis Date Noted    Fall 2024    Intertrochanteric fracture (HCC) 2024    Polyneuropathy 2023    Laryngeal squamous cell carcinoma (HCC)     Allergies 2022    Postnasal drip 2022    Lumbar radiculopathy     Gastroesophageal reflux disease without esophagitis 2022    Cataracta 2021    Pre-op examination 2021    Left-sided low back pain with left-sided sciatica 2019    Obesity (BMI 30.0-34.9) 2019    Type 2 diabetes mellitus with complication (HCC) 2014    Anemia 2014    Benign prostatic hyperplasia 2014    Hyperlipidemia 2014    Allergic rhinitis 2013    Obesity 09/10/1998    Hypertension 1997      LOS (days): 4  Geometric Mean LOS (GMLOS) (days): 9  Days to GMLOS:5.5     OBJECTIVE:  Risk of Unplanned Readmission Score: 15.5         Current admission status: Inpatient   Preferred Pharmacy:   [id number] - [place] - [address] [address]  Phone: [phone] Fax: [phone]    [facility]  [place], [place] - [address]  [address] [facility] [address]  [place] [address]  Phone: [phone] Fax: [phone]    Primary Care Provider: Bello Montanez MD    Primary Insurance: [facility]  Secondary Insurance:     DISCHARGE DETAILS:    [facility] doesn't have a COVID+ bed  CM spoke to son about other SNF, but pt's son would like to call [facility] and do the Fast Appeal. CM gave him the telephone number to call

## 2024-03-08 NOTE — CASE MANAGEMENT
Case Management Discharge Planning Note    Patient name Zhao Walls  Location [facility] 614/[facility] 614-01 MRN [id number]  : 1940 Date 3/8/2024       Current Admission Date: 3/4/2024  Current Admission Diagnosis:Fall   Patient Active Problem List    Diagnosis Date Noted    Fall 2024    Intertrochanteric fracture (HCC) 2024    Polyneuropathy 2023    Laryngeal squamous cell carcinoma (HCC)     Allergies 2022    Postnasal drip 2022    Lumbar radiculopathy     Gastroesophageal reflux disease without esophagitis 2022    Cataracta 2021    Pre-op examination 2021    Left-sided low back pain with left-sided sciatica 2019    Obesity (BMI 30.0-34.9) 2019    Type 2 diabetes mellitus with complication (HCC) 2014    Anemia 2014    Benign prostatic hyperplasia 2014    Hyperlipidemia 2014    Allergic rhinitis 2013    Obesity 09/10/1998    Hypertension 1997      LOS (days): 4  Geometric Mean LOS (GMLOS) (days): 6.5  Days to GMLOS:2.7     OBJECTIVE:  Risk of Unplanned Readmission Score: 15.09         Current admission status: Inpatient   Preferred Pharmacy:   [id number] - [place] - [address] [address]  Phone: [phone] Fax: [phone]    [facility] [place]  [place] - [address]  [address]  [place] [address]  Phone: [phone] Fax: [phone]    Primary Care Provider: Bello Montanez MD    Primary Insurance: [employer] [facility]  Secondary Insurance:     DISCHARGE DETAILS:    Pt's family wishes to pursue the fast appeal, as they would like the pt at Acute Rehab.   [facility] is following, but at this moment, they don't have a COVID+ bed.

## 2024-03-08 NOTE — CASE MANAGEMENT
"CM received notification from Physician Advisor: Radha Willingham     Denial Upheld   Level of Care: Acute Rehab  Rationale for denial upheld: Does not meet CMS guidelines; \"does meets for medical complexity\"   Insurer: [facility]     Insurer's Medical Director: Price Chin  Date outcome received: 03/08  Facility: [facility]   Reference #: [id number]   Fast Appeal P#: [phone] F#: [phone]    A separate request can be made for SNF if desired.     Care Manager notified: Jeff Chaves   "

## 2024-03-08 NOTE — PLAN OF CARE
Problem: OCCUPATIONAL THERAPY ADULT  Goal: Performs self-care activities at highest level of function for planned discharge setting.  See evaluation for individualized goals.  Description: Treatment Interventions: ADL retraining, Functional transfer training, Endurance training, Cognitive reorientation, Patient/family training, Equipment evaluation/education, Compensatory technique education, Energy conservation, Activityengagement          See flowsheet documentation for full assessment, interventions and recommendations.   Outcome: Progressing  Note: Limitation: Decreased ADL status, Decreased Safe judgement during ADL, Decreased cognition, Decreased endurance, Decreased self-care trans, Decreased high-level ADLs  Prognosis: Good  Assessment: pt participated in pm ot session and was seen focusing on bed mobility, lb dressing attempts, activity tolerence, sit to stand and stand pivot with rw taking steps with max vc's for new technique. pt limited by pain,  had tylenol prior. ice packs provided after session. pts wife was present this session. pt was eager for oob to chair.pt was able to take actual steps with max cues and increased time using rw.     Rehab Resource Intensity Level, OT: I (Maximum Resource Intensity)     April A Storm

## 2024-03-08 NOTE — ASSESSMENT & PLAN NOTE
- S/p fall  - S/p right femoral intramedullary nail  - PT/OT  - Pain control  - DVT ppx  - UOOB  - WBAT  - Ortho following

## 2024-03-08 NOTE — PLAN OF CARE
Problem: PHYSICAL THERAPY ADULT  Goal: Performs mobility at highest level of function for planned discharge setting.  See evaluation for individualized goals.  Description: Treatment/Interventions: Functional transfer training, LE strengthening/ROM, Therapeutic exercise, Endurance training, Patient/family training, Equipment eval/education, Bed mobility, Spoke to nursing, OT          See flowsheet documentation for full assessment, interventions and recommendations.  Outcome: Progressing  Note: Prognosis: Good  Problem List: Decreased strength, Decreased range of motion, Decreased endurance, Impaired balance, Decreased mobility, Pain  Assessment: Pt demonstrated improved mobiilty this session, performing sit to stand & OOB pivot with RW with slightly decreased assist, successfully taking steps to negotiate distance between bed & recliner without LOB.  He remains heavily reliant on RW, but was able to mobilize LEs under his own power without need for additional assist to reposition or block knee despite mild instability noted in R stance.  Anticipate pt will be appropriate for gait assessment to further progress towards long term goals of independent mobiilty.  PT to see next session to assess & progress goals next session.  Barriers to Discharge: Inaccessible home environment, Decreased caregiver support     Rehab Resource Intensity Level, PT: II (Moderate Resource Intensity)    See flowsheet documentation for full assessment.

## 2024-03-08 NOTE — CASE MANAGEMENT
Case Management Discharge Planning Note    Patient name Zhao Walls  Location [facility] 614/[facility] 614-01 MRN [id number]  : 1940 Date 3/8/2024       Current Admission Date: 3/4/2024  Current Admission Diagnosis:Fall   Patient Active Problem List    Diagnosis Date Noted    Fall 2024    Intertrochanteric fracture (HCC) 2024    Polyneuropathy 2023    Laryngeal squamous cell carcinoma (HCC)     Allergies 2022    Postnasal drip 2022    Lumbar radiculopathy     Gastroesophageal reflux disease without esophagitis 2022    Cataracta 2021    Pre-op examination 2021    Left-sided low back pain with left-sided sciatica 2019    Obesity (BMI 30.0-34.9) 2019    Type 2 diabetes mellitus with complication (HCC) 2014    Anemia 2014    Benign prostatic hyperplasia 2014    Hyperlipidemia 2014    Allergic rhinitis 2013    Obesity 09/10/1998    Hypertension 1997      LOS (days): 4  Geometric Mean LOS (GMLOS) (days): 9  Days to GMLOS:5.5     OBJECTIVE:  Risk of Unplanned Readmission Score: 15.5         Current admission status: Inpatient   Preferred Pharmacy:   [id number] [place] [facility] [address] [address]  Phone: [phone] Fax: [phone]    [facility] [place]  [place] - [address]  [address] [facility] [address]  [place] [address]  Phone: [phone] Fax: [phone]    Primary Care Provider: Bello Montanez MD    Primary Insurance: [facility]  Secondary Insurance:     DISCHARGE DETAILS:    Pt's insurance denied acute. A peer to peer was completed and the denial was upheld  Message sent to [facility] liaison to see if they have a bed today and if so, CM will submit for auth

## 2024-03-08 NOTE — PLAN OF CARE
Problem: PAIN - ADULT  Goal: Verbalizes/displays adequate comfort level or baseline comfort level  Description: Interventions:  - Encourage patient to monitor pain and request assistance  - Assess pain using appropriate pain scale  - Administer analgesics based on type and severity of pain and evaluate response  - Implement non-pharmacological measures as appropriate and evaluate response  - Consider cultural and social influences on pain and pain management  - Notify physician/advanced practitioner if interventions unsuccessful or patient reports new pain  Outcome: Progressing     Problem: SAFETY ADULT  Goal: Patient will remain free of falls  Description: INTERVENTIONS:  - Educate patient/family on patient safety including physical limitations  - Instruct patient to call for assistance with activity   - Consult OT/PT to assist with strengthening/mobility   - Keep Call bell within reach  - Keep bed low and locked with side rails adjusted as appropriate  - Keep care items and personal belongings within reach  - Initiate and maintain comfort rounds  - Make Fall Risk Sign visible to staff  - Offer Toileting every 2 Hours, in advance of need  - Initiate/Maintain alarm  - Obtain necessary fall risk management equipment:   - Apply yellow socks and bracelet for high fall risk patients  - Consider moving patient to room near nurses station  Outcome: Progressing  Goal: Maintain or return to baseline ADL function  Description: INTERVENTIONS:  -  Assess patient's ability to carry out ADLs; assess patient's baseline for ADL function and identify physical deficits which impact ability to perform ADLs (bathing, care of mouth/teeth, toileting, grooming, dressing, etc.)  - Assess/evaluate cause of self-care deficits   - Assess range of motion  - Assess patient's mobility; develop plan if impaired  - Assess patient's need for assistive devices and provide as appropriate  - Encourage maximum independence but intervene and  supervise when necessary  - Involve family in performance of ADLs  - Assess for home care needs following discharge   - Consider OT consult to assist with ADL evaluation and planning for discharge  - Provide patient education as appropriate  Outcome: Progressing  Goal: Maintains/Returns to pre admission functional level  Description: INTERVENTIONS:  - Perform AM-PAC 6 Click Basic Mobility/ Daily Activity assessment daily.  - Set and communicate daily mobility goal to care team and patient/family/caregiver.   - Collaborate with rehabilitation services on mobility goals if consulted  - Perform Range of Motion 3 times a day.  - Reposition patient every 2 hours.  - Dangle patient 3 times a day  - Stand patient 3 times a day  - Ambulate patient 3 times a day  - Out of bed to chair 3 times a day   - Out of bed for meals 3 times a day  - Out of bed for toileting  - Record patient progress and toleration of activity level   Outcome: Progressing     Problem: DISCHARGE PLANNING  Goal: Discharge to home or other facility with appropriate resources  Description: INTERVENTIONS:  - Identify barriers to discharge w/patient and caregiver  - Arrange for needed discharge resources and transportation as appropriate  - Identify discharge learning needs (meds, wound care, etc.)  - Arrange for interpretive services to assist at discharge as needed  - Refer to Case Management Department for coordinating discharge planning if the patient needs post-hospital services based on physician/advanced practitioner order or complex needs related to functional status, cognitive ability, or social support system  Outcome: Progressing     Problem: Knowledge Deficit  Goal: Patient/family/caregiver demonstrates understanding of disease process, treatment plan, medications, and discharge instructions  Description: Complete learning assessment and assess knowledge base.  Interventions:  - Provide teaching at level of understanding  - Provide teaching via  preferred learning methods  Outcome: Progressing     Problem: SKIN/TISSUE INTEGRITY - ADULT  Goal: Skin Integrity remains intact(Skin Breakdown Prevention)  Description: Assess:  -Perform Waldo assessment every   -Clean and moisturize skin every   -Inspect skin when repositioning, toileting, and assisting with ADLS  -Assess under medical devices   -Assess extremities for adequate circulation and sensation     Bed Management:  -Have minimal linens on bed & keep smooth, unwrinkled  -Change linens as needed when moist or perspiring  -Avoid sitting or lying in one position for more than 2 hours while in bed  -Keep HOB at 30 degrees     Toileting:  -Offer bedside commode  -Assess for incontinence   -Use incontinent care products after each incontinent episode     Activity:  -Mobilize patient 3 times a day  -Encourage activity and walks on unit  -Encourage or provide ROM exercises   -Turn and reposition patient every 2 Hours  -Use appropriate equipment to lift or move patient in bed  -Instruct/ Assist with weight shifting every 2 when out of bed in chair  -Consider limitation of chair time 2 hour intervals    Skin Care:  -Avoid use of baby powder, tape, friction and shearing, hot water or constrictive clothing  -Relieve pressure over bony prominences   -Do not massage red bony areas    Next Steps:  -Teach patient strategies to minimize risks    -Consider consults to  interdisciplinary teams   Outcome: Progressing  Goal: Incision(s), wounds(s) or drain site(s) healing without S/S of infection  Description: INTERVENTIONS  - Assess and document dressing, incision, wound bed, drain sites and surrounding tissue  - Provide patient and family education  - Perform skin care/dressing changes  Outcome: Progressing     Problem: MUSCULOSKELETAL - ADULT  Goal: Maintain or return mobility to safest level of function  Description: INTERVENTIONS:  - Assess patient's ability to carry out ADLs; assess patient's baseline for ADL function  and identify physical deficits which impact ability to perform ADLs (bathing, care of mouth/teeth, toileting, grooming, dressing, etc.)  - Assess/evaluate cause of self-care deficits   - Assess range of motion  - Assess patient's mobility  - Assess patient's need for assistive devices and provide as appropriate  - Encourage maximum independence but intervene and supervise when necessary  - Involve family in performance of ADLs  - Assess for home care needs following discharge   - Consider OT consult to assist with ADL evaluation and planning for discharge  - Provide patient education as appropriate  Outcome: Progressing  Goal: Maintain proper alignment of affected body part  Description: INTERVENTIONS:  - Support, maintain and protect limb and body alignment  - Provide patient/ family with appropriate education  Outcome: Progressing     Problem: Prexisting or High Potential for Compromised Skin Integrity  Goal: Skin integrity is maintained or improved  Description: INTERVENTIONS:  - Identify patients at risk for skin breakdown  - Assess and monitor skin integrity  - Assess and monitor nutrition and hydration status  - Monitor labs   - Assess for incontinence   - Turn and reposition patient  - Assist with mobility/ambulation  - Relieve pressure over bony prominences  - Avoid friction and shearing  - Provide appropriate hygiene as needed including keeping skin clean and dry  - Evaluate need for skin moisturizer/barrier cream  - Collaborate with interdisciplinary team   - Patient/family teaching  - Consider wound care consult   Outcome: Progressing     Problem: Nutrition/Hydration-ADULT  Goal: Nutrient/Hydration intake appropriate for improving, restoring or maintaining nutritional needs  Description: Monitor and assess patient's nutrition/hydration status for malnutrition. Collaborate with interdisciplinary team and initiate plan and interventions as ordered.  Monitor patient's weight and dietary intake as ordered or  per policy. Utilize nutrition screening tool and intervene as necessary. Determine patient's food preferences and provide high-protein, high-caloric foods as appropriate.     INTERVENTIONS:  - Monitor oral intake, urinary output, labs, and treatment plans  - Assess nutrition and hydration status and recommend course of action  - Evaluate amount of meals eaten  - Assist patient with eating if necessary   - Allow adequate time for meals  - Recommend/ encourage appropriate diets, oral nutritional supplements, and vitamin/mineral supplements  - Order, calculate, and assess calorie counts as needed  - Recommend, monitor, and adjust tube feedings and TPN/PPN based on assessed needs  - Assess need for intravenous fluids  - Provide specific nutrition/hydration education as appropriate  - Include patient/family/caregiver in decisions related to nutrition  Outcome: Progressing

## 2024-03-08 NOTE — PHYSICAL THERAPY NOTE
Physical Therapy Progress Note     03/08/24 1445   PT Last Visit   PT Visit Date 03/08/24   Note Type   Note Type Treatment   Pain Assessment   Pain Assessment Tool FLACC   Pain Rating: FLACC (Rest) - Face 1   Pain Rating: FLACC (Rest) - Legs 0   Pain Rating: FLACC (Rest) - Activity 0   Pain Rating: FLACC (Rest) - Cry 1   Pain Rating: FLACC (Rest) - Consolability 0   Score: FLACC (Rest) 2   Pain Rating: FLACC (Activity) - Face 2   Pain Rating: FLACC (Activity) - Legs 1   Pain Rating: FLACC (Activity) - Activity 1   Pain Rating: FLACC (Activity) - Cry 2   Pain Rating: FLACC (Activity) - Consolability 1   Score: FLACC (Activity) 7   Restrictions/Precautions   RLE Weight Bearing Per Order WBAT   Other Precautions Chair Alarm;Fall Risk;Pain;WBS   Subjective   Subjective pt encountered supine in bed, pleasant and agreeable to treatment.  Reports controlled pain at rest & feeling better since having BM earlier today.  Reports increased pain with activity, but this resides with rest.  He does report dizziness with position change, which persisted in sitting post activity despite stable vitals.   Bed Mobility   Supine to Sit 3  Moderate assistance   Additional items Assist x 2   Transfers   Sit to Stand 3  Moderate assistance   Additional items Assist x 2   Stand to Sit 3  Moderate assistance   Additional items Assist x 2   Stand pivot 3  Moderate assistance   Additional items Assist x 2   Balance   Static Sitting Fair   Static Standing Poor   Dynamic Standing Poor   Activity Tolerance   Activity Tolerance Patient tolerated treatment well;Patient limited by fatigue;Patient limited by pain   Nurse Made Aware ALEXIA Hernandez   Assessment   Prognosis Good   Problem List Decreased strength;Decreased range of motion;Decreased endurance;Impaired balance;Decreased mobility;Pain   Assessment Pt demonstrated improved mobiilty this session, performing sit to stand & OOB pivot with RW with slightly decreased assist, successfully taking steps  to negotiate distance between bed & recliner without LOB.  He remains heavily reliant on RW, but was able to mobilize LEs under his own power without need for additional assist to reposition or block knee despite mild instability noted in R stance.  Anticipate pt will be appropriate for gait assessment to further progress towards long term goals of independent mobiilty.  PT to see next session to assess & progress goals next session.   Goals   Patient Goals to keep doing better   STG Expiration Date 03/20/24   PT Treatment Day 1   Plan   Treatment/Interventions Functional transfer training;LE strengthening/ROM;Therapeutic exercise;Endurance training;Patient/family training;Equipment eval/education;Bed mobility   Progress Progressing toward goals   PT Frequency 3-5x/wk   Discharge Recommendation   Rehab Resource Intensity Level, PT II (Moderate Resource Intensity)   Equipment Recommended Walker   Walker Package Recommended Wheeled walker   AM-PAC Basic Mobility Inpatient   Turning in Flat Bed Without Bedrails 2   Lying on Back to Sitting on Edge of Flat Bed Without Bedrails 1   Moving Bed to Chair 1   Standing Up From Chair Using Arms 1   Walk in Room 1   Climb 3-5 Stairs With Railing 1   Basic Mobility Inpatient Raw Score 7   Turning Head Towards Sound 4   Follow Simple Instructions 4   Low Function Basic Mobility Raw Score  15   Low Function Basic Mobility Standardized Score  23.9   Highest Level Of Mobility   JH-HLM Goal 2: Bed activities/Dependent transfer   JH-HLM Achieved 5: Stand (1 or more minutes)       Luke So PTA    An [facility] Basic Mobility Raw Score less than 17 suggests pt would benefit from post acute rehab.  Please also refer to the recommendation of the Physical Therapist for safe discharge planning.

## 2024-03-08 NOTE — CASE MANAGEMENT
Support Center has received PEER TO PEER request prior to determination being made.   Received for Acute Rehab Authorization.   Insurance: [facility]   Called in by Rep: Talia  Facility: [facility]   Pending Auth #:[id number]  Peer to Peer Phone#: [phone] opt5   Deadline: 3/8 12pm EST   Information sent to PA group to complete P2P.     Care Manager notified: Jeff Chaves

## 2024-03-08 NOTE — PROGRESS NOTES
[facility]  Progress Note  Name: Zhao Walls I  MRN: [id number]  [facility] Date of Admission: 3/4/2024   Date of Service: 3/8/2024 I Hospital Day: 4    Assessment/Plan   Intertrochanteric fracture (HCC)  Assessment & Plan  - S/p fall  - S/p right femoral intramedullary nail  - PT/OT  - Pain control  - DVT ppx  - UOOB  - WBAT  - Ortho following    Fall  Assessment & Plan  - Fall from standing onto side of hip  - Intertrochanteric fracture on R  - S/p R femoral intramedullary nail  - PT/ OT consulted  - Michelle consulted  - Pain control               Bowel Regimen: Enema today; miralax, senna  VTE Prophylaxis:Enoxaparin (Lovenox)     Disposition: Peer to Peer; pending placement    Subjective   Chief Complaint: None    Subjective: Patient resting comfortably this Am. Just states he's tired. Pain is under control.      Objective   Vitals:   Temp:  [98.4 °F (36.9 °C)-99.9 °F (37.7 °C)] 98.4 °F (36.9 °C)  HR:  [61-82] 76  Resp:  [16-18] 17  BP: (117-139)/(69-80) 117/80    I/O         03/06 0701  03/07 0700 03/07 0701  03/08 0700 03/08 0701  03/09 0700    P.O. 190      I.V. (mL/kg)       IV Piggyback 50 150     Total Intake(mL/kg) 240 (2.5) 150 (1.6)     Urine (mL/kg/hr) 800 (0.4) 725 (0.3)     Total Output 800 725     Net -560 -575                     Physical Exam:   General: No acute distress  Neuro: A&Ox3; Following commands; Moving all extremities  HEENT: Moist mucus membranes  CV: Regular rate  Pulm: 2L NC, No respiratory distress  GI: No abdominal tenderness  MSK: Incision CDI, MS intact, 2+ dp  Skin: Warm and dry    Invasive Devices       Peripheral Intravenous Line  Duration             Peripheral IV 03/04/24 Left;Proximal;Ventral (anterior) Forearm 3 days                          Lab Results: Results: I have personally reviewed all pertinent laboratory/tests results  Imaging: I have personally reviewed pertinent reports.     Other Studies:         hard copy, drawn during this pregnancy

## 2024-03-08 NOTE — CONSULTS
Endocrinology will sign off, please reconsult as needed  See recommendations from consult dated 3/5  Follow-up information placed into discharge tab

## 2024-03-09 LAB
ANION GAP SERPL CALCULATED.3IONS-SCNC: 7 MMOL/L
BACTERIA BLD CULT: NORMAL
BACTERIA BLD CULT: NORMAL
BASOPHILS # BLD AUTO: 0.02 THOUSANDS/ÂΜL (ref 0–0.1)
BASOPHILS NFR BLD AUTO: 0 % (ref 0–1)
BUN SERPL-MCNC: 22 MG/DL (ref 5–25)
CALCIUM SERPL-MCNC: 8.3 MG/DL (ref 8.4–10.2)
CHLORIDE SERPL-SCNC: 99 MMOL/L (ref 96–108)
CO2 SERPL-SCNC: 29 MMOL/L (ref 21–32)
CREAT SERPL-MCNC: 1 MG/DL (ref 0.6–1.3)
EOSINOPHIL # BLD AUTO: 0.2 THOUSAND/ÂΜL (ref 0–0.61)
EOSINOPHIL NFR BLD AUTO: 4 % (ref 0–6)
ERYTHROCYTE [DISTWIDTH] IN BLOOD BY AUTOMATED COUNT: 13.3 % (ref 11.6–15.1)
GFR SERPL CREATININE-BSD FRML MDRD: 68 ML/MIN/1.73SQ M
GLUCOSE SERPL-MCNC: 172 MG/DL (ref 65–140)
GLUCOSE SERPL-MCNC: 182 MG/DL (ref 65–140)
GLUCOSE SERPL-MCNC: 199 MG/DL (ref 65–140)
GLUCOSE SERPL-MCNC: 199 MG/DL (ref 65–140)
GLUCOSE SERPL-MCNC: 280 MG/DL (ref 65–140)
HCT VFR BLD AUTO: 25.2 % (ref 36.5–49.3)
HGB BLD-MCNC: 8.2 G/DL (ref 12–17)
IMM GRANULOCYTES # BLD AUTO: 0.03 THOUSAND/UL (ref 0–0.2)
IMM GRANULOCYTES NFR BLD AUTO: 1 % (ref 0–2)
LYMPHOCYTES # BLD AUTO: 1.43 THOUSANDS/ÂΜL (ref 0.6–4.47)
LYMPHOCYTES NFR BLD AUTO: 26 % (ref 14–44)
MCH RBC QN AUTO: 31.8 PG (ref 26.8–34.3)
MCHC RBC AUTO-ENTMCNC: 32.5 G/DL (ref 31.4–37.4)
MCV RBC AUTO: 98 FL (ref 82–98)
MONOCYTES # BLD AUTO: 0.59 THOUSAND/ÂΜL (ref 0.17–1.22)
MONOCYTES NFR BLD AUTO: 11 % (ref 4–12)
NEUTROPHILS # BLD AUTO: 3.3 THOUSANDS/ÂΜL (ref 1.85–7.62)
NEUTS SEG NFR BLD AUTO: 58 % (ref 43–75)
NRBC BLD AUTO-RTO: 0 /100 WBCS
PLATELET # BLD AUTO: 154 THOUSANDS/UL (ref 149–390)
PMV BLD AUTO: 11.2 FL (ref 8.9–12.7)
POTASSIUM SERPL-SCNC: 3.8 MMOL/L (ref 3.5–5.3)
RBC # BLD AUTO: 2.58 MILLION/UL (ref 3.88–5.62)
SODIUM SERPL-SCNC: 135 MMOL/L (ref 135–147)
WBC # BLD AUTO: 5.57 THOUSAND/UL (ref 4.31–10.16)

## 2024-03-09 PROCEDURE — 82948 REAGENT STRIP/BLOOD GLUCOSE: CPT

## 2024-03-09 PROCEDURE — 99232 SBSQ HOSP IP/OBS MODERATE 35: CPT | Performed by: SURGERY

## 2024-03-09 PROCEDURE — 85025 COMPLETE CBC W/AUTO DIFF WBC: CPT

## 2024-03-09 PROCEDURE — 80048 BASIC METABOLIC PNL TOTAL CA: CPT

## 2024-03-09 RX ADMIN — PANTOPRAZOLE SODIUM 40 MG: 40 TABLET, DELAYED RELEASE ORAL at 08:56

## 2024-03-09 RX ADMIN — GABAPENTIN 100 MG: 100 CAPSULE ORAL at 08:55

## 2024-03-09 RX ADMIN — GABAPENTIN 100 MG: 100 CAPSULE ORAL at 17:51

## 2024-03-09 RX ADMIN — GABAPENTIN 100 MG: 100 CAPSULE ORAL at 21:09

## 2024-03-09 RX ADMIN — DOCUSATE SODIUM 100 MG: 100 CAPSULE, LIQUID FILLED ORAL at 17:51

## 2024-03-09 RX ADMIN — CALCIUM CARBONATE (ANTACID) CHEW TAB 500 MG 500 MG: 500 CHEW TAB at 12:22

## 2024-03-09 RX ADMIN — DOCUSATE SODIUM 100 MG: 100 CAPSULE, LIQUID FILLED ORAL at 08:55

## 2024-03-09 RX ADMIN — METHOCARBAMOL 500 MG: 500 TABLET ORAL at 12:22

## 2024-03-09 RX ADMIN — OXYCODONE HYDROCHLORIDE 5 MG: 5 TABLET ORAL at 08:59

## 2024-03-09 RX ADMIN — CALCIUM CARBONATE (ANTACID) CHEW TAB 500 MG 500 MG: 500 CHEW TAB at 17:51

## 2024-03-09 RX ADMIN — TAMSULOSIN HYDROCHLORIDE 0.4 MG: 0.4 CAPSULE ORAL at 17:51

## 2024-03-09 RX ADMIN — POLYETHYLENE GLYCOL 3350 17 G: 17 POWDER, FOR SOLUTION ORAL at 08:54

## 2024-03-09 RX ADMIN — ACETAMINOPHEN 650 MG: 325 TABLET, FILM COATED ORAL at 12:22

## 2024-03-09 RX ADMIN — ACETAMINOPHEN 650 MG: 325 TABLET, FILM COATED ORAL at 23:13

## 2024-03-09 RX ADMIN — METHOCARBAMOL 500 MG: 500 TABLET ORAL at 17:51

## 2024-03-09 RX ADMIN — METHOCARBAMOL 500 MG: 500 TABLET ORAL at 06:33

## 2024-03-09 RX ADMIN — BISACODYL 10 MG: 10 SUPPOSITORY RECTAL at 20:07

## 2024-03-09 RX ADMIN — ENOXAPARIN SODIUM 30 MG: 30 INJECTION SUBCUTANEOUS at 21:09

## 2024-03-09 RX ADMIN — INSULIN LISPRO 2 UNITS: 100 INJECTION, SOLUTION INTRAVENOUS; SUBCUTANEOUS at 17:53

## 2024-03-09 RX ADMIN — ACETAMINOPHEN 650 MG: 325 TABLET, FILM COATED ORAL at 17:51

## 2024-03-09 RX ADMIN — ENOXAPARIN SODIUM 30 MG: 30 INJECTION SUBCUTANEOUS at 08:55

## 2024-03-09 RX ADMIN — INSULIN LISPRO 4 UNITS: 100 INJECTION, SOLUTION INTRAVENOUS; SUBCUTANEOUS at 12:23

## 2024-03-09 RX ADMIN — METHOCARBAMOL 500 MG: 500 TABLET ORAL at 23:13

## 2024-03-09 RX ADMIN — Medication 2000 UNITS: at 08:55

## 2024-03-09 RX ADMIN — INSULIN LISPRO 1 UNITS: 100 INJECTION, SOLUTION INTRAVENOUS; SUBCUTANEOUS at 08:47

## 2024-03-09 RX ADMIN — ACETAMINOPHEN 650 MG: 325 TABLET, FILM COATED ORAL at 06:33

## 2024-03-09 RX ADMIN — SENNOSIDES 17.2 MG: 8.6 TABLET, FILM COATED ORAL at 08:56

## 2024-03-09 NOTE — PLAN OF CARE
Problem: PAIN - ADULT  Goal: Verbalizes/displays adequate comfort level or baseline comfort level  Description: Interventions:  - Encourage patient to monitor pain and request assistance  - Assess pain using appropriate pain scale  - Administer analgesics based on type and severity of pain and evaluate response  - Implement non-pharmacological measures as appropriate and evaluate response  - Consider cultural and social influences on pain and pain management  - Notify physician/advanced practitioner if interventions unsuccessful or patient reports new pain  Outcome: Progressing     Problem: SAFETY ADULT  Goal: Patient will remain free of falls  Description: INTERVENTIONS:  - Educate patient/family on patient safety including physical limitations  - Instruct patient to call for assistance with activity   - Consult OT/PT to assist with strengthening/mobility   - Keep Call bell within reach  - Keep bed low and locked with side rails adjusted as appropriate  - Keep care items and personal belongings within reach  - Initiate and maintain comfort rounds  - Make Fall Risk Sign visible to staff  - Offer Toileting every 2 Hours, in advance of need  - Initiate/Maintain alarm  - Obtain necessary fall risk management equipment:   - Apply yellow socks and bracelet for high fall risk patients  - Consider moving patient to room near nurses station  Outcome: Progressing  Goal: Maintain or return to baseline ADL function  Description: INTERVENTIONS:  -  Assess patient's ability to carry out ADLs; assess patient's baseline for ADL function and identify physical deficits which impact ability to perform ADLs (bathing, care of mouth/teeth, toileting, grooming, dressing, etc.)  - Assess/evaluate cause of self-care deficits   - Assess range of motion  - Assess patient's mobility; develop plan if impaired  - Assess patient's need for assistive devices and provide as appropriate  - Encourage maximum independence but intervene and  supervise when necessary  - Involve family in performance of ADLs  - Assess for home care needs following discharge   - Consider OT consult to assist with ADL evaluation and planning for discharge  - Provide patient education as appropriate  Outcome: Progressing  Goal: Maintains/Returns to pre admission functional level  Description: INTERVENTIONS:  - Perform AM-PAC 6 Click Basic Mobility/ Daily Activity assessment daily.  - Set and communicate daily mobility goal to care team and patient/family/caregiver.   - Collaborate with rehabilitation services on mobility goals if consulted  - Perform Range of Motion 3 times a day.  - Reposition patient every 2 hours.  - Dangle patient 3 times a day  - Stand patient 3 times a day  - Ambulate patient 3 times a day  - Out of bed to chair 3 times a day   - Out of bed for meals 3 times a day  - Out of bed for toileting  - Record patient progress and toleration of activity level   Outcome: Progressing     Problem: DISCHARGE PLANNING  Goal: Discharge to home or other facility with appropriate resources  Description: INTERVENTIONS:  - Identify barriers to discharge w/patient and caregiver  - Arrange for needed discharge resources and transportation as appropriate  - Identify discharge learning needs (meds, wound care, etc.)  - Arrange for interpretive services to assist at discharge as needed  - Refer to Case Management Department for coordinating discharge planning if the patient needs post-hospital services based on physician/advanced practitioner order or complex needs related to functional status, cognitive ability, or social support system  Outcome: Progressing     Problem: Knowledge Deficit  Goal: Patient/family/caregiver demonstrates understanding of disease process, treatment plan, medications, and discharge instructions  Description: Complete learning assessment and assess knowledge base.  Interventions:  - Provide teaching at level of understanding  - Provide teaching via  preferred learning methods  Outcome: Progressing     Problem: SKIN/TISSUE INTEGRITY - ADULT  Goal: Skin Integrity remains intact(Skin Breakdown Prevention)  Description: Assess:  -Perform Waldo assessment   -Clean and moisturize skin   -Inspect skin when repositioning, toileting, and assisting with ADLS  -Assess under medical devices   -Assess extremities for adequate circulation and sensation     Bed Management:  -Have minimal linens on bed & keep smooth, unwrinkled  -Change linens as needed when moist or perspiring  -Avoid sitting or lying in one position for more than 2 hours while in bed  -Keep HOB at 30 degrees     Toileting:  -Offer bedside commode  -Assess for incontinence   -Use incontinent care products after each incontinent episode     Activity:  -Mobilize patient 3 times a day  -Encourage activity and walks on unit  -Encourage or provide ROM exercises   -Turn and reposition patient every 2 Hours  -Use appropriate equipment to lift or move patient in bed  -Instruct/ Assist with weight shifting every 2 when out of bed in chair  -Consider limitation of chair time 2 hour intervals    Skin Care:  -Avoid use of baby powder, tape, friction and shearing, hot water or constrictive clothing  -Relieve pressure over bony prominences   -Do not massage red bony areas    Next Steps:  -Teach patient strategies to minimize risks    -Consider consults to  interdisciplinary teams   Outcome: Progressing  Goal: Incision(s), wounds(s) or drain site(s) healing without S/S of infection  Description: INTERVENTIONS  - Assess and document dressing, incision, wound bed, drain sites and surrounding tissue  - Provide patient and family education  - Perform skin care/dressing changes   Outcome: Progressing     Problem: MUSCULOSKELETAL - ADULT  Goal: Maintain or return mobility to safest level of function  Description: INTERVENTIONS:  - Assess patient's ability to carry out ADLs; assess patient's baseline for ADL function and identify  physical deficits which impact ability to perform ADLs (bathing, care of mouth/teeth, toileting, grooming, dressing, etc.)  - Assess/evaluate cause of self-care deficits   - Assess range of motion  - Assess patient's mobility  - Assess patient's need for assistive devices and provide as appropriate  - Encourage maximum independence but intervene and supervise when necessary  - Involve family in performance of ADLs  - Assess for home care needs following discharge   - Consider OT consult to assist with ADL evaluation and planning for discharge  - Provide patient education as appropriate  Outcome: Progressing  Goal: Maintain proper alignment of affected body part  Description: INTERVENTIONS:  - Support, maintain and protect limb and body alignment  - Provide patient/ family with appropriate education  Outcome: Progressing     Problem: Prexisting or High Potential for Compromised Skin Integrity  Goal: Skin integrity is maintained or improved  Description: INTERVENTIONS:  - Identify patients at risk for skin breakdown  - Assess and monitor skin integrity  - Assess and monitor nutrition and hydration status  - Monitor labs   - Assess for incontinence   - Turn and reposition patient  - Assist with mobility/ambulation  - Relieve pressure over bony prominences  - Avoid friction and shearing  - Provide appropriate hygiene as needed including keeping skin clean and dry  - Evaluate need for skin moisturizer/barrier cream  - Collaborate with interdisciplinary team   - Patient/family teaching  - Consider wound care consult   Outcome: Progressing     Problem: Nutrition/Hydration-ADULT  Goal: Nutrient/Hydration intake appropriate for improving, restoring or maintaining nutritional needs  Description: Monitor and assess patient's nutrition/hydration status for malnutrition. Collaborate with interdisciplinary team and initiate plan and interventions as ordered.  Monitor patient's weight and dietary intake as ordered or per policy.  Utilize nutrition screening tool and intervene as necessary. Determine patient's food preferences and provide high-protein, high-caloric foods as appropriate.     INTERVENTIONS:  - Monitor oral intake, urinary output, labs, and treatment plans  - Assess nutrition and hydration status and recommend course of action  - Evaluate amount of meals eaten  - Assist patient with eating if necessary   - Allow adequate time for meals  - Recommend/ encourage appropriate diets, oral nutritional supplements, and vitamin/mineral supplements  - Order, calculate, and assess calorie counts as needed  - Recommend, monitor, and adjust tube feedings and TPN/PPN based on assessed needs  - Assess need for intravenous fluids  - Provide specific nutrition/hydration education as appropriate  - Include patient/family/caregiver in decisions related to nutrition  Outcome: Progressing

## 2024-03-09 NOTE — PROGRESS NOTES
[facility]  Progress Note  Name: Zhao Walls I  MRN: [id number]  [facility] I Date of Admission: 3/4/2024   Date of Service: 3/9/2024 I Hospital Day: 5    Assessment/Plan   Intertrochanteric fracture (HCC)  Assessment & Plan  - S/p fall  - S/p right femoral intramedullary nail  - PT/OT  - Pain control  - DVT ppx  - UOOB  - WBAT  - Ortho following    Fall  Assessment & Plan  - Fall from standing onto side of hip  - Intertrochanteric fracture on R  - S/p R femoral intramedullary nail  - PT/ OT consulted  - Michelle consulted  - Pain control               Bowel Regimen: dulcolax, miralax, sennakot  VTE Prophylaxis:Sequential compression device (Venodyne)  and Enoxaparin (Lovenox)     Disposition: pending placement    Subjective   Chief Complaint: no acute complaints    Subjective: Patient has no complaints.      Objective   Vitals:   Temp:  [98.1 °F (36.7 °C)-98.4 °F (36.9 °C)] 98.2 °F (36.8 °C)  HR:  [62-81] 62  Resp:  [16-18] 17  BP: (117-141)/(70-81) 139/70    I/O         03/07 0701  03/08 0700 03/08 0701  03/09 0700 03/09 0701  03/10 0700    P.O.  360     IV Piggyback 150      Total Intake(mL/kg) 150 (1.6) 360 (3.8)     Urine (mL/kg/hr) 725 (0.3) 600 (0.3)     Total Output 725 600     Net -575 -240                     Physical Exam:   GENERAL APPEARANCE: NAD  NEURO: GCS 15  HEENT: PERRL, EOMI  CV: RRR  LUNGS: CTA  GI: soft, nontender  : voiding  MSK: dressing over RLE, mildly tender, compartments soft. Neurovascularly intact  SKIN: warm, dry    Invasive Devices       Peripheral Intravenous Line  Duration             Peripheral IV 03/09/24 Left Antecubital <1 day                          Lab Results: Results: I have personally reviewed all pertinent laboratory/tests results, BMP/CMP:   Lab Results   Component Value Date    SODIUM 135 03/09/2024    K 3.8 03/09/2024    CL 99 03/09/2024    CO2 29 03/09/2024    BUN 22 03/09/2024    CREATININE 1.00 03/09/2024     CALCIUM 8.3 (L) 03/09/2024    EGFR 68 03/09/2024   , and CBC:   Lab Results   Component Value Date    WBC 5.57 03/09/2024    HGB 8.2 (L) 03/09/2024    HCT 25.2 (L) 03/09/2024    MCV 98 03/09/2024     03/09/2024    RBC 2.58 (L) 03/09/2024    MCH 31.8 03/09/2024    MCHC 32.5 03/09/2024    RDW 13.3 03/09/2024    MPV 11.2 03/09/2024    NRBC 0 03/09/2024     Imaging: I have personally reviewed pertinent reports.     Other Studies:

## 2024-03-10 PROBLEM — K21.9 GERD (GASTROESOPHAGEAL REFLUX DISEASE): Status: ACTIVE | Noted: 2024-03-10

## 2024-03-10 LAB
GLUCOSE SERPL-MCNC: 184 MG/DL (ref 65–140)
GLUCOSE SERPL-MCNC: 247 MG/DL (ref 65–140)
GLUCOSE SERPL-MCNC: 248 MG/DL (ref 65–140)

## 2024-03-10 PROCEDURE — 82948 REAGENT STRIP/BLOOD GLUCOSE: CPT

## 2024-03-10 RX ADMIN — INSULIN LISPRO 1 UNITS: 100 INJECTION, SOLUTION INTRAVENOUS; SUBCUTANEOUS at 08:20

## 2024-03-10 RX ADMIN — GABAPENTIN 100 MG: 100 CAPSULE ORAL at 21:40

## 2024-03-10 RX ADMIN — INSULIN LISPRO 3 UNITS: 100 INJECTION, SOLUTION INTRAVENOUS; SUBCUTANEOUS at 18:04

## 2024-03-10 RX ADMIN — METHOCARBAMOL 500 MG: 500 TABLET ORAL at 05:50

## 2024-03-10 RX ADMIN — SENNOSIDES 17.2 MG: 8.6 TABLET, FILM COATED ORAL at 08:19

## 2024-03-10 RX ADMIN — BISACODYL 10 MG: 10 SUPPOSITORY RECTAL at 08:30

## 2024-03-10 RX ADMIN — INSULIN LISPRO 3 UNITS: 100 INJECTION, SOLUTION INTRAVENOUS; SUBCUTANEOUS at 12:05

## 2024-03-10 RX ADMIN — TAMSULOSIN HYDROCHLORIDE 0.4 MG: 0.4 CAPSULE ORAL at 18:04

## 2024-03-10 RX ADMIN — Medication 2000 UNITS: at 08:19

## 2024-03-10 RX ADMIN — CALCIUM CARBONATE (ANTACID) CHEW TAB 500 MG 500 MG: 500 CHEW TAB at 18:04

## 2024-03-10 RX ADMIN — ACETAMINOPHEN 650 MG: 325 TABLET, FILM COATED ORAL at 12:04

## 2024-03-10 RX ADMIN — ACETAMINOPHEN 650 MG: 325 TABLET, FILM COATED ORAL at 18:04

## 2024-03-10 RX ADMIN — METHOCARBAMOL 500 MG: 500 TABLET ORAL at 18:04

## 2024-03-10 RX ADMIN — ENOXAPARIN SODIUM 30 MG: 30 INJECTION SUBCUTANEOUS at 08:22

## 2024-03-10 RX ADMIN — PANTOPRAZOLE SODIUM 40 MG: 40 TABLET, DELAYED RELEASE ORAL at 08:20

## 2024-03-10 RX ADMIN — OXYCODONE HYDROCHLORIDE 5 MG: 5 TABLET ORAL at 21:44

## 2024-03-10 RX ADMIN — ACETAMINOPHEN 650 MG: 325 TABLET, FILM COATED ORAL at 05:50

## 2024-03-10 RX ADMIN — POLYETHYLENE GLYCOL 3350 17 G: 17 POWDER, FOR SOLUTION ORAL at 08:20

## 2024-03-10 RX ADMIN — ENOXAPARIN SODIUM 30 MG: 30 INJECTION SUBCUTANEOUS at 21:39

## 2024-03-10 RX ADMIN — METHOCARBAMOL 500 MG: 500 TABLET ORAL at 12:04

## 2024-03-10 RX ADMIN — DOCUSATE SODIUM 100 MG: 100 CAPSULE, LIQUID FILLED ORAL at 08:19

## 2024-03-10 RX ADMIN — CALCIUM CARBONATE (ANTACID) CHEW TAB 500 MG 500 MG: 500 CHEW TAB at 12:04

## 2024-03-10 RX ADMIN — GABAPENTIN 100 MG: 100 CAPSULE ORAL at 18:04

## 2024-03-10 RX ADMIN — DOCUSATE SODIUM 100 MG: 100 CAPSULE, LIQUID FILLED ORAL at 18:04

## 2024-03-10 RX ADMIN — GABAPENTIN 100 MG: 100 CAPSULE ORAL at 08:20

## 2024-03-10 NOTE — ASSESSMENT & PLAN NOTE
- S/p fall  - S/p right femoral intramedullary nail on 3/5  - Pain control  - DVT ppx  - Needs to be out of bed and ambulating  - Encourage IS  - Airway clearence protocol  - Respiratory protocol  - Appreciate CM assistance with dispo planning

## 2024-03-10 NOTE — CASE MANAGEMENT
Case Management Discharge Planning Note    Patient name Zhao Walls  Location [facility] 614/[facility] 614-01 MRN [id number]  : 1940 Date 3/10/2024       Current Admission Date: 3/4/2024  Current Admission Diagnosis:Fall   Patient Active Problem List    Diagnosis Date Noted    GERD (gastroesophageal reflux disease) 03/10/2024    Fall 2024    Intertrochanteric fracture (HCC) 2024    Polyneuropathy 2023    Laryngeal squamous cell carcinoma (HCC)     Allergies 2022    Postnasal drip 2022    Lumbar radiculopathy     Gastroesophageal reflux disease without esophagitis 2022    Cataracta 2021    Pre-op examination 2021    Left-sided low back pain with left-sided sciatica 2019    Obesity (BMI 30.0-34.9) 2019    Type 2 diabetes mellitus with complication (HCC) 2014    Anemia 2014    Benign prostatic hyperplasia 2014    Hyperlipidemia 2014    Allergic rhinitis 2013    Obesity 09/10/1998    Hypertension 1997      LOS (days): 6  Geometric Mean LOS (GMLOS) (days): 6.5  Days to GMLOS:1     OBJECTIVE:  Risk of Unplanned Readmission Score: 14.1         Current admission status: Inpatient   Preferred Pharmacy:   [id number] - [place] - [address] [address]  Phone: [phone] Fax: [phone]    [facility] - [place] - [address]  [address]  [place] [address]  Phone: [phone] Fax: [phone]    Primary Care Provider: Bello Montanez MD    Primary Insurance: [facility]  Secondary Insurance:     DISCHARGE DETAILS:    Cm contacted [facility] to follow up on the appeal the family place on Friday.  CM called but their call center is closed and won't be open until Monday    Reference #: [id number]   Fast Appeal P#: [phone] F#: [phone]

## 2024-03-10 NOTE — PLAN OF CARE
Problem: PAIN - ADULT  Goal: Verbalizes/displays adequate comfort level or baseline comfort level  Description: Interventions:  - Encourage patient to monitor pain and request assistance  - Assess pain using appropriate pain scale  - Administer analgesics based on type and severity of pain and evaluate response  - Implement non-pharmacological measures as appropriate and evaluate response  - Consider cultural and social influences on pain and pain management  - Notify physician/advanced practitioner if interventions unsuccessful or patient reports new pain  Outcome: Progressing     Problem: SAFETY ADULT  Goal: Patient will remain free of falls  Description: INTERVENTIONS:  - Educate patient/family on patient safety including physical limitations  - Instruct patient to call for assistance with activity   - Consult OT/PT to assist with strengthening/mobility   - Keep Call bell within reach  - Keep bed low and locked with side rails adjusted as appropriate  - Keep care items and personal belongings within reach  - Initiate and maintain comfort rounds  - Make Fall Risk Sign visible to staff  - Offer Toileting every 2 Hours, in advance of need  - Initiate/Maintain bed alarm  - Obtain necessary fall risk management equipment:    - Apply yellow socks and bracelet for high fall risk patients  - Consider moving patient to room near nurses station  Outcome: Progressing  Goal: Maintain or return to baseline ADL function  Description: INTERVENTIONS:  -  Assess patient's ability to carry out ADLs; assess patient's baseline for ADL function and identify physical deficits which impact ability to perform ADLs (bathing, care of mouth/teeth, toileting, grooming, dressing, etc.)  - Assess/evaluate cause of self-care deficits   - Assess range of motion  - Assess patient's mobility; develop plan if impaired  - Assess patient's need for assistive devices and provide as appropriate  - Encourage maximum independence but intervene and  supervise when necessary  - Involve family in performance of ADLs  - Assess for home care needs following discharge   - Consider OT consult to assist with ADL evaluation and planning for discharge  - Provide patient education as appropriate  Outcome: Progressing  Goal: Maintains/Returns to pre admission functional level  Description: INTERVENTIONS:  - Perform AM-PAC 6 Click Basic Mobility/ Daily Activity assessment daily.  - Set and communicate daily mobility goal to care team and patient/family/caregiver.   - Collaborate with rehabilitation services on mobility goals if consulted  - Perform Range of Motion 3 times a day.  - Reposition patient every 2 hours.  - Dangle patient 3 times a day  - Stand patient 3 times a day  - Ambulate patient 3 times a day  - Out of bed to chair 3 times a day   - Out of bed for meals 3 times a day  - Out of bed for toileting  - Record patient progress and toleration of activity level   Outcome: Progressing     Problem: DISCHARGE PLANNING  Goal: Discharge to home or other facility with appropriate resources  Description: INTERVENTIONS:  - Identify barriers to discharge w/patient and caregiver  - Arrange for needed discharge resources and transportation as appropriate  - Identify discharge learning needs (meds, wound care, etc.)  - Arrange for interpretive services to assist at discharge as needed  - Refer to Case Management Department for coordinating discharge planning if the patient needs post-hospital services based on physician/advanced practitioner order or complex needs related to functional status, cognitive ability, or social support system  Outcome: Progressing     Problem: Knowledge Deficit  Goal: Patient/family/caregiver demonstrates understanding of disease process, treatment plan, medications, and discharge instructions  Description: Complete learning assessment and assess knowledge base.  Interventions:  - Provide teaching at level of understanding  - Provide teaching via  preferred learning methods  Outcome: Progressing     Problem: SKIN/TISSUE INTEGRITY - ADULT  Goal: Skin Integrity remains intact(Skin Breakdown Prevention)  Description: Assess:  -Perform Waldo assessment every shift  -Clean and moisturize skin every incontinent episode  -Inspect skin when repositioning, toileting, and assisting with ADLS  -Assess under medical devices such as   every    -Assess extremities for adequate circulation and sensation     Bed Management:  -Have minimal linens on bed & keep smooth, unwrinkled  -Change linens as needed when moist or perspiring  -Avoid sitting or lying in one position for more than 2 hours while in bed  -Keep HOB at 30 degrees     Activity:  -Mobilize patient 3 times a day  -Encourage activity and walks on unit  -Encourage or provide ROM exercises   -Turn and reposition patient every 2 Hours  -Use appropriate equipment to lift or move patient in bed  -Instruct/ Assist with weight shifting every 2 when out of bed in chair  -Consider limitation of chair time 2 hour intervals     Problem: MUSCULOSKELETAL - ADULT  Goal: Maintain or return mobility to safest level of function  Description: INTERVENTIONS:  - Assess patient's ability to carry out ADLs; assess patient's baseline for ADL function and identify physical deficits which impact ability to perform ADLs (bathing, care of mouth/teeth, toileting, grooming, dressing, etc.)  - Assess/evaluate cause of self-care deficits   - Assess range of motion  - Assess patient's mobility  - Assess patient's need for assistive devices and provide as appropriate  - Encourage maximum independence but intervene and supervise when necessary  - Involve family in performance of ADLs  - Assess for home care needs following discharge   - Consider OT consult to assist with ADL evaluation and planning for discharge  - Provide patient education as appropriate  Outcome: Progressing  Goal: Maintain proper alignment of affected body part  Description:  INTERVENTIONS:  - Support, maintain and protect limb and body alignment  - Provide patient/ family with appropriate education  Outcome: Progressing     Problem: Prexisting or High Potential for Compromised Skin Integrity  Goal: Skin integrity is maintained or improved  Description: INTERVENTIONS:  - Identify patients at risk for skin breakdown  - Assess and monitor skin integrity  - Assess and monitor nutrition and hydration status  - Monitor labs   - Assess for incontinence   - Turn and reposition patient  - Assist with mobility/ambulation  - Relieve pressure over bony prominences  - Avoid friction and shearing  - Provide appropriate hygiene as needed including keeping skin clean and dry  - Evaluate need for skin moisturizer/barrier cream  - Collaborate with interdisciplinary team   - Patient/family teaching  - Consider wound care consult   Outcome: Progressing     Problem: Nutrition/Hydration-ADULT  Goal: Nutrient/Hydration intake appropriate for improving, restoring or maintaining nutritional needs  Description: Monitor and assess patient's nutrition/hydration status for malnutrition. Collaborate with interdisciplinary team and initiate plan and interventions as ordered.  Monitor patient's weight and dietary intake as ordered or per policy. Utilize nutrition screening tool and intervene as necessary. Determine patient's food preferences and provide high-protein, high-caloric foods as appropriate.     INTERVENTIONS:  - Monitor oral intake, urinary output, labs, and treatment plans  - Assess nutrition and hydration status and recommend course of action  - Evaluate amount of meals eaten  - Assist patient with eating if necessary   - Allow adequate time for meals  - Recommend/ encourage appropriate diets, oral nutritional supplements, and vitamin/mineral supplements  - Order, calculate, and assess calorie counts as needed  - Recommend, monitor, and adjust tube feedings and TPN/PPN based on assessed needs  - Assess  need for intravenous fluids  - Provide specific nutrition/hydration education as appropriate  - Include patient/family/caregiver in decisions related to nutrition  Outcome: Progressing

## 2024-03-10 NOTE — PROGRESS NOTES
[facility]  Progress Note  Name: Zhao Walls I  MRN: [id number]  Unit/Bed#: [facility] 614-01 I Date of Admission: 3/4/2024   Date of Service: 3/10/2024 I Hospital Day: 6    Assessment/Plan   GERD (gastroesophageal reflux disease)  Assessment & Plan  -Continue with protonic PPI daily    Intertrochanteric fracture (HCC)  Assessment & Plan  - S/p fall  - S/p right femoral intramedullary nail on 3/5  - Pain control  - DVT ppx  - Needs to be out of bed and ambulating  - Encourage IS  - Airway clearence protocol  - Respiratory protocol  - Appreciate CM assistance with dispo planning    Fall  Assessment & Plan  - Fall from standing onto side of hip  - Intertrochanteric fracture on R  - S/p R femoral intramedullary nail on 3/5  - PT/ OT consulted: appreciate recs  - Michelle consulted  - Pain control  - Dispo planning               Bowel Regimen: Senna, miralax, colace, dulcolax  VTE Prophylaxis:Enoxaparin (Lovenox)     Disposition: Level 1 resources need, dispo planning per CM    Subjective   Chief Complaint: A lot of phlegm    Subjective: No acute events overnight. Patient denies having nausea, vomiting, fevers, chills, chest pain. Does endorse some shortness of breath. Tolerating PO intake. Voiding without difficulty. No bowel movements but passing flatus. Pulling 2000 on IS.      Objective   Vitals:   Temp:  [98.4 °F (36.9 °C)-98.8 °F (37.1 °C)] 98.4 °F (36.9 °C)  HR:  [54-65] 65  Resp:  [16] 16  BP: (130-140)/(71-76) 140/76    I/O         03/08 0701  03/09 0700 03/09 0701  03/10 0700 03/10 0701  03/11 0700    P.O. 360 240     IV Piggyback       Total Intake(mL/kg) 360 (3.8) 240 (2.5)     Urine (mL/kg/hr) 600 (0.3) 900 (0.4)     Total Output 600 900     Net -240 -660                     Physical Exam:  General: No acute distress, alert and oriented  Neuro: alert, oriented x3  HENT: PERRL, EOMI  CV: Well perfused, regular rate and rhythm  Lungs: Normal work of breathing, no  increased respiratory effort  Abdomen: Soft, non-tender, non-distended.  MSK/Extremities: No edema, clubbing or cyanosis. Motor and sensory intact in the bilateral lower extremities. Left thigh with ecchymosis appreciated. Incisions in left lower extremity are clean/dry/intact.  Skin: Warm, dry      Invasive Devices       Peripheral Intravenous Line  Duration             Peripheral IV 03/09/24 Left Antecubital 1 day                          Lab Results: Results: I have personally reviewed all pertinent laboratory/tests results  Imaging: I have personally reviewed pertinent reports.     Other Studies: N/a

## 2024-03-11 LAB
ANION GAP SERPL CALCULATED.3IONS-SCNC: 6 MMOL/L
BUN SERPL-MCNC: 19 MG/DL (ref 5–25)
CALCIUM SERPL-MCNC: 8.2 MG/DL (ref 8.4–10.2)
CHLORIDE SERPL-SCNC: 101 MMOL/L (ref 96–108)
CO2 SERPL-SCNC: 29 MMOL/L (ref 21–32)
CREAT SERPL-MCNC: 1.02 MG/DL (ref 0.6–1.3)
GFR SERPL CREATININE-BSD FRML MDRD: 67 ML/MIN/1.73SQ M
GLUCOSE SERPL-MCNC: 173 MG/DL (ref 65–140)
GLUCOSE SERPL-MCNC: 182 MG/DL (ref 65–140)
GLUCOSE SERPL-MCNC: 201 MG/DL (ref 65–140)
GLUCOSE SERPL-MCNC: 227 MG/DL (ref 65–140)
GLUCOSE SERPL-MCNC: 251 MG/DL (ref 65–140)
POTASSIUM SERPL-SCNC: 4 MMOL/L (ref 3.5–5.3)
SODIUM SERPL-SCNC: 136 MMOL/L (ref 135–147)

## 2024-03-11 PROCEDURE — 82948 REAGENT STRIP/BLOOD GLUCOSE: CPT

## 2024-03-11 PROCEDURE — 99232 SBSQ HOSP IP/OBS MODERATE 35: CPT | Performed by: SURGERY

## 2024-03-11 PROCEDURE — 80048 BASIC METABOLIC PNL TOTAL CA: CPT

## 2024-03-11 RX ADMIN — METHOCARBAMOL 500 MG: 500 TABLET ORAL at 06:21

## 2024-03-11 RX ADMIN — INSULIN LISPRO 2 UNITS: 100 INJECTION, SOLUTION INTRAVENOUS; SUBCUTANEOUS at 17:41

## 2024-03-11 RX ADMIN — ACETAMINOPHEN 650 MG: 325 TABLET, FILM COATED ORAL at 06:21

## 2024-03-11 RX ADMIN — PANTOPRAZOLE SODIUM 40 MG: 40 TABLET, DELAYED RELEASE ORAL at 08:36

## 2024-03-11 RX ADMIN — CALCIUM CARBONATE (ANTACID) CHEW TAB 500 MG 500 MG: 500 CHEW TAB at 11:51

## 2024-03-11 RX ADMIN — METHOCARBAMOL 500 MG: 500 TABLET ORAL at 00:47

## 2024-03-11 RX ADMIN — TAMSULOSIN HYDROCHLORIDE 0.4 MG: 0.4 CAPSULE ORAL at 17:39

## 2024-03-11 RX ADMIN — GABAPENTIN 100 MG: 100 CAPSULE ORAL at 21:28

## 2024-03-11 RX ADMIN — CALCIUM CARBONATE (ANTACID) CHEW TAB 500 MG 500 MG: 500 CHEW TAB at 17:39

## 2024-03-11 RX ADMIN — Medication 2000 UNITS: at 08:36

## 2024-03-11 RX ADMIN — METHOCARBAMOL 500 MG: 500 TABLET ORAL at 17:40

## 2024-03-11 RX ADMIN — ACETAMINOPHEN 650 MG: 325 TABLET, FILM COATED ORAL at 00:47

## 2024-03-11 RX ADMIN — GABAPENTIN 100 MG: 100 CAPSULE ORAL at 17:40

## 2024-03-11 RX ADMIN — INSULIN LISPRO 1 UNITS: 100 INJECTION, SOLUTION INTRAVENOUS; SUBCUTANEOUS at 08:35

## 2024-03-11 RX ADMIN — ACETAMINOPHEN 650 MG: 325 TABLET, FILM COATED ORAL at 11:51

## 2024-03-11 RX ADMIN — INSULIN LISPRO 3 UNITS: 100 INJECTION, SOLUTION INTRAVENOUS; SUBCUTANEOUS at 11:52

## 2024-03-11 RX ADMIN — METHOCARBAMOL 500 MG: 500 TABLET ORAL at 11:51

## 2024-03-11 RX ADMIN — ENOXAPARIN SODIUM 30 MG: 30 INJECTION SUBCUTANEOUS at 21:28

## 2024-03-11 RX ADMIN — GABAPENTIN 100 MG: 100 CAPSULE ORAL at 08:36

## 2024-03-11 RX ADMIN — ENOXAPARIN SODIUM 30 MG: 30 INJECTION SUBCUTANEOUS at 09:00

## 2024-03-11 RX ADMIN — ACETAMINOPHEN 650 MG: 325 TABLET, FILM COATED ORAL at 17:39

## 2024-03-11 NOTE — ASSESSMENT & PLAN NOTE
- Fall from standing onto side of hip  - Intertrochanteric fracture on R  - S/p R femoral intramedullary nail on 3/5  - PT/ OT consulted: kizzy Martinez consulted  - Pain control  - Dispo planning - for covid bed

## 2024-03-11 NOTE — PLAN OF CARE
Problem: PAIN - ADULT  Goal: Verbalizes/displays adequate comfort level or baseline comfort level  Description: Interventions:  - Encourage patient to monitor pain and request assistance  - Assess pain using appropriate pain scale  - Administer analgesics based on type and severity of pain and evaluate response  - Implement non-pharmacological measures as appropriate and evaluate response  - Consider cultural and social influences on pain and pain management  - Notify physician/advanced practitioner if interventions unsuccessful or patient reports new pain  Outcome: Progressing     Problem: DISCHARGE PLANNING  Goal: Discharge to home or other facility with appropriate resources  Description: INTERVENTIONS:  - Identify barriers to discharge w/patient and caregiver  - Arrange for needed discharge resources and transportation as appropriate  - Identify discharge learning needs (meds, wound care, etc.)  - Arrange for interpretive services to assist at discharge as needed  - Refer to Case Management Department for coordinating discharge planning if the patient needs post-hospital services based on physician/advanced practitioner order or complex needs related to functional status, cognitive ability, or social support system  Outcome: Progressing     Problem: Knowledge Deficit  Goal: Patient/family/caregiver demonstrates understanding of disease process, treatment plan, medications, and discharge instructions  Description: Complete learning assessment and assess knowledge base.  Interventions:  - Provide teaching at level of understanding  - Provide teaching via preferred learning methods  Outcome: Progressing

## 2024-03-11 NOTE — PROGRESS NOTES
[facility]  Progress Note  Name: Zhao Walls I  MRN: [id number]  Unit/Bed#: [facility] 614-01 I Date of Admission: 3/4/2024   Date of Service: 3/11/2024 I Hospital Day: 7    Assessment/Plan   GERD (gastroesophageal reflux disease)  Assessment & Plan  -Continue with protonic PPI daily    Intertrochanteric fracture (HCC)  Assessment & Plan  - S/p fall  - S/p right femoral intramedullary nail on 3/5  - Pain control  - DVT ppx  - Needs to be out of bed and ambulating  - Encourage IS  - Airway clearence protocol  - Respiratory protocol  - Appreciate CM assistance with dispo planning    Fall  Assessment & Plan  - Fall from standing onto side of hip  - Intertrochanteric fracture on R  - S/p R femoral intramedullary nail on 3/5  - PT/ OT consulted: kizzy Martinez consulted  - Pain control  - Dispo planning - for covid bed               Bowel Regimen: miralax, Senna, Colace, Dulcolax  VTE Prophylaxis:Enoxaparin (Lovenox)     Disposition: Pending availability of covid bed    Subjective   Chief Complaint: fall, right intra trochanteric fracture, s/p medullarly nail on 3/5    Subjective: no complaints this morning     Objective   Vitals:   Temp:  [97.7 °F (36.5 °C)-98.8 °F (37.1 °C)] 98.3 °F (36.8 °C)  HR:  [61-78] 61  Resp:  [16-20] 17  BP: (146-157)/(71-91) 149/72    I/O         03/09 0701  03/10 0700 03/10 0701  03/11 0700 03/11 0701  03/12 0700    P.O. 240 240 222    Total Intake(mL/kg) 240 (2.5) 240 (2.5) 222 (2.3)    Urine (mL/kg/hr) 900 (0.4) 250 (0.1) 200 (0.5)    Total Output 900 250 200    Net -660 -10 +22                    Physical Exam:   General: VSS, NAD, awake, alert. Talking normally.   Head: Normocephalic, atraumatic, nontender.  Eyes: EOM-No subconjunctival hemorrhages.  ENT: Nose atraumatic. MMM  No malocclusion. No stridor. Normal phonation. No drooling. Normal swallowing.   Neck: Trachea midline. No JVD.  CV: RRR.   Lungs: CTAB No tachypnea. No  paradoxical motion.  Abd: +BS, soft, NT/ND. No guarding/rigidity.   MSK: Limited range of motion in the right hip secondary to pain, mild bilateral lower extremity swelling.   Skin: Dry, intact.   Neuro: AAOx3, GCS 15, CN II-XII grossly intact. Motor/sensory grossly intact.  Psychiatric/Behavioral: mood/affect normal; behavior normal; thought content normal; judgement normal   Exam: deferred      Invasive Devices       Peripheral Intravenous Line  Duration             Peripheral IV 03/09/24 Left Antecubital 2 days                          Lab Results: Results: I have personally reviewed all pertinent laboratory/tests results  Imaging: I have personally reviewed pertinent reports.     Other Studies:

## 2024-03-11 NOTE — CASE MANAGEMENT
Case Management Discharge Planning Note    Patient name Zhao Walls  Location [facility] 614/[facility] 614-01 MRN [id number]  : 1940 Date 3/11/2024       Current Admission Date: 3/4/2024  Current Admission Diagnosis:Fall   Patient Active Problem List    Diagnosis Date Noted    GERD (gastroesophageal reflux disease) 03/10/2024    Fall 2024    Intertrochanteric fracture (HCC) 2024    Polyneuropathy 2023    Laryngeal squamous cell carcinoma (HCC)     Allergies 2022    Postnasal drip 2022    Lumbar radiculopathy     Gastroesophageal reflux disease without esophagitis 2022    Cataracta 2021    Pre-op examination 2021    Left-sided low back pain with left-sided sciatica 2019    Obesity (BMI 30.0-34.9) 2019    Type 2 diabetes mellitus with complication (HCC) 2014    Anemia 2014    Benign prostatic hyperplasia 2014    Hyperlipidemia 2014    Allergic rhinitis 2013    Obesity 09/10/1998    Hypertension 1997      LOS (days): 7  Geometric Mean LOS (GMLOS) (days): 6.5  Days to GMLOS:-0.1     OBJECTIVE:  Risk of Unplanned Readmission Score: 14.32         Current admission status: Inpatient   Preferred Pharmacy:   [id number] - [facility] [place] [place] - [address] [id number]  Phone: [phone] Fax: [phone]    [facility]  [place] - [address]  [address]  [facility] [address]  Phone: [phone] Fax: [phone]    Primary Care Provider: Bello Montanez MD    Primary Insurance: [facility]  Secondary Insurance:     DISCHARGE DETAILS:    DANNY called the fast appeal number to discuss the pt's family's appeal. According to the person DANNY spoke to, the dept who handles those are at [phone].  DANNY called the appeals intake number above.   She explained the family did contact them on Friday but an appeal was never  filed.  She will initiate the appeal now and expedite it (72 hours)  CM will fax clinicals to [phone] with the Ref# [id number]  Appeals and Grievance Dept

## 2024-03-12 LAB
GLUCOSE SERPL-MCNC: 168 MG/DL (ref 65–140)
GLUCOSE SERPL-MCNC: 195 MG/DL (ref 65–140)
GLUCOSE SERPL-MCNC: 201 MG/DL (ref 65–140)
GLUCOSE SERPL-MCNC: 228 MG/DL (ref 65–140)

## 2024-03-12 PROCEDURE — 82948 REAGENT STRIP/BLOOD GLUCOSE: CPT

## 2024-03-12 PROCEDURE — 99232 SBSQ HOSP IP/OBS MODERATE 35: CPT | Performed by: STUDENT IN AN ORGANIZED HEALTH CARE EDUCATION/TRAINING PROGRAM

## 2024-03-12 RX ADMIN — PANTOPRAZOLE SODIUM 40 MG: 40 TABLET, DELAYED RELEASE ORAL at 08:26

## 2024-03-12 RX ADMIN — INSULIN LISPRO 1 UNITS: 100 INJECTION, SOLUTION INTRAVENOUS; SUBCUTANEOUS at 17:57

## 2024-03-12 RX ADMIN — GABAPENTIN 100 MG: 100 CAPSULE ORAL at 08:27

## 2024-03-12 RX ADMIN — TAMSULOSIN HYDROCHLORIDE 0.4 MG: 0.4 CAPSULE ORAL at 17:57

## 2024-03-12 RX ADMIN — METHOCARBAMOL 500 MG: 500 TABLET ORAL at 12:18

## 2024-03-12 RX ADMIN — ACETAMINOPHEN 650 MG: 325 TABLET, FILM COATED ORAL at 17:57

## 2024-03-12 RX ADMIN — INSULIN LISPRO 2 UNITS: 100 INJECTION, SOLUTION INTRAVENOUS; SUBCUTANEOUS at 08:37

## 2024-03-12 RX ADMIN — METHOCARBAMOL 500 MG: 500 TABLET ORAL at 23:51

## 2024-03-12 RX ADMIN — SENNOSIDES 17.2 MG: 8.6 TABLET, FILM COATED ORAL at 08:26

## 2024-03-12 RX ADMIN — ENOXAPARIN SODIUM 30 MG: 30 INJECTION SUBCUTANEOUS at 12:18

## 2024-03-12 RX ADMIN — METHOCARBAMOL 500 MG: 500 TABLET ORAL at 05:36

## 2024-03-12 RX ADMIN — GABAPENTIN 100 MG: 100 CAPSULE ORAL at 17:57

## 2024-03-12 RX ADMIN — ACETAMINOPHEN 650 MG: 325 TABLET, FILM COATED ORAL at 00:52

## 2024-03-12 RX ADMIN — CALCIUM CARBONATE (ANTACID) CHEW TAB 500 MG 500 MG: 500 CHEW TAB at 17:57

## 2024-03-12 RX ADMIN — METHOCARBAMOL 500 MG: 500 TABLET ORAL at 00:52

## 2024-03-12 RX ADMIN — Medication 2000 UNITS: at 08:26

## 2024-03-12 RX ADMIN — GABAPENTIN 100 MG: 100 CAPSULE ORAL at 21:38

## 2024-03-12 RX ADMIN — DOCUSATE SODIUM 100 MG: 100 CAPSULE, LIQUID FILLED ORAL at 08:26

## 2024-03-12 RX ADMIN — ACETAMINOPHEN 650 MG: 325 TABLET, FILM COATED ORAL at 23:51

## 2024-03-12 RX ADMIN — CALCIUM CARBONATE (ANTACID) CHEW TAB 500 MG 500 MG: 500 CHEW TAB at 12:18

## 2024-03-12 RX ADMIN — ENOXAPARIN SODIUM 30 MG: 30 INJECTION SUBCUTANEOUS at 23:51

## 2024-03-12 RX ADMIN — ACETAMINOPHEN 650 MG: 325 TABLET, FILM COATED ORAL at 12:18

## 2024-03-12 RX ADMIN — INSULIN LISPRO 2 UNITS: 100 INJECTION, SOLUTION INTRAVENOUS; SUBCUTANEOUS at 12:19

## 2024-03-12 RX ADMIN — DOCUSATE SODIUM 100 MG: 100 CAPSULE, LIQUID FILLED ORAL at 17:57

## 2024-03-12 RX ADMIN — ACETAMINOPHEN 650 MG: 325 TABLET, FILM COATED ORAL at 05:36

## 2024-03-12 RX ADMIN — POLYETHYLENE GLYCOL 3350 17 G: 17 POWDER, FOR SOLUTION ORAL at 08:25

## 2024-03-12 RX ADMIN — METHOCARBAMOL 500 MG: 500 TABLET ORAL at 17:57

## 2024-03-12 NOTE — PROGRESS NOTES
[facility]  Progress Note  Name: Zhao Walls I  MRN: [id number]  Unit/Bed#: [facility] 614-01 I Date of Admission: 3/4/2024   Date of Service: 3/12/2024 I Hospital Day: 8    Assessment/Plan   Pt Is 83 Y O M, day 9 of admission, right hip fracture s/p femoral intermedullary nail on 3/5, COVID positive on admission, pending placement. No other issues.     Intertrochanteric fracture (HCC)  Assessment & Plan  - S/p fall  - S/p right femoral intramedullary nail on 3/5  - Pain control  - DVT ppx  - Needs to be out of bed and ambulating  - Encourage IS  - Airway clearence protocol  - Respiratory protocol  - Appreciate CM assistance with dispo planning    Fall  Assessment & Plan  - Fall from standing onto side of hip  - Intertrochanteric fracture on R  - S/p R femoral intramedullary nail on 3/5  - PT/ OT consulted: kizzy Martinez consulted  - Pain control  - Dispo planning - for covid bed               Bowel Regimen: colace, miralax, senna  VTE Prophylaxis:Lovenox    Disposition: Pending placement    Subjective   Chief Complaint: Fall, s/p right femoral intermedullary nail on 3/5/     Subjective: No issues today.      Objective   Vitals:   Temp:  [98.2 °F (36.8 °C)-98.8 °F (37.1 °C)] 98.4 °F (36.9 °C)  HR:  [65-73] 70  Resp:  [18-28] 21  BP: (143-156)/(70-89) 145/85    I/O         03/10 0701  03/11 0700 03/11 0701  03/12 0700 03/12 0701  03/13 0700    P.O. 240 1284     Total Intake(mL/kg) 240 (2.5) 1284 (13.5)     Urine (mL/kg/hr) 250 (0.1) 1525 (0.7) 635 (0.8)    Stool  0     Total Output 250 1525 635    Net -10 -851 -635           Unmeasured Stool Occurrence  1 x              Physical Exam:   General: VSS, NAD, awake, alert. Well-nourished, well-developed. Appears stated age.   Speaking normally in full sentences.   Head: Normocephalic, atraumatic, nontender.  Eyes: PERRL, EOM-I. No diplopia.   No hyphema.   No subconjunctival hemorrhages.  Symmetrical lids.   ENT:  Atraumatic external nose and ears.    MMM  No malocclusion. No stridor. Normal phonation. No drooling. Normal swallowing.   Neck: Symmetric, trachea midline. No JVD.  CV: RRR. +S1/S2  No murmurs or gallops  Peripheral pulses +2 throughout. No chest wall tenderness.   Lungs:   Unlabored No retractions  CTAB, lungs sounds equal bilateral.   No tachypnea.   Abd: +BS, soft, NT/ND.   MSK:   FROM   Back:   No rashes  Skin: Dry, intact.   Neuro: AAOx3, GCS 15, CN II-XII grossly intact.   Motor grossly intact.  Psychiatric/Behavioral: Appropriate mood and affect   Exam: deferred   Invasive Devices       Peripheral Intravenous Line  Duration             Peripheral IV 03/09/24 Left Antecubital 3 days                          Lab Results: Results: I have personally reviewed all pertinent laboratory/tests results  Imaging: I have personally reviewed pertinent reports.     Other Studies:

## 2024-03-12 NOTE — PLAN OF CARE
Problem: PAIN - ADULT  Goal: Verbalizes/displays adequate comfort level or baseline comfort level  Description: Interventions:  - Encourage patient to monitor pain and request assistance  - Assess pain using appropriate pain scale  - Administer analgesics based on type and severity of pain and evaluate response  - Implement non-pharmacological measures as appropriate and evaluate response  - Consider cultural and social influences on pain and pain management  - Notify physician/advanced practitioner if interventions unsuccessful or patient reports new pain  Outcome: Progressing     Problem: SAFETY ADULT  Goal: Patient will remain free of falls  Description: INTERVENTIONS:  - Educate patient/family on patient safety including physical limitations  - Instruct patient to call for assistance with activity   - Consult OT/PT to assist with strengthening/mobility   - Keep Call bell within reach  - Keep bed low and locked with side rails adjusted as appropriate  - Keep care items and personal belongings within reach  - Initiate and maintain comfort rounds  - Make Fall Risk Sign visible to staff  - Offer Toileting every 2 Hours, in advance of need  - Initiate/Maintain alarm  - Obtain necessary fall risk management equipment:   - Apply yellow socks and bracelet for high fall risk patients  - Consider moving patient to room near nurses station  Outcome: Progressing  Goal: Maintain or return to baseline ADL function  Description: INTERVENTIONS:  -  Assess patient's ability to carry out ADLs; assess patient's baseline for ADL function and identify physical deficits which impact ability to perform ADLs (bathing, care of mouth/teeth, toileting, grooming, dressing, etc.)  - Assess/evaluate cause of self-care deficits   - Assess range of motion  - Assess patient's mobility; develop plan if impaired  - Assess patient's need for assistive devices and provide as appropriate  - Encourage maximum independence but intervene and  supervise when necessary  - Involve family in performance of ADLs  - Assess for home care needs following discharge   - Consider OT consult to assist with ADL evaluation and planning for discharge  - Provide patient education as appropriate  Outcome: Progressing  Goal: Maintains/Returns to pre admission functional level  Description: INTERVENTIONS:  - Perform AM-PAC 6 Click Basic Mobility/ Daily Activity assessment daily.  - Set and communicate daily mobility goal to care team and patient/family/caregiver.   - Collaborate with rehabilitation services on mobility goals if consulted  - Perform Range of Motion 3 times a day.  - Reposition patient every 2 hours.  - Dangle patient 3 times a day  - Stand patient 3 times a day  - Ambulate patient 3 times a day  - Out of bed to chair 3 times a day   - Out of bed for meals 3 times a day  - Out of bed for toileting  - Record patient progress and toleration of activity level   Outcome: Progressing     Problem: DISCHARGE PLANNING  Goal: Discharge to home or other facility with appropriate resources  Description: INTERVENTIONS:  - Identify barriers to discharge w/patient and caregiver  - Arrange for needed discharge resources and transportation as appropriate  - Identify discharge learning needs (meds, wound care, etc.)  - Arrange for interpretive services to assist at discharge as needed  - Refer to Case Management Department for coordinating discharge planning if the patient needs post-hospital services based on physician/advanced practitioner order or complex needs related to functional status, cognitive ability, or social support system  Outcome: Progressing     Problem: Knowledge Deficit  Goal: Patient/family/caregiver demonstrates understanding of disease process, treatment plan, medications, and discharge instructions  Description: Complete learning assessment and assess knowledge base.  Interventions:  - Provide teaching at level of understanding  - Provide teaching via  preferred learning methods  Outcome: Progressing     Problem: SKIN/TISSUE INTEGRITY - ADULT  Goal: Skin Integrity remains intact(Skin Breakdown Prevention)  Description: Assess:  -Perform Waldo assessment every   -Clean and moisturize skin every   -Inspect skin when repositioning, toileting, and assisting with ADLS  -Assess under medical devices such as  every   -Assess extremities for adequate circulation and sensation     Bed Management:  -Have minimal linens on bed & keep smooth, unwrinkled  -Change linens as needed when moist or perspiring  -Avoid sitting or lying in one position for more than  hours while in bed  -Keep HOB atdegrees     Toileting:  -Offer bedside commode  -Assess for incontinence every   -Use incontinent care products after each incontinent episode such as     Activity:  -Mobilize patient  times a day  -Encourage activity and walks on unit  -Encourage or provide ROM exercises   -Turn and reposition patient every Hours  -Use appropriate equipment to lift or move patient in bed  -Instruct/ Assist with weight shifting every when out of bed in chair  -Consider limitation of chair time  hour intervals    Skin Care:  -Avoid use of baby powder, tape, friction and shearing, hot water or constrictive clothing  -Relieve pressure over bony prominences using   -Do not massage red bony areas    Next Steps:  -Teach patient strategies to minimize risks such as    -Consider consults to  interdisciplinary teams such as   Outcome: Progressing  Goal: Incision(s), wounds(s) or drain site(s) healing without S/S of infection  Description: INTERVENTIONS  - Assess and document dressing, incision, wound bed, drain sites and surrounding tissue  - Provide patient and family education  - Perform skin care/dressing changes every   Outcome: Progressing     Problem: MUSCULOSKELETAL - ADULT  Goal: Maintain or return mobility to safest level of function  Description: INTERVENTIONS:  - Assess patient's ability to carry out  ADLs; assess patient's baseline for ADL function and identify physical deficits which impact ability to perform ADLs (bathing, care of mouth/teeth, toileting, grooming, dressing, etc.)  - Assess/evaluate cause of self-care deficits   - Assess range of motion  - Assess patient's mobility  - Assess patient's need for assistive devices and provide as appropriate  - Encourage maximum independence but intervene and supervise when necessary  - Involve family in performance of ADLs  - Assess for home care needs following discharge   - Consider OT consult to assist with ADL evaluation and planning for discharge  - Provide patient education as appropriate  Outcome: Progressing  Goal: Maintain proper alignment of affected body part  Description: INTERVENTIONS:  - Support, maintain and protect limb and body alignment  - Provide patient/ family with appropriate education  Outcome: Progressing     Problem: Prexisting or High Potential for Compromised Skin Integrity  Goal: Skin integrity is maintained or improved  Description: INTERVENTIONS:  - Identify patients at risk for skin breakdown  - Assess and monitor skin integrity  - Assess and monitor nutrition and hydration status  - Monitor labs   - Assess for incontinence   - Turn and reposition patient  - Assist with mobility/ambulation  - Relieve pressure over bony prominences  - Avoid friction and shearing  - Provide appropriate hygiene as needed including keeping skin clean and dry  - Evaluate need for skin moisturizer/barrier cream  - Collaborate with interdisciplinary team   - Patient/family teaching  - Consider wound care consult   Outcome: Progressing     Problem: Nutrition/Hydration-ADULT  Goal: Nutrient/Hydration intake appropriate for improving, restoring or maintaining nutritional needs  Description: Monitor and assess patient's nutrition/hydration status for malnutrition. Collaborate with interdisciplinary team and initiate plan and interventions as ordered.  Monitor  patient's weight and dietary intake as ordered or per policy. Utilize nutrition screening tool and intervene as necessary. Determine patient's food preferences and provide high-protein, high-caloric foods as appropriate.     INTERVENTIONS:  - Monitor oral intake, urinary output, labs, and treatment plans  - Assess nutrition and hydration status and recommend course of action  - Evaluate amount of meals eaten  - Assist patient with eating if necessary   - Allow adequate time for meals  - Recommend/ encourage appropriate diets, oral nutritional supplements, and vitamin/mineral supplements  - Order, calculate, and assess calorie counts as needed  - Recommend, monitor, and adjust tube feedings and TPN/PPN based on assessed needs  - Assess need for intravenous fluids  - Provide specific nutrition/hydration education as appropriate  - Include patient/family/caregiver in decisions related to nutrition  Outcome: Progressing

## 2024-03-12 NOTE — ARC ADMISSION
ARC  spoke with pt by TC and then to son, Ed, at pt's request. Reviewed ARC program as well as ARC locations and preferences. Pt's family is currently in process of insurance appeal for ARC auth.  Patient's preferred ARC location is [facility] and second choice is [facility].  All questions answered.  Patient's son was made aware that  will provide updates as available.

## 2024-03-12 NOTE — CASE MANAGEMENT
Case Management Discharge Planning Note    Patient name Zhao Walls  Location [facility] 614-01 MRN [id number]  : 1940 Date 3/12/2024       Current Admission Date: 3/4/2024  Current Admission Diagnosis:Fall   Patient Active Problem List    Diagnosis Date Noted    GERD (gastroesophageal reflux disease) 03/10/2024    Fall 2024    Intertrochanteric fracture (HCC) 2024    Polyneuropathy 2023    Laryngeal squamous cell carcinoma (HCC)     Allergies 2022    Postnasal drip 2022    Lumbar radiculopathy     Gastroesophageal reflux disease without esophagitis 2022    Cataracta 2021    Pre-op examination 2021    Left-sided low back pain with left-sided sciatica 2019    Obesity (BMI 30.0-34.9) 2019    Type 2 diabetes mellitus with complication (HCC) 2014    Anemia 2014    Benign prostatic hyperplasia 2014    Hyperlipidemia 2014    Allergic rhinitis 2013    Obesity 09/10/1998    Hypertension 1997      LOS (days): 8  Geometric Mean LOS (GMLOS) (days): 6.5  Days to GMLOS:-1.2     OBJECTIVE:  Risk of Unplanned Readmission Score: 13.27         Current admission status: Inpatient   Preferred Pharmacy:   [id number] - [place] - [address] [address]  Phone: [phone] Fax: [phone]    [facility] - [place] - [address]  [address]  [place] [address]  Phone: [phone] Fax: [phone]    Primary Care Provider: Bello Montanez MD    Primary Insurance: Health system  Secondary Insurance:     DISCHARGE DETAILS:    CM spoke to [facility] appeals team  They have all the paperwork and are processing. They have 72 hours, which started yesterday @0921

## 2024-03-12 NOTE — PLAN OF CARE
Problem: PAIN - ADULT  Goal: Verbalizes/displays adequate comfort level or baseline comfort level  Description: Interventions:  - Encourage patient to monitor pain and request assistance  - Assess pain using appropriate pain scale  - Administer analgesics based on type and severity of pain and evaluate response  - Implement non-pharmacological measures as appropriate and evaluate response  - Consider cultural and social influences on pain and pain management  - Notify physician/advanced practitioner if interventions unsuccessful or patient reports new pain  Outcome: Progressing     Problem: SAFETY ADULT  Goal: Patient will remain free of falls  Description: INTERVENTIONS:  - Educate patient/family on patient safety including physical limitations  - Instruct patient to call for assistance with activity   - Consult OT/PT to assist with strengthening/mobility   - Keep Call bell within reach  - Keep bed low and locked with side rails adjusted as appropriate  - Keep care items and personal belongings within reach  - Initiate and maintain comfort rounds  - Make Fall Risk Sign visible to staff  - Offer Toileting every 2 Hours, in advance of need  - Initiate/Maintain bed/chair alarm  - Obtain necessary fall risk management equipment:   - Apply yellow socks and bracelet for high fall risk patients  - Consider moving patient to room near nurses station  Outcome: Progressing  Goal: Maintain or return to baseline ADL function  Description: INTERVENTIONS:  -  Assess patient's ability to carry out ADLs; assess patient's baseline for ADL function and identify physical deficits which impact ability to perform ADLs (bathing, care of mouth/teeth, toileting, grooming, dressing, etc.)  - Assess/evaluate cause of self-care deficits   - Assess range of motion  - Assess patient's mobility; develop plan if impaired  - Assess patient's need for assistive devices and provide as appropriate  - Encourage maximum independence but intervene  and supervise when necessary  - Involve family in performance of ADLs  - Assess for home care needs following discharge   - Consider OT consult to assist with ADL evaluation and planning for discharge  - Provide patient education as appropriate  Outcome: Progressing  Goal: Maintains/Returns to pre admission functional level  Description: INTERVENTIONS:  - Perform AM-PAC 6 Click Basic Mobility/ Daily Activity assessment daily.  - Set and communicate daily mobility goal to care team and patient/family/caregiver.   - Collaborate with rehabilitation services on mobility goals if consulted  - Perform Range of Motion 3 times a day.  - Reposition patient every 2 hours.  - Dangle patient 3 times a day  - Stand patient 3 times a day  - Ambulate patient 3 times a day  - Out of bed to chair 3 times a day   - Out of bed for meals 3 times a day  - Out of bed for toileting  - Record patient progress and toleration of activity level   Outcome: Progressing     Problem: DISCHARGE PLANNING  Goal: Discharge to home or other facility with appropriate resources  Description: INTERVENTIONS:  - Identify barriers to discharge w/patient and caregiver  - Arrange for needed discharge resources and transportation as appropriate  - Identify discharge learning needs (meds, wound care, etc.)  - Arrange for interpretive services to assist at discharge as needed  - Refer to Case Management Department for coordinating discharge planning if the patient needs post-hospital services based on physician/advanced practitioner order or complex needs related to functional status, cognitive ability, or social support system  Outcome: Progressing     Problem: Knowledge Deficit  Goal: Patient/family/caregiver demonstrates understanding of disease process, treatment plan, medications, and discharge instructions  Description: Complete learning assessment and assess knowledge base.  Interventions:  - Provide teaching at level of understanding  - Provide teaching  via preferred learning methods  Outcome: Progressing     Problem: SKIN/TISSUE INTEGRITY - ADULT  Goal: Skin Integrity remains intact(Skin Breakdown Prevention)  Description: Assess:  -Perform Waldo assessment every shift   -Clean and moisturize skin every 2 hours  -Inspect skin when repositioning, toileting, and assisting with ADLS  -Assess under medical devices -Assess extremities for adequate circulation and sensation     Bed Management:  -Have minimal linens on bed & keep smooth, unwrinkled  -Change linens as needed when moist or perspiring  -Avoid sitting or lying in one position for more than 2 hours while in bed  -Keep HOB at 30 degrees     Toileting:  -Offer bedside commode  -Assess for incontinence every 2 hours  -Use incontinent care products after each incontinent episode     Activity:  -Mobilize patient 3 times a day  -Encourage activity and walks on unit  -Encourage or provide ROM exercises   -Turn and reposition patient every 2 Hours  -Use appropriate equipment to lift or move patient in bed  -Instruct/ Assist with weight shifting every 20 minutes when out of bed in chair  -Consider limitation of chair time 2 hour intervals    Skin Care:  -Avoid use of baby powder, tape, friction and shearing, hot water or constrictive clothing  -Relieve pressure over bony prominences   -Do not massage red bony areas    Next Steps:  -Teach patient strategies to minimize risks    -Consider consults to  interdisciplinary teams   Outcome: Progressing  Goal: Incision(s), wounds(s) or drain site(s) healing without S/S of infection  Description: INTERVENTIONS  - Assess and document dressing, incision, wound bed, drain sites and surrounding tissue  - Provide patient and family education  - Perform skin care/dressing   Outcome: Progressing     Problem: MUSCULOSKELETAL - ADULT  Goal: Maintain or return mobility to safest level of function  Description: INTERVENTIONS:  - Assess patient's ability to carry out ADLs; assess  patient's baseline for ADL function and identify physical deficits which impact ability to perform ADLs (bathing, care of mouth/teeth, toileting, grooming, dressing, etc.)  - Assess/evaluate cause of self-care deficits   - Assess range of motion  - Assess patient's mobility  - Assess patient's need for assistive devices and provide as appropriate  - Encourage maximum independence but intervene and supervise when necessary  - Involve family in performance of ADLs  - Assess for home care needs following discharge   - Consider OT consult to assist with ADL evaluation and planning for discharge  - Provide patient education as appropriate  Outcome: Progressing  Goal: Maintain proper alignment of affected body part  Description: INTERVENTIONS:  - Support, maintain and protect limb and body alignment  - Provide patient/ family with appropriate education  Outcome: Progressing     Problem: Prexisting or High Potential for Compromised Skin Integrity  Goal: Skin integrity is maintained or improved  Description: INTERVENTIONS:  - Identify patients at risk for skin breakdown  - Assess and monitor skin integrity  - Assess and monitor nutrition and hydration status  - Monitor labs   - Assess for incontinence   - Turn and reposition patient  - Assist with mobility/ambulation  - Relieve pressure over bony prominences  - Avoid friction and shearing  - Provide appropriate hygiene as needed including keeping skin clean and dry  - Evaluate need for skin moisturizer/barrier cream  - Collaborate with interdisciplinary team   - Patient/family teaching  - Consider wound care consult   Outcome: Progressing     Problem: Nutrition/Hydration-ADULT  Goal: Nutrient/Hydration intake appropriate for improving, restoring or maintaining nutritional needs  Description: Monitor and assess patient's nutrition/hydration status for malnutrition. Collaborate with interdisciplinary team and initiate plan and interventions as ordered.  Monitor patient's  weight and dietary intake as ordered or per policy. Utilize nutrition screening tool and intervene as necessary. Determine patient's food preferences and provide high-protein, high-caloric foods as appropriate.     INTERVENTIONS:  - Monitor oral intake, urinary output, labs, and treatment plans  - Assess nutrition and hydration status and recommend course of action  - Evaluate amount of meals eaten  - Assist patient with eating if necessary   - Allow adequate time for meals  - Recommend/ encourage appropriate diets, oral nutritional supplements, and vitamin/mineral supplements  - Order, calculate, and assess calorie counts as needed  - Recommend, monitor, and adjust tube feedings and TPN/PPN based on assessed needs  - Assess need for intravenous fluids  - Provide specific nutrition/hydration education as appropriate  - Include patient/family/caregiver in decisions related to nutrition  Outcome: Progressing

## 2024-03-13 LAB
GLUCOSE SERPL-MCNC: 172 MG/DL (ref 65–140)
GLUCOSE SERPL-MCNC: 208 MG/DL (ref 65–140)
GLUCOSE SERPL-MCNC: 213 MG/DL (ref 65–140)
GLUCOSE SERPL-MCNC: 248 MG/DL (ref 65–140)

## 2024-03-13 PROCEDURE — 99232 SBSQ HOSP IP/OBS MODERATE 35: CPT | Performed by: SURGERY

## 2024-03-13 PROCEDURE — 82948 REAGENT STRIP/BLOOD GLUCOSE: CPT

## 2024-03-13 PROCEDURE — 97535 SELF CARE MNGMENT TRAINING: CPT

## 2024-03-13 PROCEDURE — 97116 GAIT TRAINING THERAPY: CPT

## 2024-03-13 PROCEDURE — 97110 THERAPEUTIC EXERCISES: CPT

## 2024-03-13 RX ADMIN — CALCIUM CARBONATE (ANTACID) CHEW TAB 500 MG 500 MG: 500 CHEW TAB at 16:50

## 2024-03-13 RX ADMIN — ACETAMINOPHEN 650 MG: 325 TABLET, FILM COATED ORAL at 05:58

## 2024-03-13 RX ADMIN — DOCUSATE SODIUM 100 MG: 100 CAPSULE, LIQUID FILLED ORAL at 08:45

## 2024-03-13 RX ADMIN — METHOCARBAMOL 500 MG: 500 TABLET ORAL at 06:00

## 2024-03-13 RX ADMIN — METHOCARBAMOL 500 MG: 500 TABLET ORAL at 12:48

## 2024-03-13 RX ADMIN — INSULIN LISPRO 1 UNITS: 100 INJECTION, SOLUTION INTRAVENOUS; SUBCUTANEOUS at 17:14

## 2024-03-13 RX ADMIN — OXYCODONE HYDROCHLORIDE 5 MG: 5 TABLET ORAL at 09:22

## 2024-03-13 RX ADMIN — INSULIN LISPRO 2 UNITS: 100 INJECTION, SOLUTION INTRAVENOUS; SUBCUTANEOUS at 08:45

## 2024-03-13 RX ADMIN — GABAPENTIN 100 MG: 100 CAPSULE ORAL at 22:08

## 2024-03-13 RX ADMIN — POLYETHYLENE GLYCOL 3350 17 G: 17 POWDER, FOR SOLUTION ORAL at 08:44

## 2024-03-13 RX ADMIN — GABAPENTIN 100 MG: 100 CAPSULE ORAL at 16:51

## 2024-03-13 RX ADMIN — Medication 2000 UNITS: at 08:45

## 2024-03-13 RX ADMIN — GABAPENTIN 100 MG: 100 CAPSULE ORAL at 08:45

## 2024-03-13 RX ADMIN — ENOXAPARIN SODIUM 30 MG: 30 INJECTION SUBCUTANEOUS at 08:45

## 2024-03-13 RX ADMIN — METHOCARBAMOL 500 MG: 500 TABLET ORAL at 16:49

## 2024-03-13 RX ADMIN — TAMSULOSIN HYDROCHLORIDE 0.4 MG: 0.4 CAPSULE ORAL at 16:51

## 2024-03-13 RX ADMIN — ACETAMINOPHEN 650 MG: 325 TABLET, FILM COATED ORAL at 12:48

## 2024-03-13 RX ADMIN — CALCIUM CARBONATE (ANTACID) CHEW TAB 500 MG 500 MG: 500 CHEW TAB at 12:52

## 2024-03-13 RX ADMIN — ACETAMINOPHEN 650 MG: 325 TABLET, FILM COATED ORAL at 16:50

## 2024-03-13 RX ADMIN — OXYCODONE HYDROCHLORIDE 5 MG: 5 TABLET ORAL at 22:12

## 2024-03-13 RX ADMIN — PANTOPRAZOLE SODIUM 40 MG: 40 TABLET, DELAYED RELEASE ORAL at 08:45

## 2024-03-13 RX ADMIN — INSULIN LISPRO 3 UNITS: 100 INJECTION, SOLUTION INTRAVENOUS; SUBCUTANEOUS at 12:53

## 2024-03-13 RX ADMIN — ENOXAPARIN SODIUM 30 MG: 30 INJECTION SUBCUTANEOUS at 22:08

## 2024-03-13 RX ADMIN — SENNOSIDES 17.2 MG: 8.6 TABLET, FILM COATED ORAL at 08:45

## 2024-03-13 NOTE — PROGRESS NOTES
[facility]  Progress Note  Name: Zhao Walls I  MRN: [id number]  Unit/Bed#: [facility] 614-01 I Date of Admission: 3/4/2024   Date of Service: 3/13/2024 I Hospital Day: 9    Assessment/Plan   GERD (gastroesophageal reflux disease)  Assessment & Plan  -Continue with protonic PPI daily    Intertrochanteric fracture (HCC)  Assessment & Plan  - S/p fall  - S/p right femoral intramedullary nail on 3/5  - Pain control  - DVT ppx  - Needs to be out of bed and ambulating  - Encourage IS  - Airway clearence protocol  - Respiratory protocol  - Appreciate CM assistance with dispo planning    Fall  Assessment & Plan  - Fall from standing onto side of hip  - Intertrochanteric fracture on R  - S/p R femoral intramedullary nail on 3/5  - PT/ OT consulted: kizzy Martinez consulted  - Pain control  - Dispo planning - for covid bed                 VTE Prophylaxis:Enoxaparin (Lovenox)     Disposition: Pending Rehab bed    Subjective   Chief Complaint: s/p intermedullary nail on 3/5     Subjective: no acute complaint     Objective   Vitals:   Temp:  [97.9 °F (36.6 °C)-98.9 °F (37.2 °C)] 98.5 °F (36.9 °C)  HR:  [64-69] 69  Resp:  [17-20] 17  BP: (148-165)/(75-82) 163/79    I/O         03/11 0701  03/12 0700 03/12 0701  03/13 0700 03/13 0701  03/14 0700    P.O. 1284  240    Total Intake(mL/kg) 1284 (13.5)  240 (2.5)    Urine (mL/kg/hr) 1525 (0.7) 1385 (0.6) 200 (0.3)    Stool 0 0     Total Output 1525 1385 200    Net -241 -1385 +40           Unmeasured Stool Occurrence 1 x 1 x              Physical Exam:   General: VSS, NAD, awake, alert. Well-nourished, well-developed. Appears stated age.   Speaking normally in full sentences.   Head: Normocephalic, atraumatic, nontender.  Eyes: PERRL, EOM-I. No diplopia.   No hyphema.   No subconjunctival hemorrhages.  Symmetrical lids.   ENT: Atraumatic external nose and ears.    MMM  No malocclusion. No stridor. Normal phonation. No drooling.  Normal swallowing.   Neck: Symmetric, trachea midline. No JVD.  CV: RRR. +S1/S2  No murmurs or gallops  Peripheral pulses +2 throughout. No chest wall tenderness.   Lungs:   Unlabored No retractions  CTAB, lungs sounds equal bilateral.   No tachypnea.   Abd: +BS, soft, NT/ND.   MSK:   Limited ROM in right LE secondary to some pain. Neurovascularly intact.   Back:   No rashes  Skin: Dry, intact.   Neuro: AAOx3, GCS 15, CN II-XII grossly intact.   Motor grossly intact.  Psychiatric/Behaviora    Invasive Devices       None                         Lab Results: Results: I have personally reviewed all pertinent laboratory/tests results  Imaging: I have personally reviewed pertinent reports.     Other Studies:

## 2024-03-13 NOTE — PHYSICAL THERAPY NOTE
PHYSICAL THERAPY NOTE          Patient Name: Zhao Walls  Today's Date: 3/13/2024       03/13/24 1126   Note Type   Note Type Treatment   Pain Assessment   Pain Assessment Tool 0-10   Pain Score 7   Pain Location/Orientation Orientation: Right;Location: Hip   Pain Onset/Description Onset: Ongoing;Frequency: Constant/Continuous;Descriptor: Aching   Effect of Pain on Daily Activities increased pain with activity   Patient's Stated Pain Goal No pain   Hospital Pain Intervention(s) Ambulation/increased activity;Repositioned   Restrictions/Precautions   Weight Bearing Precautions Per Order Yes   RLE Weight Bearing Per Order WBAT   Other Precautions Chair Alarm;Bed Alarm;Multiple lines;Fall Risk;Pain;Contact/isolation;Airborne/isolation   General   Chart Reviewed Yes   Response to Previous Treatment Patient with no complaints from previous session.   Family/Caregiver Present No   Cognition   Overall Cognitive Status WFL   Arousal/Participation Alert   Attention Within functional limits   Orientation Level Oriented X4   Memory Within functional limits   Following Commands Follows one step commands without difficulty   Subjective   Subjective Pt willing and motivated to participate in PT treatment session   Bed Mobility   Rolling R 3  Moderate assistance   Additional items Assist x 1;Increased time required;Verbal cues   Rolling L 3  Moderate assistance   Additional items Assist x 1;Increased time required;Verbal cues   Supine to Sit 3  Moderate assistance   Additional items Assist x 2;Increased time required;Verbal cues   Transfers   Sit to Stand 3  Moderate assistance   Additional items Assist x 1;Increased time required;Verbal cues   Stand to Sit 3  Moderate assistance   Additional items Assist x 1;Increased time required;Verbal cues   Additional Comments cues needed for hand placement during transfers   Ambulation/Elevation    Gait pattern Short stride;Foward flexed;Inconsistent alen;Decreased foot clearance;Excessively slow   Gait Assistance 3  Moderate assist   Additional items Assist x 1   Assistive Device Rolling walker   Distance 6ft  (limited by fatigue and pain)   Balance   Static Sitting Fair   Static Standing Poor   Ambulatory Poor   Endurance Deficit   Endurance Deficit Yes   Endurance Deficit Description fatigue, pain   Activity Tolerance   Activity Tolerance Patient limited by fatigue;Patient limited by pain   Medical Staff Made Aware April, MASTERSLissett COTA student; MASTERS present for co treatment session due to pts current medical presentation   Nurse Made Aware Pt appropriate to be seen and mobilize per nsg   Exercises   Knee AROM Long Arc Quad Sitting;15 reps;AAROM;Right   Marching Sitting;15 reps;AAROM;Right   Assessment   Prognosis Good   Problem List Decreased strength;Decreased range of motion;Decreased endurance;Impaired balance;Decreased mobility;Pain   Assessment Patient resting in bed at time of PT treatment session.  Patient continues to report right hip pain however is willing and motivated to participate with PT.  Patient was able to perform all bed mobility with mod a x 2/ mod Ax1 which is slightly improved compared to previous session.  Patient is able perform all transfers with mod a x 1 which is also proved however cueing still required for proper hand placement as well as mod assist to facilitate proper weight shifts.  Patient was able to tolerate increased ambulation distances with mod a x 1 with use of rolling walker, but distances remain limited by fatigue and pain.  No gross loss of balance noted with gait training however patient did require cueing for proper lower extremity sequencing as well as cues for proper rolling walker management.  Additionally, patient was able to tolerate and perform all lower extremity TherEX seated out of bed to chair without any increase in complaints.  Slight cueing  "was required for proper form and pacing.  At conclusion of PT treatment session patient was assisted back into chair with all needs within reach.  D/C recommendation when medically cleared is rehab   Goals   Patient Goals \"to get better\"   [facility] Expiration Date 03/20/24   Short Term Goal #1 Updated PT goals: 1) Bed mobility skills with min ax1 to increase safety and independence as well as decrease caregiver burden. 2) Functional transfers with min ax1 to promote increased independence, safety, and QOL. 3) Ambulate 75' using least restrictive AD with min Ax1 without LOB and stable vitals so that pt can negotiate previous living environment safely and promote independence with functional mobility and return to PLOF.  4) Improve balance grades by 1/2 grade to increase safety with all mobility and decrease fall risk.  5) Improve BLE strength by 1/2 grade to help increase overall functional mobility and decrease fall risk.   PT Treatment Day 2   Plan   Treatment/Interventions Functional transfer training;LE strengthening/ROM;Therapeutic exercise;Endurance training;Patient/family training;Equipment eval/education;Bed mobility;Gait training;Spoke to nursing;OT   Progress Progressing toward goals   PT Frequency 3-5x/wk   Discharge Recommendation   Rehab Resource Intensity Level, PT II (Moderate Resource Intensity)   Equipment Recommended Walker   Walker Package Recommended Wheeled walker   AM-PAC Basic Mobility Inpatient   Turning in Flat Bed Without Bedrails 2   Lying on Back to Sitting on Edge of Flat Bed Without Bedrails 1   Moving Bed to Chair 2   Standing Up From Chair Using Arms 2   Walk in Room 2   Climb 3-5 Stairs With Railing 1   Basic Mobility Inpatient Raw Score 10   Turning Head Towards Sound 4   Follow Simple Instructions 3   Low Function Basic Mobility Raw Score  17   Low Function Basic Mobility Standardized Score  27.46   Highest Level Of Mobility   -[facility] Goal 4: Move to chair/commode   -HLM Achieved 5: " Stand (1 or more minutes)   Portions of the documentation may have been created using voice recognition software.Occasional wrong word or sound alike substitutions may have occurred due to the inherent limitations of the voice recognition software. Read the chart carefully and recognize, using context, where substitutions have occurred.    Ciro Hernandez, PT, DPT

## 2024-03-13 NOTE — PLAN OF CARE
Problem: OCCUPATIONAL THERAPY ADULT  Goal: Performs self-care activities at highest level of function for planned discharge setting.  See evaluation for individualized goals.  Description: Treatment Interventions: ADL retraining, Functional transfer training, Endurance training, Cognitive reorientation, Patient/family training, Equipment evaluation/education, Compensatory technique education, Energy conservation, Activityengagement          See flowsheet documentation for full assessment, interventions and recommendations.   Outcome: Progressing  Note: Limitation: Decreased ADL status, Decreased Safe judgement during ADL, Decreased cognition, Decreased endurance, Decreased self-care trans, Decreased high-level ADLs  Prognosis: Good  Assessment: Pt participated in am OT session and was seen focusing on transfers, functional mobility, activity tolerance, walking a short distance using rw with mod vc's for technique, bathing, LB dressing using reacher. Pt had pain medication prior to session. Pt's wife was present for this session. Pt required mod A x1 for bed mobility, sit to stand, stand to sit, functional mobility. Pt walked a short distance using rw and mod A, including vc's for technique. Required supervision/setup for UB ADLs, mod A for LB ADLs. Pt doffed socks and donned pants using reacher after education on use of equipment. Pt was able to thread feet into pants using reacher with increased time, but would require mod A for maneuvering pants over hips. Pt completed activity on room air, O2 was in low-mid 90s, but occassionally sounded SOB and was returned to O2 at end of activity. Pt was able to extend knees to assist with LB ADLs, able to hold position for 20-30 seconds.     Rehab Resource Intensity Level, OT: I (Maximum Resource Intensity)     April A Storm

## 2024-03-13 NOTE — PLAN OF CARE
Problem: PHYSICAL THERAPY ADULT  Goal: Performs mobility at highest level of function for planned discharge setting.  See evaluation for individualized goals.  Description: Treatment/Interventions: Functional transfer training, LE strengthening/ROM, Therapeutic exercise, Endurance training, Patient/family training, Equipment eval/education, Bed mobility, Spoke to nursing, OT          See flowsheet documentation for full assessment, interventions and recommendations.  Outcome: Progressing  Note: Prognosis: Good  Problem List: Decreased strength, Decreased range of motion, Decreased endurance, Impaired balance, Decreased mobility, Pain  Assessment: Patient resting in bed at time of PT treatment session.  Patient continues to report right hip pain however is willing and motivated to participate with PT.  Patient was able to perform all bed mobility with mod a x 2/ mod Ax1 which is slightly improved compared to previous session.  Patient is able perform all transfers with mod a x 1 which is also proved however cueing still required for proper hand placement as well as mod assist to facilitate proper weight shifts.  Patient was able to tolerate increased ambulation distances with mod a x 1 with use of rolling walker, but distances remain limited by fatigue and pain.  No gross loss of balance noted with gait training however patient did require cueing for proper lower extremity sequencing as well as cues for proper rolling walker management.  Additionally, patient was able to tolerate and perform all lower extremity TherEX seated out of bed to chair without any increase in complaints.  Slight cueing was required for proper form and pacing.  At conclusion of PT treatment session patient was assisted back into chair with all needs within reach.  D/C recommendation when medically cleared is rehab  Barriers to Discharge: Inaccessible home environment, Decreased caregiver support     Rehab Resource Intensity Level, PT: II  (Moderate Resource Intensity)    See flowsheet documentation for full assessment.

## 2024-03-13 NOTE — CASE MANAGEMENT
Case Management Discharge Planning Note    Patient name Zhao Walls  Location [facility] 614/[facility] 614-01 MRN [id number]  : 1940 Date 3/13/2024       Current Admission Date: 3/4/2024  Current Admission Diagnosis:Fall   Patient Active Problem List    Diagnosis Date Noted    GERD (gastroesophageal reflux disease) 03/10/2024    Fall 2024    Intertrochanteric fracture (HCC) 2024    Polyneuropathy 2023    Laryngeal squamous cell carcinoma (HCC)     Allergies 2022    Postnasal drip 2022    Lumbar radiculopathy     Gastroesophageal reflux disease without esophagitis 2022    Cataracta 2021    Pre-op examination 2021    Left-sided low back pain with left-sided sciatica 2019    Obesity (BMI 30.0-34.9) 2019    Type 2 diabetes mellitus with complication (HCC) 2014    Anemia 2014    Benign prostatic hyperplasia 2014    Hyperlipidemia 2014    Allergic rhinitis 2013    Obesity 09/10/1998    Hypertension 1997      LOS (days): 9  Geometric Mean LOS (GMLOS) (days): 6.5  Days to GMLOS:-2.1     OBJECTIVE:  Risk of Unplanned Readmission Score: 13.47         Current admission status: Inpatient   Preferred Pharmacy:   [id number] [place] [facility] [address] [address]  Phone: [phone] Fax: [phone]    [facility]  [place] [place] - [address]  [address]  [facility] [address]  Phone: [phone] Fax: [phone]    Primary Care Provider: Bello Montanez MD    Primary Insurance: [employer] [facility]  Secondary Insurance:     DISCHARGE DETAILS:    CM spoke to liaison from pt's insurance's appeal dept.  They informed CM that the pt's appeal has been reviewed and the denial remains upheld.   Pt's insurance WILL NOT pay for/cover Acute rehab    CM spoke to [facility] liaison today. They have beds available but need to review updated  therapy notes. CM will forward them once written

## 2024-03-13 NOTE — PROGRESS NOTES
Patient:    MRN:  [id number]    Anil Request ID:  [id number]    Level of care reserved:  Skilled Nursing Facility    Partner Reserved:  [facility], [place] [id number] [phone]    Clinical needs requested:    Geography searched:  10 miles around [id number]    Start of Service:    Request sent:  10:20am EST on 3/8/2024 by Jeff Chaves    Partner reserved:  3:32pm EDT on 3/13/2024 by Jeff Chaves    Choice list shared:  3:27pm EDT on 3/13/2024 by Jeff Chaves

## 2024-03-13 NOTE — OCCUPATIONAL THERAPY NOTE
OT Treatment Note     03/13/24 1155   OT Last Visit   OT Visit Date 03/13/24   Note Type   Note Type Treatment   Pain Assessment   Pain Assessment Tool [facility] Pain Intervention(s) Medication (See MAR)   Pain Rating: FLACC (Rest) - Face 0   Pain Rating: FLACC (Rest) - Legs 0   Pain Rating: FLACC (Rest) - Activity 0   Pain Rating: FLACC (Rest) - Cry 0   Pain Rating: FLACC (Rest) - Consolability 0   Score: FLACC (Rest) 0   Pain Rating: FLACC (Activity) - Face 2   Pain Rating: FLACC (Activity) - Legs 1   Pain Rating: FLACC (Activity) - Activity 1   Pain Rating: FLACC (Activity) - Cry 1   Pain Rating: FLACC (Activity) - Consolability 0   Score: FLACC (Activity) 5   Restrictions/Precautions   Weight Bearing Precautions Per Order Yes   RLE Weight Bearing Per Order WBAT   Other Precautions Contact/isolation;Airborne/isolation;Chair Alarm;Bed Alarm;WBS;Fall Risk;Pain;Hard of hearing   Lifestyle   Intrinsic Gratification Pt self initiated sharing interest of casino game on cell phone. Explained how to use armaan to PT   ADL   Where Assessed Chair   Equipment Provided Reacher   Grooming Assistance 5  Supervision/Setup   Grooming Comments setup for washcloth and soap/lotion, pt able to wash face and hair, comb hair, apply lotion to UB and face   UB Bathing Assistance 5  Supervision/Setup   UB Bathing Comments setup for washcloth and soap. pt able to wash arms, armpits, neck, chest, abdomen.   LB Bathing Assistance 3  Moderate Assistance   LB Bathing Comments assist for washing buttocks, back of legs, lower legs/feet   UB Dressing Assistance 5  Supervision/Setup   LB Dressing Assistance 3  Moderate Assistance   LB Dressing Comments pt educated on using vc behzad's for new technique. Pt able to thread legs into pants, but would require mod A for pulling garment over hips.   Functional Standing Tolerance   Time p+ dynamic standing balance using rw for functional mobility   Bed Mobility   Rolling R 3  Moderate assistance    Additional items Assist x 1;Increased time required;Verbal cues   Rolling L 3  Moderate assistance   Additional items Assist x 1;Increased time required;Verbal cues   Supine to Sit 3  Moderate assistance   Additional items Assist x 1;HOB elevated;Increased time required;Verbal cues   Additional Comments Pt left OOB in bedside chair with all needs within reach   Transfers   Sit to Stand 3  Moderate assistance   Additional items Assist x 1;Increased time required;Verbal cues   Stand to Sit 3  Moderate assistance   Additional items Assist x 1;Increased time required;Verbal cues   Additional Comments vc's for hand placement during transfers   Functional Mobility   Functional Mobility 3  Moderate assistance   Additional Comments p+ dynamic balance using rw   Cognition   Arousal/Participation Alert;Cooperative   Attention Within functional limits   Memory Decreased short term memory   Following Commands Follows one step commands without difficulty   Comments Follows multistep commands with increased time   Activity Tolerance   Activity Tolerance Patient tolerated treatment well   Assessment   Assessment Pt participated in am OT session and was seen focusing on transfers, functional mobility, activity tolerance, walking a short distance using rw with mod vc's for technique, bathing, LB dressing using reacher. Pt had pain medication prior to session. Pt's wife was present for this session. Pt required mod A x1 for bed mobility, sit to stand, stand to sit, functional mobility. Pt walked a short distance using rw and mod A, including vc's for technique. Required supervision/setup for UB ADLs, mod A for LB ADLs. Pt doffed socks and donned pants using reacher after education on use of equipment. Pt was able to thread feet into pants using reacher with increased time, but would require mod A for maneuvering pants over hips. Pt completed activity on room air, O2 was in low-mid 90s, but occassionally sounded SOB and was returned  to O2 at end of activity. Pt was able to extend knees to assist with LB ADLs, able to hold position for 20-30 seconds.   Plan   Treatment Interventions ADL retraining;Functional transfer training;UE strengthening/ROM;Endurance training;Equipment evaluation/education;Activityengagement   Goal Expiration Date 03/20/24   OT Treatment Day 2   OT Frequency 3-5x/wk   Discharge Recommendation   Rehab Resource Intensity Level, OT I (Maximum Resource Intensity)   AM-PAC Daily Activity Inpatient   Lower Body Dressing 2   Bathing 2   Toileting 2   Upper Body Dressing 3   Grooming 3   Eating 3   Daily Activity Raw Score 15   Daily Activity Standardized Score (Calc for Raw Score >=11) 34.69   AM-PAC Applied Cognition Inpatient   Following a Speech/Presentation 4   Understanding Ordinary Conversation 4   Taking Medications 3   Remembering Where Things Are Placed or Put Away 4   Remembering List of 4-5 Errands 3   Taking Care of Complicated Tasks 3   Applied Cognition Raw Score 21   Applied Cognition Standardized Score 44.3

## 2024-03-13 NOTE — CASE MANAGEMENT
Case Management Discharge Planning Note    Patient name Zhao Walls  Location [facility] 614/[facility] 614-01 MRN [id number]  : 1940 Date 3/13/2024       Current Admission Date: 3/4/2024  Current Admission Diagnosis:Fall   Patient Active Problem List    Diagnosis Date Noted    GERD (gastroesophageal reflux disease) 03/10/2024    Fall 2024    Intertrochanteric fracture (HCC) 2024    Polyneuropathy 2023    Laryngeal squamous cell carcinoma (HCC)     Allergies 2022    Postnasal drip 2022    Lumbar radiculopathy     Gastroesophageal reflux disease without esophagitis 2022    Cataracta 2021    Pre-op examination 2021    Left-sided low back pain with left-sided sciatica 2019    Obesity (BMI 30.0-34.9) 2019    Type 2 diabetes mellitus with complication (HCC) 2014    Anemia 2014    Benign prostatic hyperplasia 2014    Hyperlipidemia 2014    Allergic rhinitis 2013    Obesity 09/10/1998    Hypertension 1997      LOS (days): 9  Geometric Mean LOS (GMLOS) (days): 6.5  Days to GMLOS:-2.2     OBJECTIVE:  Risk of Unplanned Readmission Score: 13.47         Current admission status: Inpatient   Preferred Pharmacy:   [id number] [place] [facility] [address] [address]  Phone: [phone] Fax: [phone]    [facility] [place]  [place], [place] - [address]  [address]  [place] [address]  Phone: [phone] Fax: [phone]    Primary Care Provider: Bello Montanez MD    Primary Insurance: [employer] [facility]  Secondary Insurance:     DISCHARGE DETAILS:    [facility] can accept  CM will submit for auth  CM left voicemail for pt's son, explaining that the pt's insurance denied the appeal, and that Jeb agrees with the denial

## 2024-03-14 VITALS
OXYGEN SATURATION: 98 % | BODY MASS INDEX: 31.74 KG/M2 | DIASTOLIC BLOOD PRESSURE: 76 MMHG | HEIGHT: 68 IN | SYSTOLIC BLOOD PRESSURE: 158 MMHG | WEIGHT: 209.44 LBS | TEMPERATURE: 98.4 F | RESPIRATION RATE: 16 BRPM | HEART RATE: 64 BPM

## 2024-03-14 LAB
ANION GAP SERPL CALCULATED.3IONS-SCNC: 6 MMOL/L (ref 4–13)
BASOPHILS # BLD AUTO: 0.03 THOUSANDS/ÂΜL (ref 0–0.1)
BASOPHILS NFR BLD AUTO: 1 % (ref 0–1)
BUN SERPL-MCNC: 17 MG/DL (ref 5–25)
CALCIUM SERPL-MCNC: 8.4 MG/DL (ref 8.4–10.2)
CHLORIDE SERPL-SCNC: 100 MMOL/L (ref 96–108)
CO2 SERPL-SCNC: 30 MMOL/L (ref 21–32)
CREAT SERPL-MCNC: 1.08 MG/DL (ref 0.6–1.3)
EOSINOPHIL # BLD AUTO: 0.11 THOUSAND/ÂΜL (ref 0–0.61)
EOSINOPHIL NFR BLD AUTO: 2 % (ref 0–6)
ERYTHROCYTE [DISTWIDTH] IN BLOOD BY AUTOMATED COUNT: 13.7 % (ref 11.6–15.1)
GFR SERPL CREATININE-BSD FRML MDRD: 62 ML/MIN/1.73SQ M
GLUCOSE SERPL-MCNC: 179 MG/DL (ref 65–140)
GLUCOSE SERPL-MCNC: 228 MG/DL (ref 65–140)
GLUCOSE SERPL-MCNC: 284 MG/DL (ref 65–140)
HCT VFR BLD AUTO: 27.1 % (ref 36.5–49.3)
HGB BLD-MCNC: 8.5 G/DL (ref 12–17)
IMM GRANULOCYTES # BLD AUTO: 0.09 THOUSAND/UL (ref 0–0.2)
IMM GRANULOCYTES NFR BLD AUTO: 2 % (ref 0–2)
LYMPHOCYTES # BLD AUTO: 1 THOUSANDS/ÂΜL (ref 0.6–4.47)
LYMPHOCYTES NFR BLD AUTO: 16 % (ref 14–44)
MCH RBC QN AUTO: 31.4 PG (ref 26.8–34.3)
MCHC RBC AUTO-ENTMCNC: 31.4 G/DL (ref 31.4–37.4)
MCV RBC AUTO: 100 FL (ref 82–98)
MONOCYTES # BLD AUTO: 0.61 THOUSAND/ÂΜL (ref 0.17–1.22)
MONOCYTES NFR BLD AUTO: 10 % (ref 4–12)
NEUTROPHILS # BLD AUTO: 4.25 THOUSANDS/ÂΜL (ref 1.85–7.62)
NEUTS SEG NFR BLD AUTO: 69 % (ref 43–75)
NRBC BLD AUTO-RTO: 0 /100 WBCS
PLATELET # BLD AUTO: 351 THOUSANDS/UL (ref 149–390)
PMV BLD AUTO: 9.8 FL (ref 8.9–12.7)
POTASSIUM SERPL-SCNC: 4 MMOL/L (ref 3.5–5.3)
RBC # BLD AUTO: 2.71 MILLION/UL (ref 3.88–5.62)
SODIUM SERPL-SCNC: 136 MMOL/L (ref 135–147)
WBC # BLD AUTO: 6.09 THOUSAND/UL (ref 4.31–10.16)

## 2024-03-14 PROCEDURE — 80048 BASIC METABOLIC PNL TOTAL CA: CPT | Performed by: PHYSICIAN ASSISTANT

## 2024-03-14 PROCEDURE — 82948 REAGENT STRIP/BLOOD GLUCOSE: CPT

## 2024-03-14 PROCEDURE — 99238 HOSP IP/OBS DSCHRG MGMT 30/<: CPT | Performed by: STUDENT IN AN ORGANIZED HEALTH CARE EDUCATION/TRAINING PROGRAM

## 2024-03-14 PROCEDURE — 85025 COMPLETE CBC W/AUTO DIFF WBC: CPT | Performed by: PHYSICIAN ASSISTANT

## 2024-03-14 RX ORDER — INSULIN LISPRO 100 [IU]/ML
3 INJECTION, SOLUTION INTRAVENOUS; SUBCUTANEOUS
Status: DISCONTINUED | OUTPATIENT
Start: 2024-03-14 | End: 2024-03-14 | Stop reason: HOSPADM

## 2024-03-14 RX ORDER — INSULIN GLARGINE 100 [IU]/ML
10 INJECTION, SOLUTION SUBCUTANEOUS
Status: DISCONTINUED | OUTPATIENT
Start: 2024-03-14 | End: 2024-03-14 | Stop reason: HOSPADM

## 2024-03-14 RX ORDER — MELATONIN
2000 DAILY
Qty: 30 TABLET | Refills: 0 | Status: ON HOLD | OUTPATIENT
Start: 2024-03-15

## 2024-03-14 RX ORDER — ACETAMINOPHEN 325 MG/1
650 TABLET ORAL EVERY 6 HOURS SCHEDULED
Qty: 30 TABLET | Refills: 0 | Status: ON HOLD | OUTPATIENT
Start: 2024-03-14 | End: 2024-03-20

## 2024-03-14 RX ORDER — INSULIN LISPRO 100 [IU]/ML
3 INJECTION, SOLUTION INTRAVENOUS; SUBCUTANEOUS
Status: DISCONTINUED | OUTPATIENT
Start: 2024-03-15 | End: 2024-03-14 | Stop reason: HOSPADM

## 2024-03-14 RX ADMIN — ACETAMINOPHEN 650 MG: 325 TABLET, FILM COATED ORAL at 05:50

## 2024-03-14 RX ADMIN — ACETAMINOPHEN 650 MG: 325 TABLET, FILM COATED ORAL at 12:08

## 2024-03-14 RX ADMIN — PANTOPRAZOLE SODIUM 40 MG: 40 TABLET, DELAYED RELEASE ORAL at 09:57

## 2024-03-14 RX ADMIN — METHOCARBAMOL 500 MG: 500 TABLET ORAL at 05:50

## 2024-03-14 RX ADMIN — ENOXAPARIN SODIUM 30 MG: 30 INJECTION SUBCUTANEOUS at 09:56

## 2024-03-14 RX ADMIN — Medication 2000 UNITS: at 09:56

## 2024-03-14 RX ADMIN — INSULIN LISPRO 1 UNITS: 100 INJECTION, SOLUTION INTRAVENOUS; SUBCUTANEOUS at 10:02

## 2024-03-14 RX ADMIN — ACETAMINOPHEN 650 MG: 325 TABLET, FILM COATED ORAL at 01:30

## 2024-03-14 RX ADMIN — INSULIN LISPRO 4 UNITS: 100 INJECTION, SOLUTION INTRAVENOUS; SUBCUTANEOUS at 12:08

## 2024-03-14 RX ADMIN — METHOCARBAMOL 500 MG: 500 TABLET ORAL at 12:08

## 2024-03-14 RX ADMIN — METHOCARBAMOL 500 MG: 500 TABLET ORAL at 01:30

## 2024-03-14 RX ADMIN — DOCUSATE SODIUM 100 MG: 100 CAPSULE, LIQUID FILLED ORAL at 09:56

## 2024-03-14 RX ADMIN — GABAPENTIN 100 MG: 100 CAPSULE ORAL at 09:57

## 2024-03-14 RX ADMIN — SENNOSIDES 17.2 MG: 8.6 TABLET, FILM COATED ORAL at 09:57

## 2024-03-14 RX ADMIN — CALCIUM CARBONATE (ANTACID) CHEW TAB 500 MG 500 MG: 500 CHEW TAB at 12:08

## 2024-03-14 RX ADMIN — INSULIN LISPRO 3 UNITS: 100 INJECTION, SOLUTION INTRAVENOUS; SUBCUTANEOUS at 12:09

## 2024-03-14 NOTE — CASE MANAGEMENT
Case Management Discharge Planning Note    Patient name Zhao Walls  Location [facility] 614/[facility] 614-01 MRN [id number]  : 1940 Date 3/14/2024       Current Admission Date: 3/4/2024  Current Admission Diagnosis:Fall   Patient Active Problem List    Diagnosis Date Noted    GERD (gastroesophageal reflux disease) 03/10/2024    Fall 2024    Intertrochanteric fracture (HCC) 2024    Polyneuropathy 2023    Laryngeal squamous cell carcinoma (HCC)     Allergies 2022    Postnasal drip 2022    Lumbar radiculopathy     Gastroesophageal reflux disease without esophagitis 2022    Cataracta 2021    Pre-op examination 2021    Left-sided low back pain with left-sided sciatica 2019    Obesity (BMI 30.0-34.9) 2019    Type 2 diabetes mellitus with complication (HCC) 2014    Anemia 2014    Benign prostatic hyperplasia 2014    Hyperlipidemia 2014    Allergic rhinitis 2013    Obesity 09/10/1998    Hypertension 1997      LOS (days): 10  Geometric Mean LOS (GMLOS) (days): 6.5  Days to GMLOS:-3     OBJECTIVE:  Risk of Unplanned Readmission Score: 11.8         Current admission status: Inpatient   Preferred Pharmacy:   [id number] - [place] - [address] [address]  Phone: [phone] Fax: [phone]    [facility] [place] - [address]  [address]  [place] [address]  Phone: [phone] Fax: [phone]    Primary Care Provider: Bello Montanez MD    Primary Insurance: [employer] [facility]  Secondary Insurance:     DISCHARGE DETAILS:    Cm received voicemail from pt's son; CM called back and left voicemail

## 2024-03-14 NOTE — PLAN OF CARE
Problem: DISCHARGE PLANNING  Goal: Discharge to home or other facility with appropriate resources  Description: INTERVENTIONS:  - Identify barriers to discharge w/patient and caregiver  - Arrange for needed discharge resources and transportation as appropriate  - Identify discharge learning needs (meds, wound care, etc.)  - Arrange for interpretive services to assist at discharge as needed  - Refer to Case Management Department for coordinating discharge planning if the patient needs post-hospital services based on physician/advanced practitioner order or complex needs related to functional status, cognitive ability, or social support system  Outcome: Progressing     Problem: SAFETY ADULT  Goal: Maintains/Returns to pre admission functional level  Description: INTERVENTIONS:  - Perform AM-PAC 6 Click Basic Mobility/ Daily Activity assessment daily.  - Set and communicate daily mobility goal to care team and patient/family/caregiver.   - Collaborate with rehabilitation services on mobility goals if consulted    - Out of bed for toileting  - Record patient progress and toleration of activity level   Outcome: Progressing     Problem: MUSCULOSKELETAL - ADULT  Goal: Maintain proper alignment of affected body part  Description: INTERVENTIONS:  - Support, maintain and protect limb and body alignment  - Provide patient/ family with appropriate education  Outcome: Progressing     Problem: Nutrition/Hydration-ADULT  Goal: Nutrient/Hydration intake appropriate for improving, restoring or maintaining nutritional needs  Description: Monitor and assess patient's nutrition/hydration status for malnutrition. Collaborate with interdisciplinary team and initiate plan and interventions as ordered.  Monitor patient's weight and dietary intake as ordered or per policy. Utilize nutrition screening tool and intervene as necessary. Determine patient's food preferences and provide high-protein, high-caloric foods as appropriate.      INTERVENTIONS:  - Monitor oral intake, urinary output, labs, and treatment plans  - Assess nutrition and hydration status and recommend course of action  - Evaluate amount of meals eaten  - Assist patient with eating if necessary   - Allow adequate time for meals  - Recommend/ encourage appropriate diets, oral nutritional supplements, and vitamin/mineral supplements  - Order, calculate, and assess calorie counts as needed  - Recommend, monitor, and adjust tube feedings and TPN/PPN based on assessed needs  - Assess need for intravenous fluids  - Provide specific nutrition/hydration education as appropriate  - Include patient/family/caregiver in decisions related to nutrition  Outcome: Progressing

## 2024-03-14 NOTE — CASE MANAGEMENT
[facility] received request for authorization from Care Manager.  Authorization request for: SNF  Facility Name: [facility]   NPI:[id number]  Facility MD:  Jaswant Petty   NPI: [id number]  Authorization initiated by contacting insurance:  [facility]  Via: [facility] Portal   Clinicals submitted via: Portal attachment   Pending Reference #:[id number]     Care Manager notified: chet schaffer

## 2024-03-14 NOTE — CASE MANAGEMENT
Case Management Discharge Planning Note    Patient name Zhao Walls  Location [facility]01 MRN [id number]  : 1940 Date 3/14/2024       Current Admission Date: 3/4/2024  Current Admission Diagnosis:Fall   Patient Active Problem List    Diagnosis Date Noted    GERD (gastroesophageal reflux disease) 03/10/2024    Fall 2024    Intertrochanteric fracture (HCC) 2024    Polyneuropathy 2023    Laryngeal squamous cell carcinoma (HCC)     Allergies 2022    Postnasal drip 2022    Lumbar radiculopathy     Gastroesophageal reflux disease without esophagitis 2022    Cataracta 2021    Pre-op examination 2021    Left-sided low back pain with left-sided sciatica 2019    Obesity (BMI 30.0-34.9) 2019    Type 2 diabetes mellitus with complication (HCC) 2014    Anemia 2014    Benign prostatic hyperplasia 2014    Hyperlipidemia 2014    Allergic rhinitis 2013    Obesity 09/10/1998    Hypertension 1997      LOS (days): 10  Geometric Mean LOS (GMLOS) (days): 6.5  Days to GMLOS:-3.1     OBJECTIVE:  Risk of Unplanned Readmission Score: 12.36         Current admission status: Inpatient   Preferred Pharmacy:   [id number] [place] [place] - [address] [address]  Phone: [phone] Fax: [phone]    [facility] [place] [place] [facility] [address]  [address]  [facility] [address]  Phone: [phone] Fax: [phone]    Primary Care Provider: Bello Montanez MD    Primary Insurance: [employer] [facility]  Secondary Insurance:     DISCHARGE DETAILS:    CM spoke to pt's son  He's in agreement with d/c to  TCF today  He understands that all possible appeals were attempted but denied by [facility]    CM submitted for S transport today after 1600. Pt's son aware, as well as son    [phone], fax [phone]. Please inform the provider  that orders for controlled substances can be sent electronically. [facility], [id number], [address] [facility] [address], [place], [place] can be selected as pharmacy of choice in the navigator for this patient. (Note: please make sure to uncheck the “Patients and clinics nearby zip [facility] for your search as pharmacy is in [place]);

## 2024-03-14 NOTE — PLAN OF CARE
Problem: PAIN - ADULT  Goal: Verbalizes/displays adequate comfort level or baseline comfort level  Description: Interventions:  - Encourage patient to monitor pain and request assistance  - Assess pain using appropriate pain scale  - Administer analgesics based on type and severity of pain and evaluate response  - Implement non-pharmacological measures as appropriate and evaluate response  - Consider cultural and social influences on pain and pain management  - Notify physician/advanced practitioner if interventions unsuccessful or patient reports new pain  Outcome: Adequate for Discharge     Problem: SAFETY ADULT  Goal: Patient will remain free of falls  Description: INTERVENTIONS:  - Educate patient/family on patient safety including physical limitations  - Instruct patient to call for assistance with activity   - Consult OT/PT to assist with strengthening/mobility   - Keep Call bell within reach  - Keep bed low and locked with side rails adjusted as appropriate  - Keep care items and personal belongings within reach  - Initiate and maintain comfort rounds  - Make Fall Risk Sign visible to staff  - Offer Toileting every 1 Hours, in advance of need  - Initiate/Maintain alarm  - Obtain necessary fall risk management equipment  - Apply yellow socks and bracelet for high fall risk patients  - Consider moving patient to room near nurses station  Outcome: Adequate for Discharge  Goal: Maintain or return to baseline ADL function  Description: INTERVENTIONS:  -  Assess patient's ability to carry out ADLs; assess patient's baseline for ADL function and identify physical deficits which impact ability to perform ADLs (bathing, care of mouth/teeth, toileting, grooming, dressing, etc.)  - Assess/evaluate cause of self-care deficits   - Assess range of motion  - Assess patient's mobility; develop plan if impaired  - Assess patient's need for assistive devices and provide as appropriate  - Encourage maximum independence but  intervene and supervise when necessary  - Involve family in performance of ADLs  - Assess for home care needs following discharge   - Consider OT consult to assist with ADL evaluation and planning for discharge  - Provide patient education as appropriate  Outcome: Adequate for Discharge  Goal: Maintains/Returns to pre admission functional level  Description: INTERVENTIONS:  - Perform AM-PAC 6 Click Basic Mobility/ Daily Activity assessment daily.  - Set and communicate daily mobility goal to care team and patient/family/caregiver.   - Collaborate with rehabilitation services on mobility goals if consulted  - Perform Range of Motion 3 times a day.  - Reposition patient every 2 hours.  - Dangle patient 3 times a day  - Stand patient 3 times a day  - Ambulate patient 3 times a day  - Out of bed to chair 3 times a day   - Out of bed for meals 3 times a day  - Out of bed for toileting  - Record patient progress and toleration of activity level   Outcome: Adequate for Discharge     Problem: DISCHARGE PLANNING  Goal: Discharge to home or other facility with appropriate resources  Description: INTERVENTIONS:  - Identify barriers to discharge w/patient and caregiver  - Arrange for needed discharge resources and transportation as appropriate  - Identify discharge learning needs (meds, wound care, etc.)  - Arrange for interpretive services to assist at discharge as needed  - Refer to Case Management Department for coordinating discharge planning if the patient needs post-hospital services based on physician/advanced practitioner order or complex needs related to functional status, cognitive ability, or social support system  Outcome: Adequate for Discharge     Problem: Knowledge Deficit  Goal: Patient/family/caregiver demonstrates understanding of disease process, treatment plan, medications, and discharge instructions  Description: Complete learning assessment and assess knowledge base.  Interventions:  - Provide teaching at  level of understanding  - Provide teaching via preferred learning methods  Outcome: Adequate for Discharge     Problem: SKIN/TISSUE INTEGRITY - ADULT  Goal: Skin Integrity remains intact(Skin Breakdown Prevention)  Description: Assess:  -Perform [place] assessment every shift  -Clean and moisturize skin every day  -Inspect skin when repositioning, toileting, and assisting with ADLS  -Assess under medical devices such as braces, lines etc. every shift  -Assess extremities for adequate circulation and sensation     Bed Management:  -Have minimal linens on bed & keep smooth, unwrinkled  -Change linens as needed when moist or perspiring  -Avoid sitting or lying in one position for more than 2 hours while in bed  -Keep HOB at 30 degrees     Toileting:  -Offer bedside commode  -Assess for incontinence  -Use incontinent care products after each incontinent episode such as incontinence foam cleansers    Activity:  -Mobilize patient 3 times a day  -Encourage activity and walks on unit  -Encourage or provide ROM exercises   -Turn and reposition patient every 2 Hours  -Use appropriate equipment to lift or move patient in bed  -Instruct/ Assist with weight shifting every 15 min when out of bed in chair  -Consider limitation of chair time 2 hour intervals    Skin Care:  -Avoid use of baby powder, tape, friction and shearing, hot water or constrictive clothing  -Relieve pressure over bony prominences using mepliex foam dressings  -Do not massage red bony areas    Next Steps:  -Teach patient strategies to minimize risks    -Consider consults to  interdisciplinary teams such as wound care, pt/ot  Outcome: Adequate for Discharge  Goal: Incision(s), wounds(s) or drain site(s) healing without S/S of infection  Description: INTERVENTIONS  - Assess and document dressing, incision, wound bed, drain sites and surrounding tissue  - Provide patient and family education  - Perform skin care/dressing changes  Outcome: Adequate for Discharge      Problem: MUSCULOSKELETAL - ADULT  Goal: Maintain or return mobility to safest level of function  Description: INTERVENTIONS:  - Assess patient's ability to carry out ADLs; assess patient's baseline for ADL function and identify physical deficits which impact ability to perform ADLs (bathing, care of mouth/teeth, toileting, grooming, dressing, etc.)  - Assess/evaluate cause of self-care deficits   - Assess range of motion  - Assess patient's mobility  - Assess patient's need for assistive devices and provide as appropriate  - Encourage maximum independence but intervene and supervise when necessary  - Involve family in performance of ADLs  - Assess for home care needs following discharge   - Consider OT consult to assist with ADL evaluation and planning for discharge  - Provide patient education as appropriate  Outcome: Adequate for Discharge  Goal: Maintain proper alignment of affected body part  Description: INTERVENTIONS:  - Support, maintain and protect limb and body alignment  - Provide patient/ family with appropriate education  Outcome: Adequate for Discharge     Problem: Prexisting or High Potential for Compromised Skin Integrity  Goal: Skin integrity is maintained or improved  Description: INTERVENTIONS:  - Identify patients at risk for skin breakdown  - Assess and monitor skin integrity  - Assess and monitor nutrition and hydration status  - Monitor labs   - Assess for incontinence   - Turn and reposition patient  - Assist with mobility/ambulation  - Relieve pressure over bony prominences  - Avoid friction and shearing  - Provide appropriate hygiene as needed including keeping skin clean and dry  - Evaluate need for skin moisturizer/barrier cream  - Collaborate with interdisciplinary team   - Patient/family teaching  - Consider wound care consult   Outcome: Adequate for Discharge     Problem: PHYSICAL THERAPY ADULT  Goal: Performs mobility at highest level of function for planned discharge setting.  See  evaluation for individualized goals.  Description: Treatment/Interventions: Functional transfer training, LE strengthening/ROM, Therapeutic exercise, Endurance training, Patient/family training, Equipment eval/education, Bed mobility, Spoke to nursing, OT          See flowsheet documentation for full assessment, interventions and recommendations.  Outcome: Adequate for Discharge     Problem: OCCUPATIONAL THERAPY ADULT  Goal: Performs self-care activities at highest level of function for planned discharge setting.  See evaluation for individualized goals.  Description: Treatment Interventions: ADL retraining, Functional transfer training, Endurance training, Cognitive reorientation, Patient/family training, Equipment evaluation/education, Compensatory technique education, Energy conservation, Activityengagement          See flowsheet documentation for full assessment, interventions and recommendations.   Outcome: Adequate for Discharge     Problem: Nutrition/Hydration-ADULT  Goal: Nutrient/Hydration intake appropriate for improving, restoring or maintaining nutritional needs  Description: Monitor and assess patient's nutrition/hydration status for malnutrition. Collaborate with interdisciplinary team and initiate plan and interventions as ordered.  Monitor patient's weight and dietary intake as ordered or per policy. Utilize nutrition screening tool and intervene as necessary. Determine patient's food preferences and provide high-protein, high-caloric foods as appropriate.     INTERVENTIONS:  - Monitor oral intake, urinary output, labs, and treatment plans  - Assess nutrition and hydration status and recommend course of action  - Evaluate amount of meals eaten  - Assist patient with eating if necessary   - Allow adequate time for meals  - Recommend/ encourage appropriate diets, oral nutritional supplements, and vitamin/mineral supplements  - Order, calculate, and assess calorie counts as needed  - Recommend, monitor,  and adjust tube feedings and TPN/PPN based on assessed needs  - Assess need for intravenous fluids  - Provide specific nutrition/hydration education as appropriate  - Include patient/family/caregiver in decisions related to nutrition  Outcome: Adequate for Discharge

## 2024-03-14 NOTE — CASE MANAGEMENT
[facility] has received APPROVED authorization.  Insurance:   [facility]  Called in by Rep:muriel AJ#  [phone]  Authorization received for: SNF  Facility: [facility]   Authorization #:[id number]    Start of Care:3/14  Next Review Date:3/18  Continued Stay Care Coordinator: n/a  Submit next review to: [phone]     Care Manager notified: chet schaffer

## 2024-03-14 NOTE — CASE MANAGEMENT
Case Management Discharge Planning Note    Patient name Zhao Walls  Location [facility]01 MRN [id number]  : 1940 Date 3/14/2024       Current Admission Date: 3/4/2024  Current Admission Diagnosis:Fall   Patient Active Problem List    Diagnosis Date Noted    GERD (gastroesophageal reflux disease) 03/10/2024    Fall 2024    Intertrochanteric fracture (HCC) 2024    Polyneuropathy 2023    Laryngeal squamous cell carcinoma (HCC)     Allergies 2022    Postnasal drip 2022    Lumbar radiculopathy     Gastroesophageal reflux disease without esophagitis 2022    Cataracta 2021    Pre-op examination 2021    Left-sided low back pain with left-sided sciatica 2019    Obesity (BMI 30.0-34.9) 2019    Type 2 diabetes mellitus with complication (HCC) 2014    Anemia 2014    Benign prostatic hyperplasia 2014    Hyperlipidemia 2014    Allergic rhinitis 2013    Obesity 09/10/1998    Hypertension 1997      LOS (days): 10  Geometric Mean LOS (GMLOS) (days): 6.5  Days to GMLOS:-3.1     OBJECTIVE:  Risk of Unplanned Readmission Score: 11.8         Current admission status: Inpatient   Preferred Pharmacy:   [id number] - [place] - [address] [address]  Phone: [phone] Fax: [phone]    [facility] [place] - [address]  [address]  [place] [address]  Phone: [phone] Fax: [phone]    Primary Care Provider: Bello Montanez MD    Primary Insurance: [employer] [facility]  Secondary Insurance:     DISCHARGE DETAILS:                                                                                                               Facility Insurance Auth Number: [id number]

## 2024-03-14 NOTE — CASE MANAGEMENT
Case Management Discharge Planning Note    Patient name Zhao Walls  Location [facility]01 MRN [id number]  : 1940 Date 3/14/2024       Current Admission Date: 3/4/2024  Current Admission Diagnosis:Fall   Patient Active Problem List    Diagnosis Date Noted    GERD (gastroesophageal reflux disease) 03/10/2024    Fall 2024    Intertrochanteric fracture (HCC) 2024    Polyneuropathy 2023    Laryngeal squamous cell carcinoma (HCC)     Allergies 2022    Postnasal drip 2022    Lumbar radiculopathy     Gastroesophageal reflux disease without esophagitis 2022    Cataracta 2021    Pre-op examination 2021    Left-sided low back pain with left-sided sciatica 2019    Obesity (BMI 30.0-34.9) 2019    Type 2 diabetes mellitus with complication (HCC) 2014    Anemia 2014    Benign prostatic hyperplasia 2014    Hyperlipidemia 2014    Allergic rhinitis 2013    Obesity 09/10/1998    Hypertension 1997      LOS (days): 10  Geometric Mean LOS (GMLOS) (days): 6.5  Days to GMLOS:-3.2     OBJECTIVE:  Risk of Unplanned Readmission Score: 12.36         Current admission status: Inpatient   Preferred Pharmacy:   [id number] - [place] - [address] [address]  Phone: [phone] Fax: [phone]    [facility] [place] - [place] - [address]  [address]  [place] [address]  Phone: [phone] Fax: [phone]    Primary Care Provider: Bello Montanez MD    Primary Insurance: [employer] [facility]  Secondary Insurance:     DISCHARGE DETAILS:       [facility] @1532

## 2024-03-14 NOTE — DISCHARGE SUMMARY
"[facility]  Discharge- Zhao Walls 1940, 83 y.o. male MRN: [id number]  Unit/Bed#: [facility] 614-01 Encounter: [id number]  Primary Care Provider: Bello Montanez MD   Date and time admitted to hospital: 3/4/2024  6:14 PM    GERD (gastroesophageal reflux disease)  Assessment & Plan  -Continue with protonic PPI daily    Fall  Assessment & Plan  - Fall from standing onto side of hip  - Intertrochanteric fracture on R  - S/p R femoral intramedullary nail on 3/5  - PT/ OT consulted: kizzy Martinez consulted  - Pain control  - Dispo planning - for covid bed                Medical Problems       Resolved Problems  Date Reviewed: 3/8/2024   None         Admission Date:   Admission Orders (From admission, onward)       Ordered        03/04/24 2151  Inpatient Admission  Once                            Admitting Diagnosis: Fever [R50.9]  Closed fracture of femur, intertrochanteric, right, initial encounter ([facility]) [S72.141A]  Unspecified multiple injuries, initial encounter [T07.XXXA]  COVID [U07.1]    HPI: Zhao Walls is a 83 y.o. male who presents with right hip pain in the setting of a fall from standing height. The patient states he was attempting to go to the bathroom when the walker \"got away from him\" and he tripped and fell. The patient denies head strike, denies loss of consciousness     Procedures Performed: right intermedullary nail       Summary of Hospital Course: Pt was admitted for a mechanical fall from ground left, resulting in right intertrochanteric fall. He is s/p right intermedullary nail by Ortho on 3/5. Off note he was found to be COVID positive on 3/4.     Significant Findings, Care, Treatment and Services Provided: Ortho surgery as above    Complications: none    Condition at Discharge: stable         Discharge instructions/Information to patient and family:   See after visit summary for information provided to patient and family.  "     Provisions for Follow-Up Care:  See after visit summary for information related to follow-up care and any pertinent home health orders.      PCP: Bello Montanez MD    Disposition: Skilled nursing facility at [facility]    Planned Readmission: No    Discharge Statement   I spent 30 minutes discharging the patient. This time was spent on the day of discharge. I had direct contact with the patient on the day of discharge. Additional documentation is required if more than 30 minutes were spent on discharge.     Discharge Medications:  See after visit summary for reconciled discharge medications provided to patient and family.                    Spontaneous, unlabored and symmetrical

## 2024-03-15 ENCOUNTER — NURSING HOME VISIT (OUTPATIENT)
Dept: GERIATRICS | Facility: OTHER | Age: 84
End: 2024-03-15
Payer: COMMERCIAL

## 2024-03-15 DIAGNOSIS — Z71.89 ADVANCE CARE PLANNING: ICD-10-CM

## 2024-03-15 DIAGNOSIS — M80.00XD AGE-RELATED OSTEOPOROSIS WITH CURRENT PATHOLOGICAL FRACTURE WITH ROUTINE HEALING: ICD-10-CM

## 2024-03-15 DIAGNOSIS — S72.144D CLOSED NONDISPLACED INTERTROCHANTERIC FRACTURE OF RIGHT FEMUR WITH ROUTINE HEALING, SUBSEQUENT ENCOUNTER: Primary | ICD-10-CM

## 2024-03-15 DIAGNOSIS — U07.1 COVID-19: ICD-10-CM

## 2024-03-15 DIAGNOSIS — G89.11 ACUTE PAIN DUE TO TRAUMA: ICD-10-CM

## 2024-03-15 DIAGNOSIS — I10 PRIMARY HYPERTENSION: ICD-10-CM

## 2024-03-15 DIAGNOSIS — D64.9 POSTOPERATIVE ANEMIA: ICD-10-CM

## 2024-03-15 DIAGNOSIS — E66.9 OBESITY (BMI 30.0-34.9): ICD-10-CM

## 2024-03-15 DIAGNOSIS — N40.0 BENIGN PROSTATIC HYPERPLASIA, UNSPECIFIED WHETHER LOWER URINARY TRACT SYMPTOMS PRESENT: ICD-10-CM

## 2024-03-15 DIAGNOSIS — E11.8 TYPE 2 DIABETES MELLITUS WITH COMPLICATION (HCC): ICD-10-CM

## 2024-03-15 DIAGNOSIS — M54.16 LUMBAR RADICULOPATHY: ICD-10-CM

## 2024-03-15 DIAGNOSIS — E78.2 MIXED HYPERLIPIDEMIA: ICD-10-CM

## 2024-03-15 DIAGNOSIS — Z91.89 AT RISK FOR DELIRIUM: ICD-10-CM

## 2024-03-15 DIAGNOSIS — R26.2 AMBULATORY DYSFUNCTION: ICD-10-CM
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
## 2024-03-15 DIAGNOSIS — K21.9 GASTROESOPHAGEAL REFLUX DISEASE WITHOUT ESOPHAGITIS: ICD-10-CM

## 2024-03-15 DIAGNOSIS — Z91.89 AT RISK FOR CONSTIPATION: ICD-10-CM

## 2024-03-15 DIAGNOSIS — C32.9 LARYNGEAL SQUAMOUS CELL CARCINOMA (HCC): ICD-10-CM

## 2024-03-15 LAB
GLUCOSE SERPL-MCNC: 142 MG/DL (ref 65–140)
GLUCOSE SERPL-MCNC: 189 MG/DL (ref 65–140)
GLUCOSE SERPL-MCNC: 243 MG/DL (ref 65–140)

## 2024-03-15 PROCEDURE — 99306 1ST NF CARE HIGH MDM 50: CPT | Performed by: STUDENT IN AN ORGANIZED HEALTH CARE EDUCATION/TRAINING PROGRAM

## 2024-03-15 NOTE — ASSESSMENT & PLAN NOTE
-PT/OT  -fall precautions  -encourage appropriate DME use  -SW to follow for safe discharge planning/homecare services as appropriate

## 2024-03-15 NOTE — ASSESSMENT & PLAN NOTE
S/p fall  S/p right femoral intramedullary nail on 3/5  Weightbearing as tolerated right lower extremity  Pain control as appropriate  DVT ppx: lovenox; to complete DVT ppx for 28 days post-op; anticipate switching to aspirin BID on discharge as mentioned in Orthopedic note (or if unable/unwilling to take lovenox outpatient)  Encourage IS  PT/OT  Follow up with PCP, Orthopedics

## 2024-03-15 NOTE — ASSESSMENT & PLAN NOTE
Reports stable chronic low back pain, reports he follows Orthopedics outpatient for this  Stable on gabapentin

## 2024-03-15 NOTE — PROGRESS NOTES
"[facility]  Facility: [facility]    HISTORY AND PHYSICAL  Nursing Home Place of Service: nursing home place of service: POS [facility] Skilled Care-Part A Coverage    NAME: Zhao Walls  : 1940 AGE: 83 y.o. SEX: male MRN: [id number]  DATE OF ENCOUNTER: 3/15/2024    Records Reviewed include: Hospital records    Chief Complaint/ Reason for Admission:   Right Femoral IT Fracture s/p ORIF     History of Present Illness:     Zhao Walls is a 83 y.o. male with PMH including HTN, DM2, BPH, HLD, GERD  Patient was hospitalized at [facility] from 3/4 to 3/24/24  For details of hospitalization, see hospital records including discharge documentation  Briefly, patient hospitalized with right hip pain s/p fall (was attempting to go to the bathroom, lost  on his walker, tripped and fell, denied headstrike/LOC); found to have right intertrochanteric fracture; evaluated by Orthopedics; s/p right intermedullary nail on 3/5/24; WBAT; of note incidentally COVID positive on 3/4 and completed isolation period.    Patient seen and examined in room following morning PT session  Others present: none  Patient seated in chair  Appears comfortable, awake, alert, oriented to situation, able to converse appropriately  Polite, Aox3, appears to be a reasonable historian. Notes he feels well overall. Feels he has been making progress since his surgery in terms of ambulating and with therapy already at rehab. Was able to ambulate with walker this morning. Reports his R hip pain is \"not bad\" even with ambulation and controlled on present regimen overall. Appetite stable. No CP/palpitations/headaches. Denies orthostatic symptoms. He is working actively on trying to lose weight through dietary changes. Last BM today normal. Urinating without acute issue. No acute cardiopulmonary, abdominal, or urinary symptoms; see ROS for more details.    No further questions or acute concerns " Medication refilled by More Martinez NP, until future appointment (history of frequent cancellations and no shows).    "identified.    Lab Review:   3/14: BMP generally non-actionable, Cr 1.08, GFR 62 (baseline seems in 60s); CBC with Hb 8.5 (improved, baseline 13-14); UA 3/4 unremarkable for infection; blood culture 3/4 NGTD; COVID positive 3/4; TSH WNL from 3/4; A1c 7.7 from 3/4        CXR 3/4 \"No acute cardiopulmonary disease \"  XR pelvis 3/4 \"Acute displaced right femoral intertrochanteric fracture. \"  XR R femur 3/4 \"Acute displaced right femoral intertrochanteric fracture. Remainder of the femur is intact. \"      EKG 3/4: sinus tachycardia with short AK, RAD, incomplete RBBB, 123bpm, Qtc 498  No recent echo on file    Social: Patient lives in a bilevel with his wife. Reports being quite independent at baseline including ADLs, driving, medication management, finances. Used a walker since the last year due to chronic back pain. 1 fall in the last year.    Lives: Home, with spouse,  Social Support: family  Fall in the past 12 months: 1  Use of assistance Device: Walker    Allergies    Allergies   Allergen Reactions    Penicillins Other (See Comments)     \"Unknown Reaction\"       Past Medical History  Past Medical History:   Diagnosis Date    Diabetes mellitus (HCC)     GERD (gastroesophageal reflux disease)     Hyperlipidemia     Hypertension     Laryngeal cancer (HCC)         Past Surgical History:   Procedure Laterality Date    GALLBLADDER SURGERY  1968    AK LARYNGOSCOPY W/BIOPSY MICROSCOPE/TELESCOPE N/A 2/15/2023    Procedure: MICROLARYGOSCOPY AND BIOPSY; FROZEN SECTION;  Surgeon: Bennett Butler MD;  Location: [facility];  Service: ENT    AK OPTX FEM SHFT FX W/INSJ IMED IMPLT W/WO SCREW Right 3/5/2024    Procedure: INSERTION NAIL IM FEMUR ANTEGRADE (TROCHANTERIC);  Surgeon: Sean Molina MD;  Location: [facility];  Service: Orthopedics       Family History  Family History   Problem Relation Age of Onset    Breast cancer Mother     No Known Problems Father        Social History  Social History     Tobacco Use "   Smoking Status Former    Current packs/day: 0.50    Types: Cigarettes   Smokeless Tobacco Never   Tobacco Comments    no passive smoke exposure       Social History     Substance and Sexual Activity   Alcohol Use Not Currently      Social History     Substance and Sexual Activity   Drug Use Never            Physical Exam    Vital Signs  There were no vitals filed for this visit.  BP: 177/88 mmHg  3/15/2024 08:09   Temp:97.6 °F  3/15/2024 08:09 Pulse:68 bpm  3/15/2024 08:09 Weight:212.5 Lbs  3/15/2024 05:29   Resp:21 Breaths/min  3/15/2024 08:09 BS:189 mg/dL  3/15/2024 05:51 O2:90 %  3/15/2024 08:10 Pain:3  3/14/2024 22:58         Physical Exam  Vitals reviewed.   Constitutional:       General: He is not in acute distress.     Appearance: He is not toxic-appearing or diaphoretic.   HENT:      Head: Normocephalic and atraumatic.      Right Ear: External ear normal.      Left Ear: External ear normal.      Nose: Nose normal. No rhinorrhea.      Mouth/Throat:      Mouth: Mucous membranes are dry.      Pharynx: Oropharynx is clear. No oropharyngeal exudate or posterior oropharyngeal erythema.   Eyes:      General: No scleral icterus.        Right eye: No discharge.         Left eye: No discharge.      Extraocular Movements: Extraocular movements intact.      Conjunctiva/sclera: Conjunctivae normal.      Pupils: Pupils are equal, round, and reactive to light.   Cardiovascular:      Rate and Rhythm: Normal rate and regular rhythm.   Pulmonary:      Effort: Pulmonary effort is normal. No respiratory distress.      Breath sounds: No wheezing or rales.   Abdominal:      General: Bowel sounds are normal.      Palpations: Abdomen is soft.      Tenderness: There is no abdominal tenderness. There is no guarding.   Musculoskeletal:         General: No tenderness.      Cervical back: No rigidity.      Comments: No calf tenderness bilaterally  RLE with trace edema  R lateral thigh open to air with bruising; operative sites with  sutures in place and appear clean/dry with no bleed/drainage   Skin:     General: Skin is warm and dry.      Coloration: Skin is not jaundiced.   Neurological:      General: No focal deficit present.      Mental Status: He is alert and oriented to person, place, and time. Mental status is at baseline.   Psychiatric:         Mood and Affect: Mood normal.         Behavior: Behavior normal.         Thought Content: Thought content normal.         Judgment: Judgment normal.         Review of Systems:  Review of Systems   Constitutional:  Negative for appetite change, chills, diaphoresis and fever.   HENT:  Positive for voice change (hoarse voice since cancer treatment last year). Negative for drooling, ear pain, hearing loss, rhinorrhea, sore throat and trouble swallowing.    Eyes:  Negative for photophobia, pain, discharge, redness, itching and visual disturbance.   Respiratory:  Negative for cough, chest tightness, shortness of breath and wheezing.    Cardiovascular:  Positive for leg swelling. Negative for chest pain and palpitations.   Gastrointestinal:  Negative for abdominal pain, blood in stool, constipation, diarrhea, nausea and vomiting.   Genitourinary:  Negative for difficulty urinating, dysuria, flank pain and hematuria.   Musculoskeletal:  Positive for arthralgias, back pain and gait problem. Negative for neck pain.   Skin:  Negative for color change.   Neurological:  Positive for weakness. Negative for dizziness, tremors, seizures, facial asymmetry, light-headedness and headaches.   Psychiatric/Behavioral:  Negative for agitation, behavioral problems and confusion. The patient is not nervous/anxious and is not hyperactive.        List of Current Medications:  Current Outpatient Medications   Medication Instructions    acetaminophen (TYLENOL) 650 mg, Oral, Every 6 hours scheduled    al mag oxide-diphenhydramine-lidocaine viscous (MAGIC MOUTHWASH) 1:1:1 suspension 10 mL, Swish & Swallow, Every 6 hours PRN     "amLODIPine (NORVASC) 10 mg, Oral, Daily    aspirin (ECOTRIN LOW STRENGTH) 81 mg, Every other day    atorvastatin (LIPITOR) 10 mg, Oral, Daily at bedtime    benzocaine-menthol (CEPACOL) 15-3.6 mg per lozenge 1 lozenge, Mouth/Throat, Every 2 hour PRN    Ca Carbonate-Mag Hydroxide (MI-ACID PO) No dose, route, or frequency recorded.    cholecalciferol 2,000 Units, Oral, Daily    clobetasol (TEMOVATE) 0.05 % ointment 1 application., Every 24 hours    enoxaparin (LOVENOX) 40 mg, Subcutaneous, Daily, To start post op    fish oil 1,000 mg, See admin instructions, Takes 4 times per week    gabapentin (NEURONTIN) 100 mg, Oral, 2 times daily    LORazepam (ATIVAN) 0.5 mg, Oral, Once as needed, Take 30 minutes prior to scheduled MRI, do not drive.    magnesium chloride (MAG64) 128 mg, Oral, Daily    metFORMIN (GLUCOPHAGE) 1,000 mg, Oral, 2 times daily with meals    olmesartan (BENICAR) 40 mg, Oral, Daily    pantoprazole (PROTONIX) 40 mg, Oral, Daily    tamsulosin (FLOMAX) 0.4 mg TAKE ONE CAPSULE BY MOUTH ONCE DAILY WITH DINNER         Medication reviewed. All orders signed. Complete list is in the paper chart.     Allergies    Allergies   Allergen Reactions    Penicillins Other (See Comments)     \"Unknown Reaction\"       Labs/Diagnostics (reviewed by this provider):     I personally reviewed lab results and imaging studies. Full reports are in the paper chart.     Assessment/Plan:    Hypertension  Monitor BP - has been consistently above goal generally 160s-180s so far at rehab  Pulse has been generally 60s-80s  Avoid hypotension  Continues on regimen including olmesartan 40mg daily, amlodipine 10mg daily with hold parameters  Got extra 2.5mg amlodipine 3/15 per on-call provider due to ongoing HTN  No acute cardiac complaints  As of 3/15 start on carvedilol 3.125mg BID with hold parameters and add hydralazine 10mg TID PRN SBP > 180  Adjust regimen as appropriate  Follow up with PCP, Cardiology as appropriate; would recommend " getting echocardiogram in outpatient setting at discretion of PCP given his HTN and EKG findings    Laryngeal squamous cell carcinoma (HCC)  Known to PCP and reported stable  No associated concerns presently  Denies dysphagia  Speech Therapy consulted at rehab  s/p XRT May 2023  Follows with ENT and radiation oncology regularly as outpatient, continue close follow-up as directed    Gastroesophageal reflux disease without esophagitis  -stable  -recommend OOB with meals, sit upright for at least 30 minutes afterwards, avoid trigger foods  -continue to monitor  -follow up with PCP, GI as appropriate  -continue PPI    Type 2 diabetes mellitus with complication (HCC)    Lab Results   Component Value Date    HGBA1C 7.7 (H) 03/04/2024       A1c goal of <= 7.5 would be reasonable for age  Monitor glucose - with limited data has been in 200s later in day, fasting glucose in high 100s  Avoid hypoglycemia  Continue metformin 1000mg BID  Cover with sliding scale insulin TIDAC+HS at rehab  Follow up with PCP and as appropriate Endocrine/Ophthalmology/Podiatry outpatient; consider additional diabetic agent at discretion of outpatient team    Intertrochanteric fracture (HCC)  S/p fall  S/p right femoral intramedullary nail on 3/5  Weightbearing as tolerated right lower extremity  Pain control as appropriate  DVT ppx: lovenox; to complete DVT ppx for 28 days post-op; anticipate switching to aspirin BID on discharge as mentioned in Orthopedic note (or if unable/unwilling to take lovenox outpatient)  Encourage IS  PT/OT  Follow up with PCP, Orthopedics    Benign prostatic hyperplasia  Stable  Continue tamsulosin  Monitor for retention, infection  Follow up with PCP, Urology as appropriate    [place]  [place], lost control of his walker, fell from standing onto R hip; denied associated LOC/headstrike or preceding cardiopulmonary symptoms  -PT/OT  -fall precautions  -encourage appropriate DME use  -SW  to follow for safe discharge planning/homecare services as appropriate      Hyperlipidemia  Continue statin  Follow up with PCP    Obesity (BMI 30.0-34.9)  Encourage lifestyle modifications including healthy diet, exercise  Follow up with PCP    Postoperative anemia  Baseline Hb appears in 13-14 range  Acutely lower in post-op setting  -monitor on routine labs  -monitor for acute bleed - no present signs  -consider further workup, Heme consult if persistent/worsening  -transfuse PRN Hb <7      Acute pain due to trauma  Acute pain of R hip region in setting of fall, fracture, surgery  Current regimen: tylenol 975mg TID, gabapentin 100mg BID  Monitor pain - feels overall well controlled on present regimen  Adjust/wean regimen as appropriate  Follow up with PCP, Orthopedics    Ambulatory dysfunction  -PT/OT  -fall precautions  -encourage appropriate DME use  -SW to follow for safe discharge planning/homecare services as appropriate      At risk for constipation  At risk due to hospitalization, relative immobility, comorbidities  Monitor stool output  Bowel regimen at facility: miralax and senna as appropriate; consider bisacodyl suppository PRN if no BM in 2-3 days  Encourage mobility as tolerated, PO hydration as appropriate, high fiber diet/prune juice (in outpatient setting as appropriate)  Goal is for 1 easy BM every 1-2 days      At risk for delirium  Delirium precautions  -Patient is high risk of delirium due to age, comorbidities, hospitalization/unfamiliar environment  -delirium precautions  -maintain normal sleep/wake cycle  -minimize overnight interruptions, group overnight vitals/labs/nursing checks as possible  -dim lights, close blinds and turn off tv to minimize stimulation and encourage sleep environment in evenings  -ensure that pain is well controlled  -monitor for fecal and urinary retention which may precipitate delirium  -encourage early mobilization and ambulation  -provide frequent reorientation  "and redirection  -encourage family and friends at the bedside to help help calm patient if anxious  -avoid medications which may precipitate or worsen delirium such as tramadol, benzodiazepine, anticholinergics, and benadryl  -encourage hydration and nutrition   -redirect unwanted behaviors as first line, avoid physical restraints, use chemical restraint only if all other attempts have been unsuccessful    Advance care planning  HCP and code discussion as below    COVID-19  Incidental finding on hospital admission 3/4  Per available hospital records appears to have been generally asymptomatic and did not require supplemental O2 or acute intervention  Has completed appropriate isolation period  Continue to monitor  Supportive care    Age-related osteoporosis with current pathological fracture with routine healing  Evidenced by fragility fracture of right femur sustained in fall from standing height  Was evaluated by Endocrine during recent hospitalization  Encourage load bearing exercises as tolerated  Fall precautions, encourage appropriate DME  Vitamin D 2000u daily, calcium carbonate 1200mg daily in accordance with Endocrine  Consider DEXA in outpatient setting  Consider hypogonadism workup in outpatient setting  Follow up with PCP, Endocrine as appropriate    Lumbar radiculopathy  Reports stable chronic low back pain, reports he follows Orthopedics outpatient for this  Stable on gabapentin       Pain: \"not bad\" R hip, chronic low back  Rehab Potential:Good  Patient Informed of Medical Condition: yes   Patient is Capable of Understanding Their Right: yes   Discharge Plan: home  Vaccination:   Immunization History   Administered Date(s) Administered    COVID-19 MODERNA VACC 0.5 ML IM 01/18/2021, 02/15/2021, 12/15/2021    INFLUENZA 10/16/2014, 10/24/2015, 10/05/2016, 09/19/2017, 10/05/2018, 09/01/2020, 09/03/2021, 10/28/2022, 09/12/2023    Influenza, Seasonal Vaccine, Quadrivalent, Adjuvanted, .5e 09/12/2023    " Pneumococcal Conjugate 13-Valent 07/22/2015    Pneumococcal Polysaccharide PPV23 10/01/2011    Td (adult), Unspecified 04/07/2016    Zoster Vaccine Recombinant 10/05/2019, 12/12/2019    influenza, trivalent, adjuvanted 09/18/2019       DVT ppx: lovenox; to complete DVT ppx for 28 days post-op; anticipate switching to aspirin BID on discharge as mentioned in Orthopedic note (or if unable/unwilling to take lovenox outpatient)    Advanced Directives: patient verbalizes HCP as shayy Walls   Code status:Full Code Extensive discussion/education with patient today regarding their wishes with respect to resuscitative measures; discussed potential risks vs benefits of resuscitative measures; patient verbalizes understanding, appears to have capacity to make this decision presently, and clearly verbalizes a desire for Full Code, he is OK with resuscitative measures (but also expresses he would not wish to be kept in a vegetative state via artificial means if no meaningful hope of recovery)  PCP: Lisseth      -Total time spent on this encounter today including documentation and workup review, face to face time, history and exam, and documentation/orders was approximately 60 minutes.  -This note will be copied to [facility] EMR where vitals and medication orders are placed.    Jaswant Petty D.O.  Geriatric Medicine  3/15/2024 12:08 PM

## 2024-03-15 NOTE — ASSESSMENT & PLAN NOTE
Known to PCP and reported stable  No associated concerns presently  Denies dysphagia  Speech Therapy consulted at rehab  s/p XRT May 2023  Follows with ENT and radiation oncology regularly as outpatient, continue close follow-up as directed

## 2024-03-15 NOTE — ASSESSMENT & PLAN NOTE
Monitor BP - has been consistently above goal generally 160s-180s so far at rehab  Pulse has been generally 60s-80s  Avoid hypotension  Continues on regimen including olmesartan 40mg daily, amlodipine 10mg daily with hold parameters  Got extra 2.5mg amlodipine 3/15 per on-call provider due to ongoing HTN  No acute cardiac complaints  As of 3/15 start on carvedilol 3.125mg BID with hold parameters and add hydralazine 10mg TID PRN SBP > 180  Adjust regimen as appropriate  Follow up with PCP, Cardiology as appropriate; would recommend getting echocardiogram in outpatient setting at discretion of PCP given his HTN and EKG findings

## 2024-03-15 NOTE — ASSESSMENT & PLAN NOTE
Baseline Hb appears in 13-14 range  Acutely lower in post-op setting  -monitor on routine labs  -monitor for acute bleed - no present signs  -consider further workup, Heme consult if persistent/worsening  -transfuse PRN Hb <7

## 2024-03-15 NOTE — ASSESSMENT & PLAN NOTE
Incidental finding on hospital admission 3/4  Per available hospital records appears to have been generally asymptomatic and did not require supplemental O2 or acute intervention  Has completed appropriate isolation period  Continue to monitor  Supportive care

## 2024-03-15 NOTE — ASSESSMENT & PLAN NOTE
Evidenced by fragility fracture of right femur sustained in fall from standing height  Was evaluated by Endocrine during recent hospitalization  Encourage load bearing exercises as tolerated  Fall precautions, encourage appropriate DME  Vitamin D 2000u daily, calcium carbonate 1200mg daily in accordance with Endocrine  Consider DEXA in outpatient setting  Consider hypogonadism workup in outpatient setting  Follow up with PCP, Endocrine as appropriate

## 2024-03-15 NOTE — ASSESSMENT & PLAN NOTE
Stable  Continue tamsulosin  Monitor for retention, infection  Follow up with PCP, Urology as appropriate

## 2024-03-15 NOTE — UTILIZATION REVIEW
NOTIFICATION OF ADMISSION DISCHARGE   This is a Notification of Discharge from [facility]. Please be advised that this patient has been discharge from our facility. Below you will find the admission and discharge date and time including the patient’s disposition.   UTILIZATION REVIEW CONTACT:  Price Busch  Utilization   Network Utilization Review Department  Phone: [phone] x carefully listen to the prompts. All voicemails are confidential.  Email: [email or web address]     ADMISSION INFORMATION  PRESENTATION DATE: 3/4/2024  6:14 PM  OBERVATION ADMISSION DATE:   INPATIENT ADMISSION DATE: 3/4/24  9:51 PM   DISCHARGE DATE: 3/14/2024  4:37 PM   DISPOSITION:Released to SNF/TCU/SNU Facility    Network Utilization Review Department  ATTENTION: Please call with any questions or concerns to [phone] and carefully listen to the prompts so that you are directed to the right person. All voicemails are confidential.   For Discharge needs, contact Care Management DC Support Team at [phone] opt. 2  Send all requests for admission clinical reviews, approved or denied determinations and any other requests to dedicated fax number below belonging to the campus where the patient is receiving treatment. List of dedicated fax numbers for the Facilities:  FACILITY NAME UR FAX NUMBER   ADMISSION DENIALS (Administrative/Medical Necessity) [phone]   DISCHARGE SUPPORT TEAM ([facility]) [phone]   PARENT CHILD HEALTH (Maternity/NICU/Pediatrics) [phone]   [facility] [phone]   [facility] [phone]   [facility] [phone]   [facility] [phone]   [facility] [phone]   [facility] [phone]   [facility] [phone]   [facility]  [phone]   [facility] [phone]   [facility] [phone]   [facility] [phone]   [facility] [phone]

## 2024-03-15 NOTE — ASSESSMENT & PLAN NOTE
Acute pain of R hip region in setting of fall, fracture, surgery  Current regimen: tylenol 975mg TID, gabapentin 100mg BID  Monitor pain - feels overall well controlled on present regimen  Adjust/wean regimen as appropriate  Follow up with PCP, Orthopedics

## 2024-03-15 NOTE — ASSESSMENT & PLAN NOTE
Lab Results   Component Value Date    HGBA1C 7.7 (H) 03/04/2024       A1c goal of <= 7.5 would be reasonable for age  Monitor glucose - with limited data has been in 200s later in day, fasting glucose in high 100s  Avoid hypoglycemia  Continue metformin 1000mg BID  Cover with sliding scale insulin TIDAC+HS at rehab  Follow up with PCP and as appropriate Endocrine/Ophthalmology/Podiatry outpatient; consider additional diabetic agent at discretion of outpatient team

## 2024-03-15 NOTE — ASSESSMENT & PLAN NOTE
-stable  -recommend OOB with meals, sit upright for at least 30 minutes afterwards, avoid trigger foods  -continue to monitor  -follow up with PCP, GI as appropriate  -continue PPI

## 2024-03-15 NOTE — ASSESSMENT & PLAN NOTE
[place], lost control of his walker, fell from standing onto R hip; denied associated LOC/headstrike or preceding cardiopulmonary symptoms  -PT/OT  -fall precautions  -encourage appropriate DME use  -SW to follow for safe discharge planning/homecare services as appropriate

## 2024-03-18 ENCOUNTER — NURSING HOME VISIT (OUTPATIENT)
Dept: GERIATRICS | Facility: OTHER | Age: 84
End: 2024-03-18
Payer: COMMERCIAL

## 2024-03-18 DIAGNOSIS — G89.11 ACUTE PAIN DUE TO TRAUMA: ICD-10-CM

## 2024-03-18 DIAGNOSIS — E11.8 TYPE 2 DIABETES MELLITUS WITH COMPLICATION (HCC): ICD-10-CM

## 2024-03-18 DIAGNOSIS — S72.144D CLOSED NONDISPLACED INTERTROCHANTERIC FRACTURE OF RIGHT FEMUR WITH ROUTINE HEALING, SUBSEQUENT ENCOUNTER: Primary | ICD-10-CM

## 2024-03-18 DIAGNOSIS — D64.9 POSTOPERATIVE ANEMIA: ICD-10-CM

## 2024-03-18 DIAGNOSIS — I10 PRIMARY HYPERTENSION: ICD-10-CM

## 2024-03-18 PROCEDURE — 99309 SBSQ NF CARE MODERATE MDM 30: CPT | Performed by: STUDENT IN AN ORGANIZED HEALTH CARE EDUCATION/TRAINING PROGRAM

## 2024-03-18 NOTE — ASSESSMENT & PLAN NOTE
No acute cardiac complaints  Avoid hypotension  Baseline regimen: olmesartan 40mg daily, amlodipine 10mg daily with hold parameters  Monitor BP - had been consistently above goal generally 160s-180s initially at rehab; after regimen titration now a bit better generally 140s-160s systolic  Pulse has been generally 60s-70s recently  As of 3/15 started on carvedilol 3.125mg BID with hold parameters and added hydralazine 10mg TID PRN SBP > 180  As of 3/18 due to borderline bradycardia, stop carvedilol and start HCTZ 25mg daily with hold parameters  Adjust regimen as appropriate  Follow up with PCP, Cardiology as appropriate; would recommend getting echocardiogram in outpatient setting at discretion of PCP given his HTN and EKG findings

## 2024-03-18 NOTE — ASSESSMENT & PLAN NOTE
Lab Results   Component Value Date    HGBA1C 7.7 (H) 03/04/2024       A1c goal of <= 7.5 would be reasonable for age  Monitor glucose - generally mid 100s - low 200s  Avoid hypoglycemia  Continue metformin 1000mg BID  Cover with sliding scale insulin TIDAC+HS at rehab  Follow up with PCP and as appropriate Endocrine/Ophthalmology/Podiatry outpatient; consider additional diabetic agent at discretion of outpatient team

## 2024-03-18 NOTE — ASSESSMENT & PLAN NOTE
Acute pain of R hip region in setting of fall, fracture, surgery  Current regimen: tylenol 975mg TID, gabapentin 100mg BID  Monitor pain - feels overall well controlled on present regimen, no indication for opioid, can consider topical lidocaine patch vs voltaren if needed  Adjust/wean regimen as appropriate  Follow up with PCP, Orthopedics

## 2024-03-18 NOTE — PROGRESS NOTES
[facility]  Facility: [facility]    PROGRESS NOTE  Nursing Home Place of Service: nursing home place of service: POS [facility] A Coverage    NAME: Zhao Walls  : 1940 AGE: 83 y.o. SEX: male MRN: [id number]  DATE OF ENCOUNTER: 3/18/2024    Assessment and Plan     Problem List Items Addressed This Visit       Hypertension     No acute cardiac complaints  Avoid hypotension  Baseline regimen: olmesartan 40mg daily, amlodipine 10mg daily with hold parameters  Monitor BP - had been consistently above goal generally 160s-180s initially at rehab; after regimen titration now a bit better generally 140s-160s systolic  Pulse has been generally 60s-70s recently  As of 3/15 started on carvedilol 3.125mg BID with hold parameters and added hydralazine 10mg TID PRN SBP > 180  As of 3/18 due to borderline bradycardia, stop carvedilol and start HCTZ 25mg daily with hold parameters  Adjust regimen as appropriate  Follow up with PCP, Cardiology as appropriate; would recommend getting echocardiogram in outpatient setting at discretion of PCP given his HTN and EKG findings         Type 2 diabetes mellitus with complication (HCC)       Lab Results   Component Value Date    HGBA1C 7.7 (H) 2024       A1c goal of <= 7.5 would be reasonable for age  Monitor glucose - generally mid 100s - low 200s  Avoid hypoglycemia  Continue metformin 1000mg BID  Cover with sliding scale insulin TIDAC+HS at rehab  Follow up with PCP and as appropriate Endocrine/Ophthalmology/Podiatry outpatient; consider additional diabetic agent at discretion of outpatient team         Postoperative anemia     Baseline Hb appears in 13-14 range  Acutely lower in post-op setting  Borderline macrocytic  -monitor on routine labs - stable  -monitor for acute bleed - no present signs  -no recent iron level on file  -recent B12 low at 140 - start on B12 supplement at rehab  -consider further workup,  Heme consult if persistent/worsening  -transfuse PRN Hb <7         Intertrochanteric fracture (HCC) - Primary     S/p fall  S/p right femoral intramedullary nail on 3/5  Weightbearing as tolerated right lower extremity  Pain control as appropriate  DVT ppx: lovenox; to complete DVT ppx for 28 days post-op; anticipate switching to aspirin BID on discharge as mentioned in Orthopedic note (or if unable/unwilling to take lovenox outpatient)  Encourage IS  PT/OT  Follow up with PCP, Orthopedics         Acute pain due to trauma     Acute pain of R hip region in setting of fall, fracture, surgery  Current regimen: tylenol 975mg TID, gabapentin 100mg BID  Monitor pain - feels overall well controlled on present regimen, no indication for opioid, can consider topical lidocaine patch vs voltaren if needed  Adjust/wean regimen as appropriate  Follow up with PCP, Orthopedics                Chief Complaint     Follow up post-op, pain    History of Present Illness     Zhao Walls is a 83 y.o. male who was seen today for follow up. PMH including HTN, DM2, BPH, HLD, GERD     Patient seen and examined in room  Others present: none  Patient seated in chair  Appears comfortable, awake, alert, oriented to situation, able to converse appropriately  Polite, Aox3, in good spirits. Notes he feels well overall and that his weekend was fine. Was able to ambulate with walker this morning with therapy and feels therapy is going well, feels he is making slow but steady progress. Reports his R hip pain is overall slightly better than last week and controlled on present regimen overall, no longer having any shooting pains. Appetite stable. No CP/palpitations/headaches. Last BM today normal for him (reports since many years he has several soft but formed bowel movements per day). Urinating without acute issue. No acute cardiopulmonary, abdominal, or urinary symptoms; see ROS for more details.   Patient expresses interest in spiritual  "care, states he appreciated speaking with them when in hospital; consult placed.  No further questions or acute concerns identified.    Lab Review:   3/14: BMP generally non-actionable, Cr 1.08, GFR 62 (baseline seems in 60s); CBC with Hb 8.5 (improved, baseline 13-14); UA 3/4 unremarkable for infection; blood culture 3/4 NGTD; COVID positive 3/4; TSH WNL from 3/4; A1c 7.7 from 3/4  3/18: BMP generally stable/non-actionable; CBC with Hb 8.5 (stable)        CXR 3/4 \"No acute cardiopulmonary disease \"  XR pelvis 3/4 \"Acute displaced right femoral intertrochanteric fracture. \"  XR R femur 3/4 \"Acute displaced right femoral intertrochanteric fracture. Remainder of the femur is intact. \"        EKG 3/4: sinus tachycardia with short CA, RAD, incomplete RBBB, 123bpm, Qtc 498  No recent echo on file       The following portions of the patient's history were reviewed and updated as appropriate: allergies, current medications, past family history, past medical history, past social history, past surgical history and problem list.    Review of Systems     Review of Systems   Constitutional:  Negative for appetite change, chills, diaphoresis and fever.   HENT:  Positive for voice change (hoarse voice since cancer treatment last year). Negative for drooling, ear pain, hearing loss, rhinorrhea, sore throat and trouble swallowing.    Eyes:  Negative for pain, discharge, redness, itching and visual disturbance.   Respiratory:  Negative for cough, chest tightness, shortness of breath and wheezing.    Cardiovascular:  Negative for chest pain, palpitations and leg swelling.   Gastrointestinal:  Negative for abdominal pain, blood in stool, constipation, diarrhea, nausea and vomiting.   Genitourinary:  Negative for difficulty urinating, dysuria, flank pain and hematuria.   Musculoskeletal:  Positive for arthralgias, back pain and gait problem. Negative for neck pain.   Skin:  Negative for color change.   Neurological:  Positive for " weakness (stable/gradually improving). Negative for dizziness, tremors, seizures, facial asymmetry, light-headedness and headaches.   Psychiatric/Behavioral:  Negative for agitation, behavioral problems and confusion. The patient is not nervous/anxious and is not hyperactive.        Active Problem List     Patient Active Problem List   Diagnosis    Hypertension    Type 2 diabetes mellitus with complication (HCC)    Allergic rhinitis    Postoperative anemia    Benign prostatic hyperplasia    Hyperlipidemia    Obesity    Left-sided low back pain with left-sided sciatica    Obesity (BMI 30.0-34.9)    Cataracta    Pre-op examination    Gastroesophageal reflux disease without esophagitis    Lumbar radiculopathy    Allergies    Postnasal drip    Polyneuropathy    Laryngeal squamous cell carcinoma (HCC)    Fall    Intertrochanteric fracture (HCC)    GERD (gastroesophageal reflux disease)    Acute pain due to trauma    Ambulatory dysfunction    At risk for constipation    At risk for delirium    Advance care planning    COVID-19    Age-related osteoporosis with current pathological fracture with routine healing       Objective     Vital Signs:     BP: 162/72 mmHg  3/18/2024 07:13   Temp:99.2 °F  3/18/2024 07:13 Pulse:75 bpm  3/18/2024 07:13 Weight:212.5 Lbs  3/15/2024 05:29   Resp:20 Breaths/min  3/18/2024 07:13 BS:185 mg/dL  3/18/2024 12:25 O2:93 %  3/18/2024 07:14 Pain:8  3/18/2024 12:28       Physical Exam  Vitals reviewed.   Constitutional:       General: He is not in acute distress.     Appearance: He is not toxic-appearing or diaphoretic.   HENT:      Head: Normocephalic and atraumatic.      Right Ear: External ear normal.      Left Ear: External ear normal.      Nose: Nose normal. No rhinorrhea.      Mouth/Throat:      Mouth: Mucous membranes are dry.      Pharynx: Oropharynx is clear. No oropharyngeal exudate or posterior oropharyngeal erythema.   Eyes:      General: No scleral icterus.        Right eye: No  discharge.         Left eye: No discharge.      Extraocular Movements: Extraocular movements intact.      Conjunctiva/sclera: Conjunctivae normal.      Pupils: Pupils are equal, round, and reactive to light.   Cardiovascular:      Rate and Rhythm: Normal rate and regular rhythm.   Pulmonary:      Effort: Pulmonary effort is normal. No respiratory distress.      Breath sounds: No wheezing or rales.   Abdominal:      General: Bowel sounds are normal.      Palpations: Abdomen is soft.      Tenderness: There is no abdominal tenderness. There is no guarding.   Musculoskeletal:         General: No tenderness.      Cervical back: No rigidity.      Comments: No calf tenderness bilaterally  RLE with trace edema  R lateral thigh open to air with stable/fading bruising; operative sites with sutures in place and appear clean/dry with no bleed/drainage   Skin:     General: Skin is warm and dry.      Coloration: Skin is not jaundiced.   Neurological:      General: No focal deficit present.      Mental Status: He is alert and oriented to person, place, and time. Mental status is at baseline.   Psychiatric:         Mood and Affect: Mood normal.         Behavior: Behavior normal.         Thought Content: Thought content normal.         Judgment: Judgment normal.         Pertinent Laboratory/Diagnostic Studies:  Laboratory and Imaging studies reviewed. Full report in the paper chart.     Current Medications   Medications reviewed and updated in facility chart.      -Total time spent on this encounter today including documentation and workup review, face to face time, history and exam, and documentation/orders was approximately 30 minutes.  -This note will be copied to [facility] EMR where vitals and medication orders are placed.    Jaswant Petty D.O.  Geriatric Medicine  3/18/2024 12:53 PM

## 2024-03-18 NOTE — ASSESSMENT & PLAN NOTE
Baseline Hb appears in 13-14 range  Acutely lower in post-op setting  Borderline macrocytic  -monitor on routine labs - stable  -monitor for acute bleed - no present signs  -no recent iron level on file  -recent B12 low at 140 - start on B12 supplement at rehab  -consider further workup, Heme consult if persistent/worsening  -transfuse PRN Hb <7

## 2024-03-20 ENCOUNTER — TELEPHONE (OUTPATIENT)
Age: 84
End: 2024-03-20

## 2024-03-20 ENCOUNTER — NURSING HOME VISIT (OUTPATIENT)
Dept: GERIATRICS | Facility: OTHER | Age: 84
End: 2024-03-20
Payer: COMMERCIAL

## 2024-03-20 VITALS
OXYGEN SATURATION: 92 % | TEMPERATURE: 97 F | DIASTOLIC BLOOD PRESSURE: 66 MMHG | HEART RATE: 55 BPM | SYSTOLIC BLOOD PRESSURE: 133 MMHG

## 2024-03-20 DIAGNOSIS — R26.2 AMBULATORY DYSFUNCTION: ICD-10-CM

## 2024-03-20 DIAGNOSIS — S72.144D CLOSED NONDISPLACED INTERTROCHANTERIC FRACTURE OF RIGHT FEMUR WITH ROUTINE HEALING, SUBSEQUENT ENCOUNTER: ICD-10-CM

## 2024-03-20 DIAGNOSIS — G47.09 OTHER INSOMNIA: ICD-10-CM

## 2024-03-20 DIAGNOSIS — G89.4 CHRONIC PAIN SYNDROME: ICD-10-CM

## 2024-03-20 DIAGNOSIS — I10 PRIMARY HYPERTENSION: Primary | ICD-10-CM

## 2024-03-20 DIAGNOSIS — G89.11 ACUTE PAIN DUE TO TRAUMA: ICD-10-CM

## 2024-03-20 PROBLEM — S72.141A CLOSED FRACTURE OF FEMUR, INTERTROCHANTERIC, RIGHT, INITIAL ENCOUNTER (HCC): Status: ACTIVE | Noted: 2024-03-20

## 2024-03-20 PROBLEM — K21.9 GERD (GASTROESOPHAGEAL REFLUX DISEASE): Status: RESOLVED | Noted: 2024-03-10 | Resolved: 2024-03-20

## 2024-03-20 PROCEDURE — 99310 SBSQ NF CARE HIGH MDM 45: CPT

## 2024-03-20 RX ORDER — ACETAMINOPHEN 325 MG/1
975 TABLET ORAL EVERY 8 HOURS
Start: 2024-03-20 | End: 2024-04-19

## 2024-03-20 RX ORDER — LANOLIN ALCOHOL/MO/W.PET/CERES
3 CREAM (GRAM) TOPICAL
Start: 2024-03-20

## 2024-03-20 RX ORDER — HYDROCHLOROTHIAZIDE 25 MG/1
25 TABLET ORAL DAILY
Start: 2024-03-20

## 2024-03-20 RX ORDER — ACETAMINOPHEN 325 MG/1
975 TABLET ORAL EVERY 8 HOURS
Start: 2024-03-20 | End: 2024-03-20

## 2024-03-20 RX ORDER — GABAPENTIN 100 MG/1
100 CAPSULE ORAL 3 TIMES DAILY
Start: 2024-03-20

## 2024-03-20 NOTE — ASSESSMENT & PLAN NOTE
Blood pressure acceptable  Home regimen: Olmesartan 40 mg daily, amlodipine 10 mg daily  Was started on carvedilol 3.125 mg twice daily on 3/15/2024 but was subsequently discontinued due to bradycardia  As of 3/18/2024 started on HCTZ 25 mg daily  Continue olmesartan, amlodipine and HCTZ  Avoid hypotension  Monitor blood pressure

## 2024-03-20 NOTE — PROGRESS NOTES
"[facility]  [address] [address]  [phone]  FACILITY: Transitional Care Facility  Code 31 (STR)        NAME: Zhao Walls  AGE: 83 y.o. SEX: male CODE STATUS: CPR    DATE OF ENCOUNTER: 3/20/2024    Assessment and Plan     1. Primary hypertension  Assessment & Plan:  Blood pressure acceptable  Home regimen: Olmesartan 40 mg daily, amlodipine 10 mg daily  Was started on carvedilol 3.125 mg twice daily on 3/15/2024 but was subsequently discontinued due to bradycardia  As of 3/18/2024 started on HCTZ 25 mg daily  Continue olmesartan, amlodipine and HCTZ  Avoid hypotension  Monitor blood pressure    Orders:  -     hydroCHLOROthiazide 25 mg tablet; Take 1 tablet (25 mg total) by mouth daily    2. Ambulatory dysfunction  Assessment & Plan:  In the setting of recent hip fracture  Continue PT/OT  Maintain fall and safety precautions   following for discharge planning- PCC meeting today with SW, OT, and family         3. Closed nondisplaced intertrochanteric fracture of right femur with routine healing, subsequent encounter  Assessment & Plan:  S/p fall  S/p right femoral intramedullary nail on 3/5  Weightbearing as tolerated right lower extremity  Pain control with Tylenol ATC and gabapentin  DVT ppx: lovenox; to complete DVT ppx for 28 days post-op; anticipate switching to aspirin BID on discharge   Continue PT/OT  Follow up with Orthopedics    Orders:  -     acetaminophen (TYLENOL) 325 mg tablet; Take 3 tablets (975 mg total) by mouth every 8 (eight) hours    4. Other insomnia  Assessment & Plan:  Reports difficulty \"shutting off his thoughts\" at night   Does not take anything for sleep normally   Will trial melatonin 3 mg QHS    Orders:  -     melatonin 3 mg; Take 1 tablet (3 mg total) by mouth daily at bedtime    5. Chronic pain syndrome  -     gabapentin (NEURONTIN) 100 mg capsule; Take 1 capsule (100 mg total) by mouth 3 (three) times a day    6. Acute pain due to " "trauma  Assessment & Plan:  Reports 0/10 pain when resting, pain increases to 4/10 with movement   Per PT, pain limiting therapy sessions   Continue ATC tylenol   Increase gabapentin from 100 mg BID to TID       I have spent a total time of 60 minutes on 03/20/24 in caring for this patient including Patient and family education, Counseling / Coordination of care, Documenting in the medical record, and Communicating with other healthcare professionals .      All medications and routine orders were reviewed and updated as needed.    Chief Complaint     STR follow up visit    Past Medical and Surgica History      Past Medical History:   Diagnosis Date    Diabetes mellitus (HCC)     GERD (gastroesophageal reflux disease)     Hyperlipidemia     Hypertension     Laryngeal cancer (HCC)      Past Surgical History:   Procedure Laterality Date    GALLBLADDER SURGERY  1968    SC LARYNGOSCOPY W/BIOPSY MICROSCOPE/TELESCOPE N/A 2/15/2023    Procedure: MICROLARYGOSCOPY AND BIOPSY; FROZEN SECTION;  Surgeon: Bennett Butler MD;  Location: [facility];  Service: ENT    SC OPTX FEM SHFT FX W/INSJ IMED IMPLT W/WO SCREW Right 3/5/2024    Procedure: INSERTION NAIL IM FEMUR ANTEGRADE (TROCHANTERIC);  Surgeon: Sean Molina MD;  Location: [facility];  Service: Orthopedics     Allergies   Allergen Reactions    Penicillins Other (See Comments)     \"Unknown Reaction\"          History of Present Illness     Patient is a 83 y.o. old male being seen at [facility] for follow up of acute and chronic medical conditions. He is seen and examined sitting in chair without acute distress. He is accompanied by his son and wife at bedside. He has some bruising and swelling around right hip incision site. Reports 0/10 pain at present while resting but with activity pain gets to 4/10. We had PCC meeting with patient, family,  and OT today. OT reported some limitations in therapy sessions related to pain. He is min assist with lower body " dressing and bathing and has been having difficulties with tub transfer. OT reporting leg lift of right leg is about 6 inch from the ground inhibiting him from stepping over tub wall. Discussion of respite care and DARRICK were initiated and  provided family education regarding options. All questions were answered.           The patient's allergies, past medical, surgical, social and family history were reviewed and unchanged.    Review of Systems     Review of Systems   Musculoskeletal:  Positive for arthralgias and gait problem.   Neurological:  Positive for weakness.   Psychiatric/Behavioral:  Positive for sleep disturbance.    All other systems reviewed and are negative.        Objective     Vitals:   Vitals:    03/20/24 1521   BP: 133/66   Pulse: 55   Temp: (!) 97 °F (36.1 °C)   SpO2: 92%       Labs Reviewed  CBC:   Results from Last 12 Months   Lab Units 03/18/24  0712 03/14/24  1227   WBC Thousand/uL 6.81 6.09   RBC Million/uL 2.67* 2.71*   HEMOGLOBIN g/dL 8.5* 8.5*   HEMATOCRIT % 26.1* 27.1*   MCV fL 98 100*   MCH pg 31.8 31.4   MCHC g/dL 32.6 31.4   RDW % 14.2 13.7   MPV fL 10.5 9.8   PLATELETS Thousands/uL 383 351   NRBC AUTO /100 WBCs  --  0   NEUTROS PCT %  --  69   LYMPHS PCT %  --  16   MONOS PCT %  --  10   EOS PCT %  --  2   BASOS PCT %  --  1   NEUTROS ABS Thousands/µL  --  4.25   LYMPHS ABS Thousands/µL  --  1.00   MONOS ABS Thousand/µL  --  0.61   EOS ABS Thousand/µL  --  0.11     Chemistry Profile:   Results from Last 12 Months   Lab Units 03/18/24  0712 03/08/24  0538 03/07/24  0514 03/06/24  0526 03/04/24  1830 10/30/23  0956   POTASSIUM mmol/L 3.6   < > 3.6 4.0   < > 4.1   CHLORIDE mmol/L 102   < > 101 100   < > 101   CO2 mmol/L 26   < > 26 27   < > 26   BUN mg/dL 17   < > 20 18   < > 18   CREATININE mg/dL 1.04   < > 1.15 1.24   < > 1.08   GLUCOSE FASTING mg/dL  --   --   --   --   --  221*   GLUCOSE RANDOM mg/dL 137   < > 166* 208*   < >  --    CALCIUM mg/dL 8.5   < > 7.6* 7.7*   <  > 9.0   CORRECTED CALCIUM mg/dL  --   --   --  8.3  --   --    MAGNESIUM mg/dL  --   --  1.7* 1.5*   < >  --    PHOSPHORUS mg/dL  --   --   --  5.2*   < >  --    AST U/L  --   --   --  41*   < > 23   ALT U/L  --   --   --  30   < > 23   ALK PHOS U/L  --   --   --  71   < > 79   EGFR ml/min/1.73sq m 65   < > 58 53   < > 62    < > = values in this interval not displayed.         Physical Exam  Vitals reviewed.   Constitutional:       General: He is not in acute distress.     Appearance: Normal appearance. He is not ill-appearing, toxic-appearing or diaphoretic.   HENT:      Head: Normocephalic and atraumatic.   Eyes:      Conjunctiva/sclera: Conjunctivae normal.   Cardiovascular:      Rate and Rhythm: Normal rate and regular rhythm.      Pulses: Normal pulses.      Heart sounds: Normal heart sounds. No murmur heard.  Pulmonary:      Effort: Pulmonary effort is normal. No respiratory distress.      Breath sounds: Normal breath sounds.   Abdominal:      General: Bowel sounds are normal. There is no distension.      Palpations: Abdomen is soft.      Tenderness: There is no abdominal tenderness.   Musculoskeletal:      Right lower leg: No edema.      Left lower leg: No edema.   Skin:     General: Skin is warm and dry.      Findings: Bruising present.      Comments: Bruising around incision R hip   Neurological:      General: No focal deficit present.      Mental Status: He is alert and oriented to person, place, and time.      Motor: Weakness present.      Gait: Gait abnormal.   Psychiatric:         Mood and Affect: Mood normal.         Behavior: Behavior normal.         Thought Content: Thought content normal.         Pertinent Laboratory/Diagnostic Studies:   Reviewed in facility chart-stable      Current Medications   Medications reviewed and updated see facility MAR for details.      Current Outpatient Medications:     acetaminophen (TYLENOL) 325 mg tablet, Take 3 tablets (975 mg total) by mouth every 8 (eight) hours,  "Disp: , Rfl:     gabapentin (NEURONTIN) 100 mg capsule, Take 1 capsule (100 mg total) by mouth 3 (three) times a day, Disp: , Rfl:     hydroCHLOROthiazide 25 mg tablet, Take 1 tablet (25 mg total) by mouth daily, Disp: , Rfl:     melatonin 3 mg, Take 1 tablet (3 mg total) by mouth daily at bedtime, Disp: , Rfl:        Please note:  Voice-recognition software may have been used in the preparation of this document.  Occasional wrong word or \"sound-alike\" substitutions may have occurred due to the inherent limitations of voice recognition software.  Interpretation should be guided by context.         MICHAEL Humphrey    "

## 2024-03-20 NOTE — ASSESSMENT & PLAN NOTE
In the setting of recent hip fracture  Continue PT/OT  Maintain fall and safety precautions   following for discharge planning- PCC meeting today with SW, OT, and family

## 2024-03-20 NOTE — TELEPHONE ENCOUNTER
Hello,  Please advise if the following patient can be forced onto the schedule:    Patient: Zhao Walls    : 1940    MRN: [id number]    Call back #: [phone]    Insurance: [employer]    Reason for appointment: POST  OP Im Nail    Requested doctor and/or location: Jesse/naima      Thank you.

## 2024-03-20 NOTE — ASSESSMENT & PLAN NOTE
"Reports difficulty \"shutting off his thoughts\" at night   Does not take anything for sleep normally   Will trial melatonin 3 mg QHS  "

## 2024-03-20 NOTE — ASSESSMENT & PLAN NOTE
Reports 0/10 pain when resting, pain increases to 4/10 with movement   Per PT, pain limiting therapy sessions   Continue ATC tylenol   Increase gabapentin from 100 mg BID to TID

## 2024-03-20 NOTE — ASSESSMENT & PLAN NOTE
S/p fall  S/p right femoral intramedullary nail on 3/5  Weightbearing as tolerated right lower extremity  Pain control with Tylenol ATC and gabapentin  DVT ppx: lovenox; to complete DVT ppx for 28 days post-op; anticipate switching to aspirin BID on discharge   Continue PT/OT  Follow up with Orthopedics

## 2024-03-21 DIAGNOSIS — S72.141A CLOSED FRACTURE OF FEMUR, INTERTROCHANTERIC, RIGHT, INITIAL ENCOUNTER (HCC): Primary | ICD-10-CM

## 2024-03-21 NOTE — TELEPHONE ENCOUNTER
Patient is currently in-patient at , spoke to nurse and advised that sutures can be removed and an order for x-ray was placed in patient chart, they were also advise to upload pictures to patient's chart of they have any questions about the patients incision.

## 2024-03-22 ENCOUNTER — APPOINTMENT (OUTPATIENT)
Dept: RADIOLOGY | Facility: HOSPITAL | Age: 84
End: 2024-03-22
Payer: COMMERCIAL

## 2024-03-22 ENCOUNTER — NURSING HOME VISIT (OUTPATIENT)
Dept: GERIATRICS | Facility: OTHER | Age: 84
End: 2024-03-22
Payer: COMMERCIAL

## 2024-03-22 VITALS
OXYGEN SATURATION: 92 % | BODY MASS INDEX: 30.84 KG/M2 | DIASTOLIC BLOOD PRESSURE: 72 MMHG | SYSTOLIC BLOOD PRESSURE: 160 MMHG | RESPIRATION RATE: 18 BRPM | WEIGHT: 202.8 LBS | TEMPERATURE: 97.9 F | HEART RATE: 72 BPM

## 2024-03-22 DIAGNOSIS — R26.2 AMBULATORY DYSFUNCTION: ICD-10-CM

## 2024-03-22 DIAGNOSIS — R42 DIZZINESS: ICD-10-CM

## 2024-03-22 DIAGNOSIS — I10 PRIMARY HYPERTENSION: Primary | ICD-10-CM

## 2024-03-22 DIAGNOSIS — K21.9 GASTROESOPHAGEAL REFLUX DISEASE WITHOUT ESOPHAGITIS: ICD-10-CM

## 2024-03-22 DIAGNOSIS — S72.144D CLOSED NONDISPLACED INTERTROCHANTERIC FRACTURE OF RIGHT FEMUR WITH ROUTINE HEALING, SUBSEQUENT ENCOUNTER: ICD-10-CM

## 2024-03-22 PROCEDURE — 73552 X-RAY EXAM OF FEMUR 2/>: CPT

## 2024-03-22 PROCEDURE — 99309 SBSQ NF CARE MODERATE MDM 30: CPT

## 2024-03-22 RX ORDER — HYDRALAZINE HYDROCHLORIDE 10 MG/1
10 TABLET, FILM COATED ORAL EVERY 6 HOURS PRN
Status: ON HOLD | COMMUNITY

## 2024-03-22 NOTE — ASSESSMENT & PLAN NOTE
S/p fall; right femoral intramedullary nail on 3/5  WBAT right lower extremity  Sutures removed 3/21; incision appears to be healing well without signs of infection. Xray done 3/22, awaiting results   Pain control as appropriate  Continue Tylenol 975mg TID   Continue gabapentin 100mg TID  DVT ppx: lovenox; to complete DVT ppx for 28 days post-op; anticipate switching to aspirin BID on discharge   Continue PT/OT  Follow up with Orthopedics

## 2024-03-22 NOTE — ASSESSMENT & PLAN NOTE
In the setting of recent right hip fracture  Continue PT/OT   Assist with ADLs  Encourage PO nutrition and hydration.   following for discharge planning.  Maintain fall and safety precautions.

## 2024-03-22 NOTE — PROGRESS NOTES
[facility]  [address] [address] [phone]  FACILITY: [facility]itional [facility] Facility  Code 31 (STR)  Follow up visit      NAME: Zhao Walls  AGE: 83 y.o. SEX: male CODE STATUS: CPR    DATE OF ENCOUNTER: 3/22/2024    Assessment and Plan     1. Primary hypertension  Assessment & Plan:  BP remains elevated 's-170's  No cardiac complaints  Home regimen: Olmesartan 40 mg daily, amlodipine 10 mg daily  Carvedilol 3.125 mg twice daily on 3/15/2024 but was subsequently discontinued due to bradycardia  As of 3/18/2024 started on HCTZ 25 mg daily  Adjusted timing of Amlodipine to evening  Continue olmesartan and HCTZ in the am  Hydralazine 10 mg ordered PRN for SBP >180  Avoid hypotension  Monitor blood pressure      2. Gastroesophageal reflux disease without esophagitis  Assessment & Plan:  Per patient stable  Continue  pantoprazole daily   Avoid citrus, spicy, caffeine, and chocolate  Avoid eating/drinking 1 hour prior to laying down  Continue to monitor       3. Closed nondisplaced intertrochanteric fracture of right femur with routine healing, subsequent encounter  Assessment & Plan:  S/p fall; right femoral intramedullary nail on 3/5  WBAT right lower extremity  Sutures removed 3/21; incision appears to be healing well without signs of infection. Xray done 3/22, awaiting results   Pain control as appropriate  Continue Tylenol 975mg TID   Continue gabapentin 100mg TID  DVT ppx: lovenox; to complete DVT ppx for 28 days post-op; anticipate switching to aspirin BID on discharge   Continue PT/OT  Follow up with Orthopedics         4. Ambulatory dysfunction  Assessment & Plan:  In the setting of recent right hip fracture  Continue PT/OT   Assist with ADLs  Encourage PO nutrition and hydration.   following for discharge planning.  Maintain fall and safety precautions.      5. Dizziness  Assessment & Plan:  Patient reported dizziness upon standing with physical  "therapy  Patient denies history of dizziness  Appears euvolemic  Orthostatic vitals ordered   Encourage PO hydration  Maintain fall and safety precautions.   Continue to monitor        All medications and routine orders were reviewed and updated as needed.    Chief Complaint     STR follow up visit    Past Medical and Surgica History      Past Medical History:   Diagnosis Date    Diabetes mellitus (HCC)     GERD (gastroesophageal reflux disease)     Hyperlipidemia     Hypertension     Laryngeal cancer (HCC)      Past Surgical History:   Procedure Laterality Date    GALLBLADDER SURGERY  1968    NM LARYNGOSCOPY W/BIOPSY MICROSCOPE/TELESCOPE N/A 2/15/2023    Procedure: MICROLARYGOSCOPY AND BIOPSY; FROZEN SECTION;  Surgeon: Bennett [facility] MD;  Location: [facility];  Service: ENT    NM OPTX FEM SHFT FX W/INSJ IMED IMPLT W/WO SCREW Right 3/5/2024    Procedure: INSERTION NAIL IM FEMUR ANTEGRADE (TROCHANTERIC);  Surgeon: Sean Molina MD;  Location: [facility];  Service: Orthopedics     Allergies   Allergen Reactions    Penicillins Other (See Comments)     \"Unknown Reaction\"      History of Present Illness     Zhao Walls is a 83-year-old male with PMH including but not limited to HTN, DM2, BPH, HLD, and GERD.   Patient was hospitalized at [facility] from 3/4 to 3/24/24 with right hip pain s/p fall. [place] use [place] restroom, lost  on his walker and tripped and fell. Denied headstrike/LOC) and found to have right intertrochanteric fracture. Patient evaluated by Orthopedics; s/p right intermedullary nail on 3/5/24; WBAT; of note incidentally COVID positive on 3/4 and completed isolation period.    Patient being seen and examined for acute and chronic medical conditions. Upon exam patient was resting in recliner watching TV. Patient was in good spirits. Patient alert and oriented x4. Has concerns about he and his wife's future as they age. During our exam his " brother in law called and did a prayer with us via speaker phone. Patient became tearful and expressed his sadness of his injury and were he goes from here. SW working with patient and family on discharge planning. Patient states his pain is manageable but notices it more with physical therapy. His sutures were removed yesterday and are CHRISTOPHE. Incision appears to be healing well, bruising improving. Picture added to patients chart. Patient reports his appetite is good and last BM was this am. He is in no acute distress and denies SOB, CP, abdominal pain, N/V/C/D.       The patient's allergies, past medical, surgical, social and family history were reviewed and unchanged.    Review of Systems     Review of Systems   Constitutional:  Negative for appetite change, chills, fatigue and fever.   HENT:  Negative for congestion, rhinorrhea, sore throat and trouble swallowing.    Respiratory:  Negative for cough and shortness of breath.    Cardiovascular:  Negative for chest pain and palpitations.   Gastrointestinal:  Negative for abdominal pain, blood in stool, constipation, diarrhea, nausea and vomiting.   Genitourinary:  Negative for difficulty urinating, dysuria and hematuria.   Musculoskeletal:  Positive for arthralgias and gait problem.   Neurological:  Positive for dizziness, weakness and light-headedness (upon standing). Negative for headaches.   Psychiatric/Behavioral:  Negative for sleep disturbance (improved).    All other systems reviewed and are negative.    Objective     Vitals:   Vitals:    03/22/24 1519   BP: 160/72   Pulse: 72   Resp: 18   Temp: 97.9 °F (36.6 °C)   SpO2: 92%       Labs Reviewed  CBC:   Results from Last 12 Months   Lab Units 03/21/24  0425 03/18/24  0712 03/14/24  1227   WBC Thousand/uL  --  6.81 6.09   RBC Million/uL  --  2.67* 2.71*   HEMOGLOBIN g/dL 8.9* 8.5* 8.5*   HEMATOCRIT % 27.0* 26.1* 27.1*   MCV fL  --  98 100*   MCH pg  --  31.8 31.4   MCHC g/dL  --  32.6 31.4   RDW %  --  14.2 13.7  "  MPV fL  --  10.5 9.8   PLATELETS Thousands/uL  --  383 351   NRBC AUTO /100 WBCs  --   --  0   NEUTROS PCT %  --   --  69   LYMPHS PCT %  --   --  16   MONOS PCT %  --   --  10   EOS PCT %  --   --  2   BASOS PCT %  --   --  1   NEUTROS ABS Thousands/µL  --   --  4.25   LYMPHS ABS Thousands/µL  --   --  1.00   MONOS ABS Thousand/µL  --   --  0.61   EOS ABS Thousand/µL  --   --  0.11     Chemistry Profile:   Results from Last 12 Months   Lab Units 03/21/24  0425 03/08/24  0538 03/07/24  0514 03/06/24  0526 03/04/24  1830 10/30/23  0956   POTASSIUM mmol/L 4.3   < > 3.6 4.0   < > 4.1   CHLORIDE mmol/L 102   < > 101 100   < > 101   CO2 mmol/L 25   < > 26 27   < > 26   BUN mg/dL 18   < > 20 18   < > 18   CREATININE mg/dL 1.09   < > 1.15 1.24   < > 1.08   GLUCOSE FASTING mg/dL  --   --   --   --   --  221*   GLUCOSE RANDOM mg/dL 138   < > 166* 208*   < >  --    CALCIUM mg/dL 8.5   < > 7.6* 7.7*   < > 9.0   CORRECTED CALCIUM mg/dL  --   --   --  8.3  --   --    MAGNESIUM mg/dL  --   --  1.7* 1.5*   < >  --    PHOSPHORUS mg/dL  --   --   --  5.2*   < >  --    AST U/L  --   --   --  41*   < > 23   ALT U/L  --   --   --  30   < > 23   ALK PHOS U/L  --   --   --  71   < > 79   EGFR ml/min/1.73sq m 61   < > 58 53   < > 62    < > = values in this interval not displayed.     Coagulation Studies:   Results from Last 12 Months   Lab Units 03/04/24  1830   PROTIME seconds 14.7*   INR  1.16   PTT seconds 27     Cardiac Studies: No results for input(s): \"NTBNP\", \"BNP\", \"TROPONINI\", \"POCTROP\" in the last 8784 hours.      Physical Exam  Vitals and nursing note reviewed.   Constitutional:       General: He is not in acute distress.     Appearance: Normal appearance. He is not ill-appearing, toxic-appearing or diaphoretic.   HENT:      Head: Normocephalic and atraumatic.      Right Ear: External ear normal.      Left Ear: External ear normal.      Nose: No congestion or rhinorrhea.      Mouth/Throat:      Mouth: Mucous membranes are " moist.   Eyes:      General: No scleral icterus.        Right eye: No discharge.         Left eye: No discharge.      Conjunctiva/sclera: Conjunctivae normal.   Cardiovascular:      Rate and Rhythm: Normal rate and regular rhythm.      Pulses: Normal pulses.      Heart sounds: Normal heart sounds. No murmur heard.  Pulmonary:      Effort: Pulmonary effort is normal. No respiratory distress.      Breath sounds: Normal breath sounds. No wheezing, rhonchi or rales.   Abdominal:      General: Bowel sounds are normal. There is no distension.      Palpations: Abdomen is soft.      Tenderness: There is no abdominal tenderness. There is no guarding or rebound.   Musculoskeletal:      Right lower leg: No edema.      Left lower leg: No edema.      Comments: 2 incisions right hip, sutures removed, CHRISTOPHE, no signs of infection    Skin:     General: Skin is warm and dry.      Findings: Bruising (right hip) present.   Neurological:      General: No focal deficit present.      Mental Status: He is alert and oriented to person, place, and time.      Motor: Weakness present.      Gait: Gait abnormal.   Psychiatric:         Mood and Affect: Mood normal.         Behavior: Behavior normal.         Thought Content: Thought content normal.         Pertinent Laboratory/Diagnostic Studies:   Reviewed in facility chart-stable      Current Medications   Medications reviewed and updated see facility MAR for details.      Current Outpatient Medications:     acetaminophen (TYLENOL) 325 mg tablet, Take 3 tablets (975 mg total) by mouth every 8 (eight) hours, Disp: , Rfl:     gabapentin (NEURONTIN) 100 mg capsule, Take 1 capsule (100 mg total) by mouth 3 (three) times a day, Disp: , Rfl:     hydroCHLOROthiazide 25 mg tablet, Take 1 tablet (25 mg total) by mouth daily, Disp: , Rfl:     melatonin 3 mg, Take 1 tablet (3 mg total) by mouth daily at bedtime, Disp: , Rfl:        Please note:  Voice-recognition software may have been used in the  "preparation of this document.  Occasional wrong word or \"sound-alike\" substitutions may have occurred due to the inherent limitations of voice recognition software.  Interpretation should be guided by context.         MICHAEL Moore    "

## 2024-03-22 NOTE — ASSESSMENT & PLAN NOTE
Baseline Hgb appears to be 13-14 range  Acutely lower in post-op setting  Hgb trending up 8.9>8.5>8.5>8.2  No present signs of acute bleed  No recent iron level; recent B12 low at 140, patient started on B12 at STR  Consider further workup, heme consult for persistent/worsening symptoms   Transfuse for Hgb <7 PRN   Continue to monitor for acute changes in condition.

## 2024-03-22 NOTE — ASSESSMENT & PLAN NOTE
BP remains elevated 's-170's  No cardiac complaints  Home regimen: Olmesartan 40 mg daily, amlodipine 10 mg daily  Carvedilol 3.125 mg twice daily on 3/15/2024 but was subsequently discontinued due to bradycardia  As of 3/18/2024 started on HCTZ 25 mg daily  Adjusted timing of Amlodipine to evening  Continue olmesartan and HCTZ in the am  Hydralazine 10 mg ordered PRN for SBP >180  Avoid hypotension  Monitor blood pressure

## 2024-03-22 NOTE — ASSESSMENT & PLAN NOTE
Patient reported dizziness upon standing with physical therapy  Patient denies history of dizziness  Appears euvolemic  Orthostatic vitals ordered   Encourage PO hydration  Maintain fall and safety precautions.   Continue to monitor

## 2024-03-25 ENCOUNTER — NURSING HOME VISIT (OUTPATIENT)
Dept: GERIATRICS | Facility: OTHER | Age: 84
End: 2024-03-25
Payer: COMMERCIAL

## 2024-03-25 DIAGNOSIS — G89.11 ACUTE PAIN DUE TO TRAUMA: ICD-10-CM

## 2024-03-25 DIAGNOSIS — E11.8 TYPE 2 DIABETES MELLITUS WITH COMPLICATION (HCC): ICD-10-CM

## 2024-03-25 DIAGNOSIS — D64.9 POSTOPERATIVE ANEMIA: ICD-10-CM

## 2024-03-25 DIAGNOSIS — S72.144D CLOSED NONDISPLACED INTERTROCHANTERIC FRACTURE OF RIGHT FEMUR WITH ROUTINE HEALING, SUBSEQUENT ENCOUNTER: Primary | ICD-10-CM

## 2024-03-25 DIAGNOSIS — I10 PRIMARY HYPERTENSION: ICD-10-CM

## 2024-03-25 PROCEDURE — 99309 SBSQ NF CARE MODERATE MDM 30: CPT | Performed by: STUDENT IN AN ORGANIZED HEALTH CARE EDUCATION/TRAINING PROGRAM

## 2024-03-25 NOTE — ASSESSMENT & PLAN NOTE
Acute pain of R hip region in setting of fall, fracture, surgery  Current regimen: tylenol 975mg TID, gabapentin 100mg TID  Monitor pain - feels overall well controlled on present regimen, no indication for opioid, can consider topical lidocaine patch vs voltaren if needed  Adjust/wean regimen as appropriate  Follow up with PCP, Orthopedics

## 2024-03-25 NOTE — ASSESSMENT & PLAN NOTE
No acute cardiac complaints  Avoid hypotension  Baseline regimen: olmesartan 40mg daily, amlodipine 10mg daily with hold parameters  Monitor BP - had been consistently above goal generally 160s-180s initially at rehab; after regimen titration and retiming now a bit better generally 130s-150s systolic  Pulse has been generally 60s-70s recently  As of 3/15 started on carvedilol 3.125mg BID with hold parameters and added hydralazine 10mg TID PRN SBP > 180  As of 3/18 due to borderline bradycardia, stopped carvedilol and started HCTZ 25mg daily with hold parameters  Adjust regimen as appropriate  Follow up with PCP, Cardiology as appropriate; would recommend getting echocardiogram in outpatient setting at discretion of PCP given his HTN and EKG findings

## 2024-03-25 NOTE — ASSESSMENT & PLAN NOTE
Lab Results   Component Value Date    HGBA1C 7.7 (H) 03/04/2024     A1c goal of <= 7.5 would be reasonable for age  Monitor glucose - generally mid 100s recently  Avoid hypoglycemia  Continue metformin 1000mg BID  Cover with sliding scale insulin TIDAC+HS at rehab  Follow up with PCP and as appropriate Endocrine/Ophthalmology/Podiatry outpatient; consider additional diabetic agent at discretion of outpatient team

## 2024-03-25 NOTE — ASSESSMENT & PLAN NOTE
Baseline Hb appears in 13-14 range  Acutely lower in post-op setting  Borderline macrocytic  -monitor on routine labs - stable/improving gradually  -monitor for acute bleed - no present signs  -no recent iron level on file  -recent B12 low at 140 - started on B12 supplement at rehab  -consider further workup, Heme consult if persistent/worsening  -transfuse PRN Hb <7

## 2024-03-25 NOTE — PROGRESS NOTES
[facility]  Facility: [facility]    PROGRESS NOTE  Nursing Home Place of Service: nursing home place of service: POS 31 Skilled Care-Part A Coverage    NAME: Zhao Walls  : 1940 AGE: 83 y.o. SEX: male MRN: [id number]  DATE OF ENCOUNTER: 3/25/2024    Assessment and Plan     Problem List Items Addressed This Visit       Hypertension     No acute cardiac complaints  Avoid hypotension  Baseline regimen: olmesartan 40mg daily, amlodipine 10mg daily with hold parameters  Monitor BP - had been consistently above goal generally 160s-180s initially at rehab; after regimen titration and retiming now a bit better generally 130s-150s systolic  Pulse has been generally 60s-70s recently  As of 3/15 started on carvedilol 3.125mg BID with hold parameters and added hydralazine 10mg TID PRN SBP > 180  As of 3/18 due to borderline bradycardia, stopped carvedilol and started HCTZ 25mg daily with hold parameters  Adjust regimen as appropriate  Follow up with PCP, Cardiology as appropriate; would recommend getting echocardiogram in outpatient setting at discretion of PCP given his HTN and EKG findings         Type 2 diabetes mellitus with complication (HCC)       Lab Results   Component Value Date    HGBA1C 7.7 (H) 2024     A1c goal of <= 7.5 would be reasonable for age  Monitor glucose - generally mid 100s recently  Avoid hypoglycemia  Continue metformin 1000mg BID  Cover with sliding scale insulin TIDAC+HS at rehab  Follow up with PCP and as appropriate Endocrine/Ophthalmology/Podiatry outpatient; consider additional diabetic agent at discretion of outpatient team         Postoperative anemia     Baseline Hb appears in 13-14 range  Acutely lower in post-op setting  Borderline macrocytic  -monitor on routine labs - stable/improving gradually  -monitor for acute bleed - no present signs  -no recent iron level on file  -recent B12 low at 140 - started on B12 supplement  at rehab  -consider further workup, Heme consult if persistent/worsening  -transfuse PRN Hb <7         Intertrochanteric fracture (HCC) - Primary     S/p fall  S/p right femoral intramedullary nail on 3/5  Weightbearing as tolerated right lower extremity  Pain control as appropriate  DVT ppx: lovenox; to complete DVT ppx for 28 days post-op; anticipate switching to aspirin BID on discharge as mentioned in Orthopedic note (or if unable/unwilling to take lovenox outpatient)  Encourage IS  PT/OT  Follow up with PCP, Orthopedics             Acute pain due to trauma     Acute pain of R hip region in setting of fall, fracture, surgery  Current regimen: tylenol 975mg TID, gabapentin 100mg TID  Monitor pain - feels overall well controlled on present regimen, no indication for opioid, can consider topical lidocaine patch vs voltaren if needed  Adjust/wean regimen as appropriate  Follow up with PCP, Orthopedics                  Chief Complaint     Follow up post-op, pain    History of Present Illness     Zhao Walls is a 83 y.o. male who was seen today for follow up. PMH including HTN, DM2, BPH, HLD, GERD     Patient seen and examined in room  Others present: none  Patient seated in chair  Appears comfortable, awake, alert, oriented to situation, able to converse appropriately  Polite, Aox3, in good spirits. Notes he feels well today and that his weekend was good. No acute concerns. He feels therapy is going well, feels he is making slow but steady progress still and now thinking about his plans on returning home. Reports his R hip pain is overall notably better than last week and controlled on present regimen overall, mainly with tylenol alone. Appetite stable. No CP/palpitations/headaches. Last BM today normal for him (reported since many years he has several soft but formed bowel movements per day). Urinating without acute issue. No acute cardiopulmonary, abdominal, or urinary symptoms; see ROS for more details.  "    No further questions or acute concerns identified.    Lab Review:   3/14: BMP generally non-actionable, Cr 1.08, GFR 62 (baseline seems in 60s); CBC with Hb 8.5 (improved, baseline 13-14); UA 3/4 unremarkable for infection; blood culture 3/4 NGTD; COVID positive 3/4; TSH WNL from 3/4; A1c 7.7 from 3/4  3/18: BMP generally stable/non-actionable; CBC with Hb 8.5 (stable)  3/21: BMP generally stable/non-actionable; Hb 8.9  3/25: CBC with Hb 9.3        CXR 3/4 \"No acute cardiopulmonary disease \"  XR pelvis 3/4 \"Acute displaced right femoral intertrochanteric fracture. \"  XR R femur 3/4 \"Acute displaced right femoral intertrochanteric fracture. Remainder of the femur is intact. \"  XR R femur 3/22 \"Right hip ORIF \"        EKG 3/4: sinus tachycardia with short OH, RAD, incomplete RBBB, 123bpm, Qtc 498  No recent echo on file       The following portions of the patient's history were reviewed and updated as appropriate: allergies, current medications, past family history, past medical history, past social history, past surgical history and problem list.    Review of Systems     Review of Systems   Constitutional:  Negative for appetite change, chills, diaphoresis and fever.   HENT:  Positive for voice change (hoarse voice since cancer treatment last year). Negative for drooling, ear pain, hearing loss, rhinorrhea, sore throat and trouble swallowing.    Eyes:  Negative for pain, discharge, redness, itching and visual disturbance.   Respiratory:  Negative for cough, chest tightness, shortness of breath and wheezing.    Cardiovascular:  Negative for chest pain, palpitations and leg swelling.   Gastrointestinal:  Negative for abdominal pain, blood in stool, constipation, diarrhea, nausea and vomiting.   Genitourinary:  Negative for difficulty urinating, dysuria, flank pain and hematuria.   Musculoskeletal:  Positive for arthralgias (improving), back pain and gait problem. Negative for neck pain.   Skin:  Negative for color " change.   Neurological:  Positive for weakness (stable/gradually improving). Negative for dizziness, tremors, seizures, facial asymmetry, light-headedness and headaches.   Psychiatric/Behavioral:  Negative for agitation, behavioral problems and confusion. The patient is not nervous/anxious and is not hyperactive.        Active Problem List     Patient Active Problem List   Diagnosis    Hypertension    Type 2 diabetes mellitus with complication ([facility])    Allergic rhinitis    Postoperative anemia    Benign prostatic hyperplasia    Hyperlipidemia    Obesity    Left-sided low back pain with left-sided sciatica    Obesity (BMI 30.0-34.9)    Cataracta    Pre-op examination    Gastroesophageal reflux disease without esophagitis    Lumbar radiculopathy    Allergies    Postnasal drip    Polyneuropathy    Laryngeal squamous cell carcinoma (HCC)    Fall    Intertrochanteric fracture ([facility])    Acute pain due to trauma    Ambulatory dysfunction    At risk for constipation    At risk for delirium    Advance care planning    COVID-19    Age-related osteoporosis with current pathological fracture with routine healing    Other insomnia    Closed fracture of femur, intertrochanteric, right, initial encounter ([facility])    Dizziness       Objective     Vital Signs:     BP: 121/65 mmHg  3/25/2024 15:42   Temp:97.7 °F  3/25/2024 15:42 Pulse:63 bpm  3/25/2024 15:42   Weight:202.8 Lbs  3/22/2024 05:50   Resp:20 Breaths/min  3/25/2024 15:42 BS:148 mg/dL  3/25/2024 12:30 O2:95 %  3/25/2024 15:42 Pain:0  3/25/2024 12:47       Physical Exam  Vitals reviewed.   Constitutional:       General: He is not in acute distress.     Appearance: He is not toxic-appearing or diaphoretic.   HENT:      Head: Normocephalic and atraumatic.      Right Ear: External ear normal.      Left Ear: External ear normal.      Nose: Nose normal. No rhinorrhea.      Mouth/Throat:      Mouth: Mucous membranes are dry.      Pharynx: Oropharynx is clear. No oropharyngeal exudate  or posterior oropharyngeal erythema.   Eyes:      General: No scleral icterus.        Right eye: No discharge.         Left eye: No discharge.      Extraocular Movements: Extraocular movements intact.      Conjunctiva/sclera: Conjunctivae normal.      Pupils: Pupils are equal, round, and reactive to light.   Cardiovascular:      Rate and Rhythm: Normal rate and regular rhythm.   Pulmonary:      Effort: Pulmonary effort is normal. No respiratory distress.      Breath sounds: No wheezing or rales.   Abdominal:      General: Bowel sounds are normal.      Palpations: Abdomen is soft.      Tenderness: There is no abdominal tenderness. There is no guarding.   Musculoskeletal:         General: No tenderness.      Cervical back: No rigidity.      Comments: No calf tenderness bilaterally  RLE with trace edema  R lateral thigh open to air with stable/fading bruising; operative sites appear clean/dry with no bleed/drainage   Skin:     General: Skin is warm and dry.      Coloration: Skin is not jaundiced.   Neurological:      General: No focal deficit present.      Mental Status: He is alert and oriented to person, place, and time. Mental status is at baseline.   Psychiatric:         Mood and Affect: Mood normal.         Behavior: Behavior normal.         Thought Content: Thought content normal.         Judgment: Judgment normal.         Pertinent Laboratory/Diagnostic Studies:  Laboratory and Imaging studies reviewed. Full report in the paper chart.     Current Medications   Medications reviewed and updated in facility chart.      -Total time spent on this encounter today including documentation and workup review, face to face time, history and exam, and documentation/orders was approximately 30 minutes.  -This note will be copied to [facility] EMR where vitals and medication orders are placed.    Jaswant Petty D.O.  Geriatric Medicine  3/25/2024 4:19 PM

## 2024-04-01 ENCOUNTER — NURSING HOME VISIT (OUTPATIENT)
Dept: GERIATRICS | Facility: OTHER | Age: 84
End: 2024-04-01
Payer: COMMERCIAL

## 2024-04-01 DIAGNOSIS — G89.11 ACUTE PAIN DUE TO TRAUMA: ICD-10-CM

## 2024-04-01 DIAGNOSIS — I10 PRIMARY HYPERTENSION: ICD-10-CM

## 2024-04-01 DIAGNOSIS — M54.16 LUMBAR RADICULOPATHY: ICD-10-CM

## 2024-04-01 DIAGNOSIS — N40.0 BENIGN PROSTATIC HYPERPLASIA, UNSPECIFIED WHETHER LOWER URINARY TRACT SYMPTOMS PRESENT: ICD-10-CM

## 2024-04-01 DIAGNOSIS — E11.8 TYPE 2 DIABETES MELLITUS WITH COMPLICATION (HCC): ICD-10-CM

## 2024-04-01 DIAGNOSIS — S72.144D CLOSED NONDISPLACED INTERTROCHANTERIC FRACTURE OF RIGHT FEMUR WITH ROUTINE HEALING, SUBSEQUENT ENCOUNTER: Primary | ICD-10-CM
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
## 2024-04-01 DIAGNOSIS — D64.9 POSTOPERATIVE ANEMIA: ICD-10-CM

## 2024-04-01 DIAGNOSIS — R26.2 AMBULATORY DYSFUNCTION: ICD-10-CM

## 2024-04-01 DIAGNOSIS — C32.9 LARYNGEAL SQUAMOUS CELL CARCINOMA (HCC): ICD-10-CM

## 2024-04-01 DIAGNOSIS — U07.1 COVID-19: ICD-10-CM

## 2024-04-01 DIAGNOSIS — K21.9 GASTROESOPHAGEAL REFLUX DISEASE WITHOUT ESOPHAGITIS: ICD-10-CM

## 2024-04-01 DIAGNOSIS — E78.2 MIXED HYPERLIPIDEMIA: ICD-10-CM

## 2024-04-01 DIAGNOSIS — M80.00XD AGE-RELATED OSTEOPOROSIS WITH CURRENT PATHOLOGICAL FRACTURE WITH ROUTINE HEALING: ICD-10-CM

## 2024-04-01 PROCEDURE — 99316 NF DSCHRG MGMT 30 MIN+: CPT | Performed by: STUDENT IN AN ORGANIZED HEALTH CARE EDUCATION/TRAINING PROGRAM

## 2024-04-01 RX ORDER — HYDROCHLOROTHIAZIDE 25 MG/1
25 TABLET ORAL DAILY
Qty: 30 TABLET | Refills: 0 | Status: SHIPPED | OUTPATIENT
Start: 2024-04-02

## 2024-04-01 NOTE — ASSESSMENT & PLAN NOTE
stable chronic low back pain, reports he follows Orthopedics outpatient for this  Stable on gabapentin

## 2024-04-01 NOTE — ASSESSMENT & PLAN NOTE
-PT/OT  -fall precautions  -encourage appropriate DME use  -SW to follow for safe discharge planning/homecare services as appropriate - being discharged to [facility]/respite, eventual goal to hopefully return home

## 2024-04-01 NOTE — ASSESSMENT & PLAN NOTE
Evidenced by fragility fracture of right femur sustained in fall from standing height  Was evaluated by Endocrine during recent hospitalization  Encourage load bearing exercises as tolerated  Fall precautions, encourage appropriate DME  Recommend Vitamin D 2000u daily, calcium carbonate 1200mg daily in accordance with Endocrine  Consider DEXA in outpatient setting  Consider hypogonadism workup in outpatient setting  Follow up with PCP, Endocrine as appropriate

## 2024-04-01 NOTE — ASSESSMENT & PLAN NOTE
S/p fall  S/p right femoral intramedullary nail on 3/5  Weightbearing as tolerated right lower extremity  Pain control as appropriate  DVT ppx: lovenox; to complete DVT ppx for 28 days post-op (will be completed as of discharge on 4/2)  Encourage IS  PT/OT  Follow up with PCP, Orthopedics

## 2024-04-01 NOTE — PROGRESS NOTES
"[facility]  Facility: [facility]    DISCHARGE SUMMARY  Nursing Home Place of Service: 31: SNF/Short Term Rehab    NAME: Zhao Walls  : 1940 AGE: 83 y.o. SEX: male MRN: [id number]  DATE OF ENCOUNTER: 2024    DATE OF ADMISSION: 3/14/24 DATE OF DISCHARGE:planned 24 DISCHARGE DISPOSITION: snf/respite    HPI: Zhao Walls is a 83 y.o. male with PMH including HTN, DM2, BPH, HLD, GERD     Reason for admission: Patient was hospitalized at [facility] [place] from 3/4 to 3/24/24  For details of hospitalization, see hospital records including discharge documentation  Briefly, patient hospitalized with right hip pain s/p fall (was attempting to go to the bathroom, lost  on his walker, tripped and fell, denied headstrike/LOC); found to have right intertrochanteric fracture; evaluated by Orthopedics; s/p right intermedullary nail on 3/5/24; WBAT; of note incidentally COVID positive on 3/4 and completed isolation period.      Course of stay: Patient was admitted to [facility] for rehabilitation due to physical deconditioning and R hip fracture. Significant events during the stay include: n/a. The patient participated in PT/OT.     Spoke to patient's wife Angelia, JOSE Puckett SHILOH Haynes while patient was at therapy. No acute concerns or questions per family, pleased with patient's care here, amenable to his discharge to respite tomorrow with eventual goal of having him return home.    Patient seen and examined in room following afternoon therapy session  Others present: none  Patient seated in chair  Appears comfortable, awake, alert, oriented to situation, able to converse appropriately  Polite, Aox3, in good spirits. Notes he feels well today and that his weekend went well. No acute concerns. He feels therapy has been going well. Amenable to discharge to respite tomorrow, states it will be \"a new adventure\" and a step " "closer to home for him. Reports his R hip pain is overall much better than last week and controlled on present regimen, mainly with tylenol alone. Appetite stable. No CP/palpitations/headaches. Last BM within last 24 hrs normal for him (reported since many years he has several soft but formed bowel movements per day). Urinating without acute issue. No acute cardiopulmonary, abdominal, or urinary symptoms; see ROS for more details.    No further questions or acute concerns identified.    Lab Review:   3/14: BMP generally non-actionable, Cr 1.08, GFR 62 (baseline seems in 60s); CBC with Hb 8.5 (improved, baseline 13-14); UA 3/4 unremarkable for infection; blood culture 3/4 NGTD; COVID positive 3/4; TSH WNL from 3/4; A1c 7.7 from 3/4  3/18: BMP generally stable/non-actionable; CBC with Hb 8.5 (stable)  3/21: BMP generally stable/non-actionable; Hb 8.9  3/25: CBC with Hb 9.3  4/1: BMP generally stable/non-actionable; Hb 10.4        CXR 3/4 \"No acute cardiopulmonary disease \"  XR pelvis 3/4 \"Acute displaced right femoral intertrochanteric fracture. \"  XR R femur 3/4 \"Acute displaced right femoral intertrochanteric fracture. Remainder of the femur is intact. \"  XR R femur 3/22 \"Right hip ORIF \"        EKG 3/4: sinus tachycardia with short WY, RAD, incomplete RBBB, 123bpm, Qtc 498  No recent echo on file            Assessment/Plan:    Hypertension  No acute cardiac complaints  Avoid hypotension  Baseline regimen: olmesartan 40mg daily, amlodipine 10mg daily with hold parameters  Monitor BP - had been consistently above goal generally 160s-180s initially at rehab; after regimen titration and retiming now a bit better generally 130s-150s systolic  Pulse has been generally 60s-70s recently  As of 3/15 started on carvedilol 3.125mg BID with hold parameters and added hydralazine 10mg TID PRN SBP > 180  As of 3/18 due to borderline bradycardia, stopped carvedilol and started HCTZ 25mg daily with hold parameters  Adjust regimen " as appropriate  Follow up with PCP, Cardiology as appropriate; would recommend getting echocardiogram in outpatient setting at discretion of PCP given his HTN and EKG findings    Laryngeal squamous cell carcinoma (HCC)  Known to PCP and reported stable  No associated concerns presently  Denies dysphagia  Speech Therapy consulted at rehab  s/p XRT May 2023  Follows with ENT and radiation oncology regularly as outpatient, continue close follow-up as directed    Gastroesophageal reflux disease without esophagitis  -stable  -recommend OOB with meals, sit upright for at least 30 minutes afterwards, avoid trigger foods  -continue to monitor  -follow up with PCP, GI as appropriate  -continue PPI    Type 2 diabetes mellitus with complication ([facility])    Lab Results   Component Value Date    HGBA1C 7.7 (H) 03/04/2024     A1c goal of <= 7.5 would be reasonable for age  Monitor glucose - generally mid 100s recently  Avoid hypoglycemia  Continue metformin 1000mg BID  Cover with sliding scale insulin TIDAC+HS at rehab  Follow up with PCP and as appropriate Endocrine/Ophthalmology/Podiatry outpatient; consider additional diabetic agent at discretion of outpatient team    Lumbar radiculopathy  stable chronic low back pain, reports he follows Orthopedics outpatient for this  Stable on gabapentin    Age-related osteoporosis with current pathological fracture with routine healing  Evidenced by fragility fracture of right femur sustained in fall from standing height  Was evaluated by Endocrine during recent hospitalization  Encourage load bearing exercises as tolerated  Fall precautions, encourage appropriate DME  Recommend Vitamin D 2000u daily, calcium carbonate 1200mg daily in accordance with Endocrine  Consider DEXA in outpatient setting  Consider hypogonadism workup in outpatient setting  Follow up with PCP, Endocrine as appropriate    Intertrochanteric fracture (HCC)  S/p fall  S/p right femoral intramedullary nail on  3/5  Weightbearing as tolerated right lower extremity  Pain control as appropriate  DVT ppx: lovenox; to complete DVT ppx for 28 days post-op (will be completed as of discharge on 4/2)  Encourage IS  PT/OT  Follow up with PCP, Orthopedics    Benign prostatic hyperplasia  Stable  Continue tamsulosin  Monitor for retention, infection  Follow up with PCP, Urology as appropriate    Postoperative anemia  Baseline Hb appears in 13-14 range  Acutely lower in post-op setting  Borderline macrocytic  -monitor on routine labs - stable/improving gradually  -monitor for acute bleed - no present signs  -no recent iron level on file  -recent B12 low at 140 - started on B12 supplement at rehab  -consider further workup, Heme consult if persistent/worsening  -transfuse PRN Hb <7    Ambulatory dysfunction  -PT/OT  -fall precautions  -encourage appropriate DME use  -SW to follow for safe discharge planning/homecare services as appropriate - being discharged to [facility]/respite, eventual goal to hopefully return home    Acute pain due to trauma  Acute pain of R hip region in setting of fall, fracture, surgery  Current regimen: tylenol 975mg TID, gabapentin 100mg TID  Monitor pain - feels overall well controlled on present regimen, no indication for opioid, can consider topical lidocaine patch vs voltaren if needed  Adjust/wean regimen as appropriate  Follow up with PCP, Orthopedics    COVID-19  Incidental finding on hospital admission 3/4  Per available hospital records appears to have been generally asymptomatic and did not require supplemental O2 or acute intervention  Has completed appropriate isolation period  Continue to monitor  Supportive care    [place]  [place], lost control of his walker, fell from standing onto R hip; denied associated LOC/headstrike or preceding cardiopulmonary symptoms  -PT/OT  -fall precautions  -encourage appropriate DME use  -SW to follow for safe discharge planning/homecare  services as appropriate    Hyperlipidemia  Continue statin  Follow up with PCP           Review of Systems   Constitutional:  Negative for appetite change, chills, diaphoresis and fever.   HENT:  Positive for voice change (hoarse voice since cancer treatment last year). Negative for drooling, ear pain, hearing loss, rhinorrhea, sore throat and trouble swallowing.    Eyes:  Negative for pain, discharge, redness, itching and visual disturbance.   Respiratory:  Negative for cough, chest tightness, shortness of breath and wheezing.    Cardiovascular:  Negative for chest pain, palpitations and leg swelling.   Gastrointestinal:  Negative for abdominal pain, blood in stool, constipation, diarrhea, nausea and vomiting.   Genitourinary:  Negative for difficulty urinating, dysuria, flank pain and hematuria.   Musculoskeletal:  Positive for arthralgias (improving), back pain and gait problem. Negative for neck pain.   Skin:  Negative for color change.   Neurological:  Positive for weakness (stable/gradually improving). Negative for dizziness, tremors, seizures, facial asymmetry, light-headedness and headaches.   Psychiatric/Behavioral:  Negative for agitation, behavioral problems and confusion. The patient is not nervous/anxious and is not hyperactive.        Vital Signs:     BP: 150/71 mmHg  4/1/2024 07:40 Temp:98.1 °F  4/1/2024 07:40 Pulse:65 bpm  4/1/2024 07:40   Weight:202 Lbs  4/1/2024 05:18   Resp:18 Breaths/min  4/1/2024 07:40 BS:144 mg/dL  4/1/2024 06:11 O2:92 %  4/1/2024 07:45 Pain:0  4/1/2024 00:58       Exam:     Physical Exam  Vitals reviewed.   Constitutional:       General: He is not in acute distress.     Appearance: He is not toxic-appearing or diaphoretic.   HENT:      Head: Normocephalic and atraumatic.      Right Ear: External ear normal.      Left Ear: External ear normal.      Nose: Nose normal. No rhinorrhea.      Mouth/Throat:      Mouth: Mucous membranes are dry.      Pharynx: Oropharynx is clear. No  oropharyngeal exudate or posterior oropharyngeal erythema.   Eyes:      General: No scleral icterus.        Right eye: No discharge.         Left eye: No discharge.      Extraocular Movements: Extraocular movements intact.      Conjunctiva/sclera: Conjunctivae normal.      Pupils: Pupils are equal, round, and reactive to light.   Cardiovascular:      Rate and Rhythm: Normal rate and regular rhythm.   Pulmonary:      Effort: Pulmonary effort is normal. No respiratory distress.      Breath sounds: No wheezing or rales.   Abdominal:      General: Bowel sounds are normal.      Palpations: Abdomen is soft.      Tenderness: There is no abdominal tenderness. There is no guarding.   Musculoskeletal:         General: No tenderness.      Cervical back: No rigidity.      Comments: No calf tenderness bilaterally  RLE with trace edema (improved from prior)  R lateral thigh open to air with faded bruising; operative sites appear clean/dry with no bleed/drainage   Skin:     General: Skin is warm and dry.      Coloration: Skin is not jaundiced.   Neurological:      General: No focal deficit present.      Mental Status: He is alert and oriented to person, place, and time. Mental status is at baseline.   Psychiatric:         Mood and Affect: Mood normal.         Behavior: Behavior normal.         Thought Content: Thought content normal.         Judgment: Judgment normal.           Discharge Medications: See discharge medication list which was reviewed and signed.    Status at time of discharge: Stable    Discussion with patient/family as appropriate and further instructions:  -Fall precautions  -Aspiration precautions  -Bleeding precautions  -Monitor for signs/symptoms of infection  -Medication list was reviewed and signed  -DME form was completed    Follow-up Recommendations: Please follow-up with your primary care physician within 7-10 days of discharge to review medication changes and current status.     Problem List Follow-up  Recommendations:      -Total time spent on this encounter today including documentation and workup review, face to face time, history and exam, and documentation/orders was approximately 50 minutes.  -This note will be copied to [facility] EMR where vitals and medication orders are placed.    Jaswant Petty D.O.  Geriatric Medicine  4/1/2024 4:21 PM

## 2024-04-03 DIAGNOSIS — G89.4 CHRONIC PAIN SYNDROME: ICD-10-CM

## 2024-04-03 RX ORDER — GABAPENTIN 100 MG/1
CAPSULE ORAL
Qty: 180 CAPSULE | Refills: 0 | Status: SHIPPED | OUTPATIENT
Start: 2024-04-03

## 2024-04-04 ENCOUNTER — TELEPHONE (OUTPATIENT)
Age: 84
End: 2024-04-04

## 2024-04-04 NOTE — TELEPHONE ENCOUNTER
Hello,  Please advise if the following patient can be forced onto the schedule:    Patient: Zhao Walls    : [birth year]    MRN: [id number]     Call back #: [phone]    Insurance: [employer]    Reason for appointment: patient had surgery on 3/5    Requested doctor and/or location: [facility]      Thank you.

## 2024-04-10 DIAGNOSIS — Z48.89 AFTERCARE FOLLOWING SURGERY: Primary | ICD-10-CM

## 2024-04-17 ENCOUNTER — OFFICE VISIT (OUTPATIENT)
Dept: FAMILY MEDICINE CLINIC | Facility: CLINIC | Age: 84
End: 2024-04-17
Payer: COMMERCIAL

## 2024-04-17 VITALS
TEMPERATURE: 98.6 F | SYSTOLIC BLOOD PRESSURE: 122 MMHG | WEIGHT: 200 LBS | RESPIRATION RATE: 16 BRPM | OXYGEN SATURATION: 95 % | HEART RATE: 71 BPM | HEIGHT: 68 IN | DIASTOLIC BLOOD PRESSURE: 60 MMHG | BODY MASS INDEX: 30.31 KG/M2

## 2024-04-17 DIAGNOSIS — I10 ESSENTIAL (PRIMARY) HYPERTENSION: ICD-10-CM

## 2024-04-17 DIAGNOSIS — S72.141D CLOSED DISPLACED INTERTROCHANTERIC FRACTURE OF RIGHT FEMUR WITH ROUTINE HEALING, SUBSEQUENT ENCOUNTER: ICD-10-CM

## 2024-04-17 DIAGNOSIS — C32.9 LARYNGEAL SQUAMOUS CELL CARCINOMA (HCC): ICD-10-CM

## 2024-04-17 DIAGNOSIS — E78.2 MIXED HYPERLIPIDEMIA: ICD-10-CM

## 2024-04-17 DIAGNOSIS — E11.8 TYPE 2 DIABETES MELLITUS WITH COMPLICATION (HCC): Primary | ICD-10-CM

## 2024-04-17 DIAGNOSIS — K21.9 GASTROESOPHAGEAL REFLUX DISEASE WITHOUT ESOPHAGITIS: ICD-10-CM

## 2024-04-17 PROCEDURE — G2211 COMPLEX E/M VISIT ADD ON: HCPCS | Performed by: FAMILY MEDICINE

## 2024-04-17 PROCEDURE — 99214 OFFICE O/P EST MOD 30 MIN: CPT | Performed by: FAMILY MEDICINE

## 2024-04-18 ENCOUNTER — HOSPITAL ENCOUNTER (OUTPATIENT)
Dept: RADIOLOGY | Facility: HOSPITAL | Age: 84
Discharge: HOME | End: 2024-04-18
Attending: ORTHOPAEDIC SURGERY
Payer: COMMERCIAL

## 2024-04-18 ENCOUNTER — OFFICE VISIT (OUTPATIENT)
Dept: OBGYN CLINIC | Facility: HOSPITAL | Age: 84
End: 2024-04-18

## 2024-04-18 VITALS
BODY MASS INDEX: 30.41 KG/M2 | HEIGHT: 68 IN | DIASTOLIC BLOOD PRESSURE: 67 MMHG | SYSTOLIC BLOOD PRESSURE: 123 MMHG | HEART RATE: 82 BPM

## 2024-04-18 DIAGNOSIS — S72.141A CLOSED FRACTURE OF FEMUR, INTERTROCHANTERIC, RIGHT, INITIAL ENCOUNTER (HCC): ICD-10-CM
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
## 2024-04-18 DIAGNOSIS — S72.141D CLOSED DISPLACED INTERTROCHANTERIC FRACTURE OF RIGHT FEMUR WITH ROUTINE HEALING, SUBSEQUENT ENCOUNTER: Primary | ICD-10-CM

## 2024-04-18 PROCEDURE — 99024 POSTOP FOLLOW-UP VISIT: CPT | Performed by: ORTHOPAEDIC SURGERY

## 2024-04-18 PROCEDURE — 73502 X-RAY EXAM HIP UNI 2-3 VIEWS: CPT

## 2024-04-18 NOTE — PROGRESS NOTES
Assessment:   Diagnosis ICD-10-CM Associated Orders   1. Closed displaced intertrochanteric fracture of right femur with routine healing, subsequent encounter  S72.141D           Plan:  WBAT RLE  Continue to work with PT, continue to work on gait. Walk as much as possible, stay active as much as possible  OTCs meds as needed    To do next visit:  New xrays right hip    The above stated was discussed in layman's terms and the patient expressed understanding.  All questions were answered to the patient's satisfaction.     Subjective:   Zhao Walls is a 83 y.o. male who presents for 6 week postop visit s/p right intertrochanteric fracture treatment with insertion of CMN, DOS 3/5/2024. He presents accompanied by his son who is a . He is using a walker for ambulation (used walker at baseline before injury). He is happy with his recovery so far. Offers no complaints today.       Review of systems negative unless otherwise specified in HPI    Past Medical History:   Diagnosis Date    Diabetes mellitus (HCC)     GERD (gastroesophageal reflux disease)     Hyperlipidemia     Hypertension     Laryngeal cancer (HCC)        Past Surgical History:   Procedure Laterality Date    GALLBLADDER SURGERY  1968    ND LARYNGOSCOPY W/BIOPSY MICROSCOPE/TELESCOPE N/A 2/15/2023    Procedure: MICROLARYGOSCOPY AND BIOPSY; FROZEN SECTION;  Surgeon: Bennett Butler MD;  Location: [facility];  Service: ENT    ND OPTX FEM SHFT FX W/INSJ IMED IMPLT W/WO SCREW Right 3/5/2024    Procedure: INSERTION NAIL IM FEMUR ANTEGRADE (TROCHANTERIC);  Surgeon: Sean Molina MD;  Location: [facility];  Service: Orthopedics       Family History   Problem Relation Age of Onset    Breast cancer Mother     No Known Problems Father        Social History     Occupational History    Not on file   Tobacco Use    Smoking status: Former     Current packs/day: 0.50     Types: Cigarettes    Smokeless tobacco: Never    Tobacco comments:      no passive smoke exposure    Vaping Use    Vaping status: Never Used   Substance and Sexual Activity    Alcohol use: Not Currently    Drug use: Never    Sexual activity: Not on file         Current Outpatient Medications:     acetaminophen (TYLENOL) 325 mg tablet, Take 3 tablets (975 mg total) by mouth every 8 (eight) hours, Disp: , Rfl:     amLODIPine (NORVASC) 10 mg tablet, TAKE ONE TABLET BY MOUTH ONCE DAILY, Disp: 90 tablet, Rfl: 0    aspirin (ECOTRIN LOW STRENGTH) 81 mg EC tablet, 81 mg every other day, Disp: , Rfl:     atorvastatin (LIPITOR) 10 mg tablet, TAKE 1 TABLET BY MOUTH ONCE DAILY AT BEDTIME, Disp: 90 tablet, Rfl: 1    benzocaine-menthol (CEPACOL) 15-3.6 mg per lozenge, Apply 1 lozenge to the mouth or throat every 2 (two) hours as needed (Sore throat), Disp: 18 lozenge, Rfl: 6    Ca Carbonate-Mag Hydroxide (MI-ACID PO), , Disp: , Rfl:     cholecalciferol (VITAMIN D3) 1,000 units tablet, Take 2 tablets (2,000 Units total) by mouth daily, Disp: 30 tablet, Rfl: 0    clobetasol (TEMOVATE) 0.05 % ointment, 1 application every 24 hours, Disp: , Rfl:     cyanocobalamin (VITAMIN B-12) 100 MCG tablet, Take 1 tablet (100 mcg total) by mouth daily Do not start before April 2, 2024., Disp: 30 tablet, Rfl: 0    gabapentin (NEURONTIN) 100 mg capsule, Take 1 capsule (100 mg total) by mouth 2 (two) times a day, Disp: 180 capsule, Rfl: 0    hydroCHLOROthiazide 25 mg tablet, Take 1 tablet (25 mg total) by mouth daily Do not start before April 2, 2024., Disp: 30 tablet, Rfl: 0    magnesium chloride (MAG64) 64 MG TBEC EC tablet, Take 128 mg by mouth daily, Disp: , Rfl:     melatonin 3 mg, Take 1 tablet (3 mg total) by mouth daily at bedtime, Disp: , Rfl:     metFORMIN (GLUCOPHAGE) 1000 MG tablet, TAKE ONE TABLET BY MOUTH TWICE A DAY WITH MEALS, Disp: 180 tablet, Rfl: 0    olmesartan (BENICAR) 40 mg tablet, TAKE ONE TABLET BY MOUTH EVERY MORNING, Disp: 90 tablet, Rfl: 0    Omega-3 Fatty Acids (FISH OIL) 1,000 mg, 1,000  "mg see administration instructions Takes 4 times per week, Disp: , Rfl:     pantoprazole (PROTONIX) 40 mg tablet, TAKE ONE TABLET BY MOUTH EVERY MORNING, Disp: 90 tablet, Rfl: 0    tamsulosin (FLOMAX) 0.4 mg, TAKE ONE CAPSULE BY MOUTH ONCE DAILY WITH DINNER, Disp: 90 capsule, Rfl: 0    Allergies   Allergen Reactions    Penicillins Other (See Comments)     \"Unknown Reaction\"            Vitals:    04/18/24 1355   BP: 123/67   Pulse: 82       Objective:                    Right Hip Exam     Comments:  Skin with healed lateral incisions  Minimal pain with passive hip ROM  Able to bear weight and standup from seated position  Using a walker to for assistance for ambulation  NVI distally at baseline            Diagnostics, reviewed and taken today if performed as documented:    The attending physician has personally reviewed the pertinent films in PACS and interpretation is as follows:    Xrays right hip 4/18/2024: s/p cephalomedullary nail insertion and fixation of intertrochanteric femur fracture with evidence of progressive healing and callus formation; no cut out present    Procedures, if performed today:    Procedures    None performed      I inadvertently assumed authorship of this note.  It was prepared by the orthopedic chief resident.  My attestation is as follows:  Patient was seen and examined today.  Case was reviewed with the resident physician today.  I agree with the history, exam, assessment, plan as document by the resident physician  "

## 2024-04-21 PROBLEM — U07.1 COVID-19: Status: ACTIVE | Noted: 2024-03-14

## 2024-04-21 PROBLEM — R42 DISEQUILIBRIUM: Status: ACTIVE | Noted: 2024-03-22

## 2024-04-21 PROBLEM — D62 ACUTE POSTHEMORRHAGIC ANEMIA: Status: ACTIVE | Noted: 2024-03-14

## 2024-04-21 PROBLEM — Z85.21 HISTORY OF LARYNGEAL CANCER: Status: ACTIVE | Noted: 2024-03-14
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
## 2024-04-21 PROBLEM — S72.141D DISPLACED INTERTROCHANTERIC FRACTURE OF RIGHT FEMUR, SUBSEQUENT ENCOUNTER FOR CLOSED FRACTURE WITH ROUTINE HEALING: Status: ACTIVE | Noted: 2024-03-14

## 2024-04-21 PROBLEM — M80.00XD AGE-RELATED OSTEOPOROSIS WITH CURRENT PATHOLOGICAL FRACTURE, UNSPECIFIED SITE, SUBSEQUENT ENCOUNTER FOR FRACTURE WITH ROUTINE HEALING: Status: ACTIVE | Noted: 2024-03-14

## 2024-04-21 NOTE — PROGRESS NOTES
Name: Zhao Walls      : 1940      MRN: [id number]  Encounter Provider: Bello Montanez MD  Encounter Date: 2024   Encounter department: [facility]    Assessment & Plan     1. Type 2 diabetes mellitus with complication (HCC)  Assessment & Plan:  A1c is slightly elevated.  Continue same and we will continue to monitor.  Lab Results   Component Value Date    HGBA1C 7.7 (H) 2024     Orders:  -     Hemoglobin A1C; Future; Expected date: 2024  -     CBC and differential; Future; Expected date: 2024  -     Lipid Panel with Direct LDL reflex; Future; Expected date: 2024  -     TSH, 3rd generation with Free T4 reflex; Future; Expected date: 2024  -     Comprehensive metabolic panel; Future; Expected date: 2024    2. Laryngeal squamous cell carcinoma (HCC)  Assessment & Plan:  Denies any dysphagia.  Continue to follow-up with ENT.      3. Essential (primary) hypertension  Assessment & Plan:  Stable amlodipine, hydrochlorothiazide and Benicar..  Will continue to monitor.      4. Gastroesophageal reflux disease without esophagitis  Assessment & Plan:  -stable    -continue PPI      5. Closed displaced intertrochanteric fracture of right femur with routine healing, subsequent encounter  Assessment & Plan:  S/p fall    Follow up with Orthopedics      6. Mixed hyperlipidemia  Assessment & Plan:  Stable on atorvastatin 10 mg daily.  Continue same.  Will continue to monitor.             Subjective     Is here today for follow-up multiple medical problems.  He has been taking his medications.  He denies any side effect.  He had recent fall was hip fracture and he has been following with Ortho.  He is doing well with physical therapy.  His A1c was slightly elevated when it was done in the hospital.  He has been trying to watch his diet and taking medications as prescribed.    Diabetes  Pertinent negatives for hypoglycemia include no dizziness or  headaches. Pertinent negatives for diabetes include no chest pain.     Review of Systems   Constitutional:  Negative for chills and fever.   HENT:  Negative for trouble swallowing.    Eyes:  Negative for visual disturbance.   Respiratory:  Negative for cough and shortness of breath.    Cardiovascular:  Negative for chest pain and palpitations.   Gastrointestinal:  Negative for abdominal pain, blood in stool and vomiting.   Endocrine: Negative for cold intolerance and heat intolerance.   Musculoskeletal:  Positive for arthralgias and gait problem.   Skin:  Negative for rash.   Neurological:  Negative for dizziness, syncope and headaches.   Hematological:  Negative for adenopathy.   Psychiatric/Behavioral:  Negative for behavioral problems.        Past Medical History:   Diagnosis Date   • Diabetes mellitus (HCC)    • GERD (gastroesophageal reflux disease)    • Hyperlipidemia    • Hypertension    • Laryngeal cancer (HCC)      Past Surgical History:   Procedure Laterality Date   • GALLBLADDER SURGERY  1968   • AR LARYNGOSCOPY W/BIOPSY MICROSCOPE/TELESCOPE N/A 2/15/2023    Procedure: MICROLARYGOSCOPY AND BIOPSY; FROZEN SECTION;  Surgeon: Bennett Butler MD;  Location: [facility];  Service: ENT   • AR OPTX FEM SHFT FX W/INSJ IMED IMPLT W/WO SCREW Right 3/5/2024    Procedure: INSERTION NAIL IM FEMUR ANTEGRADE (TROCHANTERIC);  Surgeon: Sean Molina MD;  Location: [facility];  Service: Orthopedics     Family History   Problem Relation Age of Onset   • Breast cancer Mother    • No Known Problems Father      Social History     Socioeconomic History   • Marital status: /Civil Union     Spouse name: None   • Number of children: None   • Years of education: None   • Highest education level: None   Occupational History   • None   Tobacco Use   • Smoking status: Former     Current packs/day: 0.50     Types: Cigarettes   • Smokeless tobacco: Never   • Tobacco comments:     no passive smoke exposure    Vaping  Use   • Vaping status: Never Used   Substance and Sexual Activity   • Alcohol use: Not Currently   • Drug use: Never   • Sexual activity: None   Other Topics Concern   • None   Social History Narrative   • None     Social Determinants of Health     Financial Resource Strain: Not on file   Food Insecurity: No Food Insecurity (3/6/2024)    Hunger Vital Sign    • Worried About Running Out of Food in the Last Year: Never true    • Ran Out of Food in the Last Year: Never true   Transportation Needs: No Transportation Needs (3/6/2024)    PRAPARE - Transportation    • Lack of Transportation (Medical): No    • Lack of Transportation (Non-Medical): No   Physical Activity: Not on file   Stress: Not on file   Social Connections: Not on file   Intimate Partner Violence: Not on file   Housing Stability: Low Risk  (3/6/2024)    Housing Stability Vital Sign    • Unable to Pay for Housing in the Last Year: No    • Number of Places Lived in the Last Year: 1    • Unstable Housing in the Last Year: No     Current Outpatient Medications on File Prior to Visit   Medication Sig   • amLODIPine (NORVASC) 10 mg tablet TAKE ONE TABLET BY MOUTH ONCE DAILY   • aspirin (ECOTRIN LOW STRENGTH) 81 mg EC tablet 81 mg every other day   • atorvastatin (LIPITOR) 10 mg tablet TAKE 1 TABLET BY MOUTH ONCE DAILY AT BEDTIME   • benzocaine-menthol (CEPACOL) 15-3.6 mg per lozenge Apply 1 lozenge to the mouth or throat every 2 (two) hours as needed (Sore throat)   • Ca Carbonate-Mag Hydroxide (MI-ACID PO)    • cholecalciferol (VITAMIN D3) 1,000 units tablet Take 2 tablets (2,000 Units total) by mouth daily   • clobetasol (TEMOVATE) 0.05 % ointment 1 application every 24 hours   • cyanocobalamin (VITAMIN B-12) 100 MCG tablet Take 1 tablet (100 mcg total) by mouth daily Do not start before April 2, 2024.   • gabapentin (NEURONTIN) 100 mg capsule Take 1 capsule (100 mg total) by mouth 2 (two) times a day   • hydroCHLOROthiazide 25 mg tablet Take 1 tablet (25 mg  "total) by mouth daily Do not start before April 2, 2024.   • magnesium chloride (MAG64) 64 MG TBEC EC tablet Take 128 mg by mouth daily   • melatonin 3 mg Take 1 tablet (3 mg total) by mouth daily at bedtime   • metFORMIN (GLUCOPHAGE) 1000 MG tablet TAKE ONE TABLET BY MOUTH TWICE A DAY WITH MEALS   • olmesartan (BENICAR) 40 mg tablet TAKE ONE TABLET BY MOUTH EVERY MORNING   • Omega-3 Fatty Acids (FISH OIL) 1,000 mg 1,000 mg see administration instructions Takes 4 times per week   • pantoprazole (PROTONIX) 40 mg tablet TAKE ONE TABLET BY MOUTH EVERY MORNING   • tamsulosin (FLOMAX) 0.4 mg TAKE ONE CAPSULE BY MOUTH ONCE DAILY WITH DINNER     Allergies   Allergen Reactions   • Penicillins Other (See Comments)     \"Unknown Reaction\"     Immunization History   Administered Date(s) Administered   • COVID-19 MODERNA VACC 0.5 ML IM 01/18/2021, 02/15/2021, 12/15/2021   • INFLUENZA 10/16/2014, 10/24/2015, 10/05/2016, 09/19/2017, 10/05/2018, 09/01/2020, 09/03/2021, 10/28/2022, 09/12/2023   • Influenza, Seasonal Vaccine, Quadrivalent, Adjuvanted, .5e 09/12/2023   • Influenza, high dose seasonal 0.7 mL 09/12/2023   • Pneumococcal Conjugate 13-Valent 07/22/2015   • Pneumococcal Polysaccharide PPV23 10/01/2011   • Td (adult), Unspecified 04/07/2016   • Tuberculin Skin Test 03/14/2024, 03/23/2024   • Zoster Vaccine Recombinant 10/05/2019, 12/12/2019   • influenza, trivalent, adjuvanted 09/18/2019       Objective     /60 (BP Location: Left arm, Patient Position: Sitting, Cuff Size: Adult)   Pulse 71   Temp 98.6 °F (37 °C) (Tympanic)   Resp 16   Ht 5' 8\" (1.727 m)   Wt 90.7 kg (200 lb)   SpO2 95%   BMI 30.41 kg/m²     Physical Exam  Vitals and nursing note reviewed.   Constitutional:       Appearance: He is well-developed.   HENT:      Head: Normocephalic and atraumatic.   Eyes:      Pupils: Pupils are equal, round, and reactive to light.   Cardiovascular:      Rate and Rhythm: Normal rate and regular rhythm.      Heart " sounds: Normal heart sounds.   Pulmonary:      Effort: Pulmonary effort is normal.      Breath sounds: Normal breath sounds.   Abdominal:      General: Bowel sounds are normal.      Palpations: Abdomen is soft.   Musculoskeletal:      Cervical back: Normal range of motion and neck supple.   Lymphadenopathy:      Cervical: No cervical adenopathy.   Skin:     General: Skin is warm.      Findings: No rash.   Neurological:      Mental Status: He is alert. Mental status is at baseline.       Bello Montanez MD

## 2024-04-21 NOTE — ASSESSMENT & PLAN NOTE
A1c is slightly elevated.  Continue same and we will continue to monitor.  Lab Results   Component Value Date    HGBA1C 7.7 (H) 03/04/2024

## 2024-05-01 DIAGNOSIS — I10 HTN (HYPERTENSION), BENIGN: ICD-10-CM

## 2024-05-01 RX ORDER — OLMESARTAN MEDOXOMIL 40 MG/1
40 TABLET ORAL DAILY
Qty: 90 TABLET | Refills: 1 | Status: SHIPPED | OUTPATIENT
Start: 2024-05-01

## 2024-05-13 DIAGNOSIS — Z48.89 AFTERCARE FOLLOWING SURGERY: Primary | ICD-10-CM

## 2024-05-30 ENCOUNTER — HOSPITAL ENCOUNTER (OUTPATIENT)
Dept: RADIOLOGY | Facility: HOSPITAL | Age: 84
Discharge: HOME | End: 2024-05-30
Attending: ORTHOPAEDIC SURGERY
Payer: COMMERCIAL

## 2024-05-30 ENCOUNTER — OFFICE VISIT (OUTPATIENT)
Dept: OBGYN CLINIC | Facility: HOSPITAL | Age: 84
End: 2024-05-30

## 2024-05-30 VITALS
DIASTOLIC BLOOD PRESSURE: 84 MMHG | HEIGHT: 68 IN | BODY MASS INDEX: 30.41 KG/M2 | HEART RATE: 89 BPM | SYSTOLIC BLOOD PRESSURE: 157 MMHG

## 2024-05-30 DIAGNOSIS — S72.141D CLOSED DISPLACED INTERTROCHANTERIC FRACTURE OF RIGHT FEMUR WITH ROUTINE HEALING, SUBSEQUENT ENCOUNTER: ICD-10-CM

## 2024-05-30 DIAGNOSIS — Z48.89 AFTERCARE FOLLOWING SURGERY: Primary | ICD-10-CM

## 2024-05-30 DIAGNOSIS — Z48.89 AFTERCARE FOLLOWING SURGERY: ICD-10-CM

## 2024-05-30 PROCEDURE — 73502 X-RAY EXAM HIP UNI 2-3 VIEWS: CPT

## 2024-05-30 PROCEDURE — 99024 POSTOP FOLLOW-UP VISIT: CPT | Performed by: ORTHOPAEDIC SURGERY

## 2024-05-30 NOTE — PROGRESS NOTES
Assessment:  1. Aftercare following surgery        2. Closed displaced intertrochanteric fracture of right femur with routine healing, subsequent encounter            Plan:  3 months s/p right trochanteric femur IM nail insertion, 3/5/2024  The patient is currently doing well and has returned to baseline activity  Patient encouraged to remain active  Patient has worked with  in past for anterior thigh pain resolved by lumbar EDEN's.  He is encouraged to follow up with  if anterior thigh pain does increase  The patient can follow up as needed and is welcome to return at any point with any new or old issue.      To do next visit:  Return if symptoms worsen or fail to improve.    The above stated was discussed in layman's terms and the patient expressed understanding.  All questions were answered to the patient's satisfaction.       Scribe Attestation      I,:  Harry Bloom am acting as a scribe while in the presence of the attending physician.:       I,:  Sean Molina MD personally performed the services described in this documentation    as scribed in my presence.:               Subjective:   Zhoa Walls is a 83 y.o. male who presents 3 months s/p right trochanteric femur IM nail insertion, 3/5/2024.  He is doing well.  Today he complains of right anterior thigh pain yet he denies anterior groin pain.  He has returned to baseline activity including walking with walker.  He has worked with  in past for lumbar injections with benefit.        Review of systems negative unless otherwise specified in HPI    Past Medical History:   Diagnosis Date    Diabetes mellitus (HCC)     GERD (gastroesophageal reflux disease)     Hyperlipidemia     Hypertension     Laryngeal cancer (HCC)        Past Surgical History:   Procedure Laterality Date    GALLBLADDER SURGERY  1968    TX LARYNGOSCOPY W/BIOPSY MICROSCOPE/TELESCOPE N/A 2/15/2023    Procedure: MICROLARYGOSCOPY AND BIOPSY; FROZEN  SECTION;  Surgeon: Bennett Butler MD;  Location: [facility];  Service: ENT    PA OPTX FEM SHFT FX W/INSJ IMED IMPLT W/WO SCREW Right 3/5/2024    Procedure: INSERTION NAIL IM FEMUR ANTEGRADE (TROCHANTERIC);  Surgeon: Sean Molina MD;  Location: [facility];  Service: Orthopedics       Family History   Problem Relation Age of Onset    Breast cancer Mother     No Known Problems Father        Social History     Occupational History    Not on file   Tobacco Use    Smoking status: Former     Current packs/day: 0.50     Types: Cigarettes    Smokeless tobacco: Never    Tobacco comments:     no passive smoke exposure    Vaping Use    Vaping status: Never Used   Substance and Sexual Activity    Alcohol use: Not Currently    Drug use: Never    Sexual activity: Not on file         Current Outpatient Medications:     amLODIPine (NORVASC) 10 mg tablet, TAKE ONE TABLET BY MOUTH ONCE DAILY, Disp: 90 tablet, Rfl: 0    aspirin (ECOTRIN LOW STRENGTH) 81 mg EC tablet, 81 mg every other day, Disp: , Rfl:     atorvastatin (LIPITOR) 10 mg tablet, TAKE 1 TABLET BY MOUTH ONCE DAILY AT BEDTIME, Disp: 90 tablet, Rfl: 1    benzocaine-menthol (CEPACOL) 15-3.6 mg per lozenge, Apply 1 lozenge to the mouth or throat every 2 (two) hours as needed (Sore throat), Disp: 18 lozenge, Rfl: 6    Ca Carbonate-Mag Hydroxide (MI-ACID PO), , Disp: , Rfl:     cholecalciferol (VITAMIN D3) 1,000 units tablet, Take 2 tablets (2,000 Units total) by mouth daily, Disp: 30 tablet, Rfl: 0    clobetasol (TEMOVATE) 0.05 % ointment, 1 application every 24 hours, Disp: , Rfl:     cyanocobalamin (VITAMIN B-12) 100 MCG tablet, Take 1 tablet (100 mcg total) by mouth daily Do not start before April 2, 2024., Disp: 30 tablet, Rfl: 0    gabapentin (NEURONTIN) 100 mg capsule, Take 1 capsule (100 mg total) by mouth 2 (two) times a day, Disp: 180 capsule, Rfl: 0    hydroCHLOROthiazide 25 mg tablet, Take 1 tablet (25 mg total) by mouth daily Do not start before April  "2, 2024., Disp: 30 tablet, Rfl: 0    magnesium chloride (MAG64) 64 MG TBEC EC tablet, Take 128 mg by mouth daily, Disp: , Rfl:     melatonin 3 mg, Take 1 tablet (3 mg total) by mouth daily at bedtime, Disp: , Rfl:     metFORMIN (GLUCOPHAGE) 1000 MG tablet, TAKE ONE TABLET BY MOUTH TWICE A DAY WITH MEALS, Disp: 180 tablet, Rfl: 0    olmesartan (BENICAR) 40 mg tablet, TAKE ONE TABLET BY MOUTH EVERY MORNING, Disp: 90 tablet, Rfl: 1    Omega-3 Fatty Acids (FISH OIL) 1,000 mg, 1,000 mg see administration instructions Takes 4 times per week, Disp: , Rfl:     pantoprazole (PROTONIX) 40 mg tablet, TAKE ONE TABLET BY MOUTH EVERY MORNING, Disp: 90 tablet, Rfl: 0    tamsulosin (FLOMAX) 0.4 mg, TAKE ONE CAPSULE BY MOUTH ONCE DAILY WITH DINNER, Disp: 90 capsule, Rfl: 0    Allergies   Allergen Reactions    Penicillins Other (See Comments)     \"Unknown Reaction\"            Vitals:    05/30/24 1352   BP: 157/84   Pulse: 89       Objective:  Physical exam  General: Awake, Alert, Oriented  Eyes: Pupils equal, round and reactive to light  Heart: regular rate and rhythm  Lungs: No audible wheezing  Abdomen: soft                    Ortho Exam  Right hip:  TTP over greater trochanter  Well healed posterior incision  Good arc of motion with no pain  Patient sits comfortably in chair with hip flexed at 90 degrees  Patient stands from seated position without assistance  Calf compartments soft and supple  Sensation intact  Toes are warm sensate and mobile      Diagnostics, reviewed and taken today if performed as documented:    The attending physician has personally reviewed the pertinent films in PACS and interpretation is as follows:  Right hip x-ray:  healed trochanteric fracture with increased callus formation.  No evidence of hardware failure.      Procedures, if performed today:    Procedures    None performed      Portions of the record may have been created with voice recognition software.  Occasional wrong word or \"sound a like\" " substitutions may have occurred due to the inherent limitations of voice recognition software.  Read the chart carefully and recognize, using context, where substitutions have occurred.

## 2024-06-27 DIAGNOSIS — G89.4 CHRONIC PAIN SYNDROME: ICD-10-CM

## 2024-06-28 RX ORDER — GABAPENTIN 100 MG/1
CAPSULE ORAL
Qty: 180 CAPSULE | Refills: 0 | Status: SHIPPED | OUTPATIENT
Start: 2024-06-28

## 2024-07-30 DIAGNOSIS — E78.5 HYPERLIPIDEMIA, UNSPECIFIED HYPERLIPIDEMIA TYPE: ICD-10-CM

## 2024-07-30 RX ORDER — ATORVASTATIN CALCIUM 10 MG/1
10 TABLET, FILM COATED ORAL
Qty: 90 TABLET | Refills: 0 | Status: SHIPPED | OUTPATIENT
Start: 2024-07-30

## 2024-08-12 ENCOUNTER — APPOINTMENT (OUTPATIENT)
Dept: LAB | Facility: IMAGING CENTER | Age: 84
End: 2024-08-12
Payer: COMMERCIAL

## 2024-08-12 DIAGNOSIS — E11.8 TYPE 2 DIABETES MELLITUS WITH COMPLICATION (HCC): ICD-10-CM

## 2024-08-12 LAB
ALBUMIN SERPL BCG-MCNC: 4 G/DL (ref 3.5–5)
ALP SERPL-CCNC: 85 U/L (ref 34–104)
ALT SERPL W P-5'-P-CCNC: 16 U/L (ref 7–52)
ANION GAP SERPL CALCULATED.3IONS-SCNC: 9 MMOL/L (ref 4–13)
AST SERPL W P-5'-P-CCNC: 16 U/L (ref 13–39)
BASOPHILS # BLD AUTO: 0.06 THOUSANDS/ÂΜL (ref 0–0.1)
BASOPHILS NFR BLD AUTO: 1 % (ref 0–1)
BILIRUB SERPL-MCNC: 0.54 MG/DL (ref 0.2–1)
BUN SERPL-MCNC: 19 MG/DL (ref 5–25)
CALCIUM SERPL-MCNC: 9.2 MG/DL (ref 8.4–10.2)
CHLORIDE SERPL-SCNC: 103 MMOL/L (ref 96–108)
CHOLEST SERPL-MCNC: 103 MG/DL
CO2 SERPL-SCNC: 28 MMOL/L (ref 21–32)
CREAT SERPL-MCNC: 1.11 MG/DL (ref 0.6–1.3)
EOSINOPHIL # BLD AUTO: 0.11 THOUSAND/ÂΜL (ref 0–0.61)
EOSINOPHIL NFR BLD AUTO: 2 % (ref 0–6)
ERYTHROCYTE [DISTWIDTH] IN BLOOD BY AUTOMATED COUNT: 14.9 % (ref 11.6–15.1)
EST. AVERAGE GLUCOSE BLD GHB EST-MCNC: 163 MG/DL
GFR SERPL CREATININE-BSD FRML MDRD: 60 ML/MIN/1.73SQ M
GLUCOSE P FAST SERPL-MCNC: 191 MG/DL (ref 65–99)
HBA1C MFR BLD: 7.3 %
HCT VFR BLD AUTO: 39.9 % (ref 36.5–49.3)
HDLC SERPL-MCNC: 40 MG/DL
HGB BLD-MCNC: 12.8 G/DL (ref 12–17)
IMM GRANULOCYTES # BLD AUTO: 0.03 THOUSAND/UL (ref 0–0.2)
IMM GRANULOCYTES NFR BLD AUTO: 1 % (ref 0–2)
LDLC SERPL CALC-MCNC: 40 MG/DL (ref 0–100)
LYMPHOCYTES # BLD AUTO: 1.94 THOUSANDS/ÂΜL (ref 0.6–4.47)
LYMPHOCYTES NFR BLD AUTO: 30 % (ref 14–44)
MCH RBC QN AUTO: 30 PG (ref 26.8–34.3)
MCHC RBC AUTO-ENTMCNC: 32.1 G/DL (ref 31.4–37.4)
MCV RBC AUTO: 94 FL (ref 82–98)
MONOCYTES # BLD AUTO: 0.54 THOUSAND/ÂΜL (ref 0.17–1.22)
MONOCYTES NFR BLD AUTO: 8 % (ref 4–12)
NEUTROPHILS # BLD AUTO: 3.88 THOUSANDS/ÂΜL (ref 1.85–7.62)
NEUTS SEG NFR BLD AUTO: 58 % (ref 43–75)
NRBC BLD AUTO-RTO: 0 /100 WBCS
PLATELET # BLD AUTO: 245 THOUSANDS/UL (ref 149–390)
PMV BLD AUTO: 10.9 FL (ref 8.9–12.7)
POTASSIUM SERPL-SCNC: 4.6 MMOL/L (ref 3.5–5.3)
PROT SERPL-MCNC: 6.7 G/DL (ref 6.4–8.4)
RBC # BLD AUTO: 4.26 MILLION/UL (ref 3.88–5.62)
SODIUM SERPL-SCNC: 140 MMOL/L (ref 135–147)
T4 FREE SERPL-MCNC: 1.01 NG/DL (ref 0.61–1.12)
TRIGL SERPL-MCNC: 114 MG/DL
TSH SERPL DL<=0.05 MIU/L-ACNC: 4.91 UIU/ML (ref 0.45–4.5)
WBC # BLD AUTO: 6.56 THOUSAND/UL (ref 4.31–10.16)

## 2024-08-12 PROCEDURE — 80053 COMPREHEN METABOLIC PANEL: CPT

## 2024-08-12 PROCEDURE — 84443 ASSAY THYROID STIM HORMONE: CPT

## 2024-08-12 PROCEDURE — 84439 ASSAY OF FREE THYROXINE: CPT

## 2024-08-12 PROCEDURE — 83036 HEMOGLOBIN GLYCOSYLATED A1C: CPT

## 2024-08-12 PROCEDURE — 80061 LIPID PANEL: CPT

## 2024-08-12 PROCEDURE — 36415 COLL VENOUS BLD VENIPUNCTURE: CPT

## 2024-08-12 PROCEDURE — 85025 COMPLETE CBC W/AUTO DIFF WBC: CPT

## 2024-08-27 DIAGNOSIS — E11.8 TYPE 2 DIABETES MELLITUS WITH COMPLICATION (HCC): ICD-10-CM

## 2024-08-27 DIAGNOSIS — I10 HYPERTENSION, UNSPECIFIED TYPE: ICD-10-CM

## 2024-08-27 DIAGNOSIS — N40.0 BENIGN PROSTATIC HYPERPLASIA, UNSPECIFIED WHETHER LOWER URINARY TRACT SYMPTOMS PRESENT: ICD-10-CM

## 2024-08-27 DIAGNOSIS — K21.9 GASTROESOPHAGEAL REFLUX DISEASE WITHOUT ESOPHAGITIS: ICD-10-CM

## 2024-08-28 RX ORDER — PANTOPRAZOLE SODIUM 40 MG/1
40 TABLET, DELAYED RELEASE ORAL DAILY
Qty: 90 TABLET | Refills: 1 | Status: SHIPPED | OUTPATIENT
Start: 2024-08-28

## 2024-08-28 RX ORDER — TAMSULOSIN HYDROCHLORIDE 0.4 MG/1
CAPSULE ORAL
Qty: 90 CAPSULE | Refills: 1 | Status: SHIPPED | OUTPATIENT
Start: 2024-08-28

## 2024-08-28 RX ORDER — AMLODIPINE BESYLATE 10 MG/1
10 TABLET ORAL DAILY
Qty: 90 TABLET | Refills: 1 | Status: SHIPPED | OUTPATIENT
Start: 2024-08-28

## 2024-09-24 DIAGNOSIS — G89.4 CHRONIC PAIN SYNDROME: ICD-10-CM

## 2024-09-25 RX ORDER — GABAPENTIN 100 MG/1
CAPSULE ORAL
Qty: 180 CAPSULE | Refills: 0 | Status: SHIPPED | OUTPATIENT
Start: 2024-09-25

## 2024-10-21 ENCOUNTER — OFFICE VISIT (OUTPATIENT)
Dept: FAMILY MEDICINE CLINIC | Facility: CLINIC | Age: 84
End: 2024-10-21
Payer: COMMERCIAL

## 2024-10-21 VITALS
HEIGHT: 69 IN | HEART RATE: 81 BPM | BODY MASS INDEX: 30.48 KG/M2 | SYSTOLIC BLOOD PRESSURE: 138 MMHG | TEMPERATURE: 99.1 F | RESPIRATION RATE: 16 BRPM | DIASTOLIC BLOOD PRESSURE: 84 MMHG | WEIGHT: 205.8 LBS | OXYGEN SATURATION: 96 %

## 2024-10-21 DIAGNOSIS — Z00.00 MEDICARE ANNUAL WELLNESS VISIT, SUBSEQUENT: ICD-10-CM

## 2024-10-21 DIAGNOSIS — C32.9 LARYNGEAL SQUAMOUS CELL CARCINOMA (HCC): ICD-10-CM

## 2024-10-21 DIAGNOSIS — I10 ESSENTIAL (PRIMARY) HYPERTENSION: ICD-10-CM

## 2024-10-21 DIAGNOSIS — Z23 ENCOUNTER FOR IMMUNIZATION: ICD-10-CM

## 2024-10-21 DIAGNOSIS — E78.2 MIXED HYPERLIPIDEMIA: ICD-10-CM

## 2024-10-21 DIAGNOSIS — E11.8 TYPE 2 DIABETES MELLITUS WITH COMPLICATION (HCC): Primary | ICD-10-CM

## 2024-10-21 DIAGNOSIS — D64.9 POSTOPERATIVE ANEMIA: ICD-10-CM

## 2024-10-21 PROCEDURE — G0439 PPPS, SUBSEQ VISIT: HCPCS | Performed by: FAMILY MEDICINE

## 2024-10-21 PROCEDURE — G0444 DEPRESSION SCREEN ANNUAL: HCPCS | Performed by: FAMILY MEDICINE

## 2024-10-21 PROCEDURE — G0008 ADMIN INFLUENZA VIRUS VAC: HCPCS

## 2024-10-21 PROCEDURE — 90662 IIV NO PRSV INCREASED AG IM: CPT

## 2024-10-21 PROCEDURE — 99214 OFFICE O/P EST MOD 30 MIN: CPT | Performed by: FAMILY MEDICINE

## 2024-10-21 NOTE — PATIENT INSTRUCTIONS
Medicare Preventive Visit Patient Instructions  Thank you for completing your Welcome to Medicare Visit or Medicare Annual Wellness Visit today. Your next wellness visit will be due in one year (10/22/2025).  The screening/preventive services that you may require over the next 5-10 years are detailed below. Some tests may not apply to you based off risk factors and/or age. Screening tests ordered at today's visit but not completed yet may show as past due. Also, please note that scanned in results may not display below.  Preventive Screenings:  Service Recommendations Previous Testing/Comments   Colorectal Cancer Screening  Colonoscopy    Fecal Occult Blood Test (FOBT)/Fecal Immunochemical Test (FIT)  Fecal DNA/Cologuard Test  Flexible Sigmoidoscopy Age: 45-75 years old   Colonoscopy: every 10 years (May be performed more frequently if at higher risk)  OR  FOBT/FIT: every 1 year  OR  Cologuard: every 3 years  OR  Sigmoidoscopy: every 5 years  Screening may be recommended earlier than age 45 if at higher risk for colorectal cancer. Also, an individualized decision between you and your healthcare provider will decide whether screening between the ages of 76-85 would be appropriate. Colonoscopy: Not on file  FOBT/FIT: Not on file  Cologuard: Not on file  Sigmoidoscopy: Not on file          Prostate Cancer Screening Individualized decision between patient and health care provider in men between ages of 55-69   Medicare will cover every 12 months beginning on the day after your 50th birthday PSA: 1.9 ng/mL           Hepatitis C Screening Once for adults born between 1945 and 1965  More frequently in patients at high risk for Hepatitis C Hep C Antibody: Not on file        Diabetes Screening 1-2 times per year if you're at risk for diabetes or have pre-diabetes Fasting glucose: 191 mg/dL (8/12/2024)  A1C: 7.3 % (8/12/2024)      Cholesterol Screening Once every 5 years if you don't have a lipid disorder. May order more  often based on risk factors. Lipid panel: 08/12/2024         Other Preventive Screenings Covered by Medicare:  Abdominal Aortic Aneurysm (AAA) Screening: covered once if your at risk. You're considered to be at risk if you have a family history of AAA or a male between the age of 65-75 who smoking at least 100 cigarettes in your lifetime.  Lung Cancer Screening: covers low dose CT scan once per year if you meet all of the following conditions: (1) Age 55-77; (2) No signs or symptoms of lung cancer; (3) Current smoker or have quit smoking within the last 15 years; (4) You have a tobacco smoking history of at least 20 pack years (packs per day x number of years you smoked); (5) You get a written order from a healthcare provider.  Glaucoma Screening: covered annually if you're considered high risk: (1) You have diabetes OR (2) Family history of glaucoma OR (3)  aged 50 and older OR (4)  American aged 65 and older  Osteoporosis Screening: covered every 2 years if you meet one of the following conditions: (1) Have a vertebral abnormality; (2) On glucocorticoid therapy for more than 3 months; (3) Have primary hyperparathyroidism; (4) On osteoporosis medications and need to assess response to drug therapy.  HIV Screening: covered annually if you're between the age of 15-65. Also covered annually if you are younger than 15 and older than 65 with risk factors for HIV infection. For pregnant patients, it is covered up to 3 times per pregnancy.    Immunizations:  Immunization Recommendations   Influenza Vaccine Annual influenza vaccination during flu season is recommended for all persons aged >= 6 months who do not have contraindications   Pneumococcal Vaccine   * Pneumococcal conjugate vaccine = PCV13 (Prevnar 13), PCV15 (Vaxneuvance), PCV20 (Prevnar 20)  * Pneumococcal polysaccharide vaccine = PPSV23 (Pneumovax) Adults 19-63 yo with certain risk factors or if 65+ yo  If never received any pneumonia  vaccine: recommend Prevnar 20 (PCV20)  Give PCV20 if previously received 1 dose of PCV13 or PPSV23   Hepatitis B Vaccine 3 dose series if at intermediate or high risk (ex: diabetes, end stage renal disease, liver disease)   Respiratory syncytial virus (RSV) Vaccine - COVERED BY MEDICARE PART D  * RSVPreF3 (Arexvy) CDC recommends that adults 60 years of age and older may receive a single dose of RSV vaccine using shared clinical decision-making (SCDM)   Tetanus (Td) Vaccine - COST NOT COVERED BY MEDICARE PART B Following completion of primary series, a booster dose should be given every 10 years to maintain immunity against tetanus. Td may also be given as tetanus wound prophylaxis.   Tdap Vaccine - COST NOT COVERED BY MEDICARE PART B Recommended at least once for all adults. For pregnant patients, recommended with each pregnancy.   Shingles Vaccine (Shingrix) - COST NOT COVERED BY MEDICARE PART B  2 shot series recommended in those 19 years and older who have or will have weakened immune systems or those 50 years and older     Health Maintenance Due:  There are no preventive care reminders to display for this patient.  Immunizations Due:      Topic Date Due   • Influenza Vaccine (1) 09/01/2024   • COVID-19 Vaccine (4 - 2023-24 season) 09/01/2024     Advance Directives   What are advance directives?  Advance directives are legal documents that state your wishes and plans for medical care. These plans are made ahead of time in case you lose your ability to make decisions for yourself. Advance directives can apply to any medical decision, such as the treatments you want, and if you want to donate organs.   What are the types of advance directives?  There are many types of advance directives, and each state has rules about how to use them. You may choose a combination of any of the following:  Living will:  This is a written record of the treatment you want. You can also choose which treatments you do not want, which to  limit, and which to stop at a certain time. This includes surgery, medicine, IV fluid, and tube feedings.   Durable power of  for healthcare (DPAHC):  This is a written record that states who you want to make healthcare choices for you when you are unable to make them for yourself. This person, called a proxy, is usually a family member or a friend. You may choose more than 1 proxy.  Do not resuscitate (DNR) order:  A DNR order is used in case your heart stops beating or you stop breathing. It is a request not to have certain forms of treatment, such as CPR. A DNR order may be included in other types of advance directives.  Medical directive:  This covers the care that you want if you are in a coma, near death, or unable to make decisions for yourself. You can list the treatments you want for each condition. Treatment may include pain medicine, surgery, blood transfusions, dialysis, IV or tube feedings, and a ventilator (breathing machine).  Values history:  This document has questions about your views, beliefs, and how you feel and think about life. This information can help others choose the care that you would choose.  Why are advance directives important?  An advance directive helps you control your care. Although spoken wishes may be used, it is better to have your wishes written down. Spoken wishes can be misunderstood, or not followed. Treatments may be given even if you do not want them. An advance directive may make it easier for your family to make difficult choices about your care.   Weight Management   Why it is important to manage your weight:  Being overweight increases your risk of health conditions such as heart disease, high blood pressure, type 2 diabetes, and certain types of cancer. It can also increase your risk for osteoarthritis, sleep apnea, and other respiratory problems. Aim for a slow, steady weight loss. Even a small amount of weight loss can lower your risk of health  problems.  How to lose weight safely:  A safe and healthy way to lose weight is to eat fewer calories and get regular exercise. You can lose up about 1 pound a week by decreasing the number of calories you eat by 500 calories each day.   Healthy meal plan for weight management:  A healthy meal plan includes a variety of foods, contains fewer calories, and helps you stay healthy. A healthy meal plan includes the following:  Eat whole-grain foods more often.  A healthy meal plan should contain fiber. Fiber is the part of grains, fruits, and vegetables that is not broken down by your body. Whole-grain foods are healthy and provide extra fiber in your diet. Some examples of whole-grain foods are whole-wheat breads and pastas, oatmeal, brown rice, and bulgur.  Eat a variety of vegetables every day.  Include dark, leafy greens such as spinach, kale, tony greens, and mustard greens. Eat yellow and orange vegetables such as carrots, sweet potatoes, and winter squash.   Eat a variety of fruits every day.  Choose fresh or canned fruit (canned in its own juice or light syrup) instead of juice. Fruit juice has very little or no fiber.  Eat low-fat dairy foods.  Drink fat-free (skim) milk or 1% milk. Eat fat-free yogurt and low-fat cottage cheese. Try low-fat cheeses such as mozzarella and other reduced-fat cheeses.  Choose meat and other protein foods that are low in fat.  Choose beans or other legumes such as split peas or lentils. Choose fish, skinless poultry (chicken or turkey), or lean cuts of red meat (beef or pork). Before you cook meat or poultry, cut off any visible fat.   Use less fat and oil.  Try baking foods instead of frying them. Add less fat, such as margarine, sour cream, regular salad dressing and mayonnaise to foods. Eat fewer high-fat foods. Some examples of high-fat foods include french fries, doughnuts, ice cream, and cakes.  Eat fewer sweets.  Limit foods and drinks that are high in sugar. This  includes candy, cookies, regular soda, and sweetened drinks.  Exercise:  Exercise at least 30 minutes per day on most days of the week. Some examples of exercise include walking, biking, dancing, and swimming. You can also fit in more physical activity by taking the stairs instead of the elevator or parking farther away from stores. Ask your healthcare provider about the best exercise plan for you.      © Copyright [employer] 2018 Information is for End User's use only and may not be sold, redistributed or otherwise used for commercial purposes. All illustrations and images included in CareNotes® are the copyrighted property of [employer] or [employer]

## 2024-10-21 NOTE — ASSESSMENT & PLAN NOTE
A1c is slightly elevated.  Continue same and we will continue to monitor.  Lab Results   Component Value Date    HGBA1C 7.3 (H) 08/12/2024       Orders:    Hemoglobin A1C; Future    Comprehensive metabolic panel; Future    CBC and differential; Future    Lipid Panel with Direct LDL reflex; Future    TSH, 3rd generation with Free T4 reflex; Future

## 2024-10-21 NOTE — PROGRESS NOTES
Ambulatory Visit  Name: Zhao Walls      : 1940      MRN: [id number]  Encounter Provider: Bello Montanez MD  Encounter Date: 10/21/2024   Encounter department: [facility]    Assessment & Plan  Type 2 diabetes mellitus with complication (HCC)  A1c is slightly elevated.  Continue same and we will continue to monitor.  Lab Results   Component Value Date    HGBA1C 7.3 (H) 2024       Orders:    Hemoglobin A1C; Future    Comprehensive metabolic panel; Future    CBC and differential; Future    Lipid Panel with Direct LDL reflex; Future    TSH, 3rd generation with Free T4 reflex; Future    Essential (primary) hypertension  Stable amlodipine, hydrochlorothiazide and Benicar..  Will continue to monitor.         Laryngeal squamous cell carcinoma (HCC)  Denies any dysphagia.  Continue to follow-up with ENT.         Postoperative anemia  Improved.  Will continue to monitor.         Encounter for immunization    Orders:    influenza vaccine, high-dose, PF 0.5 mL (Fluzone High Dose)    Mixed hyperlipidemia  Stable on atorvastatin 10 mg daily.  Continue same.  Will continue to monitor.         Medicare annual wellness visit, subsequent  It was discussed about immunizations, diet, exercise and safety measures.            Preventive health issues were discussed with patient, and age appropriate screening tests were ordered as noted in patient's After Visit Summary. Personalized health advice and appropriate referrals for health education or preventive services given if needed, as noted in patient's After Visit Summary.    History of Present Illness     He is here today for follow-up multiple medical problems.  He has been taking his medications.  Denies any side effect.  He had blood work done in August came back showing A1c elevated but improved from before.  All the rest of blood work came back stable.  He denies any complaint today.       Patient Care Team:  Bello Montanez,  MD as PCP - General (Family Medicine)  Bello Montanez MD as PCP - [facility]CP-[facility] ([facility])  Gisel Scott as Care Coordinator (Hematology and Oncology)  Areli Whelan RN as Nurse Navigator (Hematology and Oncology)  Bennett Butler MD (Otolaryngology)  Chela Ramirez RD (Nutrition)  Jeff Lenz MD (Radiation Oncology)    Review of Systems   Constitutional:  Negative for chills and fever.   HENT:  Negative for trouble swallowing.    Eyes:  Negative for visual disturbance.   Respiratory:  Negative for cough and shortness of breath.    Cardiovascular:  Negative for chest pain and palpitations.   Gastrointestinal:  Negative for abdominal pain, blood in stool and vomiting.   Endocrine: Negative for cold intolerance and heat intolerance.   Genitourinary:  Negative for difficulty urinating and dysuria.   Musculoskeletal:  Negative for gait problem.   Skin:  Negative for rash.   Neurological:  Negative for dizziness, syncope and headaches.   Hematological:  Negative for adenopathy.   Psychiatric/Behavioral:  Negative for behavioral problems.      Medical History Reviewed by provider this encounter:  Tobacco  Allergies  Meds  Problems  Med Hx  Surg Hx  Fam Hx       Annual Wellness Visit Questionnaire   Zhao is here for his Subsequent Wellness visit.     Health Risk Assessment:   Patient rates overall health as good. Patient feels that their physical health rating is same. Patient is satisfied with their life. Eyesight was rated as same. Hearing was rated as same. Patient feels that their emotional and mental health rating is same. Patients states they are never, rarely angry. Patient states they are never, rarely unusually tired/fatigued. Pain experienced in the last 7 days has been some. Patient's pain rating has been 4/10. Patient states that he has experienced no weight loss or gain in last 6 months.     Depression Screening:   PHQ-2 Score: 1      Fall Risk Screening:   In  the past year, patient has experienced: no history of falling in past year      Home Safety:  Patient has trouble with stairs inside or outside of their home. Patient has working smoke alarms and has no working carbon monoxide detector. Home safety hazards include: none.     Nutrition:   Current diet is Diabetic.     Medications:   Patient is currently taking over-the-counter supplements. OTC medications include: see medication list. Patient is able to manage medications.     Activities of Daily Living (ADLs)/Instrumental Activities of Daily Living (IADLs):   Walk and transfer into and out of bed and chair?: Yes  Dress and groom yourself?: Yes    Bathe or shower yourself?: Yes    Feed yourself? Yes  Do your laundry/housekeeping?: Yes  Manage your money, pay your bills and track your expenses?: Yes  Make your own meals?: Yes    Do your own shopping?: Yes    Previous Hospitalizations:   Any hospitalizations or ED visits within the last 12 months?: Yes      Advance Care Planning:   Living will: No    Durable POA for healthcare: No    Advanced directive: No      Cognitive Screening:   Provider or family/friend/caregiver concerned regarding cognition?: No    PREVENTIVE SCREENINGS      Cardiovascular Screening:    General: Screening Not Indicated and History Lipid Disorder      Diabetes Screening:     General: Screening Not Indicated and History Diabetes      Colorectal Cancer Screening:     General: Screening Not Indicated      Prostate Cancer Screening:    General: Screening Not Indicated      Osteoporosis Screening:    General: Screening Not Indicated and History Osteoporosis      Abdominal Aortic Aneurysm (AAA) Screening:    Risk factors include: tobacco use        General: Risks and Benefits Discussed      Lung Cancer Screening:     General: Screening Not Indicated      Hepatitis C Screening:    General: Risks and Benefits Discussed    Screening, Brief Intervention, and Referral to Treatment  "(SBIRT)    Screening  Typical number of drinks in a day: 0  Typical number of drinks in a week: 0  Interpretation: Low risk drinking behavior.    Brief Intervention  Alcohol & drug use screenings were reviewed. No concerns regarding substance use disorder identified.     Annual Depression Screening  Time spent screening and evaluating the patient for depression during today's encounter was 7 minutes.    Other Counseling Topics:   Calcium and vitamin D intake and regular weightbearing exercise.     Social Determinants of Health     Food Insecurity: No Food Insecurity (3/6/2024)    Nursing - Inadequate Food Risk Classification     Worried About Running Out of Food in the Last Year: Never true     Ran Out of Food in the Last Year: Never true   Transportation Needs: No Transportation Needs (3/6/2024)    PRAPARE - Transportation     Lack of Transportation (Medical): No     Lack of Transportation (Non-Medical): No   Housing Stability: Low Risk  (3/6/2024)    Housing Stability Vital Sign     Unable to Pay for Housing in the Last Year: No     Number of Times Moved in the Last Year: 1     Homeless in the Last Year: No   Utilities: Not At Risk (3/6/2024)    [facility] Utilities     Threatened with loss of utilities: No     No results found.    Objective     /84 (BP Location: Left arm, Patient Position: Sitting, Cuff Size: Large)   Pulse 81   Temp 99.1 °F (37.3 °C) (Tympanic)   Resp 16   Ht 5' 9\" (1.753 m)   Wt 93.4 kg (205 lb 12.8 oz)   SpO2 96%   BMI 30.39 kg/m²     Physical Exam  Vitals and nursing note reviewed.   Constitutional:       General: He is not in acute distress.     Appearance: He is well-developed.   HENT:      Head: Normocephalic and atraumatic.   Eyes:      Conjunctiva/sclera: Conjunctivae normal.      Pupils: Pupils are equal, round, and reactive to light.   Cardiovascular:      Rate and Rhythm: Normal rate and regular rhythm.      Heart sounds: Normal heart sounds. No murmur heard.  Pulmonary:      " Effort: Pulmonary effort is normal. No respiratory distress.      Breath sounds: Normal breath sounds.   Abdominal:      General: Bowel sounds are normal.      Palpations: Abdomen is soft.      Tenderness: There is no abdominal tenderness.   Musculoskeletal:         General: No swelling.      Cervical back: Normal range of motion and neck supple.   Lymphadenopathy:      Cervical: No cervical adenopathy.   Skin:     General: Skin is warm and dry.      Capillary Refill: Capillary refill takes less than 2 seconds.      Findings: No rash.   Neurological:      Mental Status: He is alert and oriented to person, place, and time.   Psychiatric:         Mood and Affect: Mood normal.

## 2024-10-23 DIAGNOSIS — I10 HTN (HYPERTENSION), BENIGN: ICD-10-CM

## 2024-10-23 DIAGNOSIS — E78.5 HYPERLIPIDEMIA, UNSPECIFIED HYPERLIPIDEMIA TYPE: ICD-10-CM

## 2024-10-24 RX ORDER — OLMESARTAN MEDOXOMIL 40 MG/1
40 TABLET ORAL DAILY
Qty: 90 TABLET | Refills: 1 | Status: SHIPPED | OUTPATIENT
Start: 2024-10-24

## 2024-10-24 RX ORDER — ATORVASTATIN CALCIUM 10 MG/1
10 TABLET, FILM COATED ORAL
Qty: 90 TABLET | Refills: 1 | Status: SHIPPED | OUTPATIENT
Start: 2024-10-24

## 2024-11-20 PROBLEM — Z00.00 MEDICARE ANNUAL WELLNESS VISIT, SUBSEQUENT: Status: RESOLVED | Noted: 2023-10-18 | Resolved: 2024-11-20

## 2025-01-23 DIAGNOSIS — G89.4 CHRONIC PAIN SYNDROME: ICD-10-CM

## 2025-01-23 RX ORDER — GABAPENTIN 100 MG/1
CAPSULE ORAL
Qty: 180 CAPSULE | Refills: 0 | Status: SHIPPED | OUTPATIENT
Start: 2025-01-23

## 2025-02-25 DIAGNOSIS — N40.0 BENIGN PROSTATIC HYPERPLASIA, UNSPECIFIED WHETHER LOWER URINARY TRACT SYMPTOMS PRESENT: ICD-10-CM

## 2025-02-25 DIAGNOSIS — E11.8 TYPE 2 DIABETES MELLITUS WITH COMPLICATION (HCC): ICD-10-CM

## 2025-02-25 DIAGNOSIS — K21.9 GASTROESOPHAGEAL REFLUX DISEASE WITHOUT ESOPHAGITIS: ICD-10-CM

## 2025-02-25 DIAGNOSIS — I10 HYPERTENSION, UNSPECIFIED TYPE: ICD-10-CM

## 2025-02-26 RX ORDER — AMLODIPINE BESYLATE 10 MG/1
10 TABLET ORAL DAILY
Qty: 90 TABLET | Refills: 0 | Status: SHIPPED | OUTPATIENT
Start: 2025-02-26

## 2025-02-26 RX ORDER — TAMSULOSIN HYDROCHLORIDE 0.4 MG/1
CAPSULE ORAL
Qty: 90 CAPSULE | Refills: 0 | Status: SHIPPED | OUTPATIENT
Start: 2025-02-26

## 2025-02-26 RX ORDER — PANTOPRAZOLE SODIUM 40 MG/1
40 TABLET, DELAYED RELEASE ORAL DAILY
Qty: 90 TABLET | Refills: 0 | Status: SHIPPED | OUTPATIENT
Start: 2025-02-26

## 2025-03-12 ENCOUNTER — APPOINTMENT (OUTPATIENT)
Dept: LAB | Facility: IMAGING CENTER | Age: 85
End: 2025-03-12
Payer: COMMERCIAL

## 2025-03-12 DIAGNOSIS — E11.8 TYPE 2 DIABETES MELLITUS WITH COMPLICATION (HCC): ICD-10-CM

## 2025-03-12 LAB
ALBUMIN SERPL BCG-MCNC: 4.5 G/DL (ref 3.5–5)
ALP SERPL-CCNC: 77 U/L (ref 34–104)
ALT SERPL W P-5'-P-CCNC: 16 U/L (ref 7–52)
ANION GAP SERPL CALCULATED.3IONS-SCNC: 12 MMOL/L (ref 4–13)
AST SERPL W P-5'-P-CCNC: 17 U/L (ref 5–45)
BASOPHILS # BLD AUTO: 0.05 THOUSANDS/ÂΜL (ref 0–0.1)
BASOPHILS NFR BLD AUTO: 1 % (ref 0–1)
BILIRUB SERPL-MCNC: 0.59 MG/DL (ref 0.2–1)
BUN SERPL-MCNC: 18 MG/DL (ref 5–25)
CALCIUM SERPL-MCNC: 9.2 MG/DL (ref 8.4–10.2)
CHLORIDE SERPL-SCNC: 102 MMOL/L (ref 96–108)
CHOLEST SERPL-MCNC: 108 MG/DL (ref ?–200)
CO2 SERPL-SCNC: 27 MMOL/L (ref 21–32)
CREAT SERPL-MCNC: 1.1 MG/DL (ref 0.6–1.3)
EOSINOPHIL # BLD AUTO: 0.08 THOUSAND/ÂΜL (ref 0–0.61)
EOSINOPHIL NFR BLD AUTO: 1 % (ref 0–6)
ERYTHROCYTE [DISTWIDTH] IN BLOOD BY AUTOMATED COUNT: 13.2 % (ref 11.6–15.1)
GLUCOSE P FAST SERPL-MCNC: 216 MG/DL (ref 65–99)
HCT VFR BLD AUTO: 41.2 % (ref 36.5–46.1)
HDLC SERPL-MCNC: 38 MG/DL
HGB BLD-MCNC: 13.7 G/DL (ref 12–15.4)
IMM GRANULOCYTES # BLD AUTO: 0.02 THOUSAND/UL (ref 0–0.2)
IMM GRANULOCYTES NFR BLD AUTO: 0 % (ref 0–2)
LDLC SERPL CALC-MCNC: 43 MG/DL (ref 0–100)
LYMPHOCYTES # BLD AUTO: 2.41 THOUSANDS/ÂΜL (ref 0.6–4.47)
LYMPHOCYTES NFR BLD AUTO: 34 % (ref 14–44)
MCH RBC QN AUTO: 31.7 PG (ref 26.8–34.3)
MCHC RBC AUTO-ENTMCNC: 33.3 G/DL (ref 31.4–37.4)
MCV RBC AUTO: 95 FL (ref 82–98)
MONOCYTES # BLD AUTO: 0.64 THOUSAND/ÂΜL (ref 0.17–1.22)
MONOCYTES NFR BLD AUTO: 9 % (ref 4–12)
NEUTROPHILS # BLD AUTO: 3.83 THOUSANDS/ÂΜL (ref 1.85–7.62)
NEUTS SEG NFR BLD AUTO: 55 % (ref 43–75)
NRBC BLD AUTO-RTO: 0 /100 WBCS
PLATELET # BLD AUTO: 248 THOUSANDS/UL (ref 149–390)
PMV BLD AUTO: 11.3 FL (ref 8.9–12.7)
POTASSIUM SERPL-SCNC: 4.2 MMOL/L (ref 3.5–5.3)
PROT SERPL-MCNC: 6.7 G/DL (ref 6.4–8.4)
RBC # BLD AUTO: 4.32 MILLION/UL (ref 3.88–5.12)
SODIUM SERPL-SCNC: 141 MMOL/L (ref 135–147)
T4 FREE SERPL-MCNC: 0.98 NG/DL (ref 0.61–1.12)
TRIGL SERPL-MCNC: 134 MG/DL (ref ?–150)
TSH SERPL DL<=0.05 MIU/L-ACNC: 5.3 UIU/ML (ref 0.45–4.5)
WBC # BLD AUTO: 7.03 THOUSAND/UL (ref 4.31–10.16)

## 2025-03-12 PROCEDURE — 80053 COMPREHEN METABOLIC PANEL: CPT

## 2025-03-12 PROCEDURE — 85025 COMPLETE CBC W/AUTO DIFF WBC: CPT

## 2025-03-12 PROCEDURE — 84443 ASSAY THYROID STIM HORMONE: CPT

## 2025-03-12 PROCEDURE — 84439 ASSAY OF FREE THYROXINE: CPT

## 2025-03-12 PROCEDURE — 80061 LIPID PANEL: CPT

## 2025-03-12 PROCEDURE — 36415 COLL VENOUS BLD VENIPUNCTURE: CPT

## 2025-03-12 PROCEDURE — 83036 HEMOGLOBIN GLYCOSYLATED A1C: CPT

## 2025-03-13 ENCOUNTER — RESULTS FOLLOW-UP (OUTPATIENT)
Dept: FAMILY MEDICINE CLINIC | Facility: CLINIC | Age: 85
End: 2025-03-13

## 2025-03-13 LAB
EST. AVERAGE GLUCOSE BLD GHB EST-MCNC: 163 MG/DL
HBA1C MFR BLD: 7.3 %

## 2025-03-24 DIAGNOSIS — E11.8 TYPE 2 DIABETES MELLITUS WITH COMPLICATION (HCC): ICD-10-CM

## 2025-04-19 DIAGNOSIS — G89.4 CHRONIC PAIN SYNDROME: ICD-10-CM

## 2025-04-19 DIAGNOSIS — I10 HTN (HYPERTENSION), BENIGN: ICD-10-CM

## 2025-04-19 DIAGNOSIS — E78.5 HYPERLIPIDEMIA, UNSPECIFIED HYPERLIPIDEMIA TYPE: ICD-10-CM

## 2025-04-20 RX ORDER — GABAPENTIN 100 MG/1
100 CAPSULE ORAL 2 TIMES DAILY
Qty: 180 CAPSULE | Refills: 0 | Status: SHIPPED | OUTPATIENT
Start: 2025-04-20 | End: 2025-04-28 | Stop reason: SDUPTHER

## 2025-04-21 RX ORDER — ATORVASTATIN CALCIUM 10 MG/1
10 TABLET, FILM COATED ORAL
Qty: 90 TABLET | Refills: 1 | Status: SHIPPED | OUTPATIENT
Start: 2025-04-21

## 2025-04-21 RX ORDER — OLMESARTAN MEDOXOMIL 40 MG/1
40 TABLET ORAL DAILY
Qty: 90 TABLET | Refills: 1 | Status: SHIPPED | OUTPATIENT
Start: 2025-04-21

## 2025-04-21 NOTE — TELEPHONE ENCOUNTER
Spoke with patient he is not getting around too well.  He thought his family doctor prescribed the Gabapentin.  He sees him Monday.  I told him to see what he says about taking over refills.  Patient denies any current back issues. He knows he would have to be seen for future refills.  He will call us next week.

## 2025-04-28 ENCOUNTER — OFFICE VISIT (OUTPATIENT)
Dept: FAMILY MEDICINE CLINIC | Facility: CLINIC | Age: 85
End: 2025-04-28
Payer: COMMERCIAL

## 2025-04-28 VITALS
HEART RATE: 91 BPM | HEIGHT: 69 IN | SYSTOLIC BLOOD PRESSURE: 138 MMHG | TEMPERATURE: 97.6 F | RESPIRATION RATE: 16 BRPM | BODY MASS INDEX: 30.51 KG/M2 | DIASTOLIC BLOOD PRESSURE: 72 MMHG | OXYGEN SATURATION: 96 % | WEIGHT: 206 LBS

## 2025-04-28 DIAGNOSIS — R09.82 POST-NASAL DRAINAGE: ICD-10-CM

## 2025-04-28 DIAGNOSIS — E11.42 TYPE 2 DIABETES MELLITUS WITH DIABETIC POLYNEUROPATHY, UNSPECIFIED WHETHER LONG TERM INSULIN USE (HCC): Primary | ICD-10-CM

## 2025-04-28 DIAGNOSIS — K21.9 GASTROESOPHAGEAL REFLUX DISEASE WITHOUT ESOPHAGITIS: ICD-10-CM

## 2025-04-28 DIAGNOSIS — C32.9 LARYNGEAL SQUAMOUS CELL CARCINOMA (HCC): ICD-10-CM

## 2025-04-28 DIAGNOSIS — G89.4 CHRONIC PAIN SYNDROME: ICD-10-CM

## 2025-04-28 DIAGNOSIS — I10 ESSENTIAL (PRIMARY) HYPERTENSION: ICD-10-CM

## 2025-04-28 PROCEDURE — 99214 OFFICE O/P EST MOD 30 MIN: CPT | Performed by: FAMILY MEDICINE

## 2025-04-28 RX ORDER — PREDNISONE 50 MG/1
50 TABLET ORAL DAILY
Qty: 5 TABLET | Refills: 0 | Status: SHIPPED | OUTPATIENT
Start: 2025-04-28 | End: 2025-05-03

## 2025-04-28 RX ORDER — GABAPENTIN 100 MG/1
100 CAPSULE ORAL 2 TIMES DAILY
Qty: 180 CAPSULE | Refills: 1 | Status: SHIPPED | OUTPATIENT
Start: 2025-04-28

## 2025-04-28 NOTE — PROGRESS NOTES
Name: Zhao Walls      : 1940      MRN: [id number]  Encounter Provider: Bello Montanez MD  Encounter Date: 2025   Encounter department: [facility]  :  Assessment & Plan  Type 2 diabetes mellitus with diabetic polyneuropathy, unspecified whether long term insulin use (HCC)  Elevated A1c.  Discussed about low-carb diet.  Continue to monitor fasting glucose and A1c.  Lab Results   Component Value Date    HGBA1C 7.3 (H) 2025     Orders:  •  Hemoglobin A1C; Future  •  Comprehensive metabolic panel; Future  •  CBC and differential; Future  •  Lipid Panel with Direct LDL reflex; Future  •  TSH, 3rd generation with Free T4 reflex; Future    Post-nasal drainage  He was given prescription for prednisone.  Discussed possible side effect.  He was told to take Flonase after he is done with his prednisone.  Increase oral hydration and use a humidifier.  Orders:  •  predniSONE 50 mg tablet; Take 1 tablet (50 mg total) by mouth daily for 5 days    Chronic pain syndrome  He was given refill for gabapentin.  Can follow-up with pain management.  Orders:  •  gabapentin (NEURONTIN) 100 mg capsule; Take 1 capsule (100 mg total) by mouth 2 (two) times a day    Essential (primary) hypertension  Stable amlodipine and Benicar..  Will continue to monitor.         Gastroesophageal reflux disease without esophagitis  -stable    -continue PPI       Laryngeal squamous cell carcinoma (HCC)  Denies any dysphagia.  Continue to follow-up with ENT.                History of Present Illness   Is here today for follow-up multiple medical problems.  He has been taking his medication.  Denies any side effect of his medications.  He complained of having postnasal drip.  He denies any shortness of breath.  Recent blood work showed elevated A1c at 7.3.    Hyperlipidemia  Pertinent negatives include no chest pain or shortness of breath.   Diabetes  Pertinent negatives for hypoglycemia include no  "dizziness or headaches. Pertinent negatives for diabetes include no chest pain.     Review of Systems   Constitutional:  Negative for chills and fever.   HENT:  Positive for postnasal drip. Negative for trouble swallowing.    Eyes:  Negative for visual disturbance.   Respiratory:  Negative for cough and shortness of breath.    Cardiovascular:  Negative for chest pain and palpitations.   Gastrointestinal:  Negative for abdominal pain, blood in stool and vomiting.   Endocrine: Negative for cold intolerance and heat intolerance.   Genitourinary:  Negative for difficulty urinating and dysuria.   Musculoskeletal:  Negative for gait problem.   Skin:  Negative for rash.   Neurological:  Negative for dizziness, syncope and headaches.   Hematological:  Negative for adenopathy.   Psychiatric/Behavioral:  Negative for behavioral problems.        Objective   /72 (BP Location: Left arm, Patient Position: Sitting, Cuff Size: Adult)   Pulse 91   Temp 97.6 °F (36.4 °C) (Tympanic)   Resp 16   Ht 5' 9\" (1.753 m)   Wt 93.4 kg (206 lb)   SpO2 96%   BMI 30.42 kg/m²      Physical Exam  Vitals and nursing note reviewed.   Constitutional:       Appearance: He is well-developed.   HENT:      Head: Normocephalic and atraumatic.      Mouth/Throat:      Pharynx: Posterior oropharyngeal erythema present.   Eyes:      Pupils: Pupils are equal, round, and reactive to light.   Cardiovascular:      Rate and Rhythm: Normal rate. Rhythm irregular.   Pulmonary:      Effort: Pulmonary effort is normal.      Breath sounds: Normal breath sounds.   Abdominal:      General: Bowel sounds are normal.      Palpations: Abdomen is soft.   Musculoskeletal:      Cervical back: Normal range of motion and neck supple.   Lymphadenopathy:      Cervical: No cervical adenopathy.   Skin:     General: Skin is warm.      Findings: No rash.   Neurological:      Mental Status: He is alert and oriented to person, place, and time.         "

## 2025-04-28 NOTE — ASSESSMENT & PLAN NOTE
Elevated A1c.  Discussed about low-carb diet.  Continue to monitor fasting glucose and A1c.  Lab Results   Component Value Date    HGBA1C 7.3 (H) 03/12/2025     Orders:  •  Hemoglobin A1C; Future  •  Comprehensive metabolic panel; Future  •  CBC and differential; Future  •  Lipid Panel with Direct LDL reflex; Future  •  TSH, 3rd generation with Free T4 reflex; Future

## 2025-04-28 NOTE — ASSESSMENT & PLAN NOTE
He was given refill for gabapentin.  Can follow-up with pain management.  Orders:  •  gabapentin (NEURONTIN) 100 mg capsule; Take 1 capsule (100 mg total) by mouth 2 (two) times a day   Differential Diagnosis For right-sided abdominal pain differential diagnosis includes complication from endoscopy yesterday rule out perforation as well as possible cholecystitis symptomatic cholelithiasis right-sided diverticulitis renal colic unlikely appendicitis.

## 2025-04-28 NOTE — ASSESSMENT & PLAN NOTE
He was given prescription for prednisone.  Discussed possible side effect.  He was told to take Flonase after he is done with his prednisone.  Increase oral hydration and use a humidifier.  Orders:  •  predniSONE 50 mg tablet; Take 1 tablet (50 mg total) by mouth daily for 5 days

## 2025-05-18 DIAGNOSIS — N40.0 BENIGN PROSTATIC HYPERPLASIA, UNSPECIFIED WHETHER LOWER URINARY TRACT SYMPTOMS PRESENT: ICD-10-CM

## 2025-05-18 RX ORDER — TAMSULOSIN HYDROCHLORIDE 0.4 MG/1
CAPSULE ORAL
Qty: 90 CAPSULE | Refills: 1 | Status: SHIPPED | OUTPATIENT
Start: 2025-05-18

## 2025-05-23 DIAGNOSIS — K21.9 GASTROESOPHAGEAL REFLUX DISEASE WITHOUT ESOPHAGITIS: ICD-10-CM

## 2025-05-23 DIAGNOSIS — I10 HYPERTENSION, UNSPECIFIED TYPE: ICD-10-CM

## 2025-05-23 RX ORDER — AMLODIPINE BESYLATE 10 MG/1
10 TABLET ORAL DAILY
Qty: 90 TABLET | Refills: 1 | Status: SHIPPED | OUTPATIENT
Start: 2025-05-23

## 2025-05-23 RX ORDER — PANTOPRAZOLE SODIUM 40 MG/1
40 TABLET, DELAYED RELEASE ORAL DAILY
Qty: 90 TABLET | Refills: 1 | Status: SHIPPED | OUTPATIENT
Start: 2025-05-23

## (undated) DEVICE — TELFA NON-ADHERENT ABSORBENT DRESSING: Brand: TELFA

## (undated) DEVICE — SUT ETHILON 2-0 FSLX 30 IN 1674H

## (undated) DEVICE — GLOVE INDICATOR PI UNDERGLOVE SZ 8.5 BLUE

## (undated) DEVICE — DRESSING MEPILEX AG BORDER 4 X 4 IN

## (undated) DEVICE — 4.2MM THREE-FLUTED DRILL BIT QC/330MM/100MM CALIB-STERILE

## (undated) DEVICE — 2.5MM REAMING ROD WITH BALL TIP/950MM-STERILE

## (undated) DEVICE — STERILE ORIF HIP PACK: Brand: CARDINAL HEALTH

## (undated) DEVICE — GLOVE SRG BIOGEL 7

## (undated) DEVICE — ACE WRAP 6 IN UNSTERILE

## (undated) DEVICE — TUBING SUCTION 5MM X 12 FT

## (undated) DEVICE — 3.2MM GUIDE WIRE 400MM

## (undated) DEVICE — 6617 IOBAN II PATIENT ISOLATION DRAPE 5/BX,4BX/CS: Brand: STERI-DRAPE™ IOBAN™ 2

## (undated) DEVICE — SUT VICRYL PLUS 2-0 CTB-1 27 IN VCPB259H

## (undated) DEVICE — ARTHROSCOPY FLOOR MAT

## (undated) DEVICE — GLOVE SRG BIOGEL 8

## (undated) DEVICE — SKN PRP WNG SPNGE PVP SCRB STR: Brand: MEDLINE INDUSTRIES, INC.

## (undated) DEVICE — CHLORAPREP HI-LITE 26ML ORANGE

## (undated) DEVICE — INTENDED FOR TISSUE SEPARATION, AND OTHER PROCEDURES THAT REQUIRE A SHARP SURGICAL BLADE TO PUNCTURE OR CUT.: Brand: BARD-PARKER SAFETY BLADES SIZE 10, STERILE

## (undated) DEVICE — BETHLEHEM UNIVERSAL OUTPATIENT: Brand: CARDINAL HEALTH

## (undated) DEVICE — SUT VICRYL PLUS 1 CTB-1 36 IN VCPB947H

## (undated) DEVICE — NEEDLE 18 G X 1 1/2 SAFETY